# Patient Record
Sex: FEMALE | Race: WHITE | NOT HISPANIC OR LATINO | Employment: OTHER | URBAN - METROPOLITAN AREA
[De-identification: names, ages, dates, MRNs, and addresses within clinical notes are randomized per-mention and may not be internally consistent; named-entity substitution may affect disease eponyms.]

---

## 2017-02-24 ENCOUNTER — TELEPHONE (OUTPATIENT)
Dept: INTERNAL MEDICINE | Facility: CLINIC | Age: 67
End: 2017-02-24

## 2017-02-24 DIAGNOSIS — F32.0 MAJOR DEPRESSIVE DISORDER, SINGLE EPISODE, MILD (H): ICD-10-CM

## 2017-02-24 NOTE — TELEPHONE ENCOUNTER
Pt still has refills left on her prescription so she is going to call her mail order pharmacy and get this straightened out.

## 2017-02-24 NOTE — TELEPHONE ENCOUNTER
Reason for Call:  Other prescription    Detailed comments: Patient is requesting Dr Barry to refill 1 month supply of Bupropian, while she is in [place]. Will see MD in April when she comes home. Any question please call her.    Phone Number Patient can be reached at: Cell number on file:    Telephone Information:   Mobile [phone]       Best Time: Anytime    Can we leave a detailed message on this number? YES    Call taken on 2/24/2017 at 8:50 AM by MARCI NAVARRO

## 2017-04-25 ENCOUNTER — TELEPHONE (OUTPATIENT)
Dept: INTERNAL MEDICINE | Facility: CLINIC | Age: 67
End: 2017-04-25

## 2017-04-25 NOTE — TELEPHONE ENCOUNTER
Patient calling.  In October 2016, bilateral foot numbness started mostly in toes.  At office visit on 11/28/2016 started on Neurontin.  Since then she has only been taking 1 capsule at bedtime d/t drowsiness and only experiencing numbness at night.  Now experiencing bilateral foot numbness mostly in toes all day.  Reports does not experience any drowsiness now.  Patient wondering if she can increase the frequency of the medication.  Please advise.

## 2017-04-25 NOTE — TELEPHONE ENCOUNTER
Reason for call: Other   Patient called regarding (reason for call): wants to talk to nurse about foot pain, and see if she needs to see dr, or do any labs, now that she is back from [place]  Additional comments: Please call patient to discuss this with her    Phone Number Pt can be reached at: Home number on file [phone] (home)  Best Time: anytime  Can we leave a detailed message on this number? YES

## 2017-04-25 NOTE — TELEPHONE ENCOUNTER
If tolerates can take up to 600mg po TID, may need f/u appt if no resolve, higher dose may increase drowsiness.

## 2017-06-01 DIAGNOSIS — Z13.6 CARDIOVASCULAR SCREENING; LDL GOAL LESS THAN 100: ICD-10-CM

## 2017-06-01 DIAGNOSIS — F32.0 MAJOR DEPRESSIVE DISORDER, SINGLE EPISODE, MILD (H): ICD-10-CM

## 2017-06-01 DIAGNOSIS — K21.9 GASTROESOPHAGEAL REFLUX DISEASE WITHOUT ESOPHAGITIS: ICD-10-CM

## 2017-06-01 DIAGNOSIS — E11.9 TYPE 2 DIABETES MELLITUS WITHOUT COMPLICATION, WITHOUT LONG-TERM CURRENT USE OF INSULIN (H): ICD-10-CM

## 2017-06-01 DIAGNOSIS — R20.9 DISTURBANCE OF SKIN SENSATION: ICD-10-CM

## 2017-06-01 DIAGNOSIS — I10 ESSENTIAL HYPERTENSION, BENIGN: ICD-10-CM

## 2017-06-01 RX ORDER — BUPROPION HYDROCHLORIDE 150 MG/1
150 TABLET ORAL EVERY MORNING
Qty: 90 TABLET | Refills: 3 | Status: SHIPPED | OUTPATIENT
Start: 2017-06-01 | End: 2018-04-26

## 2017-06-01 RX ORDER — VALSARTAN 160 MG/1
160 TABLET ORAL DAILY
Qty: 90 TABLET | Refills: 3 | Status: SHIPPED | OUTPATIENT
Start: 2017-06-01 | End: 2017-06-12

## 2017-06-01 RX ORDER — SIMVASTATIN 20 MG
20 TABLET ORAL AT BEDTIME
Qty: 90 TABLET | Refills: 3 | Status: SHIPPED | OUTPATIENT
Start: 2017-06-01 | End: 2018-04-26

## 2017-06-01 RX ORDER — CITALOPRAM HYDROBROMIDE 20 MG/1
20 TABLET ORAL DAILY
Qty: 90 TABLET | Refills: 3 | Status: SHIPPED | OUTPATIENT
Start: 2017-06-01 | End: 2018-04-26

## 2017-06-01 NOTE — TELEPHONE ENCOUNTER
Reason for Call:  Other prescription    Detailed comments: Please fax all of the pt medications to [facility] to get them refilled.     Phone Number Patient can be reached at: Home number on file [phone] (home)    Best Time: asap    Can we leave a detailed message on this number? YES    Call taken on 6/1/2017 at 8:17 AM by Sonia Guan

## 2017-06-01 NOTE — TELEPHONE ENCOUNTER
Pt is due for office visit and labs.  Called and pt is scheduled to see you Monday.  Ok to fill all meds to mail order with 90 day supply now?  Pt says she is having no issues.

## 2017-06-05 ENCOUNTER — OFFICE VISIT (OUTPATIENT)
Dept: INTERNAL MEDICINE | Facility: CLINIC | Age: 67
End: 2017-06-05
Payer: MEDICARE

## 2017-06-05 VITALS
HEART RATE: 102 BPM | HEIGHT: 67 IN | SYSTOLIC BLOOD PRESSURE: 136 MMHG | DIASTOLIC BLOOD PRESSURE: 84 MMHG | TEMPERATURE: 98.4 F | WEIGHT: 218.3 LBS | OXYGEN SATURATION: 99 % | BODY MASS INDEX: 34.26 KG/M2

## 2017-06-05 DIAGNOSIS — F32.0 MAJOR DEPRESSIVE DISORDER, SINGLE EPISODE, MILD (H): ICD-10-CM

## 2017-06-05 DIAGNOSIS — E11.9 TYPE 2 DIABETES MELLITUS WITHOUT COMPLICATION, WITHOUT LONG-TERM CURRENT USE OF INSULIN (H): Primary | ICD-10-CM

## 2017-06-05 DIAGNOSIS — I10 ESSENTIAL HYPERTENSION, BENIGN: ICD-10-CM

## 2017-06-05 DIAGNOSIS — R20.9 DISTURBANCE OF SKIN SENSATION: ICD-10-CM

## 2017-06-05 DIAGNOSIS — Z71.89 ACP (ADVANCE CARE PLANNING): ICD-10-CM

## 2017-06-05 DIAGNOSIS — Z13.6 CARDIOVASCULAR SCREENING; LDL GOAL LESS THAN 100: ICD-10-CM

## 2017-06-05 LAB
ALBUMIN SERPL-MCNC: 3.6 G/DL (ref 3.4–5)
ALP SERPL-CCNC: 99 U/L (ref 40–150)
ALT SERPL W P-5'-P-CCNC: 24 U/L (ref 0–50)
ANION GAP SERPL CALCULATED.3IONS-SCNC: 8 MMOL/L (ref 3–14)
AST SERPL W P-5'-P-CCNC: 17 U/L (ref 0–45)
BILIRUB SERPL-MCNC: 0.6 MG/DL (ref 0.2–1.3)
BUN SERPL-MCNC: 10 MG/DL (ref 7–30)
CALCIUM SERPL-MCNC: 8.9 MG/DL (ref 8.5–10.1)
CHLORIDE SERPL-SCNC: 107 MMOL/L (ref 94–109)
CHOLEST SERPL-MCNC: 137 MG/DL
CO2 SERPL-SCNC: 28 MMOL/L (ref 20–32)
CREAT SERPL-MCNC: 0.61 MG/DL (ref 0.52–1.04)
GFR SERPL CREATININE-BSD FRML MDRD: ABNORMAL ML/MIN/1.7M2
GLUCOSE SERPL-MCNC: 129 MG/DL (ref 70–99)
HBA1C MFR BLD: 5.8 % (ref 4.3–6)
HDLC SERPL-MCNC: 57 MG/DL
LDLC SERPL CALC-MCNC: 62 MG/DL
NONHDLC SERPL-MCNC: 80 MG/DL
POTASSIUM SERPL-SCNC: 4.7 MMOL/L (ref 3.4–5.3)
PROT SERPL-MCNC: 7.3 G/DL (ref 6.8–8.8)
SODIUM SERPL-SCNC: 143 MMOL/L (ref 133–144)
TRIGL SERPL-MCNC: 88 MG/DL

## 2017-06-05 PROCEDURE — 36415 COLL VENOUS BLD VENIPUNCTURE: CPT | Performed by: INTERNAL MEDICINE

## 2017-06-05 PROCEDURE — 80053 COMPREHEN METABOLIC PANEL: CPT | Performed by: INTERNAL MEDICINE

## 2017-06-05 PROCEDURE — 80061 LIPID PANEL: CPT | Performed by: INTERNAL MEDICINE

## 2017-06-05 PROCEDURE — 99214 OFFICE O/P EST MOD 30 MIN: CPT | Performed by: INTERNAL MEDICINE

## 2017-06-05 PROCEDURE — 83036 HEMOGLOBIN GLYCOSYLATED A1C: CPT | Performed by: INTERNAL MEDICINE

## 2017-06-05 RX ORDER — GABAPENTIN 300 MG/1
CAPSULE ORAL
Qty: 270 CAPSULE | Refills: 3 | Status: SHIPPED | OUTPATIENT
Start: 2017-06-05 | End: 2018-04-26

## 2017-06-05 NOTE — NURSING NOTE
"Chief Complaint   Patient presents with     Recheck Medication       Initial /84  Pulse 102  Temp 98.4  F (36.9  C) (Oral)  Ht 5' 7\" (1.702 m)  Wt 218 lb 4.8 oz (99 kg)  SpO2 99%  BMI 34.19 kg/m2 Estimated body mass index is 34.19 kg/(m^2) as calculated from the following:    Height as of this encounter: 5' 7\" (1.702 m).    Weight as of this encounter: 218 lb 4.8 oz (99 kg).  Medication Reconciliation: complete   Raquel Leal CMA      "

## 2017-06-05 NOTE — PROGRESS NOTES
SUBJECTIVE:                                                    Diane Gaitan is a 67 year old female who presents to clinic today for the following health issues:    Diabetes Follow-up      Patient is checking blood sugars: not at all    Diabetic concerns: None     Symptoms of hypoglycemia (low blood sugar): none     Paresthesias (numbness or burning in feet) or sores: Yes- numbness, using Gabapentin     Date of last diabetic eye exam: UTD     Hyperlipidemia Follow-Up      Rate your low fat/cholesterol diet?: fair    Taking statin?  Yes, no muscle aches from statin    Other lipid medications/supplements?:  none     Hypertension Follow-up      Outpatient blood pressures are not being checked.    Low Salt Diet: low salt       Depression Followup    Status since last visit: Stable     See PHQ-9 for current symptoms.  Other associated symptoms: None    Complicating factors:   Significant life event:  No   Current substance abuse:  None  Anxiety or Panic symptoms:  No    PHQ-9  English PHQ-9   Any Language            Amount of exercise or physical activity: 2-3 days/week for an average of 45-60 minutes    Problems taking medications regularly: No    Medication side effects: none    Diet: low fat/cholesterol and diabetic    Other concerns:  1. Worsening neuropathy- using gabapentin 300 tid with good results     Problem list and histories reviewed & adjusted, as indicated.  Additional history: as documented    Patient Active Problem List   Diagnosis     Essential hypertension, benign     CARDIOVASCULAR SCREENING; LDL GOAL LESS THAN 100     ACP (advance care planning)     S/P total knee replacement     Osteoarthrosis, unspecified whether generalized or localized, involving lower leg     Acute posthemorrhagic anemia     Major depressive disorder, single episode, mild (H)     Gastroesophageal reflux disease without esophagitis     Type 2 diabetes mellitus without complication, without long-term current use of insulin  (H)     Past Surgical History:   Procedure Laterality Date     ARTHROPLASTY KNEE  10/18/2012    Procedure: ARTHROPLASTY KNEE;  RIGHT TOTAL KNEE ARTHROPLASTY ([employer]PHEW)^ ;  Surgeon: Howard Charles MD;  Location: [facility]     BREAST BIOPSY, RT/LT  1988    breast biopsy     C NONSPECIFIC PROCEDURE  10/30/96    skin tags removed     C NONSPECIFIC PROCEDURE      colonic polyps     COLONOSCOPY N/A 7/14/2016    Procedure: COLONOSCOPY;  Surgeon: Curt Patel MD;  Location:  GI     HYSTERECTOMY, PAP NO LONGER INDICATED  1986    abdominal hysterectomy       Social History   Substance Use Topics     Smoking status: Former Smoker     Packs/day: 1.00     Years: 12.00     Quit date: 10/18/1980     Smokeless tobacco: Never Used     Alcohol use Yes      Comment: OCCASIONAL     Family History   Problem Relation Age of Onset     Breast Cancer Sister      Colon Cancer Sister      Breast Cancer Daughter      Hypertension Daughter          Current Outpatient Prescriptions   Medication Sig Dispense Refill     buPROPion (WELLBUTRIN XL) 150 MG 24 hr tablet Take 1 tablet (150 mg) by mouth every morning 90 tablet 3     valsartan (DIOVAN) 160 MG tablet Take 1 tablet (160 mg) by mouth daily 90 tablet 3     citalopram (CELEXA) 20 MG tablet Take 1 tablet (20 mg) by mouth daily 90 tablet 3     metFORMIN (GLUCOPHAGE) 500 MG tablet Take 1 tablet (500 mg) by mouth 2 times daily (with meals) 180 tablet 3     simvastatin (ZOCOR) 20 MG tablet Take 1 tablet (20 mg) by mouth At Bedtime 90 tablet 3     omeprazole (PRILOSEC) 20 MG CR capsule Take 1 capsule (20 mg) by mouth daily 90 capsule 3     gabapentin (NEURONTIN) 300 MG capsule Take 1 tablet (300 mg) every night for 1-3 days, then 1 tablet twice daily for 1-3 days, then 1 tablet three times daily 90 capsule 5     Acetaminophen (TYLENOL PO) Take 1,000 mg by mouth every 6 hours as needed.         aspirin 81 MG tablet Take 1 tablet by mouth daily. 90 tablet 3     CALCIUM + D OR  "2 tablets daily       Allergies   Allergen Reactions     Lisinopril Cough     COUGH     Recent Labs   Lab Test  09/15/16   0651  04/25/16   0914  09/17/15   1035  01/19/15   0940  01/08/15   0806   06/21/13   0742   A1C  5.9  6.0  6.2*   --   6.1*   --   5.8   LDL   --   61   --    --   51   --   61   HDL   --   63   --    --   49*   --   58   TRIG   --   146   --    --   182*   --   155*   ALT  33   --    --   33   --    --   34   CR  0.70   --   0.79   --   0.73   --   0.65   GFRESTIMATED  83   --   73   --   80   --   >90   GFRESTBLACK  >90   GFR Calc     --   88   --   >90   GFR Calc     --   >90   POTASSIUM  4.1   --   4.1   --   4.3   --   3.9   TSH  1.59   --    --   1.17   --    < >   --     < > = values in this interval not displayed.      BP Readings from Last 3 Encounters:   11/28/16 128/80   09/15/16 124/74   07/14/16 131/72    Wt Readings from Last 3 Encounters:   11/28/16 211 lb 14.4 oz (96.1 kg)   09/15/16 209 lb 14.4 oz (95.2 kg)   07/14/16 210 lb (95.3 kg)           Labs reviewed in EPIC    Reviewed and updated as needed this visit by clinical staff  Tobacco  Allergies  Med Hx  Surg Hx  Fam Hx  Soc Hx      Reviewed and updated as needed this visit by Provider         ROS:  C: NEGATIVE for fever, chills, change in weight  E/M: NEGATIVE for ear, mouth and throat problems  R: NEGATIVE for significant cough or SOB  CV: NEGATIVE for chest pain, palpitations or peripheral edema  GI: NEGATIVE for nausea, abdominal pain, heartburn, or change in bowel habits  : NEGATIVE for frequency, dysuria, or hematuria  M: NEGATIVE for significant arthralgias or myalgia  H: NEGATIVE for bleeding problems  P: NEGATIVE for changes in mood or affect    OBJECTIVE:                                                    /84  Pulse 102  Temp 98.4  F (36.9  C) (Oral)  Ht 5' 7\" (1.702 m)  Wt 218 lb 4.8 oz (99 kg)  SpO2 99%  BMI 34.19 kg/m2  There is no height or weight on file to " calculate BMI.  GENERAL: alert and no distress  EYES: Eyes grossly normal to inspection, extraocular movements - intact, and PERRL  HENT: ear canals- normal; TMs- normal; Nose- normal; Mouth- no ulcers, no lesions  NECK: no tenderness, no adenopathy, no asymmetry, no masses, no stiffness; thyroid- normal to palpation  RESP: lungs clear to auscultation - no rales, no rhonchi, no wheezes  CV: regular rates and rhythm, normal S1 S2, no S3 or S4 and no click or rub   ABDOMEN: soft, no tenderness, no  hepatosplenomegaly, no masses, normal bowel sounds  MS: extremities- no gross deformities noted  PSYCH: Alert and oriented times 3; speech- coherent , normal rate and volume; able to articulate logical thoughts, able to abstract reason, no tangential thoughts, no hallucinations or delusions, affect- normal     ASSESSMENT/PLAN:                                                      (E11.9) Type 2 diabetes mellitus without complication, without long-term current use of insulin (H)  (primary encounter diagnosis)  Comment: labs as fsting today, stable on therapy, no changes  Plan: Hemoglobin A1c, gabapentin (NEURONTIN) 300 MG         capsule, Comprehensive metabolic panel            (I10) Essential hypertension, benign  Comment: at goal on therapy  Plan: Comprehensive metabolic panel            (F32.0) Major depressive disorder, single episode, mild (H)  Comment: stable per PHQ 9 score 0  Plan:     (Z13.6) CARDIOVASCULAR SCREENING; LDL GOAL LESS THAN 100  Comment:   LDL Cholesterol Calculated   Date Value Ref Range Status   04/25/2016 61 <100 mg/dL Final     Comment:     Desirable:       <100 mg/dl   ]  Plan: Lipid Profile        At goal, labs fasting    (R20.9) Disturbance of skin sensation  Comment: stable on TID dosing   Plan: gabapentin (NEURONTIN) 300 MG capsule            (Z71.89) ACP (advance care planning)  Comment:   Plan: noted, advised        See Patient Instructions    Jac Barry MD  [facility]  [place]EL    THE MEDICATION LIST HAS BEEN FULLY RECONCILED BY THE M[facility]. AND THE NURSING STAFF.  25 minutes spent with this patient, face to face, discussing treatment options for listed problems above as well as side effects of appropriate medications.  Counseling time extended beyond 50% of the clinic visit.  Medication dosing, treatment plan and follow-up were discussed. Also reviewed need for primary care testing for patient.

## 2017-06-05 NOTE — LETTER
[facility]  [address]  [address]      June 5, 2017      Diane Gaitan  [address] [facility] [address]          Dear Diane,      I have enclosed a copy of your most recent labs done here at the clinic and if available some of your prior labs for comparison.     I am pleased to inform you that your routine blood work including your sodium, potassium, calcium, kidney and liver function tests are all normal.    Your Hemoglobin A1C and blood sugar tests look good and I would continue with your medication without change.  These tests should be repeated in 6 months.    Your cholesterol looks good and I would not change anything at this point but would repeat your labs in 12 months.    Please call me if you have further questions.        Jac Barry MD

## 2017-06-05 NOTE — MR AVS SNAPSHOT
After Visit Summary   6/5/2017    Diane Gaitan    MRN: [id number]           Patient Information     Date Of Birth          1950        Visit Information        Provider Department      6/5/2017 8:00 AM Jac Barry MD [facility]        Today's Diagnoses     Type 2 diabetes mellitus without complication, without long-term current use of insulin (H)    -  1    Essential hypertension, benign        Major depressive disorder, single episode, mild (H)        CARDIOVASCULAR SCREENING; LDL GOAL LESS THAN 100        Disturbance of skin sensation        ACP (advance care planning)           Follow-ups after your visit        Follow-up notes from your care team     Return in about 6 months (around 12/5/2017), or if symptoms worsen or fail to improve, for BP Recheck, Lab Work.      Who to contact     If you have questions or need follow up information about today's clinic visit or your schedule please contact [facility] directly at [phone].  Normal or non-critical lab and imaging results will be communicated to you by ShotCliphart, letter or phone within 4 business days after the clinic has received the results. If you do not hear from us within 7 days, please contact the clinic through St[employer] or phone. If you have a critical or abnormal lab result, we will notify you by phone as soon as possible.  Submit refill requests through [employer] or call your pharmacy and they will forward the refill request to us. Please allow 3 business days for your refill to be completed.          Additional Information About Your Visit        MyChart Information     [employer] gives you secure access to your electronic health record. If you see a primary care provider, you can also send messages to your care team and make appointments. If you have questions, please call your primary care clinic.  If you do not have a primary care provider, please call [phone] and they  "will assist you.        Care EveryWhere ID     This is your Care EveryWhere ID. This could be used by other organizations to access your [facility] medical records  [id number]        Your Vitals Were     Pulse Temperature Height Pulse Oximetry BMI (Body Mass Index)       102 98.4  F (36.9  C) (Oral) 5' 7\" (1.702 m) 99% 34.19 kg/m2        Blood Pressure from Last 3 Encounters:   06/05/17 136/84   11/28/16 128/80   09/15/16 124/74    Weight from Last 3 Encounters:   06/05/17 218 lb 4.8 oz (99 kg)   11/28/16 211 lb 14.4 oz (96.1 kg)   09/15/16 209 lb 14.4 oz (95.2 kg)              We Performed the Following     Comprehensive metabolic panel     DEPRESSION ACTION PLAN (DAP)     Hemoglobin A1c     Lipid Profile          Today's Medication Changes          These changes are accurate as of: 6/5/17  8:21 AM.  If you have any questions, ask your nurse or doctor.               These medicines have changed or have updated prescriptions.        Dose/Directions    gabapentin 300 MG capsule   Commonly known as:  NEURONTIN   This may have changed:  additional instructions   Used for:  Type 2 diabetes mellitus without complication, without long-term current use of insulin (H), Disturbance of skin sensation   Changed by:  Jac Barry MD        1 tablet three times daily   Quantity:  270 capsule   Refills:  3            Where to get your medicines      These medications were sent to [facility] BY MAIL [facility]INDY CEDEÑO - [address]  [address] [place] [address]     Phone:  [phone]     gabapentin 300 MG capsule                Primary Care Provider Office Phone # Fax #    Jac Barry [phone]       [facility] [address] [facility] [address]        Thank you!     Thank you for choosing [facility]  for your care. Our goal is always to provide you with excellent care. Hearing back from our patients is one way we can continue to improve our " services. Please take a few minutes to complete the written survey that you may receive in the mail after your visit with us. Thank you!             Your Updated Medication List - Protect others around you: Learn how to safely use, store and throw away your medicines at www.disposemymeds.org.          This list is accurate as of: 6/5/17  8:21 AM.  Always use your most recent med list.                   Brand Name Dispense Instructions for use    aspirin 81 MG tablet     90 tablet    Take 1 tablet by mouth daily.       buPROPion 150 MG 24 hr tablet    WELLBUTRIN XL    90 tablet    Take 1 tablet (150 mg) by mouth every morning       CALCIUM + D PO      2 tablets daily       citalopram 20 MG tablet    celeXA    90 tablet    Take 1 tablet (20 mg) by mouth daily       gabapentin 300 MG capsule    NEURONTIN    270 capsule    1 tablet three times daily       metFORMIN 500 MG tablet    GLUCOPHAGE    180 tablet    Take 1 tablet (500 mg) by mouth 2 times daily (with meals)       omeprazole 20 MG CR capsule    priLOSEC    90 capsule    Take 1 capsule (20 mg) by mouth daily       simvastatin 20 MG tablet    ZOCOR    90 tablet    Take 1 tablet (20 mg) by mouth At Bedtime       TYLENOL PO      Take 1,000 mg by mouth every 6 hours as needed.       valsartan 160 MG tablet    DIOVAN    90 tablet    Take 1 tablet (160 mg) by mouth daily

## 2017-06-06 ASSESSMENT — PATIENT HEALTH QUESTIONNAIRE - PHQ9: SUM OF ALL RESPONSES TO PHQ QUESTIONS 1-9: 0

## 2017-06-12 ENCOUNTER — TELEPHONE (OUTPATIENT)
Dept: INTERNAL MEDICINE | Facility: CLINIC | Age: 67
End: 2017-06-12

## 2017-06-12 DIAGNOSIS — I10 ESSENTIAL HYPERTENSION, BENIGN: ICD-10-CM

## 2017-06-12 RX ORDER — VALSARTAN 160 MG/1
160 TABLET ORAL DAILY
Qty: 20 TABLET | Refills: 0 | Status: SHIPPED | OUTPATIENT
Start: 2017-06-12 | End: 2017-12-05

## 2017-06-12 NOTE — TELEPHONE ENCOUNTER
Pt is calling says that her mailorder pharmacy has not yet sent out her medications that were refilled by PCP on 6/1/17. She says that she has run out of Valsartan and is requesting about a 2 wk refill.   RX sent to requested [facility].

## 2017-07-11 ENCOUNTER — MYC MEDICAL ADVICE (OUTPATIENT)
Dept: INTERNAL MEDICINE | Facility: CLINIC | Age: 67
End: 2017-07-11

## 2017-08-30 ENCOUNTER — OFFICE VISIT (OUTPATIENT)
Dept: INTERNAL MEDICINE | Facility: CLINIC | Age: 67
End: 2017-08-30
Payer: MEDICARE

## 2017-08-30 VITALS
DIASTOLIC BLOOD PRESSURE: 70 MMHG | OXYGEN SATURATION: 98 % | SYSTOLIC BLOOD PRESSURE: 152 MMHG | TEMPERATURE: 99.2 F | BODY MASS INDEX: 34.89 KG/M2 | WEIGHT: 222.3 LBS | HEIGHT: 67 IN

## 2017-08-30 DIAGNOSIS — N63.0 LUMP OR MASS IN BREAST: ICD-10-CM

## 2017-08-30 DIAGNOSIS — N64.9 LESION OF BREAST: Primary | ICD-10-CM

## 2017-08-30 DIAGNOSIS — Z80.3 FAMILY HISTORY OF BREAST CANCER IN FIRST DEGREE RELATIVE: ICD-10-CM

## 2017-08-30 PROCEDURE — 99214 OFFICE O/P EST MOD 30 MIN: CPT | Performed by: INTERNAL MEDICINE

## 2017-08-30 NOTE — PROGRESS NOTES
"  SUBJECTIVE:                                                      HPI: Diane Gaitan is a pleasant 67 year old female who presents with a spot on her breast:    - right breast  - noticed ~1 week ago; no precipitating trauma  - was initially losing scant clear fluid, then started spotting blood  - asymptomatic - no pain or itching    - no associated lumps or bumps  - no fevers or chills  - no night sweats, unintentional weight loss, anorexia, or excessive fatigue    Last mammogram was in September, 2016 and was normal (other than scattered fibroglandular densities).    FH significant for:  - daughter with breast cancer diagnosed in her 40s  - sister with breast cancer diagnosed in her 60s  - niece (above sister's daughter) diagnosed with breast cancer in her 40s  - four maternal aunts with breast cancer  - paternal aunt with breast cancer    The medication, allergy, and problem lists have been reviewed and updated as appropriate.       OBJECTIVE:                                                      /70  Temp 99.2  F (37.3  C) (Oral)  Ht 5' 7\" (1.702 m)  Wt 222 lb 4.8 oz (100.8 kg)  SpO2 98%  BMI 34.82 kg/m2  Constitutional: well-appearing  Breasts: small scab with minimal surrounding mild erythema right areola at 9 o'clock; no masses or skin retraction; no nipple discharge or bleeding; no axillary lymphadenopathy      ASSESSMENT/PLAN:                                                      (N64.9) Lesion of breast  (primary encounter diagnosis)  (N63) Lump or mass in breast  (Z80.3) Family history of breast cancer in a first degree relative  Comment:    - skin lesion/scab right areola at 9 o'clock.   - suspect benign lesion healing appropriately, but family history is concerning.  Plan: right-sided diagnostic mammogram and ultrasound.    The instructions on the AVS were discussed and explained to the patient. Patient expressed understanding of instructions.    (Chart documentation was completed, in part, " with Dragon voice-recognition software. Even though reviewed, some grammatical, spelling, and word errors may remain.)    Marian Farris MD   [address] [address]  T: [phone], F: [phone]

## 2017-08-30 NOTE — NURSING NOTE
"Chief Complaint   Patient presents with     Derm Problem     R breast        Initial /70  Temp 99.2  F (37.3  C) (Oral)  Ht 5' 7\" (1.702 m)  Wt 222 lb 4.8 oz (100.8 kg)  SpO2 98%  BMI 34.82 kg/m2 Estimated body mass index is 34.82 kg/(m^2) as calculated from the following:    Height as of this encounter: 5' 7\" (1.702 m).    Weight as of this encounter: 222 lb 4.8 oz (100.8 kg).  Medication Reconciliation: complete   Jeana Arthur MA   "

## 2017-08-30 NOTE — MR AVS SNAPSHOT
After Visit Summary   8/30/2017    Diane Gaitan    MRN: [id number]           Patient Information     Date Of Birth          1950        Visit Information        Provider Department      8/30/2017 11:00 AM Marian Farris MD [facility]        Today's Diagnoses     Lesion of breast    -  1    Lump or mass in breast          Care Instructions    Please schedule right mammogram and ultrasound on your way out.           Follow-ups after your visit        Future tests that were ordered for you today     Open Future Orders        Priority Expected Expires Ordered    MA Diagnostic Digital Right Routine  8/30/2018 8/30/2017    US Breast Right Limited 1-3 Quadrants Routine  8/30/2018 8/30/2017            Who to contact     If you have questions or need follow up information about today's clinic visit or your schedule please contact [facility] directly at [phone].  Normal or non-critical lab and imaging results will be communicated to you by MyChart, letter or phone within 4 business days after the clinic has received the results. If you do not hear from us within 7 days, please contact the clinic through Arideashart or phone. If you have a critical or abnormal lab result, we will notify you by phone as soon as possible.  Submit refill requests through Happy Days or call your pharmacy and they will forward the refill request to us. Please allow 3 business days for your refill to be completed.          Additional Information About Your Visit        MyChart Information     Happy Days gives you secure access to your electronic health record. If you see a primary care provider, you can also send messages to your care team and make appointments. If you have questions, please call your primary care clinic.  If you do not have a primary care provider, please call [phone] and they will assist you.        Care EveryWhere ID     This is your Care EveryWhere ID.  "This could be used by other organizations to access your [facility] medical records  [id number]        Your Vitals Were     Temperature Height Pulse Oximetry BMI (Body Mass Index)          99.2  F (37.3  C) (Oral) 5' 7\" (1.702 m) 98% 34.82 kg/m2         Blood Pressure from Last 3 Encounters:   08/30/17 152/70   06/05/17 136/84   11/28/16 128/80    Weight from Last 3 Encounters:   08/30/17 222 lb 4.8 oz (100.8 kg)   06/05/17 218 lb 4.8 oz (99 kg)   11/28/16 211 lb 14.4 oz (96.1 kg)               Primary Care Provider Office Phone # Fax #    Jac Barry [phone]       [address] [place] [phone]        Equal Access to Services     [facility]: Hadii alexx mayorga hadasho Soomaali, waaxda luqadaha, qaybta kaalmada adeegyada, waxnicole rivera hayamie yang . So [facility] [phone].    ATENCIÓN: Si habla español, tiene a christina disposición servicios gratuitos de asistencia lingüística. Ingrid al [phone].    We comply with applicable federal civil rights laws and [place] laws. We do not discriminate on the basis of race, color, national origin, age, disability sex, sexual orientation or gender identity.            Thank you!     Thank you for choosing [facility]  for your care. Our goal is always to provide you with excellent care. Hearing back from our patients is one way we can continue to improve our services. Please take a few minutes to complete the written survey that you may receive in the mail after your visit with us. Thank you!             Your Updated Medication List - Protect others around you: Learn how to safely use, store and throw away your medicines at www.disposemymeds.org.          This list is accurate as of: 8/30/17 11:08 AM.  Always use your most recent med list.                   Brand Name Dispense Instructions for use Diagnosis    aspirin 81 MG tablet     90 tablet    Take 1 tablet by mouth daily.    Type 2 diabetes, HbA1c goal < " 7% (H)       buPROPion 150 MG 24 hr tablet    WELLBUTRIN XL    90 tablet    Take 1 tablet (150 mg) by mouth every morning    Major depressive disorder, single episode, mild (H)       CALCIUM + D PO      2 tablets daily        citalopram 20 MG tablet    celeXA    90 tablet    Take 1 tablet (20 mg) by mouth daily    Major depressive disorder, single episode, mild (H)       gabapentin 300 MG capsule    NEURONTIN    270 capsule    1 tablet three times daily    Type 2 diabetes mellitus without complication, without long-term current use of insulin (H), Disturbance of skin sensation       metFORMIN 500 MG tablet    GLUCOPHAGE    180 tablet    Take 1 tablet (500 mg) by mouth 2 times daily (with meals)    Type 2 diabetes mellitus without complication, without long-term current use of insulin (H)       omeprazole 20 MG CR capsule    priLOSEC    90 capsule    Take 1 capsule (20 mg) by mouth daily    Gastroesophageal reflux disease without esophagitis       simvastatin 20 MG tablet    ZOCOR    90 tablet    Take 1 tablet (20 mg) by mouth At Bedtime    CARDIOVASCULAR SCREENING; LDL GOAL LESS THAN 100, Type 2 diabetes mellitus without complication, without long-term current use of insulin (H)       TYLENOL PO      Take 1,000 mg by mouth every 6 hours as needed.        valsartan 160 MG tablet    DIOVAN    20 tablet    Take 1 tablet (160 mg) by mouth daily    Essential hypertension, benign

## 2017-09-06 ENCOUNTER — HOSPITAL ENCOUNTER (OUTPATIENT)
Dept: MAMMOGRAPHY | Facility: CLINIC | Age: 67
End: 2017-09-06
Attending: INTERNAL MEDICINE
Payer: MEDICARE

## 2017-09-06 ENCOUNTER — HOSPITAL ENCOUNTER (OUTPATIENT)
Dept: MAMMOGRAPHY | Facility: CLINIC | Age: 67
Discharge: HOME | End: 2017-09-06
Attending: INTERNAL MEDICINE | Admitting: INTERNAL MEDICINE
Payer: MEDICARE

## 2017-09-06 DIAGNOSIS — N64.9 LESION OF BREAST: ICD-10-CM

## 2017-09-06 DIAGNOSIS — N63.0 LUMP OR MASS IN BREAST: ICD-10-CM

## 2017-09-06 PROCEDURE — 76642 ULTRASOUND BREAST LIMITED: CPT | Mod: RT

## 2017-09-06 PROCEDURE — G0204 DX MAMMO INCL CAD BI: HCPCS

## 2017-11-07 ENCOUNTER — OFFICE VISIT (OUTPATIENT)
Dept: INTERNAL MEDICINE | Facility: CLINIC | Age: 67
End: 2017-11-07
Payer: MEDICARE

## 2017-11-07 VITALS
SYSTOLIC BLOOD PRESSURE: 134 MMHG | TEMPERATURE: 98.1 F | OXYGEN SATURATION: 98 % | BODY MASS INDEX: 35.05 KG/M2 | HEIGHT: 67 IN | HEART RATE: 102 BPM | WEIGHT: 223.3 LBS | DIASTOLIC BLOOD PRESSURE: 74 MMHG

## 2017-11-07 DIAGNOSIS — I10 ESSENTIAL HYPERTENSION, BENIGN: ICD-10-CM

## 2017-11-07 DIAGNOSIS — E11.9 TYPE 2 DIABETES MELLITUS WITHOUT COMPLICATION, WITHOUT LONG-TERM CURRENT USE OF INSULIN (H): Primary | ICD-10-CM

## 2017-11-07 DIAGNOSIS — F32.0 MAJOR DEPRESSIVE DISORDER, SINGLE EPISODE, MILD (H): ICD-10-CM

## 2017-11-07 DIAGNOSIS — Z23 NEED FOR PROPHYLACTIC VACCINATION AND INOCULATION AGAINST INFLUENZA: ICD-10-CM

## 2017-11-07 LAB
CREAT UR-MCNC: 51 MG/DL
HBA1C MFR BLD: 6.3 % (ref 4.3–6)
MICROALBUMIN UR-MCNC: <5 MG/L
MICROALBUMIN/CREAT UR: NORMAL MG/G CR (ref 0–25)
TSH SERPL DL<=0.005 MIU/L-ACNC: 1.23 MU/L (ref 0.4–4)

## 2017-11-07 PROCEDURE — 99214 OFFICE O/P EST MOD 30 MIN: CPT | Performed by: INTERNAL MEDICINE

## 2017-11-07 PROCEDURE — G0008 ADMIN INFLUENZA VIRUS VAC: HCPCS | Performed by: INTERNAL MEDICINE

## 2017-11-07 PROCEDURE — 84443 ASSAY THYROID STIM HORMONE: CPT | Performed by: INTERNAL MEDICINE

## 2017-11-07 PROCEDURE — 36415 COLL VENOUS BLD VENIPUNCTURE: CPT | Performed by: INTERNAL MEDICINE

## 2017-11-07 PROCEDURE — 83036 HEMOGLOBIN GLYCOSYLATED A1C: CPT | Performed by: INTERNAL MEDICINE

## 2017-11-07 PROCEDURE — 82043 UR ALBUMIN QUANTITATIVE: CPT | Performed by: INTERNAL MEDICINE

## 2017-11-07 PROCEDURE — 90662 IIV NO PRSV INCREASED AG IM: CPT | Performed by: INTERNAL MEDICINE

## 2017-11-07 ASSESSMENT — PATIENT HEALTH QUESTIONNAIRE - PHQ9: SUM OF ALL RESPONSES TO PHQ QUESTIONS 1-9: 0

## 2017-11-07 NOTE — NURSING NOTE
"Chief Complaint   Patient presents with     Diabetes     Extremity Weakness       Initial /84  Pulse 102  Temp 98.1  F (36.7  C) (Oral)  Ht 5' 7\" (1.702 m)  Wt 223 lb 4.8 oz (101.3 kg)  SpO2 98%  BMI 34.97 kg/m2 Estimated body mass index is 34.97 kg/(m^2) as calculated from the following:    Height as of this encounter: 5' 7\" (1.702 m).    Weight as of this encounter: 223 lb 4.8 oz (101.3 kg).  Medication Reconciliation: complete   Raquel Leal, MARIA A      "

## 2017-11-07 NOTE — MR AVS SNAPSHOT
After Visit Summary   11/7/2017    Diane Gaitan    MRN: [id number]           Patient Information     Date Of Birth          1950        Visit Information        Provider Department      11/7/2017 8:00 AM Jac Barry MD [facility]        Today's Diagnoses     Type 2 diabetes mellitus without complication, without long-term current use of insulin (H)    -  1    Essential hypertension, benign        Major depressive disorder, single episode, mild (H)           Follow-ups after your visit        Follow-up notes from your care team     Return in about 6 months (around 5/7/2018), or if symptoms worsen or fail to improve.      Who to contact     If you have questions or need follow up information about today's clinic visit or your schedule please contact [facility] directly at [phone].  Normal or non-critical lab and imaging results will be communicated to you by CitizenShipperhart, letter or phone within 4 business days after the clinic has received the results. If you do not hear from us within 7 days, please contact the clinic through CitizenShipperhart or phone. If you have a critical or abnormal lab result, we will notify you by phone as soon as possible.  Submit refill requests through Ozmott or call your pharmacy and they will forward the refill request to us. Please allow 3 business days for your refill to be completed.          Additional Information About Your Visit        MyChart Information     Ozmott gives you secure access to your electronic health record. If you see a primary care provider, you can also send messages to your care team and make appointments. If you have questions, please call your primary care clinic.  If you do not have a primary care provider, please call [phone] and they will assist you.        Care EveryWhere ID     This is your Care EveryWhere ID. This could be used by other organizations to access your [facility]  "records  [id number]        Your Vitals Were     Pulse Temperature Height Pulse Oximetry BMI (Body Mass Index)       102 98.1  F (36.7  C) (Oral) 5' 7\" (1.702 m) 98% 34.97 kg/m2        Blood Pressure from Last 3 Encounters:   11/07/17 134/74   08/30/17 152/70   06/05/17 136/84    Weight from Last 3 Encounters:   11/07/17 223 lb 4.8 oz (101.3 kg)   08/30/17 222 lb 4.8 oz (100.8 kg)   06/05/17 218 lb 4.8 oz (99 kg)              We Performed the Following     Albumin Random Urine Quantitative with Creat Ratio     Hemoglobin A1c     TSH with free T4 reflex        Primary Care Provider Office Phone # Fax #    Jac Barry [phone]       [address] [place] [phone]        Equal Access to Services     [facility]LEGARIO SCHRADER : Hadii aad ku hadasho Soomaali, waaxda luqadaha, qaybta kaalmada adeegyada, waxay jeancarlosin hayiwonan star yang . So [facility] [phone].    ATENCIÓN: Si habla español, tiene a christina disposición servicios gratuitos de asistencia lingüística. Ingrid al [phone].    We comply with applicable federal civil rights laws and [place] laws. We do not discriminate on the basis of race, color, national origin, age, disability, sex, sexual orientation, or gender identity.            Thank you!     Thank you for choosing [facility]  for your care. Our goal is always to provide you with excellent care. Hearing back from our patients is one way we can continue to improve our services. Please take a few minutes to complete the written survey that you may receive in the mail after your visit with us. Thank you!             Your Updated Medication List - Protect others around you: Learn how to safely use, store and throw away your medicines at www.disposemymeds.org.          This list is accurate as of: 11/7/17  8:19 AM.  Always use your most recent med list.                   Brand Name Dispense Instructions for use Diagnosis    aspirin 81 MG tablet     90 " tablet    Take 1 tablet by mouth daily.    Type 2 diabetes, HbA1c goal < 7% (H)       buPROPion 150 MG 24 hr tablet    WELLBUTRIN XL    90 tablet    Take 1 tablet (150 mg) by mouth every morning    Major depressive disorder, single episode, mild (H)       CALCIUM + D PO      2 tablets daily        citalopram 20 MG tablet    celeXA    90 tablet    Take 1 tablet (20 mg) by mouth daily    Major depressive disorder, single episode, mild (H)       gabapentin 300 MG capsule    NEURONTIN    270 capsule    1 tablet three times daily    Type 2 diabetes mellitus without complication, without long-term current use of insulin (H), Disturbance of skin sensation       metFORMIN 500 MG tablet    GLUCOPHAGE    180 tablet    Take 1 tablet (500 mg) by mouth 2 times daily (with meals)    Type 2 diabetes mellitus without complication, without long-term current use of insulin (H)       omeprazole 20 MG CR capsule    priLOSEC    90 capsule    Take 1 capsule (20 mg) by mouth daily    Gastroesophageal reflux disease without esophagitis       simvastatin 20 MG tablet    ZOCOR    90 tablet    Take 1 tablet (20 mg) by mouth At Bedtime    CARDIOVASCULAR SCREENING; LDL GOAL LESS THAN 100, Type 2 diabetes mellitus without complication, without long-term current use of insulin (H)       TYLENOL PO      Take 1,000 mg by mouth every 6 hours as needed.        valsartan 160 MG tablet    DIOVAN    20 tablet    Take 1 tablet (160 mg) by mouth daily    Essential hypertension, benign

## 2017-11-07 NOTE — LETTER
[facility]  [address] [id number] [phone]      11/7/2017       Diane Gaitan  [address] [place] [phone]        Dear Diane,    Your Hemoglobin A1C is stable although slightly more abnormal and indicates your blood sugars could be better controlled with better diet and exercise.  Please follow-up in the clinic to repeat these labs in 6 months for comparison.    Your thyroid function tests look good and thus I would not change anything at this point.    Sincerely,      Jac Barry MD  Internal Medicine

## 2017-11-07 NOTE — PROGRESS NOTES
SUBJECTIVE:   Diane Gaitan is a 67 year old female who presents to clinic today for the following health issues:      Diabetes Follow-up      Patient is checking blood sugars: not at all    Diabetic concerns: None     Symptoms of hypoglycemia (low blood sugar): none     Paresthesias (numbness or burning in feet) or sores: Yes- numbness and burning. Using gabapentin      Date of last diabetic eye exam: 4/2017        Amount of exercise or physical activity: 6-7 days/week for an average of less than 15 minutes    Problems taking medications regularly: No    Medication side effects: none    Diet: regular (no restrictions)    Other concerns:  1. Bilateral leg weakness, worse with prolonged walking. Using cane intermittently. Discussed with patient as intermittent, no pain, did contact Orthopedist, no other focal changes, unsure of BS at time as does not check, discussed and advised close observation.      Problem list and histories reviewed & adjusted, as indicated.  Additional history: as documented    Patient Active Problem List   Diagnosis     Essential hypertension, benign     CARDIOVASCULAR SCREENING; LDL GOAL LESS THAN 100     ACP (advance care planning)     S/P total knee replacement     Osteoarthrosis, unspecified whether generalized or localized, involving lower leg     Acute posthemorrhagic anemia     Major depressive disorder, single episode, mild (H)     Gastroesophageal reflux disease without esophagitis     Type 2 diabetes mellitus without complication, without long-term current use of insulin (H)     Past Surgical History:   Procedure Laterality Date     ARTHROPLASTY KNEE  10/18/2012    Procedure: ARTHROPLASTY KNEE;  RIGHT TOTAL KNEE ARTHROPLASTY (SMITH & NEPHEW)^ ;  Surgeon: Howard Charles MD;  Location: [facility] OR     BREAST BIOPSY, RT/LT  1988    breast biopsy     C NONSPECIFIC PROCEDURE  10/30/96    skin tags removed     C NONSPECIFIC PROCEDURE      colonic polyps     COLONOSCOPY N/A  7/14/2016    Procedure: COLONOSCOPY;  Surgeon: Curt Patel MD;  Location:  GI     HYSTERECTOMY, PAP NO LONGER INDICATED  1986    abdominal hysterectomy       Social History   Substance Use Topics     Smoking status: Former Smoker     Packs/day: 1.00     Years: 12.00     Quit date: 10/18/1980     Smokeless tobacco: Never Used     Alcohol use Yes      Comment: OCCASIONAL     Family History   Problem Relation Age of Onset     Breast Cancer Sister      Colon Cancer Sister      Breast Cancer Daughter      Hypertension Daughter          Current Outpatient Prescriptions   Medication Sig Dispense Refill     valsartan (DIOVAN) 160 MG tablet Take 1 tablet (160 mg) by mouth daily 20 tablet 0     gabapentin (NEURONTIN) 300 MG capsule 1 tablet three times daily 270 capsule 3     buPROPion (WELLBUTRIN XL) 150 MG 24 hr tablet Take 1 tablet (150 mg) by mouth every morning 90 tablet 3     citalopram (CELEXA) 20 MG tablet Take 1 tablet (20 mg) by mouth daily 90 tablet 3     metFORMIN (GLUCOPHAGE) 500 MG tablet Take 1 tablet (500 mg) by mouth 2 times daily (with meals) 180 tablet 3     simvastatin (ZOCOR) 20 MG tablet Take 1 tablet (20 mg) by mouth At Bedtime 90 tablet 3     omeprazole (PRILOSEC) 20 MG CR capsule Take 1 capsule (20 mg) by mouth daily 90 capsule 3     Acetaminophen (TYLENOL PO) Take 1,000 mg by mouth every 6 hours as needed.         aspirin 81 MG tablet Take 1 tablet by mouth daily. 90 tablet 3     CALCIUM + D OR 2 tablets daily       Allergies   Allergen Reactions     Lisinopril Cough     COUGH     Recent Labs   Lab Test  06/05/17   0819  09/15/16   0651  04/25/16   0914   01/19/15   0940  01/08/15   0806   A1C  5.8  5.9  6.0   < >   --   6.1*   LDL  62   --   61   --    --   51   HDL  57   --   63   --    --   49*   TRIG  88   --   146   --    --   182*   ALT  24  33   --    --   33   --    CR  0.61  0.70   --    < >   --   0.73   GFRESTIMATED  >90  Non  GFR Calc    83   --    < >   --    "80   GFRESTBLACK  >90   GFR Calc    >90   GFR Calc     --    < >   --   >90   GFR Calc     POTASSIUM  4.7  4.1   --    < >   --   4.3   TSH   --   1.59   --    --   1.17   --     < > = values in this interval not displayed.      BP Readings from Last 3 Encounters:   11/07/17 144/84   08/30/17 152/70   06/05/17 136/84    Wt Readings from Last 3 Encounters:   11/07/17 223 lb 4.8 oz (101.3 kg)   08/30/17 222 lb 4.8 oz (100.8 kg)   06/05/17 218 lb 4.8 oz (99 kg)            Labs reviewed in EPIC      Reviewed and updated as needed this visit by clinical staffTobacco  Allergies  Med Hx  Surg Hx  Fam Hx  Soc Hx      Reviewed and updated as needed this visit by Provider         ROS:  C: NEGATIVE for fever, chills, change in weight  E/M: NEGATIVE for ear, mouth and throat problems  R: NEGATIVE for significant cough or SOB  CV: NEGATIVE for chest pain, palpitations or peripheral edema  GI: NEGATIVE for nausea, abdominal pain, heartburn, or change in bowel habits  : NEGATIVE for frequency, dysuria, or hematuria  M: NEGATIVE for significant arthralgias or myalgia  N: NEGATIVE for weakness, dizziness or paresthesias  H: NEGATIVE for bleeding problems  P: NEGATIVE for changes in mood or affect    OBJECTIVE:                                                    /74  Pulse 102  Temp 98.1  F (36.7  C) (Oral)  Ht 5' 7\" (1.702 m)  Wt 223 lb 4.8 oz (101.3 kg)  SpO2 98%  BMI 34.97 kg/m2  Body mass index is 34.97 kg/(m^2).  GENERAL: alert and no distress  EYES: Eyes grossly normal to inspection, extraocular movements - intact, and PERRL  HENT: ear canals- normal; TMs- normal; Nose- normal; Mouth- no ulcers, no lesions  NECK: no tenderness, no adenopathy, no asymmetry, no masses, no stiffness; thyroid- normal to palpation  RESP: lungs clear to auscultation - no rales, no rhonchi, no wheezes  CV: regular rates and rhythm, normal S1 S2, no S3 or S4 and no murmur, no click or " rub -  MS: extremities- no gross deformities noted, no edema  NEURO: strength and tone- normal, sensory exam- grossly normal, mentation- intact, speech- normal, reflexes- symmetric  PSYCH: Alert and oriented times 3; speech- coherent , normal rate and volume; able to articulate logical thoughts, able to abstract reason, no tangential thoughts, no hallucinations or delusions, affect- normal       ASSESSMENT/PLAN:                                                      (E11.9) Type 2 diabetes mellitus without complication, without long-term current use of insulin (H)  (primary encounter diagnosis)  Comment: stable on therapy, labs as fasting  Plan: Hemoglobin A1c, TSH with free T4 reflex,         Albumin Random Urine Quantitative with Creat         Ratio        advised better compliance with Accu checks    (I10) Essential hypertension, benign  Comment: stable on therapy  Plan:     (F32.0) Major depressive disorder, single episode, mild (H)  Comment: stable per PHQ 9 score 0  Plan:       See Patient Instructions    Jac Barry MD  [facility]    25 minutes spent with this patient, face to face, discussing treatment options for listed problems above as well as side effects of appropriate medications.  Counseling time extended beyond 50% of the clinic visit.  Medication dosing, treatment plan and follow-up were discussed. Also reviewed need for primary care testing for patient.

## 2017-12-05 ENCOUNTER — TELEPHONE (OUTPATIENT)
Dept: INTERNAL MEDICINE | Facility: CLINIC | Age: 67
End: 2017-12-05

## 2017-12-05 DIAGNOSIS — I10 ESSENTIAL HYPERTENSION, BENIGN: ICD-10-CM

## 2017-12-05 RX ORDER — VALSARTAN 160 MG/1
160 TABLET ORAL DAILY
Qty: 90 TABLET | Refills: 3 | Status: SHIPPED | OUTPATIENT
Start: 2017-12-05 | End: 2018-09-24

## 2017-12-05 NOTE — TELEPHONE ENCOUNTER
Reason for Call:  Medication or medication refill:    Do you use a [facility]?  Name of the pharmacy and phone number for the current request:  [facility] [address] [place] [address] - [phone]    Name of the medication requested: valsartan    Other request: [facility]  [facility] isn't able to fill the valsartan, so the pt is requesting that Dr Barry write a new script for [facility].    Can we leave a detailed message on this number? YES    Phone number patient can be reached at: Home number on file [phone] (home)    Best Time: anytime    Call taken on 12/5/2017 at 9:44 AM by DAINA CHAPPELL

## 2017-12-05 NOTE — TELEPHONE ENCOUNTER
valsartan (DIOVAN) 160 MG tablet  Last Written Prescription Date: 6/12/17  Last Fill Quantity: 20, # refills: 0  Last Office Visit with G, [facility] or [facility] prescribing provider: 11/7/17       Potassium   Date Value Ref Range Status   06/05/2017 4.7 3.4 - 5.3 mmol/L Final     Creatinine   Date Value Ref Range Status   06/05/2017 0.61 0.52 - 1.04 mg/dL Final     BP Readings from Last 3 Encounters:   11/07/17 134/74   08/30/17 152/70   06/05/17 136/84

## 2018-04-26 ENCOUNTER — OFFICE VISIT (OUTPATIENT)
Dept: INTERNAL MEDICINE | Facility: CLINIC | Age: 68
End: 2018-04-26
Payer: MEDICARE

## 2018-04-26 VITALS
OXYGEN SATURATION: 97 % | BODY MASS INDEX: 33.79 KG/M2 | DIASTOLIC BLOOD PRESSURE: 76 MMHG | HEIGHT: 67 IN | SYSTOLIC BLOOD PRESSURE: 136 MMHG | TEMPERATURE: 98.4 F | RESPIRATION RATE: 16 BRPM | HEART RATE: 105 BPM | WEIGHT: 215.3 LBS

## 2018-04-26 DIAGNOSIS — E11.9 TYPE 2 DIABETES MELLITUS WITHOUT COMPLICATION, WITHOUT LONG-TERM CURRENT USE OF INSULIN (H): Primary | ICD-10-CM

## 2018-04-26 DIAGNOSIS — Z13.6 CARDIOVASCULAR SCREENING; LDL GOAL LESS THAN 100: ICD-10-CM

## 2018-04-26 DIAGNOSIS — R20.9 DISTURBANCE OF SKIN SENSATION: ICD-10-CM

## 2018-04-26 DIAGNOSIS — F32.0 MAJOR DEPRESSIVE DISORDER, SINGLE EPISODE, MILD (H): ICD-10-CM

## 2018-04-26 DIAGNOSIS — I10 ESSENTIAL HYPERTENSION, BENIGN: ICD-10-CM

## 2018-04-26 DIAGNOSIS — K21.9 GASTROESOPHAGEAL REFLUX DISEASE WITHOUT ESOPHAGITIS: ICD-10-CM

## 2018-04-26 LAB
ALBUMIN SERPL-MCNC: 3.6 G/DL (ref 3.4–5)
ALP SERPL-CCNC: 96 U/L (ref 40–150)
ALT SERPL W P-5'-P-CCNC: 25 U/L (ref 0–50)
ANION GAP SERPL CALCULATED.3IONS-SCNC: 9 MMOL/L (ref 3–14)
AST SERPL W P-5'-P-CCNC: 12 U/L (ref 0–45)
BILIRUB SERPL-MCNC: 0.7 MG/DL (ref 0.2–1.3)
BUN SERPL-MCNC: 7 MG/DL (ref 7–30)
CALCIUM SERPL-MCNC: 9.3 MG/DL (ref 8.5–10.1)
CHLORIDE SERPL-SCNC: 106 MMOL/L (ref 94–109)
CHOLEST SERPL-MCNC: 123 MG/DL
CO2 SERPL-SCNC: 26 MMOL/L (ref 20–32)
CREAT SERPL-MCNC: 0.61 MG/DL (ref 0.52–1.04)
GFR SERPL CREATININE-BSD FRML MDRD: >90 ML/MIN/1.7M2
GLUCOSE SERPL-MCNC: 150 MG/DL (ref 70–99)
HBA1C MFR BLD: 6 % (ref 0–5.6)
HDLC SERPL-MCNC: 48 MG/DL
LDLC SERPL CALC-MCNC: 41 MG/DL
NONHDLC SERPL-MCNC: 75 MG/DL
POTASSIUM SERPL-SCNC: 4.4 MMOL/L (ref 3.4–5.3)
PROT SERPL-MCNC: 7.5 G/DL (ref 6.8–8.8)
SODIUM SERPL-SCNC: 141 MMOL/L (ref 133–144)
TRIGL SERPL-MCNC: 169 MG/DL

## 2018-04-26 PROCEDURE — 99214 OFFICE O/P EST MOD 30 MIN: CPT | Performed by: INTERNAL MEDICINE

## 2018-04-26 PROCEDURE — 36415 COLL VENOUS BLD VENIPUNCTURE: CPT | Performed by: INTERNAL MEDICINE

## 2018-04-26 PROCEDURE — 83036 HEMOGLOBIN GLYCOSYLATED A1C: CPT | Performed by: INTERNAL MEDICINE

## 2018-04-26 PROCEDURE — 80053 COMPREHEN METABOLIC PANEL: CPT | Performed by: INTERNAL MEDICINE

## 2018-04-26 PROCEDURE — 80061 LIPID PANEL: CPT | Performed by: INTERNAL MEDICINE

## 2018-04-26 RX ORDER — GABAPENTIN 300 MG/1
CAPSULE ORAL
Qty: 270 CAPSULE | Refills: 3 | Status: ON HOLD | OUTPATIENT
Start: 2018-04-26 | End: 2018-09-13

## 2018-04-26 RX ORDER — SIMVASTATIN 20 MG
20 TABLET ORAL AT BEDTIME
Qty: 90 TABLET | Refills: 3 | Status: SHIPPED | OUTPATIENT
Start: 2018-04-26 | End: 2019-05-02

## 2018-04-26 RX ORDER — BUPROPION HYDROCHLORIDE 150 MG/1
150 TABLET ORAL EVERY MORNING
Qty: 90 TABLET | Refills: 3 | Status: SHIPPED | OUTPATIENT
Start: 2018-04-26 | End: 2018-12-13

## 2018-04-26 RX ORDER — CITALOPRAM HYDROBROMIDE 20 MG/1
20 TABLET ORAL DAILY
Qty: 90 TABLET | Refills: 3 | Status: SHIPPED | OUTPATIENT
Start: 2018-04-26 | End: 2019-05-02

## 2018-04-26 ASSESSMENT — PAIN SCALES - GENERAL: PAINLEVEL: NO PAIN (0)

## 2018-04-26 NOTE — LETTER
[facility]  [address] [address]  [phone]      4/26/2018       Diane Gaitan  [address] [facility] [phone]        Dear Diane,      Your Hemoglobin A1C and blood sugar tests look good and I would continue with your medication without change.  These tests should be repeated in 6 months.    I am pleased to inform you that your routine blood work including your sodium, potassium, calcium, kidney and liver function tests are all normal.    Your cholesterol looks good and I would not change anything at this point but would repeat your labs in 12 months and continue to work on your diet and exercise as this will help lower your triglyceride level.    Sincerely,      Jac Barry MD  Internal Medicine

## 2018-04-26 NOTE — MR AVS SNAPSHOT
After Visit Summary   4/26/2018    Diane Gaitan    MRN: [id number]           Patient Information     Date Of Birth          1950        Visit Information        Provider Department      4/26/2018 8:00 AM Jac Barry MD [facility]        Today's Diagnoses     Type 2 diabetes mellitus without complication, without long-term current use of insulin (H)    -  1    Major depressive disorder, single episode, mild (H)        Essential hypertension, benign        CARDIOVASCULAR SCREENING; LDL GOAL LESS THAN 100        Gastroesophageal reflux disease without esophagitis        Disturbance of skin sensation           Follow-ups after your visit        Follow-up notes from your care team     Return in about 6 months (around 10/26/2018), or if symptoms worsen or fail to improve.      Who to contact     If you have questions or need follow up information about today's clinic visit or your schedule please contact [facility] directly at [phone].  Normal or non-critical lab and imaging results will be communicated to you by MyChart, letter or phone within 4 business days after the clinic has received the results. If you do not hear from us within 7 days, please contact the clinic through [employer] or phone. If you have a critical or abnormal lab result, we will notify you by phone as soon as possible.  Submit refill requests through [employer] or call your pharmacy and they will forward the refill request to us. Please allow 3 business days for your refill to be completed.          Additional Information About Your Visit        MyChart Information     [employer] gives you secure access to your electronic health record. If you see a primary care provider, you can also send messages to your care team and make appointments. If you have questions, please call your primary care clinic.  If you do not have a primary care provider, please call [phone] and they  "will assist you.        Care EveryWhere ID     This is your Care EveryWhere ID. This could be used by other organizations to access your [place] medical records  [id number]        Your Vitals Were     Pulse Temperature Respirations Height Pulse Oximetry BMI (Body Mass Index)    105 98.4  F (36.9  C) (Oral) 16 5' 7\" (1.702 m) 97% 33.72 kg/m2       Blood Pressure from Last 3 Encounters:   04/26/18 136/76   11/07/17 134/74   08/30/17 152/70    Weight from Last 3 Encounters:   04/26/18 215 lb 4.8 oz (97.7 kg)   11/07/17 223 lb 4.8 oz (101.3 kg)   08/30/17 222 lb 4.8 oz (100.8 kg)              We Performed the Following     Comprehensive metabolic panel     Hemoglobin A1c     Lipid Profile          Where to get your medicines      These medications were sent to [facility] BY MAIL SINDY CEDE[facility]O [facility] [address] [facility]  [address] [facility] [place] [address]     Phone:  [phone]     buPROPion 150 MG 24 hr tablet    citalopram 20 MG tablet    gabapentin 300 MG capsule    metFORMIN 500 MG tablet    omeprazole 20 MG CR capsule    simvastatin 20 MG tablet          Primary Care Provider Office Phone # Fax #    Jac Barry [phone]       [address] [place] [id number]        Equal Access to Services     YUNG SCHRADER AH: Hadii alexx ku hadasho Soblazeali, waaxda luqadaha, qaybta kaalmada jada, leisa acosta. So [facility] [phone].    ATENCIÓN: Si habla español, tiene a christina disposición servicios gratuitos de asistencia lingüística. Ingrid al [phone].    We comply with applicable federal civil rights laws and [place] laws. We do not discriminate on the basis of race, color, national origin, age, disability, sex, sexual orientation, or gender identity.            Thank you!     Thank you for choosing [facility]  for your care. Our goal is always to provide you with excellent care. Hearing back from our patients is one way we can " continue to improve our services. Please take a few minutes to complete the written survey that you may receive in the mail after your visit with us. Thank you!             Your Updated Medication List - Protect others around you: Learn how to safely use, store and throw away your medicines at www.disposemymeds.org.          This list is accurate as of 4/26/18  8:08 AM.  Always use your most recent med list.                   Brand Name Dispense Instructions for use Diagnosis    aspirin 81 MG tablet     90 tablet    Take 1 tablet by mouth daily.    Type 2 diabetes, HbA1c goal < 7% (H)       buPROPion 150 MG 24 hr tablet    WELLBUTRIN XL    90 tablet    Take 1 tablet (150 mg) by mouth every morning    Major depressive disorder, single episode, mild (H)       CALCIUM + D PO      2 tablets daily        citalopram 20 MG tablet    celeXA    90 tablet    Take 1 tablet (20 mg) by mouth daily    Major depressive disorder, single episode, mild (H)       gabapentin 300 MG capsule    NEURONTIN    270 capsule    1 tablet three times daily    Type 2 diabetes mellitus without complication, without long-term current use of insulin (H), Disturbance of skin sensation       metFORMIN 500 MG tablet    GLUCOPHAGE    180 tablet    Take 1 tablet (500 mg) by mouth 2 times daily (with meals)    Type 2 diabetes mellitus without complication, without long-term current use of insulin (H)       omeprazole 20 MG CR capsule    priLOSEC    90 capsule    Take 1 capsule (20 mg) by mouth daily    Gastroesophageal reflux disease without esophagitis       simvastatin 20 MG tablet    ZOCOR    90 tablet    Take 1 tablet (20 mg) by mouth At Bedtime    CARDIOVASCULAR SCREENING; LDL GOAL LESS THAN 100, Type 2 diabetes mellitus without complication, without long-term current use of insulin (H)       TYLENOL PO      Take 1,000 mg by mouth every 6 hours as needed.        valsartan 160 MG tablet    DIOVAN    90 tablet    Take 1 tablet (160 mg) by mouth daily     Essential hypertension, benign

## 2018-04-26 NOTE — PROGRESS NOTES
SUBJECTIVE:   Diane Gaitan is a 68 year old female who presents to clinic today for the following health issues:    Diabetes Follow-up      Patient is checking blood sugars: not at all    Diabetic concerns: None     Symptoms of hypoglycemia (low blood sugar): none     Paresthesias (numbness or burning in feet) or sores: Yes- numbness, improving with gabapentin      Date of last diabetic eye exam: Scheduled Monday     Hyperlipidemia Follow-Up      Rate your low fat/cholesterol diet?: fair    Taking statin?  Yes, no muscle aches from statin    Other lipid medications/supplements?:  none    Hypertension Follow-up      Outpatient blood pressures are not being checked.    Low Salt Diet: low salt    Depression Followup    Status since last visit: Stable     See PHQ-9 for current symptoms.  Other associated symptoms: None    Complicating factors:   Significant life event:  No   Current substance abuse:  None  Anxiety or Panic symptoms:  No    PHQ-9 11/28/2016 6/5/2017 11/7/2017   Total Score 0 0 0   Q9: Suicide Ideation Not at all Not at all Not at all       PHQ-9  English  PHQ-9   Any Language  Suicide Assessment Five-step Evaluation and Treatment (SAFE-T)    BP Readings from Last 2 Encounters:   11/07/17 134/74   08/30/17 152/70     Hemoglobin A1C (%)   Date Value   11/07/2017 6.3 (H)   06/05/2017 5.8     LDL Cholesterol Calculated (mg/dL)   Date Value   06/05/2017 62   04/25/2016 61       Amount of exercise or physical activity: 6-7 days/week for an average of less than 15 minutes    Problems taking medications regularly: No    Medication side effects: none    Diet: regular (no restrictions)      Problem list and histories reviewed & adjusted, as indicated.  Additional history: as documented    Patient Active Problem List   Diagnosis     Essential hypertension, benign     CARDIOVASCULAR SCREENING; LDL GOAL LESS THAN 100     ACP (advance care planning)     S/P total knee replacement     Osteoarthrosis, unspecified  whether generalized or localized, involving lower leg     Acute posthemorrhagic anemia     Major depressive disorder, single episode, mild (H)     Gastroesophageal reflux disease without esophagitis     Type 2 diabetes mellitus without complication, without long-term current use of insulin (H)     Past Surgical History:   Procedure Laterality Date     ARTHROPLASTY KNEE  10/18/2012    Procedure: ARTHROPLASTY KNEE;  RIGHT TOTAL KNEE ARTHROPLASTY (SMITH & NEPHEW)^ ;  Surgeon: Howard Charles MD;  Location: [facility] OR     BREAST BIOPSY, RT/LT  1988    breast biopsy     C NONSPECIFIC PROCEDURE  10/30/96    skin tags removed     C NONSPECIFIC PROCEDURE      colonic polyps     COLONOSCOPY N/A 7/14/2016    Procedure: COLONOSCOPY;  Surgeon: Curt Patel MD;  Location:  GI     HYSTERECTOMY, PAP NO LONGER INDICATED  1986    abdominal hysterectomy       Social History   Substance Use Topics     Smoking status: Former Smoker     Packs/day: 1.00     Years: 12.00     Quit date: 10/18/1980     Smokeless tobacco: Never Used     Alcohol use Yes      Comment: OCCASIONAL     Family History   Problem Relation Age of Onset     Breast Cancer Sister      Colon Cancer Sister      Breast Cancer Daughter      Hypertension Daughter          Current Outpatient Prescriptions   Medication Sig Dispense Refill     Acetaminophen (TYLENOL PO) Take 1,000 mg by mouth every 6 hours as needed.         aspirin 81 MG tablet Take 1 tablet by mouth daily. 90 tablet 3     buPROPion (WELLBUTRIN XL) 150 MG 24 hr tablet Take 1 tablet (150 mg) by mouth every morning 90 tablet 3     CALCIUM + D OR 2 tablets daily       citalopram (CELEXA) 20 MG tablet Take 1 tablet (20 mg) by mouth daily 90 tablet 3     gabapentin (NEURONTIN) 300 MG capsule 1 tablet three times daily 270 capsule 3     metFORMIN (GLUCOPHAGE) 500 MG tablet Take 1 tablet (500 mg) by mouth 2 times daily (with meals) 180 tablet 3     omeprazole (PRILOSEC) 20 MG CR capsule Take 1  capsule (20 mg) by mouth daily 90 capsule 3     simvastatin (ZOCOR) 20 MG tablet Take 1 tablet (20 mg) by mouth At Bedtime 90 tablet 3     valsartan (DIOVAN) 160 MG tablet Take 1 tablet (160 mg) by mouth daily 90 tablet 3     Allergies   Allergen Reactions     Lisinopril Cough     COUGH     Recent Labs   Lab Test  11/07/17   0811  06/05/17   0819  09/15/16   0651  04/25/16   0914   01/19/15   0940  01/08/15   0806   A1C  6.3*  5.8  5.9  6.0   < >   --   6.1*   LDL   --   62   --   61   --    --   51   HDL   --   57   --   63   --    --   49*   TRIG   --   88   --   146   --    --   182*   ALT   --   24  33   --    --   33   --    CR   --   0.61  0.70   --    < >   --   0.73   GFRESTIMATED   --   >90  Non  GFR Calc    83   --    < >   --   80   GFRESTBLACK   --   >90   GFR Calc    >90   GFR Calc     --    < >   --   >90   GFR Calc     POTASSIUM   --   4.7  4.1   --    < >   --   4.3   TSH  1.23   --   1.59   --    --   1.17   --     < > = values in this interval not displayed.      BP Readings from Last 3 Encounters:   11/07/17 134/74   08/30/17 152/70   06/05/17 136/84    Wt Readings from Last 3 Encounters:   11/07/17 223 lb 4.8 oz (101.3 kg)   08/30/17 222 lb 4.8 oz (100.8 kg)   06/05/17 218 lb 4.8 oz (99 kg)        Labs reviewed in EPIC    Reviewed and updated as needed this visit by clinical staff       Reviewed and updated as needed this visit by Provider         ROS:  CONSTITUTIONAL: NEGATIVE for fever, chills, change in weight  ENT/MOUTH: NEGATIVE for ear, mouth and throat problems  RESP: NEGATIVE for significant cough or SOB  CV: NEGATIVE for chest pain, palpitations or peripheral edema  GI: NEGATIVE for nausea, abdominal pain, heartburn, or change in bowel habits  : NEGATIVE for frequency, dysuria, or hematuria  MUSCULOSKELETAL: NEGATIVE for significant arthralgias or myalgia  NEURO: NEGATIVE for weakness, dizziness or paresthesias  HEME:  "NEGATIVE for bleeding problems  PSYCHIATRIC: NEGATIVE for changes in mood or affect    OBJECTIVE:                                                    /76  Pulse 105  Temp 98.4  F (36.9  C) (Oral)  Resp 16  Ht 5' 7\" (1.702 m)  Wt 215 lb 4.8 oz (97.7 kg)  SpO2 97%  BMI 33.72 kg/m2  Body mass index is 33.72 kg/(m^2).  GENERAL: alert and no distress  EYES: Eyes grossly normal to inspection, extraocular movements - intact, and PERRL  HENT: ear canals- normal; TMs- normal; Nose- normal; Mouth- no ulcers, no lesions  NECK: no tenderness, no adenopathy, no asymmetry, no masses, no stiffness; thyroid- normal to palpation  RESP: lungs clear to auscultation - no rales, no rhonchi, no wheezes  CV: regular rates and rhythm, normal S1 S2, no S3 or S4 and no click or rub -  MS: extremities- no gross deformities noted  NEURO:  No focal changes  PSYCH: Alert and oriented times 3; speech- coherent , normal rate and volume; able to articulate logical thoughts, able to abstract reason, no tangential thoughts, no hallucinations or delusions, affect- normal     Lab Results   Component Value Date    A1C 6.3 11/07/2017    A1C 5.8 06/05/2017    A1C 5.9 09/15/2016    A1C 6.0 04/25/2016    A1C 6.2 09/17/2015     LDL Cholesterol Calculated   Date Value Ref Range Status   06/05/2017 62 <100 mg/dL Final     Comment:     Desirable:       <100 mg/dl     ASSESSMENT/PLAN:                                                      (E11.9) Type 2 diabetes mellitus without complication, without long-term current use of insulin (H)  (primary encounter diagnosis)  Comment: labs as ordered  Plan: Comprehensive metabolic panel, Hemoglobin A1c,         simvastatin (ZOCOR) 20 MG tablet, metFORMIN         (GLUCOPHAGE) 500 MG tablet, gabapentin         (NEURONTIN) 300 MG capsule            (F32.0) Major depressive disorder, single episode, mild (H)  Comment: stable on PHQ 9  Plan: citalopram (CELEXA) 20 MG tablet, buPROPion         (WELLBUTRIN XL) 150 MG " 24 hr tablet            (I10) Essential hypertension, benign  Comment: stable on therapy  Plan: Comprehensive metabolic panel            (Z13.6) CARDIOVASCULAR SCREENING; LDL GOAL LESS THAN 100  Comment: labs as ordered  Plan: Lipid Profile, simvastatin (ZOCOR) 20 MG tablet            (K21.9) Gastroesophageal reflux disease without esophagitis  Comment: stable on therapy  Plan: omeprazole (PRILOSEC) 20 MG CR capsule            (R20.9) Disturbance of skin sensation  Comment: as ordered  Plan: gabapentin (NEURONTIN) 300 MG capsule            See Patient Instructions    Jac Barry MD  [facility]    THE MEDICATION LIST HAS BEEN FULLY RECONCILED BY THE [facility] AND THE NURSING STAFF.    25 minutes spent with this patient, face to face, discussing treatment options for listed problems above as well as side effects of appropriate medications.  Counseling time extended beyond 50% of the clinic visit.  Medication dosing, treatment plan and follow-up were discussed. Also reviewed need for primary care testing for patient.

## 2018-04-27 ASSESSMENT — PATIENT HEALTH QUESTIONNAIRE - PHQ9: SUM OF ALL RESPONSES TO PHQ QUESTIONS 1-9: 3

## 2018-05-01 ENCOUNTER — TRANSFERRED RECORDS (OUTPATIENT)
Dept: HEALTH INFORMATION MANAGEMENT | Facility: CLINIC | Age: 68
End: 2018-05-01

## 2018-09-13 ENCOUNTER — HOSPITAL ENCOUNTER (INPATIENT)
Facility: CLINIC | Age: 68
LOS: 2 days | Discharge: HOME | DRG: 812 | End: 2018-09-15
Attending: EMERGENCY MEDICINE | Admitting: INTERNAL MEDICINE
Payer: MEDICARE

## 2018-09-13 ENCOUNTER — APPOINTMENT (OUTPATIENT)
Dept: GENERAL RADIOLOGY | Facility: CLINIC | Age: 68
DRG: 812 | End: 2018-09-13
Attending: EMERGENCY MEDICINE
Payer: MEDICARE

## 2018-09-13 ENCOUNTER — APPOINTMENT (OUTPATIENT)
Dept: CT IMAGING | Facility: CLINIC | Age: 68
DRG: 812 | End: 2018-09-13
Attending: EMERGENCY MEDICINE
Payer: MEDICARE

## 2018-09-13 ENCOUNTER — APPOINTMENT (OUTPATIENT)
Dept: CARDIOLOGY | Facility: CLINIC | Age: 68
DRG: 812 | End: 2018-09-13
Attending: PHYSICIAN ASSISTANT
Payer: MEDICARE

## 2018-09-13 ENCOUNTER — SURGERY (OUTPATIENT)
Age: 68
End: 2018-09-13

## 2018-09-13 DIAGNOSIS — R07.9 CHEST PAIN: ICD-10-CM

## 2018-09-13 PROBLEM — I21.4 NSTEMI (NON-ST ELEVATED MYOCARDIAL INFARCTION) (H): Status: ACTIVE | Noted: 2018-09-13

## 2018-09-13 PROBLEM — I20.0 UNSTABLE ANGINA (H): Status: ACTIVE | Noted: 2018-09-13

## 2018-09-13 LAB
ABO + RH BLD: NORMAL
ABO + RH BLD: NORMAL
ALBUMIN SERPL-MCNC: 3.5 G/DL (ref 3.4–5)
ALP SERPL-CCNC: 99 U/L (ref 40–150)
ALT SERPL W P-5'-P-CCNC: 22 U/L (ref 0–50)
ANION GAP SERPL CALCULATED.3IONS-SCNC: 9 MMOL/L (ref 3–14)
AST SERPL W P-5'-P-CCNC: 16 U/L (ref 0–45)
BASOPHILS # BLD AUTO: 0 10E9/L (ref 0–0.2)
BASOPHILS NFR BLD AUTO: 0.4 %
BILIRUB DIRECT SERPL-MCNC: 0.2 MG/DL (ref 0–0.2)
BILIRUB SERPL-MCNC: 0.6 MG/DL (ref 0.2–1.3)
BLD GP AB SCN SERPL QL: NORMAL
BLOOD BANK CMNT PATIENT-IMP: NORMAL
BUN SERPL-MCNC: 10 MG/DL (ref 7–30)
CALCIUM SERPL-MCNC: 8.2 MG/DL (ref 8.5–10.1)
CHLORIDE SERPL-SCNC: 104 MMOL/L (ref 94–109)
CHOLEST SERPL-MCNC: 120 MG/DL
CO2 SERPL-SCNC: 25 MMOL/L (ref 20–32)
CREAT SERPL-MCNC: 0.67 MG/DL (ref 0.52–1.04)
D DIMER PPP FEU-MCNC: 0.8 UG/ML FEU (ref 0–0.5)
DIFFERENTIAL METHOD BLD: ABNORMAL
EOSINOPHIL # BLD AUTO: 0.1 10E9/L (ref 0–0.7)
EOSINOPHIL NFR BLD AUTO: 1.6 %
ERYTHROCYTE [DISTWIDTH] IN BLOOD BY AUTOMATED COUNT: 16.3 % (ref 10–15)
ERYTHROCYTE [DISTWIDTH] IN BLOOD BY AUTOMATED COUNT: 16.5 % (ref 10–15)
FERRITIN SERPL-MCNC: 19 NG/ML (ref 8–252)
GFR SERPL CREATININE-BSD FRML MDRD: 87 ML/MIN/1.7M2
GLUCOSE BLDC GLUCOMTR-MCNC: 134 MG/DL (ref 70–99)
GLUCOSE BLDC GLUCOMTR-MCNC: 154 MG/DL (ref 70–99)
GLUCOSE BLDC GLUCOMTR-MCNC: 185 MG/DL (ref 70–99)
GLUCOSE SERPL-MCNC: 219 MG/DL (ref 70–99)
HBA1C MFR BLD: 6.1 % (ref 0–5.6)
HCT VFR BLD AUTO: 26.2 % (ref 35–47)
HCT VFR BLD AUTO: 28.5 % (ref 35–47)
HDLC SERPL-MCNC: 51 MG/DL
HGB BLD-MCNC: 7.4 G/DL (ref 11.7–15.7)
HGB BLD-MCNC: 7.5 G/DL (ref 11.7–15.7)
HGB BLD-MCNC: 7.7 G/DL (ref 11.7–15.7)
HGB BLD-MCNC: 7.8 G/DL (ref 11.7–15.7)
HGB BLD-MCNC: 8.3 G/DL (ref 11.7–15.7)
IMM GRANULOCYTES # BLD: 0 10E9/L (ref 0–0.4)
IMM GRANULOCYTES NFR BLD: 0.4 %
INTERPRETATION ECG - MUSE: NORMAL
IRON SATN MFR SERPL: 4 % (ref 15–46)
IRON SERPL-MCNC: 18 UG/DL (ref 35–180)
LACTATE BLD-SCNC: 0.9 MMOL/L (ref 0.7–2)
LDLC SERPL CALC-MCNC: 48 MG/DL
LIPASE SERPL-CCNC: 115 U/L (ref 73–393)
LYMPHOCYTES # BLD AUTO: 0.7 10E9/L (ref 0.8–5.3)
LYMPHOCYTES NFR BLD AUTO: 8.1 %
MCH RBC QN AUTO: 19.2 PG (ref 26.5–33)
MCH RBC QN AUTO: 19.5 PG (ref 26.5–33)
MCHC RBC AUTO-ENTMCNC: 28.6 G/DL (ref 31.5–36.5)
MCHC RBC AUTO-ENTMCNC: 29.1 G/DL (ref 31.5–36.5)
MCV RBC AUTO: 67 FL (ref 78–100)
MCV RBC AUTO: 67 FL (ref 78–100)
MONOCYTES # BLD AUTO: 0.6 10E9/L (ref 0–1.3)
MONOCYTES NFR BLD AUTO: 7.1 %
NEUTROPHILS # BLD AUTO: 7 10E9/L (ref 1.6–8.3)
NEUTROPHILS NFR BLD AUTO: 82.4 %
NONHDLC SERPL-MCNC: 69 MG/DL
NRBC # BLD AUTO: 0 10*3/UL
NRBC BLD AUTO-RTO: 0 /100
PLATELET # BLD AUTO: 145 10E9/L (ref 150–450)
PLATELET # BLD AUTO: 164 10E9/L (ref 150–450)
PLATELET # BLD EST: ABNORMAL 10*3/UL
POTASSIUM SERPL-SCNC: 4.2 MMOL/L (ref 3.4–5.3)
PROT SERPL-MCNC: 7.2 G/DL (ref 6.8–8.8)
RBC # BLD AUTO: 3.91 10E12/L (ref 3.8–5.2)
RBC # BLD AUTO: 4.26 10E12/L (ref 3.8–5.2)
RBC MORPH BLD: ABNORMAL
SODIUM SERPL-SCNC: 138 MMOL/L (ref 133–144)
SPECIMEN EXP DATE BLD: NORMAL
TIBC SERPL-MCNC: 408 UG/DL (ref 240–430)
TRIGL SERPL-MCNC: 103 MG/DL
TROPONIN I SERPL-MCNC: <0.015 UG/L (ref 0–0.04)
TSH SERPL DL<=0.005 MIU/L-ACNC: 0.91 MU/L (ref 0.4–4)
UPPER GI ENDOSCOPY: NORMAL
WBC # BLD AUTO: 8.4 10E9/L (ref 4–11)
WBC # BLD AUTO: 8.5 10E9/L (ref 4–11)

## 2018-09-13 PROCEDURE — 93010 ELECTROCARDIOGRAM REPORT: CPT | Performed by: INTERNAL MEDICINE

## 2018-09-13 PROCEDURE — 83036 HEMOGLOBIN GLYCOSYLATED A1C: CPT | Performed by: EMERGENCY MEDICINE

## 2018-09-13 PROCEDURE — 80048 BASIC METABOLIC PNL TOTAL CA: CPT | Performed by: EMERGENCY MEDICINE

## 2018-09-13 PROCEDURE — 25500064 ZZH RX 255 OP 636: Performed by: INTERNAL MEDICINE

## 2018-09-13 PROCEDURE — 25000128 H RX IP 250 OP 636: Performed by: EMERGENCY MEDICINE

## 2018-09-13 PROCEDURE — 36415 COLL VENOUS BLD VENIPUNCTURE: CPT | Performed by: INTERNAL MEDICINE

## 2018-09-13 PROCEDURE — 25000128 H RX IP 250 OP 636

## 2018-09-13 PROCEDURE — 83540 ASSAY OF IRON: CPT | Performed by: EMERGENCY MEDICINE

## 2018-09-13 PROCEDURE — 99291 CRITICAL CARE FIRST HOUR: CPT

## 2018-09-13 PROCEDURE — 96376 TX/PRO/DX INJ SAME DRUG ADON: CPT

## 2018-09-13 PROCEDURE — 86901 BLOOD TYPING SEROLOGIC RH(D): CPT | Performed by: PHYSICIAN ASSISTANT

## 2018-09-13 PROCEDURE — 82728 ASSAY OF FERRITIN: CPT | Performed by: EMERGENCY MEDICINE

## 2018-09-13 PROCEDURE — 83690 ASSAY OF LIPASE: CPT | Performed by: EMERGENCY MEDICINE

## 2018-09-13 PROCEDURE — 25000125 ZZHC RX 250: Performed by: INTERNAL MEDICINE

## 2018-09-13 PROCEDURE — 25000132 ZZH RX MED GY IP 250 OP 250 PS 637: Mod: GY | Performed by: PHYSICIAN ASSISTANT

## 2018-09-13 PROCEDURE — G0500 MOD SEDAT ENDO SERVICE >5YRS: HCPCS | Performed by: INTERNAL MEDICINE

## 2018-09-13 PROCEDURE — 84443 ASSAY THYROID STIM HORMONE: CPT | Performed by: EMERGENCY MEDICINE

## 2018-09-13 PROCEDURE — 71260 CT THORAX DX C+: CPT

## 2018-09-13 PROCEDURE — 80061 LIPID PANEL: CPT | Performed by: EMERGENCY MEDICINE

## 2018-09-13 PROCEDURE — 96365 THER/PROPH/DIAG IV INF INIT: CPT | Mod: 59

## 2018-09-13 PROCEDURE — 40000264 ECHO COMPLETE WITH OPTISON

## 2018-09-13 PROCEDURE — 25000128 H RX IP 250 OP 636: Performed by: PHYSICIAN ASSISTANT

## 2018-09-13 PROCEDURE — 93005 ELECTROCARDIOGRAM TRACING: CPT

## 2018-09-13 PROCEDURE — 93306 TTE W/DOPPLER COMPLETE: CPT | Mod: 26 | Performed by: INTERNAL MEDICINE

## 2018-09-13 PROCEDURE — A9270 NON-COVERED ITEM OR SERVICE: HCPCS | Mod: GY | Performed by: EMERGENCY MEDICINE

## 2018-09-13 PROCEDURE — 00000146 ZZHCL STATISTIC GLUCOSE BY METER IP

## 2018-09-13 PROCEDURE — 36415 COLL VENOUS BLD VENIPUNCTURE: CPT | Performed by: PHYSICIAN ASSISTANT

## 2018-09-13 PROCEDURE — 25000128 H RX IP 250 OP 636: Performed by: INTERNAL MEDICINE

## 2018-09-13 PROCEDURE — 96366 THER/PROPH/DIAG IV INF ADDON: CPT

## 2018-09-13 PROCEDURE — 86900 BLOOD TYPING SEROLOGIC ABO: CPT | Performed by: PHYSICIAN ASSISTANT

## 2018-09-13 PROCEDURE — 85027 COMPLETE CBC AUTOMATED: CPT | Performed by: PHYSICIAN ASSISTANT

## 2018-09-13 PROCEDURE — 25000125 ZZHC RX 250: Performed by: EMERGENCY MEDICINE

## 2018-09-13 PROCEDURE — 99222 1ST HOSP IP/OBS MODERATE 55: CPT | Mod: 25 | Performed by: INTERNAL MEDICINE

## 2018-09-13 PROCEDURE — 0DJ08ZZ INSPECTION OF UPPER INTESTINAL TRACT, VIA NATURAL OR ARTIFICIAL OPENING ENDOSCOPIC: ICD-10-PCS | Performed by: INTERNAL MEDICINE

## 2018-09-13 PROCEDURE — 85018 HEMOGLOBIN: CPT | Performed by: PHYSICIAN ASSISTANT

## 2018-09-13 PROCEDURE — 84484 ASSAY OF TROPONIN QUANT: CPT | Performed by: EMERGENCY MEDICINE

## 2018-09-13 PROCEDURE — 96375 TX/PRO/DX INJ NEW DRUG ADDON: CPT

## 2018-09-13 PROCEDURE — 43235 EGD DIAGNOSTIC BRUSH WASH: CPT | Performed by: INTERNAL MEDICINE

## 2018-09-13 PROCEDURE — 99223 1ST HOSP IP/OBS HIGH 75: CPT | Mod: AI | Performed by: INTERNAL MEDICINE

## 2018-09-13 PROCEDURE — 21000000 ZZH R&B IMCU HEART CARE

## 2018-09-13 PROCEDURE — 25000132 ZZH RX MED GY IP 250 OP 250 PS 637: Mod: GY | Performed by: EMERGENCY MEDICINE

## 2018-09-13 PROCEDURE — C9113 INJ PANTOPRAZOLE SODIUM, VIA: HCPCS | Performed by: PHYSICIAN ASSISTANT

## 2018-09-13 PROCEDURE — 86850 RBC ANTIBODY SCREEN: CPT | Performed by: PHYSICIAN ASSISTANT

## 2018-09-13 PROCEDURE — 25000131 ZZH RX MED GY IP 250 OP 636 PS 637: Mod: GY | Performed by: PHYSICIAN ASSISTANT

## 2018-09-13 PROCEDURE — 83550 IRON BINDING TEST: CPT | Performed by: EMERGENCY MEDICINE

## 2018-09-13 PROCEDURE — 71045 X-RAY EXAM CHEST 1 VIEW: CPT

## 2018-09-13 PROCEDURE — 80076 HEPATIC FUNCTION PANEL: CPT | Performed by: EMERGENCY MEDICINE

## 2018-09-13 PROCEDURE — 93005 ELECTROCARDIOGRAM TRACING: CPT | Mod: 76

## 2018-09-13 PROCEDURE — A9270 NON-COVERED ITEM OR SERVICE: HCPCS | Mod: GY | Performed by: PHYSICIAN ASSISTANT

## 2018-09-13 PROCEDURE — 83605 ASSAY OF LACTIC ACID: CPT | Performed by: INTERNAL MEDICINE

## 2018-09-13 PROCEDURE — 99292 CRITICAL CARE ADDL 30 MIN: CPT

## 2018-09-13 PROCEDURE — 84484 ASSAY OF TROPONIN QUANT: CPT | Performed by: PHYSICIAN ASSISTANT

## 2018-09-13 PROCEDURE — 85025 COMPLETE CBC W/AUTO DIFF WBC: CPT | Performed by: EMERGENCY MEDICINE

## 2018-09-13 PROCEDURE — 85379 FIBRIN DEGRADATION QUANT: CPT | Performed by: EMERGENCY MEDICINE

## 2018-09-13 RX ORDER — BISACODYL 10 MG
10 SUPPOSITORY, RECTAL RECTAL DAILY PRN
Status: DISCONTINUED | OUTPATIENT
Start: 2018-09-13 | End: 2018-09-15 | Stop reason: HOSPADM

## 2018-09-13 RX ORDER — GABAPENTIN 300 MG/1
300 CAPSULE ORAL 2 TIMES DAILY PRN
COMMUNITY
End: 2019-10-03

## 2018-09-13 RX ORDER — OXYCODONE HYDROCHLORIDE 5 MG/1
5-10 TABLET ORAL EVERY 4 HOURS PRN
Status: DISCONTINUED | OUTPATIENT
Start: 2018-09-13 | End: 2018-09-15 | Stop reason: HOSPADM

## 2018-09-13 RX ORDER — PROCHLORPERAZINE 25 MG
12.5 SUPPOSITORY, RECTAL RECTAL EVERY 12 HOURS PRN
Status: DISCONTINUED | OUTPATIENT
Start: 2018-09-13 | End: 2018-09-15 | Stop reason: HOSPADM

## 2018-09-13 RX ORDER — CITALOPRAM HYDROBROMIDE 20 MG/1
20 TABLET ORAL DAILY
Status: DISCONTINUED | OUTPATIENT
Start: 2018-09-13 | End: 2018-09-15 | Stop reason: HOSPADM

## 2018-09-13 RX ORDER — NITROGLYCERIN 20 MG/100ML
.07-2 INJECTION INTRAVENOUS CONTINUOUS
Status: DISCONTINUED | OUTPATIENT
Start: 2018-09-13 | End: 2018-09-15 | Stop reason: HOSPADM

## 2018-09-13 RX ORDER — HYDROMORPHONE HYDROCHLORIDE 1 MG/ML
.2-.5 INJECTION, SOLUTION INTRAMUSCULAR; INTRAVENOUS; SUBCUTANEOUS
Status: DISCONTINUED | OUTPATIENT
Start: 2018-09-13 | End: 2018-09-15 | Stop reason: HOSPADM

## 2018-09-13 RX ORDER — ACETAMINOPHEN 650 MG/1
650 SUPPOSITORY RECTAL EVERY 4 HOURS PRN
Status: DISCONTINUED | OUTPATIENT
Start: 2018-09-13 | End: 2018-09-15 | Stop reason: HOSPADM

## 2018-09-13 RX ORDER — DEXTROSE MONOHYDRATE 25 G/50ML
25-50 INJECTION, SOLUTION INTRAVENOUS
Status: DISCONTINUED | OUTPATIENT
Start: 2018-09-13 | End: 2018-09-15 | Stop reason: HOSPADM

## 2018-09-13 RX ORDER — ONDANSETRON 4 MG/1
4 TABLET, ORALLY DISINTEGRATING ORAL EVERY 6 HOURS PRN
Status: DISCONTINUED | OUTPATIENT
Start: 2018-09-13 | End: 2018-09-15 | Stop reason: HOSPADM

## 2018-09-13 RX ORDER — ASPIRIN 81 MG/1
81 TABLET, CHEWABLE ORAL DAILY
Status: DISCONTINUED | OUTPATIENT
Start: 2018-09-14 | End: 2018-09-15 | Stop reason: HOSPADM

## 2018-09-13 RX ORDER — SODIUM CHLORIDE, SODIUM LACTATE, POTASSIUM CHLORIDE, CALCIUM CHLORIDE 600; 310; 30; 20 MG/100ML; MG/100ML; MG/100ML; MG/100ML
1000 INJECTION, SOLUTION INTRAVENOUS CONTINUOUS
Status: DISCONTINUED | OUTPATIENT
Start: 2018-09-13 | End: 2018-09-13

## 2018-09-13 RX ORDER — FLUMAZENIL 0.1 MG/ML
0.2 INJECTION, SOLUTION INTRAVENOUS
Status: ACTIVE | OUTPATIENT
Start: 2018-09-13 | End: 2018-09-14

## 2018-09-13 RX ORDER — GABAPENTIN 300 MG/1
300 CAPSULE ORAL 2 TIMES DAILY
Status: DISCONTINUED | OUTPATIENT
Start: 2018-09-13 | End: 2018-09-15 | Stop reason: HOSPADM

## 2018-09-13 RX ORDER — GABAPENTIN 300 MG/1
300 CAPSULE ORAL 2 TIMES DAILY
COMMUNITY
End: 2019-05-02

## 2018-09-13 RX ORDER — BUPROPION HYDROCHLORIDE 150 MG/1
150 TABLET ORAL EVERY MORNING
Status: DISCONTINUED | OUTPATIENT
Start: 2018-09-13 | End: 2018-09-15 | Stop reason: HOSPADM

## 2018-09-13 RX ORDER — AMOXICILLIN 250 MG
1 CAPSULE ORAL 2 TIMES DAILY PRN
Status: DISCONTINUED | OUTPATIENT
Start: 2018-09-13 | End: 2018-09-15 | Stop reason: HOSPADM

## 2018-09-13 RX ORDER — ACETAMINOPHEN 500 MG
1000 TABLET ORAL ONCE
Status: COMPLETED | OUTPATIENT
Start: 2018-09-13 | End: 2018-09-13

## 2018-09-13 RX ORDER — IOPAMIDOL 755 MG/ML
76 INJECTION, SOLUTION INTRAVASCULAR ONCE
Status: COMPLETED | OUTPATIENT
Start: 2018-09-13 | End: 2018-09-13

## 2018-09-13 RX ORDER — ATORVASTATIN CALCIUM 40 MG/1
40 TABLET, FILM COATED ORAL EVERY EVENING
Status: DISCONTINUED | OUTPATIENT
Start: 2018-09-13 | End: 2018-09-15 | Stop reason: HOSPADM

## 2018-09-13 RX ORDER — MORPHINE SULFATE 2 MG/ML
2 INJECTION, SOLUTION INTRAMUSCULAR; INTRAVENOUS
Status: DISCONTINUED | OUTPATIENT
Start: 2018-09-13 | End: 2018-09-13

## 2018-09-13 RX ORDER — POLYETHYLENE GLYCOL 3350 17 G/17G
17 POWDER, FOR SOLUTION ORAL DAILY PRN
Status: DISCONTINUED | OUTPATIENT
Start: 2018-09-13 | End: 2018-09-15 | Stop reason: HOSPADM

## 2018-09-13 RX ORDER — ONDANSETRON 2 MG/ML
4 INJECTION INTRAMUSCULAR; INTRAVENOUS EVERY 30 MIN PRN
Status: DISCONTINUED | OUTPATIENT
Start: 2018-09-13 | End: 2018-09-13

## 2018-09-13 RX ORDER — VALSARTAN 160 MG/1
160 TABLET ORAL DAILY
Status: DISCONTINUED | OUTPATIENT
Start: 2018-09-14 | End: 2018-09-13

## 2018-09-13 RX ORDER — FENTANYL CITRATE 50 UG/ML
INJECTION, SOLUTION INTRAMUSCULAR; INTRAVENOUS PRN
Status: DISCONTINUED | OUTPATIENT
Start: 2018-09-13 | End: 2018-09-13 | Stop reason: HOSPADM

## 2018-09-13 RX ORDER — LIDOCAINE 40 MG/G
CREAM TOPICAL
Status: DISCONTINUED | OUTPATIENT
Start: 2018-09-13 | End: 2018-09-15 | Stop reason: HOSPADM

## 2018-09-13 RX ORDER — NITROGLYCERIN 20 MG/100ML
INJECTION INTRAVENOUS
Status: COMPLETED
Start: 2018-09-13 | End: 2018-09-13

## 2018-09-13 RX ORDER — GABAPENTIN 300 MG/1
300 CAPSULE ORAL 2 TIMES DAILY PRN
Status: DISCONTINUED | OUTPATIENT
Start: 2018-09-13 | End: 2018-09-15 | Stop reason: HOSPADM

## 2018-09-13 RX ORDER — NALOXONE HYDROCHLORIDE 0.4 MG/ML
.1-.4 INJECTION, SOLUTION INTRAMUSCULAR; INTRAVENOUS; SUBCUTANEOUS
Status: DISCONTINUED | OUTPATIENT
Start: 2018-09-13 | End: 2018-09-13

## 2018-09-13 RX ORDER — VALSARTAN 160 MG/1
160 TABLET ORAL DAILY
Status: DISCONTINUED | OUTPATIENT
Start: 2018-09-13 | End: 2018-09-15 | Stop reason: HOSPADM

## 2018-09-13 RX ORDER — AMOXICILLIN 250 MG
2 CAPSULE ORAL 2 TIMES DAILY PRN
Status: DISCONTINUED | OUTPATIENT
Start: 2018-09-13 | End: 2018-09-15 | Stop reason: HOSPADM

## 2018-09-13 RX ORDER — NICOTINE POLACRILEX 4 MG
15-30 LOZENGE BUCCAL
Status: DISCONTINUED | OUTPATIENT
Start: 2018-09-13 | End: 2018-09-15 | Stop reason: HOSPADM

## 2018-09-13 RX ORDER — ONDANSETRON 2 MG/ML
4 INJECTION INTRAMUSCULAR; INTRAVENOUS EVERY 6 HOURS PRN
Status: DISCONTINUED | OUTPATIENT
Start: 2018-09-13 | End: 2018-09-15 | Stop reason: HOSPADM

## 2018-09-13 RX ORDER — ACETAMINOPHEN 325 MG/1
650 TABLET ORAL EVERY 4 HOURS PRN
Status: DISCONTINUED | OUTPATIENT
Start: 2018-09-13 | End: 2018-09-15 | Stop reason: HOSPADM

## 2018-09-13 RX ORDER — NITROGLYCERIN 20 MG/100ML
.07-2 INJECTION INTRAVENOUS CONTINUOUS
Status: CANCELLED | OUTPATIENT
Start: 2018-09-13

## 2018-09-13 RX ORDER — ALUMINA, MAGNESIA, AND SIMETHICONE 2400; 2400; 240 MG/30ML; MG/30ML; MG/30ML
30 SUSPENSION ORAL EVERY 4 HOURS PRN
Status: DISCONTINUED | OUTPATIENT
Start: 2018-09-13 | End: 2018-09-15 | Stop reason: HOSPADM

## 2018-09-13 RX ORDER — DILTIAZEM HYDROCHLORIDE 5 MG/ML
10 INJECTION INTRAVENOUS ONCE
Status: COMPLETED | OUTPATIENT
Start: 2018-09-13 | End: 2018-09-13

## 2018-09-13 RX ORDER — NALOXONE HYDROCHLORIDE 0.4 MG/ML
.1-.4 INJECTION, SOLUTION INTRAMUSCULAR; INTRAVENOUS; SUBCUTANEOUS
Status: DISCONTINUED | OUTPATIENT
Start: 2018-09-13 | End: 2018-09-15 | Stop reason: HOSPADM

## 2018-09-13 RX ORDER — PROCHLORPERAZINE MALEATE 5 MG
5 TABLET ORAL EVERY 6 HOURS PRN
Status: DISCONTINUED | OUTPATIENT
Start: 2018-09-13 | End: 2018-09-15 | Stop reason: HOSPADM

## 2018-09-13 RX ORDER — METOPROLOL TARTRATE 25 MG/1
25 TABLET, FILM COATED ORAL 2 TIMES DAILY
Status: DISCONTINUED | OUTPATIENT
Start: 2018-09-13 | End: 2018-09-15 | Stop reason: HOSPADM

## 2018-09-13 RX ADMIN — HUMAN ALBUMIN MICROSPHERES AND PERFLUTREN 8 ML: 10; .22 INJECTION, SOLUTION INTRAVENOUS at 10:22

## 2018-09-13 RX ADMIN — SODIUM CHLORIDE 1000 ML: 9 INJECTION, SOLUTION INTRAVENOUS at 04:26

## 2018-09-13 RX ADMIN — ATORVASTATIN CALCIUM 40 MG: 40 TABLET, FILM COATED ORAL at 19:37

## 2018-09-13 RX ADMIN — VALSARTAN 160 MG: 160 TABLET, FILM COATED ORAL at 11:52

## 2018-09-13 RX ADMIN — ACETAMINOPHEN 1000 MG: 500 TABLET, FILM COATED ORAL at 04:31

## 2018-09-13 RX ADMIN — GABAPENTIN 300 MG: 300 CAPSULE ORAL at 21:16

## 2018-09-13 RX ADMIN — OXYCODONE HYDROCHLORIDE 5 MG: 5 TABLET ORAL at 19:37

## 2018-09-13 RX ADMIN — INSULIN ASPART 1 UNITS: 100 INJECTION, SOLUTION INTRAVENOUS; SUBCUTANEOUS at 17:15

## 2018-09-13 RX ADMIN — CITALOPRAM HYDROBROMIDE 20 MG: 20 TABLET ORAL at 10:42

## 2018-09-13 RX ADMIN — MORPHINE SULFATE 2 MG: 2 INJECTION, SOLUTION INTRAMUSCULAR; INTRAVENOUS at 04:45

## 2018-09-13 RX ADMIN — NITROGLYCERIN 0.07 MCG/KG/MIN: 20 INJECTION INTRAVENOUS at 04:25

## 2018-09-13 RX ADMIN — METOPROLOL TARTRATE 25 MG: 25 TABLET ORAL at 09:07

## 2018-09-13 RX ADMIN — FENTANYL CITRATE 100 MCG: 50 INJECTION, SOLUTION INTRAMUSCULAR; INTRAVENOUS at 13:11

## 2018-09-13 RX ADMIN — GABAPENTIN 300 MG: 300 CAPSULE ORAL at 10:42

## 2018-09-13 RX ADMIN — PANTOPRAZOLE SODIUM 40 MG: 40 INJECTION, POWDER, FOR SOLUTION INTRAVENOUS at 09:07

## 2018-09-13 RX ADMIN — SODIUM CHLORIDE 99 ML: 9 INJECTION, SOLUTION INTRAVENOUS at 05:27

## 2018-09-13 RX ADMIN — METOPROLOL TARTRATE 25 MG: 25 TABLET ORAL at 21:17

## 2018-09-13 RX ADMIN — DILTIAZEM HYDROCHLORIDE 10 MG: 5 INJECTION INTRAVENOUS at 21:04

## 2018-09-13 RX ADMIN — MIDAZOLAM 2 MG: 1 INJECTION INTRAMUSCULAR; INTRAVENOUS at 13:11

## 2018-09-13 RX ADMIN — MORPHINE SULFATE 2 MG: 2 INJECTION, SOLUTION INTRAMUSCULAR; INTRAVENOUS at 10:53

## 2018-09-13 RX ADMIN — Medication 0.5 MG: at 20:27

## 2018-09-13 RX ADMIN — IOPAMIDOL 76 ML: 755 INJECTION, SOLUTION INTRAVENOUS at 05:12

## 2018-09-13 RX ADMIN — BUPROPION HYDROCHLORIDE 150 MG: 150 TABLET, FILM COATED, EXTENDED RELEASE ORAL at 10:42

## 2018-09-13 RX ADMIN — MORPHINE SULFATE 2 MG: 2 INJECTION, SOLUTION INTRAMUSCULAR; INTRAVENOUS at 05:30

## 2018-09-13 ASSESSMENT — ENCOUNTER SYMPTOMS
DIAPHORESIS: 1
SHORTNESS OF BREATH: 1

## 2018-09-13 ASSESSMENT — ACTIVITIES OF DAILY LIVING (ADL)
FALL_HISTORY_WITHIN_LAST_SIX_MONTHS: NO
SWALLOWING: 0-->SWALLOWS FOODS/LIQUIDS WITHOUT DIFFICULTY
AMBULATION: 0-->INDEPENDENT
BATHING: 0-->INDEPENDENT
TRANSFERRING: 0-->INDEPENDENT
COGNITION: 0 - NO COGNITION ISSUES REPORTED
DRESS: 0-->INDEPENDENT
RETIRED_COMMUNICATION: 0-->UNDERSTANDS/COMMUNICATES WITHOUT DIFFICULTY
RETIRED_EATING: 0-->INDEPENDENT
TOILETING: 0-->INDEPENDENT

## 2018-09-13 ASSESSMENT — PAIN DESCRIPTION - DESCRIPTORS
DESCRIPTORS: PRESSURE
DESCRIPTORS: ACHING

## 2018-09-13 NOTE — CONSULTS
[facility]  Gastroenterology Consultation         Diane Gaitan  [address] [address]  68 year old female    Admission Date/Time: 9/13/2018  Primary Care Provider: Jac Barry  Referring / Attending Physician: Clair Alaniz    We were asked to see the patient in consultation by Dr. Clair Alaniz for evaluation of acute onset of severe chest pain new diagnosis of anemia..      CC: Anemia.    HPI:  Diane Gaitan is a 68 year old female who was admitted via ER due to acute saw acute onset of severe abdominal pain patient woke up at 1:00 in the morning patient has history of diabetes gastroesophageal reflux disease patient was significantly hypertensive patient was noticed to have some EKG changes on inferior leads however patient has been evaluated in cardiology this morning patient's cardiac workup has been completely unremarkable including echocardiogram troponins patient was started on IV nitroglycerin due to pain.  Patient was given a dose of morphine now.  Patient is starting to feel better her pain is also improving.  Patient denied any history of similar episode in the past denied any history of fever chills, dysphagia.  Denied any history of hematemesis cough any significant physical activity or any other precipitating event.  During her workup patient's hemoglobin was found to be 7.5 with MCV of 67.  There is no baseline hemoglobin available according to patient patient was never told she is anemic during a routine physical exams.  CT of the chest was unremarkable.    ROS: A comprehensive ten point review of systems was negative aside from those in mentioned in the HPI.      PAST MED HX:  I have reviewed this patient's medical history and updated it with pertinent information if needed.   Past Medical History:   Diagnosis Date     Arthritis      BENIGN HYPERTENSION 8/28/2002     Cancer (H)     Basal cell carcinoma     CARDIOVASCULAR SCREENING; LDL GOAL  LESS THAN 100 10/31/2010     Depressive disorder 1997     Esophageal reflux      Mild major depression (H) 9/14/2010     Other malaise and fatigue 8/28/2002     Type 2 diabetes, HbA1c goal < 7% (H) 10/31/2010       MEDICATIONS:   Prior to Admission Medications   Prescriptions Last Dose Informant Patient Reported? Taking?   Acetaminophen (TYLENOL PO)  at prn Self Yes Yes   Sig: Take 1,000 mg by mouth every 6 hours as needed.     aspirin 81 MG tablet 9/13/2018 at 0400 Self No Yes   Sig: Take 1 tablet by mouth daily.   buPROPion (WELLBUTRIN XL) 150 MG 24 hr tablet 9/12/2018 at am Self No Yes   Sig: Take 1 tablet (150 mg) by mouth every morning   calcium carbonate 600 mg-vitamin D 400 units (CALTRATE) 600-400 MG-UNIT per tablet 9/12/2018 at am Self Yes Yes   Sig: Take 1 tablet by mouth daily   citalopram (CELEXA) 20 MG tablet 9/12/2018 at am Self No Yes   Sig: Take 1 tablet (20 mg) by mouth daily   gabapentin (NEURONTIN) 300 MG capsule 9/12/2018 at pm Self Yes Yes   Sig: Take 300 mg by mouth 2 times daily   gabapentin (NEURONTIN) 300 MG capsule  at prn Self Yes Yes   Sig: Take 300 mg by mouth 2 times daily as needed for other (Neuropathy) In addition to scheduled doses   metFORMIN (GLUCOPHAGE) 500 MG tablet 9/12/2018 at pm Self No Yes   Sig: Take 1 tablet (500 mg) by mouth 2 times daily (with meals)   omeprazole (PRILOSEC) 20 MG CR capsule 9/12/2018 at am Self No Yes   Sig: Take 1 capsule (20 mg) by mouth daily   simvastatin (ZOCOR) 20 MG tablet 9/12/2018 at pm Self No Yes   Sig: Take 1 tablet (20 mg) by mouth At Bedtime   valsartan (DIOVAN) 160 MG tablet 9/13/2018 at am Self No Yes   Sig: Take 1 tablet (160 mg) by mouth daily      Facility-Administered Medications: None       ALLERGIES:   Allergies   Allergen Reactions     Lisinopril Cough     COUGH       SOCIAL HISTORY:  Social History   Substance Use Topics     Smoking status: Former Smoker     Packs/day: 1.00     Years: 12.00     Quit date: 10/18/1980     Smokeless  tobacco: Never Used     Alcohol use Yes      Comment: 12 Beers per week       FAMILY HISTORY:  Family History   Problem Relation Age of Onset     Breast Cancer Sister      Colon Cancer Sister      Breast Cancer Daughter      Hypertension Daughter      Skin Cancer Mother      Coronary Artery Disease Father      Breast Cancer Sister        PHYSICAL EXAM:   General awake alert laying comfortably still complaining of pain 1-3/10 in intensity  Vital Signs with Ranges  Temp: 98.3  F (36.8  C) Temp src: Oral BP: 107/60 Pulse: 90 Heart Rate: 93 Resp: 21 SpO2: 96 % O2 Device: None (Room air) Oxygen Delivery: 4 LPM  I/O last 3 completed shifts:  In: 1050 [P.O.:50; IV Piggyback:1000]  Out: -     Constitutional: healthy, alert and no distress   Cardiovascular: negative, PMI normal. No lifts, heaves, or thrills. RRR. No murmurs, clicks gallops or rub  Respiratory: negative, Percussion normal. Good diaphragmatic excursion. Lungs clear  Head: Normocephalic. No masses, lesions, tenderness or abnormalities  Neck: Neck supple. No adenopathy. Thyroid symmetric, normal size,, Carotids without bruits.  Abdomen: Abdomen soft, non-tender. BS normal. No masses, organomegaly          ADDITIONAL COMMENTS:   I reviewed the patient's new clinical lab test results.   Recent Labs   Lab Test  09/13/18   1408  09/13/18   0904  09/13/18   0416   10/21/12   0550   10/18/12   0915   WBC   --   8.4  8.5   --    --    --    --    HGB  7.8*  7.5*  8.3*   < >  8.7*   < >  14.0   MCV   --   67*  67*   --    --    --    --    PLT   --   145*  164   --   137*   < >  214   INR   --    --    --    --    --    --   0.97    < > = values in this interval not displayed.     Recent Labs   Lab Test  09/13/18 0416 04/26/18   0809  06/05/17   0819   POTASSIUM  4.2  4.4  4.7   CHLORIDE  104  106  107   CO2  25  26  28   BUN  10  7  10   ANIONGAP  9  9  8     Recent Labs   Lab Test  09/13/18 0416 04/26/18   0809  06/05/17   0819   ALBUMIN  3.5  3.6  3.6    BILITOTAL  0.6  0.7  0.6   ALT  22  25  24   AST  16  12  17   LIPASE  115   --    --        I reviewed the patient's new imaging results.        CONSULTATION ASSESSMENT AND PLAN:    Principal Problem:    NSTEMI (non-ST elevated myocardial infarction) (H)    Assessment:     Very pleasant 68-year-old female with accused onset of severe chest pain radiating to her neck left shoulder left arm of unclear etiology cardiac workup has been completely unremarkable patient has a known history of gastroesophageal reflux patient is at risk for possible esophageal spasm due to bad acid reflux however patient's symptoms are atypical patient is complaining of persistent pain for many hours.  Patient denied any history of hematemesis melena nausea vomiting or any other significant GI symptoms.  Patient has new diagnosis of anemia of unclear etiology there is no signs of acute GI bleed.  At this point I will recommend her to be started on IV Protonix continue on pain control patient is cleared from cardiology standpoint.  Patient will need further evaluation of GI tract not only due to her above-mentioned symptom but also due to her anemia.  I will recommend her to be evaluated with upper GI endoscopy today.  Patient had a colonoscopy 2 years ago which was normal.  However if upper GI endoscopy is negative patient would benefit from repeat colonoscopy and possible video capsule endoscopy.  At this point if upper GI endoscopy is unremarkable I would recommend further workup for her chest pain prior to pursuing colonoscopy.  Plan was discussed in detail including the risks and benefits.  Thank you very much for letting me participate in her care.            Mac White MD, FACP  [facility].  Office: [phone]  Cell : [phone]

## 2018-09-13 NOTE — ED NOTES
Pt c/o increasing sharp midsternal CP as well as HA. MD notified, nitroglycerin gtt increased, EKG repeated.

## 2018-09-13 NOTE — H&P
Admitted:     09/13/2018      PRIMARY CARE PROVIDER:  Jac Barry MD      CHIEF COMPLAINT:  Chest pain.      History obtained from patient.  The patient's daughter accompanies her at bedside and contributes some history.      HISTORY OF PRESENT ILLNESS:  Diane Gaitan is a 68-year-old female with a past medical history of non-insulin dependent type 2 diabetes mellitus, hypertension, osteoarthritis, GERD and depression who presented to the Emergency Department for further evaluation of chest pain.  The patient states that her chest pain awoke her at 1:00 a.m. the day prior to admission.  Describes as a sharp shooting pain on the left side of her chest radiating to her bilateral shoulders with associated diaphoresis and shortness of breath, pleuritic component.  Note sitting upright or positional changes made her dizzy.  She has had no prior episodes of symptoms like this.  Due to her symptoms, she called EMS.  She was administered 3 nitroglycerin and full dose aspirin and ultimately brought to the Emergency Department.      In the ED, the patient was seen and assessed by Dr. Medina who obtained basic labs and imaging which revealed an unremarkable BMP.  CBC showed anemia with hemoglobin of 8.3, otherwise unremarkable.  Note MCV 67.  Troponin negative.  D-dimer 0.8.  However, CT PE was negative for PE or other acute pathology.  Chest x-ray unremarkable.  EKG showed sinus tachycardia.  The patient was given a 1 liter bolus of IV fluids, Tylenol 1 gram, and started on a nitroglycerin drip for her symptoms and administered morphine for pain and Zofran for nausea.  Ultimately, the decision was made to admit the patient under inpatient status for further evaluation and treatment.      On my interview, the patient confirms the above history.  She notes her pain improved with the nitroglycerin drip, however, she still has an ongoing headache.  She rates her chest pain 1/10.  She has no prior cardiac history.  No prior  "stress testing.  Risk factors for CAD include diabetes, hypertension, and hyperlipidemia.  Father with a history of CAD in his 80s.  No family history of premature CAD.  The patient does not use tobacco.  On my evaluation, her chest pain is reproducible upon palpation of her left upper chest wall.  She is right-handed, she denies any recent trauma, falls or new exercise regimen.  She does have a history of GERD which she says is well controlled.  Note current hemoglobin 8.3.  Per chart review, her last hemoglobin in our system was 11.1.  She states her hemoglobin ranges from 13-14 usually.  She denies any melena, hematochezia, hematuria or vaginal bleeding.  She has had a hysterectomy.  She does take daily aspirin 81 mg, with her last dose being on 09/12.  No chronic NSAID use, intermittent ibuprofen use.  Family history of colon cancer.  Gets colonoscopies every 5 years with last colonoscopy 2 years ago reported as \"normal.\"  Has had polyps removed in the past.      REVIEW OF SYSTEMS:  A 10-point review of systems was performed and is otherwise negative unless specified in the HPI.      PAST MEDICAL HISTORY:   1.  Non-insulin dependent diabetes mellitus with last A1c of 6.0 on 04/26/2018 with associated peripheral neuropathy.   2.  Hypertension.   3.  Osteoarthritis.   4.  Hyperlipidemia.   5.  GERD.   6.  Depression.      MEDICATIONS:    Prescriptions Prior to Admission   Medication Sig Dispense Refill Last Dose     Acetaminophen (TYLENOL PO) Take 1,000 mg by mouth every 6 hours as needed.      at prn     aspirin 81 MG tablet Take 1 tablet by mouth daily. 90 tablet 3 9/13/2018 at 0400     buPROPion (WELLBUTRIN XL) 150 MG 24 hr tablet Take 1 tablet (150 mg) by mouth every morning 90 tablet 3 9/12/2018 at am     calcium carbonate 600 mg-vitamin D 400 units (CALTRATE) 600-400 MG-UNIT per tablet Take 1 tablet by mouth daily   9/12/2018 at am     citalopram (CELEXA) 20 MG tablet Take 1 tablet (20 mg) by mouth daily 90 " tablet 3 9/12/2018 at am     gabapentin (NEURONTIN) 300 MG capsule Take 300 mg by mouth 2 times daily   9/12/2018 at pm     gabapentin (NEURONTIN) 300 MG capsule Take 300 mg by mouth 2 times daily as needed for other (Neuropathy) In addition to scheduled doses    at prn     metFORMIN (GLUCOPHAGE) 500 MG tablet Take 1 tablet (500 mg) by mouth 2 times daily (with meals) 180 tablet 3 9/12/2018 at pm     omeprazole (PRILOSEC) 20 MG CR capsule Take 1 capsule (20 mg) by mouth daily 90 capsule 3 9/12/2018 at am     simvastatin (ZOCOR) 20 MG tablet Take 1 tablet (20 mg) by mouth At Bedtime 90 tablet 3 9/12/2018 at pm     valsartan (DIOVAN) 160 MG tablet Take 1 tablet (160 mg) by mouth daily 90 tablet 3 9/13/2018 at am      ALLERGIES:  LISINOPRIL CAUSING COUGH.      PAST SURGICAL HISTORY:   1.  Hysterectomy in her 30s.   2.  Colonoscopy.   3.  Appendectomy.   4.  Bilateral knee replacement in 2007, 2012.   5.  Shoulder surgery.      SOCIAL HISTORY:  The patient lives in a house in [place].  She is .  She will use a cane on occasion when she is out running errands.  She denies tobacco use.  Occasional alcohol use, socially, last alcohol intake was the night prior to admission.  She drank 3 beers to celebrate her daughter's birthday.      LABORATORY DATA:  Reviewed per Epic and noted in the HPI.      IMAGING:  Noted in the HPI.      VITAL SIGNS:  T 98.6, P 105, /72, R 17, SpO2 96% on room air.        CONSTITUTIONAL: Pt laying in bed, dressed in hospital garb Appears comfortable. Cooperative with interview. Accompanied by daughter at bedside.  HEENT: Normocephalic, atraumatic. Negative for conjunctival redness or scleral icterus.    CARDIOVASCULAR: RRR, no murmurs, rubs, or extra heart sounds appreciated. Pulses +2/4 and regular in upper and lower extremities, bilaterally.   RESPIRATORY: No increased work of breathing.  CTA, bilat; no wheezes, rales, or rhonchi appreciated.  GASTROINTESTINAL:  Abdomen soft,  non-distended. BS auscultated in all four quadrants. Negative for tenderness to palpation.  No masses or organomegaly noted.  MUSCULOSKELETAL: Chest pain reproducible about palpation of left upper chest wall. No gross deformities noted. Normal muscle tone.   HEMATOLOGIC/LYMPHATIC/IMMUNOLOGIC: Negative for lower extremity edema, bilaterally.  NEUROLOGIC: Alert and oriented to person, place, and time.  strength intact.   SKIN: Warm, dry, intact. No jaundice noted. Negative for suspicious lesions, rashes, bruising, open sores or abrasions.      ASSESSMENT AND PLAN:  Diane Gaitan is a 68-year-old female with a past medical history of non-insulin dependent diabetes mellitus with peripheral neuropathy, hypertension, hyperlipidemia, osteoarthritis, gastroesophageal reflux disease, and depression who presented to the Emergency Department for further evaluation of chest pain.  Also found to be anemic.  Ultimately, the patient was started on a nitroglycerin drip for her chest pain and admitted for further evaluation and treatment.  The patient is slightly tachycardic and hypertensive, all other vitals within normal limits.  The patient is currently stable.      Atypical chest pain:  Acute onset of left upper chest wall pain beginning at 1:00 a.m. the day of admission with pain radiating to her bilateral shoulders, described as a sharp shooting pain with some pleuritic component, but also note is reproducible upon palpation.  Associated diaphoresis and shortness of breath.  Risk factors for CAD include diabetes mellitus, hypertension, hyperlipidemia.  No tobacco history.  No prior episodes.  No prior stress testing.  The patient was given 3 nitroglycerin by EMS and a full dose of aspirin.  In the ED, she was started on a nitroglycerin drip for ongoing pain, given Tylenol, morphine, Zofran and a 1 liter bolus.  Troponin negative.  BMP unremarkable.  CBC with anemia, hemoglobin 8.3, otherwise unremarkable.  D-dimer 0.8,  prompting CT PE which was negative for PE.  Chest x-ray unremarkable.  EKG showing sinus tachycardia.   -- Admit under inpatient status.   -- Continue nitroglycerin drip (for blood pressure and chest pain), ween as tolerated   -- Will hold on heparin at this time given anemia (see below).  If troponins turns positive, would most certainly initiate heparin as long as hemoglobin remains stable.  I appreciate cardiology's recommendations.     -- Cardiology consulted, appreciate assistance.   -- Echocardiogram ordered.   -- Serial troponins.   -- Lipid profile added.   -- Will continue patient's prior to admission valsartan, aspirin 81 mg p.o. daily, atorvastatin 40 mg p.o. daily, Lopressor 25 mg p.o. b.i.d.   -- NPO status until Cardiology assesses.      Microcytic anemia:  Hemoglobin on admission 8.3 with MCV of 67.  Most recent hemoglobin per record is from 2012 and was 11.1.  The patient reports her hemoglobin typically ranges from 13-14.  She denies any melena, hematochezia, hematuria or vaginal bleeding.  She does take a daily aspirin 81 mg p.o. daily with last dose on 09/12.  However, she did get full dose of aspirin via EMS prior to arrival.  No daily chronic NSAID use.  Occasional intermittent ibuprofen.  No history of ulcer.  No abdominal pain, nausea, or vomiting.  Last colonoscopy in 2016, which was unremarkable.  Gets colonoscopies every 5 years for her family history of colon cancer (sister).  Takes a daily Prilosec.   -- Serial hemoglobins.   -- Iron studies; iron 18 (low), iron binding capacity 408, iron saturation index 4 (low), ferritin 19.     -- GI consulted, appreciate recommendations.   -- Occult blood testing ordered.   -- Protonix IV.     Recent Labs  Lab 09/13/18  0904 09/13/18  0416   HGB 7.5* 8.3*     Type 2 diabetes mellitus, non-insulin dependent, controlled with peripheral neuropathy:  A1c on admission 6.1.  The patient maintained on metformin 500 mg p.o. b.i.d.  We will hold prior to  admission metformin at this time.  Will cover patient with medium dose sliding scale insulin.  Blood sugars per protocol.  Monitor for signs of hypoglycemia.  Will continue PTA gabapentin.     Recent Labs  Lab 18  1120 18  0416   GLC  --  219*   *  --      Hypertension, hyperlipidemia:  As above.     Depression:  Continue PTA Wellbutrin and Celexa once verified by pharmacy.     Gastroesophageal reflux disease:  PPI as above.     Deep venous thrombosis prophylaxis:  PCDs ordered.      CODE STATUS:  FULL CODE.  This was confirmed with patient and daughter at bedside.      This patient was seen and assessed with Dr. Gillis of the Hospitalist Service who agrees with the plan as outlined above.         LIANA GILLIS MD       As dictated by JUAN M CAR PA-C            D: 2018   T: 2018   MT: AMADOR      Name:     GRACIE ALVARADO   MRN:      [id number]        Account:      [id number]   :      1950        Admitted:     2018                   Document: [id number]

## 2018-09-13 NOTE — ED PROVIDER NOTES
History     Chief Complaint:  Chest pain      HPI   Diane Gaitan is a 68 year old female who presents to the emergency department via EMS for evaluation of chest pain. Per EMS report, the patient work up around 1:00 AM with chest pain radiating to the bilateral shoulders. She was also diaphoretic and had shortness of breath. En route the patient was stable other than being hypertensive and sinus tachycardic. She was given 3 doses of Nitroglycerine, blood sugar 232. Note, she took aspirin before EMS arrived.   Upon evaluation, the patient states that she has never experienced pain like this before and rates the pain as a 5/10 but the pain has gotten better since the onset.   All other symptoms are negative per patient's report.           Allergies:  Lisinopril: Cough       Medications:    Tylenol  Aspirin  Wellbutrin  Celexa  Neurontin  Metformin  Omeprazole  ZOCOR  DIOVAN       Past Medical History:    Arthritis  Hypertension  Depression  Type 2 diabetes.     Past Surgical History:    Arthroplasty knee  Breast biopsy   Hysterectomy    Family History:    Breast cancer  Colon Cancer    Social History:  Smoking status: Former smoker 10/18/1980  Alcohol use: Yes    Marital Status:   [5]       Review of Systems   Constitutional: Positive for diaphoresis.   Respiratory: Positive for shortness of breath.    Cardiovascular: Positive for chest pain.   All other systems reviewed and are negative.        Physical Exam     Patient Vitals for the past 24 hrs:   BP Temp Temp src Pulse Heart Rate Resp SpO2 Height Weight   09/13/18 0610 146/81 - - - 102 28 96 % - -   09/13/18 0600 (!) 137/93 - - - 100 21 97 % - -   09/13/18 0540 142/72 - - - 102 19 95 % - -   09/13/18 0533 151/70 - - - 101 23 96 % - -   09/13/18 0530 (!) 240/88 - - - 101 21 95 % - -   09/13/18 0500 139/72 - - - 100 20 95 % - -   09/13/18 0452 - - - - 101 26 98 % - -   09/13/18 0450 129/64 - - - 100 27 96 % - -   09/13/18 0444 - - - - 102 14 97 % - -  "  09/13/18 0434 135/64 - - - 98 8 96 % - -   09/13/18 0414 106/64 98.6  F (37  C) Oral 98 - 20 97 % 1.702 m (5' 7\") 98.3 kg (216 lb 11.4 oz)         Physical Exam  Constitutional: The patient is oriented to person, place, and time. Uncomfortable, Diaphoretic  HENT:   Head: Atraumatic  Right Ear: Normal  Left Ear: Normal  Nose: Nose normal.   Mouth/Throat: Oropharynx is clear and moist. No erythema or exudate.   Eyes: Conjunctivae and EOM are normal. Pupils are equal, round, and reactive to light. No discharge  Neck: Normal range of motion. Neck supple.   Cardiovascular: Normal rate, regular rhythm, no murmur gallops or rubs. Intact distal pulses.    Pulmonary/Chest: CTA bilaterally. No wheezes rale or rhonchi.  Abdominal: Soft. Non tender.  No masses   Musculoskeletal: No edema. No bony deformity. Normal range of motion  Lymphadenopathy:     The patient has no cervical adenopathy.   Neurological: The patient is alert and oriented to person, place, and time. The patient has normal strength and normal reflexes. No cranial nerve deficit. Coordination normal.   Skin: Skin is warm and dry. No rash noted. The patient is not diaphoretic.   Psychiatric: The patient has a normal mood and affect.        Emergency Department Course   #1 ECG (4:10:56):  Rate 93 bpm. ME interval 132. QRS duration 78. QT/QTc 380/472. P-R-T axes 60 9 34. Normal sinus rhythm, Normal ECG,  Interpreted at 0410 by Curt Medina MD.    #2 ECG (4:39:43):  Rate 101 bpm. ME interval 140. QRS duration 78. QT/QTc 374/484. P-R-T axes 65 20 46. Sinus tachycardia, Otherwise normal ECG,  Interpreted at 0445 by Curt Medina MD.    #3 ECG (5:29:40):  Rate 104 bpm. ME interval 124. QRS duration 80. QT/QTc 368/483. P-R-T axes 72 17 41. Sinus tachycardia, otherwise normal ECG Interpreted at 0531 by Curt Medina MD.    Imaging:  Radiographic findings were communicated with the patient who voiced understanding of the findings.    Chest XR, 1 view "   IMPRESSION: No acute abnormality.  As read by radiology    Chest CT  IMPRESSION: There is no pulmonary embolus, aortic aneurysm or  dissection. No acute chest abnormality.  As read by Radiology    Laboratory:  CBC:HGB 8.3 (L),  WNL (WBC 8.5, )  BMP:Glucose 219 (H), Calcium 8.2 (L),  WNL (Creatinine 0.67)  Troponin I: <0.015  D dimer: 0.8 (H)    Interventions:  0431 Tylenol 1000 mg PO  0530 Morphine 2 mg IV      Emergency Department Course:  Past medical records, nursing notes, and vitals reviewed.  0412: I performed an exam of the patient and obtained history, as documented above.    IV inserted and blood drawn.    The patient was sent for a chest x-ray and CT while in the emergency department, findings above.    0424: I rechecked the patient.    0558: I rechecked the patient.    Findings and plan explained to the Patient who consents to admission.     0557: Discussed the patient with Dr. Churchill, who will admit the patient to a ICU bed for further monitoring, evaluation, and treatment.       Impression & Plan      Medical Decision Making:  Patient presents with chest pain concerning for acute coronary syndrome. On arrival here she was diaphoretic, had pain radiating to the jaw and both shoulders. Initial EKG showed her normal sinus rhythm. Paramedics related that they thought one of their EKGs looked like ST elevation in the inferior leads although did not appreciate it. Currently patient had been given 3 sublingual nitroglycerin. Pain was improving although she has got a significant headache. She was started on a nitroglycerine drip that was titrated up and pain is now essentially resolved. She did have an elevated D dimer so scan of chest was done which was negative. Initial troponin came back negative as well. Repeat EKG showed she had sinus tachycardia but no ST elevation in light of her symptoms. I do think that admission is warranted. I spoke to Dr. Churchill. Patient will be admitted to the CICU for  further evaluation and treatment.       Diagnosis:    ICD-10-CM    1. Chest pain-possible ACS R07.9        Disposition:  Admitted to ICU bed.         Sebastian Navas  9/13/2018    EMERGENCY DEPARTMENT    Scribe Disclosure:  I, Sebastian Navas, am serving as a scribe at 4:12 AM on 9/13/2018 to document services personally performed by Curt Medina MD based on my observations and the provider's statements to me.        Curt Medina MD  09/13/18 0633

## 2018-09-13 NOTE — PROGRESS NOTES
[facility]  Cardiology Consultation     Date of Admission:  9/13/2018    Assessment & Plan     1.  Microcytic Anemia - Hgb 7.5 with MCV 67, iron deficiency.    2.  HTN  3.  CP with normal troponin  4.  DM  5.  GERD    Recommendations:    1.  Chest pain: Unclear etiology.  Fortunately this has since resolved with stabilization of medical therapy.  More pressing concern is her anemia.  GI service has been consulted.  Agreed that given the fact that her EKG is at baseline and her troponins are negative she would not be a good candidate for heparin.  2.  Would focus initially on blood pressure control and GI workup.  Echocardiogram has been ordered and the results are pending at time of my dictation.  3.  We will be in the background and the focus of her initial care at this point should be directed by the GI service.  With iron deficiency anemia in this age group certainly cancer would be in the differential or some form of ongoing bleeding not apparent to patient.  Defer to GI and hospitalist team  4.  May consider outpatient stress test once her other issues have stabilized.      Lee Smith MD      HPI:    Patient is a pleasant 68-year-old female who we are asked to consult regarding chest pain.  Patient has a past medical history notable for hypertension, diabetes mellitus and gastroesophageal reflux disease.  She was in her usual state of health until the morning of her admission.  She states that she was awakened with a headache and chest pressure.  She describes as a sharp pain middle of her chest apparently reproducible with no radiation.  She presented to the emergency room her EKG demonstrated sinus tachycardia however no acute ST-T wave normalities were found.  Lab work was also notable for reduced hemoglobin with an MCV which was also microcytic in nature.  Apparently this is a new finding as well.  Upon questioning patient reports of no melena hematochezia amenorrhea or any  other obvious sources of bleeding.  She does take a chronic low-dose aspirin.  Limited echocardiogram has been performed the results are pending at time of dictation.  Briefly the wall motion normalities appear to be normal.  Again awaiting official read.  Her hemoglobin unfortunately has further declined to 7.5.  GI service is also consulted as well.  Due to her presentation cartilages service is consulted.  Fortunately her troponins have been negative ×2.        Lee Smith MD    Primary Care Physician   Jca Barry      Patient Active Problem List   Diagnosis     Essential hypertension, benign     CARDIOVASCULAR SCREENING; LDL GOAL LESS THAN 100     ACP (advance care planning)     S/P total knee replacement     Osteoarthrosis, unspecified whether generalized or localized, involving lower leg     Acute posthemorrhagic anemia     Major depressive disorder, single episode, mild (H)     Gastroesophageal reflux disease without esophagitis     Type 2 diabetes mellitus without complication, without long-term current use of insulin (H)     NSTEMI (non-ST elevated myocardial infarction) (H)     Unstable angina (H)       Past Medical History   I have reviewed this patient's medical history and updated it with pertinent information if needed.   Past Medical History:   Diagnosis Date     Arthritis      BENIGN HYPERTENSION 8/28/2002     CARDIOVASCULAR SCREENING; LDL GOAL LESS THAN 100 10/31/2010     Esophageal reflux      Mild major depression (H) 9/14/2010     Other malaise and fatigue 8/28/2002     Type 2 diabetes, HbA1c goal < 7% (H) 10/31/2010       Past Surgical History   I have reviewed this patient's surgical history and updated it with pertinent information if needed.  Past Surgical History:   Procedure Laterality Date     ARTHROPLASTY KNEE  10/18/2012    Procedure: ARTHROPLASTY KNEE;  RIGHT TOTAL KNEE ARTHROPLASTY ([employer]PHEW)^ ;  Surgeon: Howard Charles MD;  Location: [facility] OR     BREAST  BIOPSY, RT/LT  1988    breast biopsy     C NONSPECIFIC PROCEDURE  10/30/96    skin tags removed     C NONSPECIFIC PROCEDURE      colonic polyps     COLONOSCOPY N/A 7/14/2016    Procedure: COLONOSCOPY;  Surgeon: Curt Patel MD;  Location:  GI     HYSTERECTOMY, PAP NO LONGER INDICATED  1986    abdominal hysterectomy       Prior to Admission Medications   Prior to Admission Medications   Prescriptions Last Dose Informant Patient Reported? Taking?   Acetaminophen (TYLENOL PO)  at prn Self Yes Yes   Sig: Take 1,000 mg by mouth every 6 hours as needed.     aspirin 81 MG tablet 9/13/2018 at 0400 Self No Yes   Sig: Take 1 tablet by mouth daily.   buPROPion (WELLBUTRIN XL) 150 MG 24 hr tablet 9/12/2018 at am Self No Yes   Sig: Take 1 tablet (150 mg) by mouth every morning   calcium carbonate 600 mg-vitamin D 400 units (CALTRATE) 600-400 MG-UNIT per tablet 9/12/2018 at am Self Yes Yes   Sig: Take 1 tablet by mouth daily   citalopram (CELEXA) 20 MG tablet 9/12/2018 at am Self No Yes   Sig: Take 1 tablet (20 mg) by mouth daily   gabapentin (NEURONTIN) 300 MG capsule 9/12/2018 at pm Self Yes Yes   Sig: Take 300 mg by mouth 2 times daily   gabapentin (NEURONTIN) 300 MG capsule  at prn Self Yes Yes   Sig: Take 300 mg by mouth 2 times daily as needed for other (Neuropathy) In addition to scheduled doses   metFORMIN (GLUCOPHAGE) 500 MG tablet 9/12/2018 at pm Self No Yes   Sig: Take 1 tablet (500 mg) by mouth 2 times daily (with meals)   omeprazole (PRILOSEC) 20 MG CR capsule 9/12/2018 at am Self No Yes   Sig: Take 1 capsule (20 mg) by mouth daily   simvastatin (ZOCOR) 20 MG tablet 9/12/2018 at pm Self No Yes   Sig: Take 1 tablet (20 mg) by mouth At Bedtime   valsartan (DIOVAN) 160 MG tablet 9/13/2018 at am Self No Yes   Sig: Take 1 tablet (160 mg) by mouth daily      Facility-Administered Medications: None     Current Facility-Administered Medications   Medication Dose Route Frequency     [START ON 9/14/2018] aspirin   "81 mg Oral Daily     atorvastatin  40 mg Oral QPM     buPROPion  150 mg Oral QAM     citalopram  20 mg Oral Daily     gabapentin  300 mg Oral BID     insulin aspart  1-6 Units Subcutaneous Q4H     metoprolol tartrate  25 mg Oral BID     pantoprazole (PROTONIX) IV  40 mg Intravenous Daily with breakfast     sodium chloride (PF)  3 mL Intracatheter Q8H     [START ON 9/14/2018] valsartan  160 mg Oral Daily     Current Facility-Administered Medications   Medication Last Rate     Continuing ACE inhibitor/ARB/ARNI from home medication list OR ACE inhibitor/ARB order already placed during this visit       - MEDICATION INSTRUCTIONS -       - MEDICATION INSTRUCTIONS -       - MEDICATION INSTRUCTIONS -       nitroGLYcerin 0.7 mcg/kg/min (09/13/18 0916)     Allergies   Allergies   Allergen Reactions     Lisinopril Cough     COUGH       Social History    reports that she quit smoking about 37 years ago. She has a 12.00 pack-year smoking history. She has never used smokeless tobacco. She reports that she drinks alcohol. She reports that she does not use illicit drugs.    Family History   Family History   Problem Relation Age of Onset     Breast Cancer Sister      Colon Cancer Sister      Breast Cancer Daughter      Hypertension Daughter        Review of Systems   The comprehensive 10 point Review of Systems is negative other than noted in the HPI or here.     Physical Exam   Vital Signs with Ranges  Temp:  [98.6  F (37  C)] 98.6  F (37  C)  Pulse:  [96-98] 96  Heart Rate:  [] 89  Resp:  [8-28] 21  BP: (106-240)/(56-93) 112/64  SpO2:  [95 %-98 %] 98 %  Wt Readings from Last 4 Encounters:   09/13/18 98.3 kg (216 lb 11.4 oz)   04/26/18 97.7 kg (215 lb 4.8 oz)   11/07/17 101.3 kg (223 lb 4.8 oz)   08/30/17 100.8 kg (222 lb 4.8 oz)     I/O last 3 completed shifts:  In: 1000 [IV Piggyback:1000]  Out: -       Vitals: /64  Pulse 96  Temp 98.6  F (37  C) (Oral)  Resp 21  Ht 1.702 m (5' 7\")  Wt 98.3 kg (216 lb 11.4 oz)  " SpO2 98%  BMI 33.94 kg/m2    Eyes:   Lids and lashes normal, pupils equal, round and reactive to light, extra ocular muscles intact, sclera clear, conjunctiva normal     Neck:   Supple, symmetrical, trachea midline, no adenopathy, thyroid symmetric, not enlarged and no tenderness, skin normal     Back:   Symmetric, no curvature, spinous processes are non-tender on palpation, paraspinous muscles are non-tender on palpation, no costal vertebral tenderness     Lungs:   No increased work of breathing, good air exchange, clear to auscultation bilaterally, no crackles or wheezing     Cardiovascular:   Normal apical impulse, regular rate and rhythm, normal S1 and S2, no S3 or S4, and no murmur noted     Abdomen:   No scars, normal bowel sounds, soft, non-distended, non-tender, no masses palpated, no hepatosplenomegally     Neurologic:   Awake, alert, oriented to name, place and time.  Cranial nerves II-XII are grossly intact.  Motor is 5 out of 5 bilaterally.  Cerebellar finger to nose, heel to shin intact.  Sensory is intact.  Babinski down going, Romberg negative, and gait is normal.         Recent Labs  Lab 09/13/18 0904 09/13/18 0416   TROPI <0.015 <0.015         Recent Labs  Lab 09/13/18 0904 09/13/18 0416   WBC 8.4 8.5   HGB 7.5* 8.3*   MCV 67* 67*   * 164   NA  --  138   POTASSIUM  --  4.2   CHLORIDE  --  104   CO2  --  25   BUN  --  10   CR  --  0.67   GFRESTIMATED  --  87   GFRESTBLACK  --  >90   ANIONGAP  --  9   JORDON  --  8.2*   GLC  --  219*   ALBUMIN  --  3.5   PROTTOTAL  --  7.2   BILITOTAL  --  0.6   ALKPHOS  --  99   ALT  --  22   AST  --  16   LIPASE  --  115   TROPI <0.015 <0.015     Recent Labs   Lab Test  09/13/18   0416  04/26/18   0809   01/08/15   0806  06/21/13   0742   CHOL  120  123   < >  136  149   HDL  51  48*   < >  49*  58   LDL  48  41   < >  51  61   TRIG  103  169*   < >  182*  155*   CHOLHDLRATIO   --    --    --   2.8  2.6    < > = values in this interval not displayed.        Recent Labs  Lab 09/13/18  0904 09/13/18  0416   WBC 8.4 8.5   HGB 7.5* 8.3*   HCT 26.2* 28.5*   MCV 67* 67*   * 164   IRON  --  18*   IRONSAT  --  4*   FEB  --  408   SOLOMON  --  19     No results for input(s): PH, PHV, PO2, PO2V, SAT, PCO2, PCO2V, HCO3, HCO3V in the last 168 hours.  No results for input(s): NTBNPI, NTBNP in the last 168 hours.    Recent Labs  Lab 09/13/18  0416   DD 0.8*     No results for input(s): SED, CRP in the last 168 hours.    Recent Labs  Lab 09/13/18  0904 09/13/18  0416   * 164       Recent Labs  Lab 09/13/18  0416   TSH 0.91     No results for input(s): COLOR, APPEARANCE, URINEGLC, URINEBILI, URINEKETONE, SG, UBLD, URINEPH, PROTEIN, UROBILINOGEN, NITRITE, LEUKEST, RBCU, WBCU in the last 168 hours.    Imaging:  Recent Results (from the past 48 hour(s))   Chest  XR, 1 view PORTABLE    Narrative    XR CHEST PORT 1 VW  9/13/2018 4:45 AM     HISTORY: Chest pain.    COMPARISON: 1/23/2005.    FINDINGS: Upright portable chest. The heart size is normal. The lungs  are clear. No pneumothorax. Degenerative disease in the shoulders.      Impression    IMPRESSION: No acute abnormality.    LUBA KUMAR MD   Chest CT, IV contrast only - PE protocol    Narrative    CT CHEST PULMONARY EMBOLISM W CONTRAST  9/13/2018 5:29 AM    HISTORY: Chest pain, shortness of breath.    TECHNIQUE: Scans obtained from the apices through the diaphragm with  IV contrast. 76 mL Isovue-370 injected. Radiation dose for this scan  was reduced using automated exposure control, adjustment of the mA  and/or kV according to patient size, or iterative reconstruction  technique.    COMPARISON: None.    FINDINGS: Evaluation of the pulmonary arterial system shows no  evidence of embolus. There is no aortic aneurysm or dissection. The  heart size is normal. There is no mediastinal, hilar or axillary lymph  node enlargement. There is dependent atelectasis in the lungs  bilaterally. No pneumothorax or pleural  effusion. Images through the  upper abdomen show no acute abnormality. Probable cyst in the left  lobe of the liver.      Impression    IMPRESSION: There is no pulmonary embolus, aortic aneurysm or  dissection. No acute chest abnormality.    LUBA KUMAR MD       Echo:  No results found for this or any previous visit (from the past 4320 hour(s)).

## 2018-09-13 NOTE — IP AVS SNAPSHOT
MRN:[id number]                      After Visit Summary   9/13/2018    Diane Gaitan    MRN: [id number]           Thank you!     Thank you for choosing [facility] for your care. Our goal is always to provide you with excellent care. Hearing back from our patients is one way we can continue to improve our services. Please take a few minutes to complete the written survey that you may receive in the mail after you visit with us. Thank you!        Patient Information     Date Of Birth          1950        Designated Caregiver       Most Recent Value    Caregiver    Will someone help with your care after discharge? yes    Name of designated caregiver nasreen Gaitan    Phone number of caregiver [phone]    Caregiver address [address] [address]      About your hospital stay     You were admitted on:  September 13, 2018 You last received care in the:  [facility] [place] [facility]    You were discharged on:  September 15, 2018        Reason for your hospital stay       You had chest pain, needed to make sure it was not related to you heart. You also had low hemoglobin.                  Who to Call     For medical emergencies, please call 911.  For non-urgent questions about your medical care, please call your primary care provider or clinic, [phone]  For questions related to your surgery, please call your surgery clinic        Attending Provider     Provider Specialty    Curt Medina MD Emergency Medicine    Hui Churchill MD Internal Medicine    Ruddy Turner MD Internal Medicine       Primary Care Provider Office Phone # Fax #    Jac Barry [phone]      After Care Instructions     Activity       Your activity upon discharge: activity as tolerated            Diet       Follow this diet upon discharge: Orders Placed This Encounter      Moderate Consistent CHO Diet                  Follow-up Appointments     Follow-up and  recommended labs and tests        Follow up with primary care provider, Jac Barry, within 7 days for hospital follow- up.  No follow up labs or test are needed.    Needs to follow up with GI for colonoscopy as outpatient to further investigate cause of microcytic anemia.                  Further instructions from your care team       NO NSAIDS - no motrin/ibuprofen/advil, no Aleeve/Naproxen, no Idocin, No aspirin other than what is prescribed by MD.              When You Have Gastrointestinal (GI) Bleeding    Blood in your vomit or stool can be a sign of gastrointestinal (GI) bleeding. GI bleeding can be scary. But the cause may not be serious. You should always see a doctor if GI bleeding occurs.  The GI tract  The GI tract is the path through which food travels in the body. Food passes from the mouth down the esophagus (the tube from the mouth to the stomach). Food begins to break down in the stomach. It then moves through the duodenum, the first part of the small intestine. Nutrients are absorbed as food travels through the small intestine. What is left passes into the colon (large intestine) as waste. The colon removes water from the waste. Waste continues from the colon to the rectum (where stool is stored). Waste then leaves the body through the anus.  Causes of GI bleeding  GI bleeding can be caused by many different problems. Some of the more common causes include:    Swollen veins in the anus (hemorrhoids)    Swollen veins in the esophagus (varices)    Sore on the lining of the GI tract (ulcer)    Cuts or scrapes in the mouth or throat    Infection caused by germs such as bacteria or parasites    Food allergies, such as milk allergy in young children    Medicines    Inflammation of the GI tract (gastritis or esophagitis)    Colitis (Crohn's disease or ulcerative colitis)    Cancer (tumors or polyps)    Abnormal pouches in the colon (diverticula)    Tears in the esophagus or  anus    Nosebleed    Abnormal blood vessels in the GI tract (angiodysplasia)  Diagnosing the cause of blood in stool  If blood is coming out in your stool, you may have a lower GI tract problem or a very fast upper GI tract bleed. Bleeding from the GI tract can be bright red. Or it may look dark and tarry. Tests may also find blood in your stool that can t be seen with the eye (occult blood). To find out the cause, tests that may be ordered include:    Blood tests. A blood sample is taken and sent to a lab for exam.    Hemoccult test. Checks a stool sample for blood.    Stool culture. Checks a stool sample for bacteria or parasites.    X-ray, ultrasound, or CT scan. Imaging tests that take pictures of the digestive tract.    Colonoscopy or sigmoidoscopy. This test uses a flexible tube with a tiny camera. The tube is inserted through your anus into your rectum to see the inside of your colon. Your provider can also take a tiny tissue sample (biopsy) and treat a bleeding source  Diagnosing the cause of blood in vomit  If you are vomiting blood or something that looks like coffee grounds, you may have an upper GI tract problem. To find the cause, tests that may be done include:    Upper Endoscopy. A flexible tube with a tiny camera is inserted through your mouth and throat to see inside your upper GI tract. This lets your provider take a tiny tissue sample (biopsy) and treat a bleeding source.    Nasogastric lavage. This can tell if you have upper GI or lower GI bleeding.    X-ray, ultrasound, or CT scan. Imaging tests that take pictures of your digestive tract.    Upper GI series. X-rays of the upper part of your GI tract taken from inside your body.    Enteroscopy. This sends a flexible tube or a small, swallowed capsule camera into your small intestine.  When to call your healthcare provider  Call your healthcare provider right away if you have any of the following:    Bleeding from your mouth or anus that can't be  stopped    Fever of 100.4 F (38.0 ) or higher    Bleeding along with feeling lightheaded or dizzy    Signs of fluid loss (dehydration). These include a dry, sticky mouth, decreased urine output; and very dark urine.    Belly (abdominal) pain   Date Last Reviewed: 7/1/2016 2000-2017 [employer]. [address] [place] [address]. All rights reserved. This information is not intended as a substitute for professional medical care. Always follow your healthcare professional's instructions.                 *CHEST PAIN, UNCERTAIN CAUSE    Based on your exam today, the exact cause of your chest pain is not certain. Your condition does not seem serious at this time, and your pain does not appear to be coming from your heart. However, sometimes the signs of a serious problem take more time to appear. Therefore, watch for the warning signs listed below.  HOME CARE:    1. Rest today and avoid strenuous activity.  2. Take any prescribed medicine as directed.  FOLLOW UP with your doctor in 1-3 days.   GET PROMPT MEDICAL ATTENTION if any of the following occur:    A change in the type of pain: if it feels different, becomes more severe, lasts longer, or begins to spread into your shoulder, arm, neck, jaw or back    Shortness of breath or increased pain with breathing    Weakness, dizziness, or fainting    Cough with blood or dark colored sputum (phlegm)    Fever over 101  F (38.3  C)    Swelling, pain or redness in one leg    8020-9005 [employer] [address] [place] [address]. All rights reserved. This information is not intended as a substitute for professional medical care. Always follow your healthcare professional's instructions.  This information has been modified by your health care provider with permission from the publisher.        Pending Results     Date and Time Order Name Status Description    9/13/2018 2022 EKG 12-lead, tracing only Preliminary     9/13/2018 0438  "EKG 12-lead, tracing only Preliminary             Statement of Approval     Ordered          09/15/18 0837  I have reviewed and agree with all the recommendations and orders detailed in this document.  EFFECTIVE NOW     Approved and electronically signed by:  Som Dhaliwal MD             Admission Information     Date & Time Provider Department Dept. Phone    9/13/2018 Ruddy Turner MD [facility] [phone]      Your Vitals Were     Blood Pressure Pulse Temperature Respirations Height Weight    121/69 (BP Location: Right arm) 90 98.1  F (36.7  C) (Oral) 16 1.702 m (5' 7\") 98.7 kg (217 lb 9.6 oz)    Pulse Oximetry BMI (Body Mass Index)                97% 34.08 kg/m2          MyChart Information     [facility] gives you secure access to your electronic health record. If you see a primary care provider, you can also send messages to your care team and make appointments. If you have questions, please call your primary care clinic.  If you do not have a primary care provider, please call [phone] and they will assist you.        Care EveryWhere ID     This is your Care EveryWhere ID. This could be used by other organizations to access your [facility] medical records  [id number]        Equal Access to Services     YUNG SCHRADER : Erick Hoang, wajuan sanches, qaybta kaalmada jada, leisa acosta. So [facility] [phone].    ATENCIÓN: Si habla español, tiene a christina disposición servicios gratuitos de asistencia lingüística. Llame al [phone].    We comply with applicable federal civil rights laws and [place] laws. We do not discriminate on the basis of race, color, national origin, age, disability, sex, sexual orientation, or gender identity.               Review of your medicines      CONTINUE these medicines which have NOT CHANGED        Dose / Directions    aspirin 81 MG tablet   Used for:  Type 2 diabetes, HbA1c goal < 7% (H)        " Dose:  81 mg   Take 1 tablet by mouth daily.   Quantity:  90 tablet   Refills:  3       buPROPion 150 MG 24 hr tablet   Commonly known as:  WELLBUTRIN XL   Used for:  Major depressive disorder, single episode, mild (H)        Dose:  150 mg   Take 1 tablet (150 mg) by mouth every morning   Quantity:  90 tablet   Refills:  3       calcium carbonate 600 mg-vitamin D 400 units 600-400 MG-UNIT per tablet   Commonly known as:  CALTRATE        Dose:  1 tablet   Take 1 tablet by mouth daily   Refills:  0       citalopram 20 MG tablet   Commonly known as:  celeXA   Used for:  Major depressive disorder, single episode, mild (H)        Dose:  20 mg   Take 1 tablet (20 mg) by mouth daily   Quantity:  90 tablet   Refills:  3       * gabapentin 300 MG capsule   Commonly known as:  NEURONTIN        Dose:  300 mg   Take 300 mg by mouth 2 times daily   Refills:  0       * gabapentin 300 MG capsule   Commonly known as:  NEURONTIN        Dose:  300 mg   Take 300 mg by mouth 2 times daily as needed for other (Neuropathy) In addition to scheduled doses   Refills:  0       metFORMIN 500 MG tablet   Commonly known as:  GLUCOPHAGE   Used for:  Type 2 diabetes mellitus without complication, without long-term current use of insulin (H)        Dose:  500 mg   Take 1 tablet (500 mg) by mouth 2 times daily (with meals)   Quantity:  180 tablet   Refills:  3       omeprazole 20 MG CR capsule   Commonly known as:  priLOSEC   Used for:  Gastroesophageal reflux disease without esophagitis        Dose:  20 mg   Take 1 capsule (20 mg) by mouth daily   Quantity:  90 capsule   Refills:  3       simvastatin 20 MG tablet   Commonly known as:  ZOCOR   Used for:  CARDIOVASCULAR SCREENING; LDL GOAL LESS THAN 100, Type 2 diabetes mellitus without complication, without long-term current use of insulin (H)        Dose:  20 mg   Take 1 tablet (20 mg) by mouth At Bedtime   Quantity:  90 tablet   Refills:  3       TYLENOL PO        Dose:  1000 mg   Take 1,000 mg  by mouth every 6 hours as needed.   Refills:  0       valsartan 160 MG tablet   Commonly known as:  DIOVAN   Used for:  Essential hypertension, benign        Dose:  160 mg   Take 1 tablet (160 mg) by mouth daily   Quantity:  90 tablet   Refills:  3       * Notice:  This list has 2 medication(s) that are the same as other medications prescribed for you. Read the directions carefully, and ask your doctor or other care provider to review them with you.             Protect others around you: Learn how to safely use, store and throw away your medicines at www.disposemymeds.org.             Medication List: This is a list of all your medications and when to take them. Check marks below indicate your daily home schedule. Keep this list as a reference.      Medications           Morning Afternoon Evening Bedtime As Needed    aspirin 81 MG tablet   Take 1 tablet by mouth daily.   Next Dose Due:  9/16/18 am                                   buPROPion 150 MG 24 hr tablet   Commonly known as:  WELLBUTRIN XL   Take 1 tablet (150 mg) by mouth every morning   Last time this was given:  150 mg on 9/15/2018  8:18 AM   Next Dose Due:  9/16/18 am                                   calcium carbonate 600 mg-vitamin D 400 units 600-400 MG-UNIT per tablet   Commonly known as:  CALTRATE   Take 1 tablet by mouth daily   Next Dose Due:  9/16/18 am                                   citalopram 20 MG tablet   Commonly known as:  celeXA   Take 1 tablet (20 mg) by mouth daily   Last time this was given:  20 mg on 9/15/2018  8:17 AM   Next Dose Due:  9/16/18 am                                   * gabapentin 300 MG capsule   Commonly known as:  NEURONTIN   Take 300 mg by mouth 2 times daily   Last time this was given:  300 mg on 9/15/2018  8:17 AM   Next Dose Due:  9/15/18 evening                                        * gabapentin 300 MG capsule   Commonly known as:  NEURONTIN   Take 300 mg by mouth 2 times daily as needed for other (Neuropathy) In  addition to scheduled doses   Last time this was given:  300 mg on 9/15/2018  8:17 AM                                   metFORMIN 500 MG tablet   Commonly known as:  GLUCOPHAGE   Take 1 tablet (500 mg) by mouth 2 times daily (with meals)   Next Dose Due:  9/15/18 dinner                                        omeprazole 20 MG CR capsule   Commonly known as:  priLOSEC   Take 1 capsule (20 mg) by mouth daily   Next Dose Due:  9/16/18 am                                   simvastatin 20 MG tablet   Commonly known as:  ZOCOR   Take 1 tablet (20 mg) by mouth At Bedtime   Next Dose Due:  9/15/18 bedtime                                     TYLENOL PO   Take 1,000 mg by mouth every 6 hours as needed.   Last time this was given:  650 mg on 9/15/2018  1:41 AM                                   valsartan 160 MG tablet   Commonly known as:  DIOVAN   Take 1 tablet (160 mg) by mouth daily   Last time this was given:  160 mg on 9/15/2018  8:17 AM   Next Dose Due:  9/16/18 am                                   * Notice:  This list has 2 medication(s) that are the same as other medications prescribed for you. Read the directions carefully, and ask your doctor or other care provider to review them with you.

## 2018-09-13 NOTE — IP AVS SNAPSHOT
[facility]    [address]    [place] [address]    Phone:  [phone]                                       After Visit Summary   9/13/2018    Diane Gaitan    MRN: [id number]           After Visit Summary Signature Page     I have received my discharge instructions, and my questions have been answered. I have discussed any challenges I see with this plan with the nurse or doctor.    ..........................................................................................................................................  Patient/Patient Representative Signature      ..........................................................................................................................................  Patient Representative Print Name and Relationship to Patient    ..................................................               ................................................  Date                                   Time    ..........................................................................................................................................  Reviewed by Signature/Title    ...................................................              ..............................................  Date                                               Time          22EPIC Rev 08/18

## 2018-09-13 NOTE — PLAN OF CARE
Problem: Patient Care Overview  Goal: Plan of Care/Patient Progress Review  Outcome: No Change  Weaned of nitroglycerine, continue to have chest pressure, had EGD was negative, up with assist. Hgb is low.  Continue to monitor.

## 2018-09-13 NOTE — ED NOTES
Bed: ST  Expected date: 9/13/18  Expected time: 4:10 AM  Means of arrival: Ambulance  Comments:  Santiago [phone] [id number]

## 2018-09-13 NOTE — ED NOTES
"Back from CT, pt c/o HA getting worse while in CT, MD aware, pt states \"I feel like my heads going to explode\". States chest pain is better, 2-3/10.  in CT.   "

## 2018-09-13 NOTE — ED NOTES
CP down to 2/10 on nitroglycerin gtt, MD aware. BP stable. Results and plan of care discussed with pt. Dtr at bedside. Pt to CT on cardiac monitor with RN and EDT.

## 2018-09-13 NOTE — PHARMACY-ADMISSION MEDICATION HISTORY
Admission medication history interview status for the 9/13/2018  admission is complete. See EPIC admission navigator for prior to admission medications     Medication history source reliability:Moderate    Actions taken by pharmacist (provider contacted, etc):  Spoke w/ patient who provided a medication list (list did not include Gabapentin; however, pt was able to tell me the dosing)     Additional medication history information not noted on PTA med list :None    Medication reconciliation/reorder completed by provider prior to medication history? Yes    Time spent in this activity: 15 minutes    Prior to Admission medications    Medication Sig Last Dose Taking? Auth Provider   Acetaminophen (TYLENOL PO) Take 1,000 mg by mouth every 6 hours as needed.    at prn Yes Reported, Patient   aspirin 81 MG tablet Take 1 tablet by mouth daily. 9/13/2018 at 0400 Yes Jac Barry MD   buPROPion (WELLBUTRIN XL) 150 MG 24 hr tablet Take 1 tablet (150 mg) by mouth every morning 9/12/2018 at am Yes Jac Barry MD   calcium carbonate 600 mg-vitamin D 400 units (CALTRATE) 600-400 MG-UNIT per tablet Take 1 tablet by mouth daily 9/12/2018 at am Yes Unknown, Entered By History   citalopram (CELEXA) 20 MG tablet Take 1 tablet (20 mg) by mouth daily 9/12/2018 at am Yes Jac Barry MD   gabapentin (NEURONTIN) 300 MG capsule Take 300 mg by mouth 2 times daily 9/12/2018 at pm Yes Unknown, Entered By History   gabapentin (NEURONTIN) 300 MG capsule Take 300 mg by mouth 2 times daily as needed for other (Neuropathy) In addition to scheduled doses  at prn Yes Unknown, Entered By History   metFORMIN (GLUCOPHAGE) 500 MG tablet Take 1 tablet (500 mg) by mouth 2 times daily (with meals) 9/12/2018 at pm Yes Jac Barry MD   omeprazole (PRILOSEC) 20 MG CR capsule Take 1 capsule (20 mg) by mouth daily 9/12/2018 at am Yes Jac Barry MD   simvastatin (ZOCOR) 20 MG tablet Take 1 tablet (20 mg) by mouth At Bedtime 9/12/2018 at pm  Yes Jac Barry MD   valsartan (DIOVAN) 160 MG tablet Take 1 tablet (160 mg) by mouth daily 9/13/2018 at am Yes Jac Barry MD Carolyn Dahlman, PharmD, BCPS

## 2018-09-14 ENCOUNTER — APPOINTMENT (OUTPATIENT)
Dept: NUCLEAR MEDICINE | Facility: CLINIC | Age: 68
DRG: 812 | End: 2018-09-14
Attending: NURSE PRACTITIONER
Payer: MEDICARE

## 2018-09-14 LAB
GLUCOSE BLDC GLUCOMTR-MCNC: 103 MG/DL (ref 70–99)
HEMOCCULT STL QL: NEGATIVE
HGB BLD-MCNC: 7.6 G/DL (ref 11.7–15.7)
HGB BLD-MCNC: 7.7 G/DL (ref 11.7–15.7)

## 2018-09-14 PROCEDURE — 99232 SBSQ HOSP IP/OBS MODERATE 35: CPT | Mod: 25 | Performed by: INTERNAL MEDICINE

## 2018-09-14 PROCEDURE — 25000132 ZZH RX MED GY IP 250 OP 250 PS 637: Mod: GY | Performed by: PHYSICIAN ASSISTANT

## 2018-09-14 PROCEDURE — 93018 CV STRESS TEST I&R ONLY: CPT | Performed by: INTERNAL MEDICINE

## 2018-09-14 PROCEDURE — 25000128 H RX IP 250 OP 636: Performed by: PHYSICIAN ASSISTANT

## 2018-09-14 PROCEDURE — 40000855 ZZH STATISTIC ECHO STRESS OR NM NPI

## 2018-09-14 PROCEDURE — 93017 CV STRESS TEST TRACING ONLY: CPT

## 2018-09-14 PROCEDURE — 36415 COLL VENOUS BLD VENIPUNCTURE: CPT | Performed by: PHYSICIAN ASSISTANT

## 2018-09-14 PROCEDURE — 21000000 ZZH R&B IMCU HEART CARE

## 2018-09-14 PROCEDURE — 93005 ELECTROCARDIOGRAM TRACING: CPT

## 2018-09-14 PROCEDURE — 82272 OCCULT BLD FECES 1-3 TESTS: CPT | Performed by: PHYSICIAN ASSISTANT

## 2018-09-14 PROCEDURE — 78452 HT MUSCLE IMAGE SPECT MULT: CPT | Mod: 26 | Performed by: INTERNAL MEDICINE

## 2018-09-14 PROCEDURE — 00000146 ZZHCL STATISTIC GLUCOSE BY METER IP

## 2018-09-14 PROCEDURE — 25000128 H RX IP 250 OP 636: Performed by: INTERNAL MEDICINE

## 2018-09-14 PROCEDURE — 99232 SBSQ HOSP IP/OBS MODERATE 35: CPT | Performed by: STUDENT IN AN ORGANIZED HEALTH CARE EDUCATION/TRAINING PROGRAM

## 2018-09-14 PROCEDURE — 78452 HT MUSCLE IMAGE SPECT MULT: CPT

## 2018-09-14 PROCEDURE — 34300033 ZZH RX 343: Performed by: INTERNAL MEDICINE

## 2018-09-14 PROCEDURE — A9270 NON-COVERED ITEM OR SERVICE: HCPCS | Mod: GY | Performed by: PHYSICIAN ASSISTANT

## 2018-09-14 PROCEDURE — C9113 INJ PANTOPRAZOLE SODIUM, VIA: HCPCS | Performed by: PHYSICIAN ASSISTANT

## 2018-09-14 PROCEDURE — A9502 TC99M TETROFOSMIN: HCPCS | Performed by: INTERNAL MEDICINE

## 2018-09-14 PROCEDURE — 93010 ELECTROCARDIOGRAM REPORT: CPT | Performed by: INTERNAL MEDICINE

## 2018-09-14 PROCEDURE — 85018 HEMOGLOBIN: CPT | Performed by: PHYSICIAN ASSISTANT

## 2018-09-14 PROCEDURE — 93016 CV STRESS TEST SUPVJ ONLY: CPT | Performed by: INTERNAL MEDICINE

## 2018-09-14 RX ORDER — AMINOPHYLLINE 25 MG/ML
50-100 INJECTION, SOLUTION INTRAVENOUS
Status: DISCONTINUED | OUTPATIENT
Start: 2018-09-14 | End: 2018-09-14

## 2018-09-14 RX ORDER — REGADENOSON 0.08 MG/ML
0.4 INJECTION, SOLUTION INTRAVENOUS ONCE
Status: COMPLETED | OUTPATIENT
Start: 2018-09-14 | End: 2018-09-14

## 2018-09-14 RX ORDER — ACYCLOVIR 200 MG/1
0-1 CAPSULE ORAL
Status: DISCONTINUED | OUTPATIENT
Start: 2018-09-14 | End: 2018-09-14

## 2018-09-14 RX ORDER — ALBUTEROL SULFATE 90 UG/1
2 AEROSOL, METERED RESPIRATORY (INHALATION) EVERY 5 MIN PRN
Status: DISCONTINUED | OUTPATIENT
Start: 2018-09-14 | End: 2018-09-14

## 2018-09-14 RX ADMIN — TETROFOSMIN 32.9 MCI.: 1.38 INJECTION, POWDER, LYOPHILIZED, FOR SOLUTION INTRAVENOUS at 11:30

## 2018-09-14 RX ADMIN — REGADENOSON 0.4 MG: 0.08 INJECTION, SOLUTION INTRAVENOUS at 11:27

## 2018-09-14 RX ADMIN — TETROFOSMIN 10.5 MCI.: 1.38 INJECTION, POWDER, LYOPHILIZED, FOR SOLUTION INTRAVENOUS at 09:45

## 2018-09-14 RX ADMIN — CITALOPRAM HYDROBROMIDE 20 MG: 20 TABLET ORAL at 09:29

## 2018-09-14 RX ADMIN — METOPROLOL TARTRATE 25 MG: 25 TABLET ORAL at 09:28

## 2018-09-14 RX ADMIN — ATORVASTATIN CALCIUM 40 MG: 40 TABLET, FILM COATED ORAL at 22:46

## 2018-09-14 RX ADMIN — PANTOPRAZOLE SODIUM 40 MG: 40 INJECTION, POWDER, FOR SOLUTION INTRAVENOUS at 09:29

## 2018-09-14 RX ADMIN — GABAPENTIN 300 MG: 300 CAPSULE ORAL at 09:29

## 2018-09-14 RX ADMIN — BUPROPION HYDROCHLORIDE 150 MG: 150 TABLET, FILM COATED, EXTENDED RELEASE ORAL at 09:29

## 2018-09-14 RX ADMIN — METOPROLOL TARTRATE 25 MG: 25 TABLET ORAL at 22:47

## 2018-09-14 RX ADMIN — GABAPENTIN 300 MG: 300 CAPSULE ORAL at 22:47

## 2018-09-14 RX ADMIN — POLYETHYLENE GLYCOL 3350 17 G: 17 POWDER, FOR SOLUTION ORAL at 16:42

## 2018-09-14 RX ADMIN — VALSARTAN 160 MG: 160 TABLET, FILM COATED ORAL at 09:29

## 2018-09-14 RX ADMIN — ASPIRIN 81 MG 81 MG: 81 TABLET ORAL at 09:29

## 2018-09-14 NOTE — PROGRESS NOTES
[facility]  Gastroenterology Progress Note     Diane Gaitan MRN# [id number]   YOB: 1950 Age: 68 year old          Assessment and Plan:     NSTEMI (non-ST elevated myocardial infarction) (H)    Essential hypertension, benign    Major depressive disorder, single episode, mild (H)    Gastroesophageal reflux disease without esophagitis    Type 2 diabetes mellitus without complication, without long-term current use of insulin (H)    Unstable angina (H)  Anemia with history of GERD- Status post EGD on 09/13/2018 revealed no cause of GI bleed. Hemoglobin has remained stable around 7.7. Patient is asymptomatic with shortness of breath or extreme fatigue. Developed new onset A fib yesterday. Will recommend to draw serial hemoglobin and monitor for signs of active GI bleed. Patient denies any history of melena or rectal bleeding. At this time no urgent need for colonoscopy and given patients current status will recommend to plan colonoscopy as an outpatient. Alternatively if patient remains stable and colonoscopy is desired to have done inpatient. Can plan in next 1-2 days pending stable from cardiac standpoint         Chest pain      Interval History:   no complaints, doing well, denies chest pain, denies shortness of breath, denies abdominal pain, up and ambulating, alert, oriented to person, place and time and doing well; no cp, sob, n/v/d, or abd pain.              Review of Systems:   C: NEGATIVE for fever, chills, change in weight  E/M: NEGATIVE for ear, mouth and throat problems  R: NEGATIVE for significant cough or SOB  CV: NEGATIVE for chest pain, palpitations or peripheral edema             Medications:   I have reviewed this patient's current medications    aspirin  81 mg Oral Daily     atorvastatin  40 mg Oral QPM     buPROPion  150 mg Oral QAM     citalopram  20 mg Oral Daily     gabapentin  300 mg Oral BID     metoprolol tartrate  25 mg Oral BID     pantoprazole (PROTONIX) IV   40 mg Intravenous Daily with breakfast     sodium chloride (PF)  3 mL Intracatheter Q8H     valsartan  160 mg Oral Daily                  Physical Exam:   Vitals were reviewed  Vital Signs with Ranges  Temp:  [98.3  F (36.8  C)-99.7  F (37.6  C)] 99  F (37.2  C)  Pulse:  [90-96] 90  Heart Rate:  [] 85  Resp:  [11-40] 15  BP: ()/(48-94) 106/94  SpO2:  [85 %-99 %] 95 %  I/O last 3 completed shifts:  In: 250 [P.O.:250]  Out: -   Constitutional: healthy, alert and no distress   Cardiovascular: negative, PMI normal. No lifts, heaves, or thrills. RRR. No murmurs, clicks gallops or rub  Respiratory: negative, Percussion normal. Good diaphragmatic excursion. Lungs clear  Abdomen: Abdomen soft, non-tender. BS normal. No masses, organomegaly           Data:   I reviewed the patient's new clinical lab test results.   Recent Labs   Lab Test  09/14/18   0535  09/14/18   0215  09/13/18   2250   09/13/18   0904  09/13/18   0416   10/21/12   0550   10/18/12   0915   WBC   --    --    --    --   8.4  8.5   --    --    --    --    HGB  7.6*  7.7*  7.7*   < >  7.5*  8.3*   < >  8.7*   < >  14.0   MCV   --    --    --    --   67*  67*   --    --    --    --    PLT   --    --    --    --   145*  164   --   137*   < >  214   INR   --    --    --    --    --    --    --    --    --   0.97    < > = values in this interval not displayed.     Recent Labs   Lab Test  09/13/18 0416 04/26/18   0809  06/05/17   0819   POTASSIUM  4.2  4.4  4.7   CHLORIDE  104  106  107   CO2  25  26  28   BUN  10  7  10   ANIONGAP  9  9  8     Recent Labs   Lab Test  09/13/18 0416 04/26/18   0809  06/05/17   0819   ALBUMIN  3.5  3.6  3.6   BILITOTAL  0.6  0.7  0.6   ALT  22  25  24   AST  16  12  17   LIPASE  115   --    --        I reviewed the patient's new imaging results.    All laboratory data reviewed  All imaging studies reviewed by me.    Shea Romo PA-C,  9/14/2018  [facility]  Office : [phone]  Cell:  [phone] (Dr. White)  Cell: [phone] (Shea Romo PA-C)

## 2018-09-14 NOTE — PROGRESS NOTES
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  Also found to be anemic.  Ultimately, the patient was started on a nitroglycerin drip for her chest pain and admitted for further evaluation and treatment on 09/13/2018      Microcytic anemia:  Hemoglobin on admission 8.3 with MCV of 67.  Most recent hemoglobin per record is from 2012 and was 11.1.  The patient reports her hemoglobin typically ranges from 13-14.  She denies any melena, hematochezia, hematuria or vaginal bleeding.  She does take a daily aspirin 81 mg p.o. daily with last dose on 09/12.  However, she did get full dose of aspirin via EMS prior to arrival.  No daily chronic NSAID use.  Occasional intermittent ibuprofen.  No history of ulcer.  No abdominal pain, nausea, or vomiting.  Last colonoscopy in 2016, which was unremarkable.  Gets colonoscopies every 5 years for her family history of colon cancer (sister).  Takes a daily Prilosec. GI was consulted and EGD on 09/13/2018 revealed no cause of GI bleed, per GI colonoscopy can be done as outpatient, if negative may need capsule study.  Plan:  - GI work up as outpatient  - Regular diet  - one more Hgb in AM    Atypical chest pain  Assessment: Acute onset of left upper chest wall pain beginning at 1:00 a.m. the day of admission with pain radiating to her bilateral shoulders, described as a sharp shooting pain with some pleuritic component, but also note is reproducible upon palpation.  Associated diaphoresis and shortness of breath.  Risk factors for CAD include diabetes mellitus, hypertension, hyperlipidemia.  No tobacco history.  No prior episodes.  No prior stress  testing.  The patient was given 3 nitroglycerin by EMS and a full dose of aspirin.  In the ED, she was started on a nitroglycerin drip for ongoing pain, given Tylenol, morphine, Zofran and a 1 liter bolus.  Troponin negative.  BMP unremarkable.  CBC with anemia, hemoglobin 8.3, otherwise unremarkable.  D-dimer 0.8, prompting CT PE which was negative for PE.  Chest x-ray unremarkable.  EKG showing sinus tachycardia. ECHO with normal EF, no WMA was noted. Nuclear stress test without evidence of myocardial ischemia. Given negative cardiac work-up, chest pain very unlikely to be cardiac in nature.  Plan:  - Follow as outpatient  - Continue ASA       Type 2 diabetes mellitus, non-insulin dependent, controlled with peripheral neuropathy  Assessment: A1c on admission 6.1.  The patient maintained on metformin 500 mg p.o. b.i.d.    Plan:  - We will hold prior to admission metformin at this time.    - Will cover patient with medium dose sliding scale insulin.    - Blood sugars per protocol.  - Monitor for signs of hypoglycemia.    - Will continue PTA gabapentin.      Hypertension, hyperlipidemia:  As above.      Depression:  Continue PTA Wellbutrin and Celexa once verified by pharmacy.      Gastroesophageal reflux disease:  PPI as above.        Active Diet Order      Moderate Consistent CHO Diet    DVT Prophylaxis: Pneumatic Compression Devices  Code Status: Full Code    Disposition: Expected discharge in 09/15/2018    Attestation:   I have reviewed today's relevant vital signs, notes, medications, labs and imaging.I personally reviewed the Nuclear stress test image(s) showing no evidence of ischemia.    Som Dhaliwal MD  [facility]  Text Page  (7am - 6pm)        Interval History:        Doing well overall  Tolerating diet  Has not had BM since admission  No nausea/vomiting/abdominal pain  Hoping for discharge tomorrow         Physical Exam:        Physical Exam   Temp: 99  F (37.2  C) Temp src: Oral BP:  121/66   Heart Rate: 90 Resp: 18 SpO2: 96 % O2 Device: None (Room air)    Vitals:    09/13/18 0414 09/14/18 0510   Weight: 98.3 kg (216 lb 11.4 oz) 99.1 kg (218 lb 6.4 oz)     Vital Signs with Ranges  Temp:  [99  F (37.2  C)-99.7  F (37.6  C)] 99  F (37.2  C)  Heart Rate:  [] 90  Resp:  [12-31] 18  BP: ()/(48-94) 121/66  SpO2:  [92 %-98 %] 96 %  I/O last 3 completed shifts:  In: 440 [P.O.:440]  Out: -     Constitutional: Awake, alert, cooperative, no apparent distress  Respiratory: Clear to auscultation bilaterally, no crackles or wheezing  Cardiovascular: Regular rate and rhythm, normal S1 and S2, and no murmur noted  GI: Normal bowel sounds, soft, non-distended, non-tender  Skin/Integumen: No rashes, no cyanosis, no edema  Neuro: A/O x 3. No gross neuro deficits. Moving all extremities  Psych: normal mood and affect     # Pain Assessment:  Current Pain Score 9/14/2018   Patient currently in pain? denies   Pain score (0-10) -   Pain location -   Pain descriptors -   Diane phillips pain level was assessed and she currently denies pain.         Data:     CBC RESULTS:    Recent Labs  Lab 09/14/18  0535 09/14/18  0215 09/13/18  2250 09/13/18  1756 09/13/18  1408 09/13/18  0904 09/13/18  0416   WBC  --   --   --   --   --  8.4 8.5   RBC  --   --   --   --   --  3.91 4.26   HGB 7.6* 7.7* 7.7* 7.4* 7.8* 7.5* 8.3*   HCT  --   --   --   --   --  26.2* 28.5*   PLT  --   --   --   --   --  145* 164       BASIC METABOLIC PANEL:    Recent Labs  Lab 09/13/18  0416      POTASSIUM 4.2   CHLORIDE 104   CO2 25   BUN 10   CR 0.67   *   JORDON 8.2*       HEPATIC FUNCTION PANEL    Recent Labs  Lab 09/13/18  0416   AST 16   ALT 22   ALKPHOS 99   BILITOTAL 0.6     INR  No results for input(s): INR in the last 168 hours.    OTHER LABS  None    Medications     Continuing ACE inhibitor/ARB/ARNI from home medication list OR ACE inhibitor/ARB order already placed during this visit       - MEDICATION INSTRUCTIONS -       -  MEDICATION INSTRUCTIONS -       - MEDICATION INSTRUCTIONS -       - MEDICATION INSTRUCTIONS -       nitroGLYcerin Stopped (09/13/18 1207)       aspirin  81 mg Oral Daily     atorvastatin  40 mg Oral QPM     buPROPion  150 mg Oral QAM     citalopram  20 mg Oral Daily     gabapentin  300 mg Oral BID     metoprolol tartrate  25 mg Oral BID     pantoprazole (PROTONIX) IV  40 mg Intravenous Daily with breakfast     sodium chloride (PF)  3 mL Intracatheter Q8H     valsartan  160 mg Oral Daily         Recent Results (from the past 24 hour(s))   NM Lexiscan stress test (nuc card)    Narrative    GATED MYOCARDIAL PERFUSION SCINTIGRAPHY WITH INTRAVENOUS PHARMACOLOGIC  VASODILATATION LEXISCAN -ONE DAY STUDY     9/14/2018 1:18 PM  GRACIE ALVARADO  68 years  Female  1950.    Indication/Clinical History: Non-STEMI    Impression  1.  Myocardial perfusion imaging using single isotope technique  demonstrated a technically difficult study without evidence for  myocardial ischemia.   2. Gated images demonstrated normal wall motion with a calculated  ejection fraction of 70%.    3. There is no prior nuclear study available for comparison.    Procedure  Pharmacologic stress testing was performed with Lexiscan at a rate of  0.08 mg/ml rapid bolus injection, for 15 seconds, 0.4 mg/5ml  intravenously. Low-level exercise was not performed along with the  vasodilator infusion.  The heart rate was 85 at baseline and sander to  93 beats per minute during the Lexiscan infusion. The rest blood  pressure was 129/60 mmHg and was 126/64 mm Hg during Lexiscan  infusion. The patient experienced no chest pain  during the test.    Myocardial perfusion imaging was performed at rest, approximately 45  minutes after the injection intravenously of 10.5 mCi of Tc-99m  Myoview. At peak pharmacologic effect, 10-20 seconds after Lexiscan,   the patient was injected intravenously with 32.9 mCi of  Tc-99m  Myoview. The post-stress tomographic imaging was  performed  approximately 60 minutes after stress.    EKG Findings  The resting EKG demonstrated sinus rhythm with early repolarization.  The stress EKG demonstrated no significant worsening of the baseline  ST-T wave changes with Lexiscan infusion.    Tomographic Findings  Overall, the study quality is poor . On the stress images, there is  somewhat patchy but overall relatively uniform tracer uptake noted. On  the rest images, there is somewhat patchy but overall relatively  uniform tracer uptake noted . Gated images demonstrated normal wall  motion. The left ventricular ejection fraction was calculated to be  70%. TID was 0.98.    STEVEN RUIZ MD

## 2018-09-14 NOTE — PLAN OF CARE
Problem: Patient Care Overview  Goal: Plan of Care/Patient Progress Review  Outcome: No Change  Patient A&O x4,  Dilaudid IV 0.5 mg and Oxicodone 5 mg given for sudden midsternal chest pain/tightness/pressure and ECG done - confirmed new A-fib RVR - Dr. Borrero was paged and one time Dilt 10 mg given on tele -Aifib RVR (HR 110s) and VSS,  Lungs are diminished at bases and on RA, up with SBA , on mod CHO diet, skin is WNL , voiding well. IV is in R arm x2  and SL. Plan for Cardiology to decide treatment in AM for new A-fib.

## 2018-09-14 NOTE — PROGRESS NOTES
Cardiology    Nuclear Stress Test  Impression  1.  Myocardial perfusion imaging using single isotope technique  demonstrated a technically difficult study without evidence for  myocardial ischemia.   2. Gated images demonstrated normal wall motion with a calculated  ejection fraction of 70%.    3. There is no prior nuclear study available for comparison.    Recs:    1.  CV status stable  2.  Proceed with GI/Heme w/u for anemia  3.  Cardiology will sign off    Lee Smith MD

## 2018-09-14 NOTE — PLAN OF CARE
Problem: Patient Care Overview  Goal: Plan of Care/Patient Progress Review  OT/CR: Orders rec'd and chart reviewed. Pt admitted due to chest pain and A-fib, per chart converted to NSR. Pt going for stress test soon, will hold therapy until after stress test and POC established.

## 2018-09-14 NOTE — PLAN OF CARE
Problem: Anemia (Adult)  Goal: Identify Related Risk Factors and Signs and Symptoms  Related risk factors and signs and symptoms are identified upon initiation of Human Response Clinical Practice Guideline (CPG).  Outcome: No Change  Patient had uneventful shift and slept well.  Son spent the night.  VSS.  Denies pain.  Hgb's stable at in 7.6 to 7.7 range.  No active bleeding noted.  Patient converted from AFIB to SR at 0435.  Patient's rate remains tachycardic.  Mild BARNETT.

## 2018-09-14 NOTE — PROGRESS NOTES
[facility]  Cardiology Progress Note  Date of Service: 09/14/2018    Assessment & Plan    Diane Gaitan is a 68 year old female with past medical history significant for hypertension, DM, and GERD admitted on 9/13/2018 with head ache and chest pressure. Her chest pain was described as sharp in the middle of her chest that was reproducible without radiation. Upon admission her hemoglobin was 7.5.    1. Chest pain of unclear etiology:    Troponin negative x 3    Echocardiogram showed preserved LVEF without regional wall motion or valvular abnormalities    Lexiscan nuclear stress test today    2. Atrial fibrillation:     CHADS2-VASc 4 (age, gender, hypertension and diabetes)    Atrial fibrillation with RVR up to 160 bpm.    Converted to SR with 10 mg IV diltiazem    Due to significant anemia will not recommend anticoagulation at this time.     Consider outpatient monitor    3. Hypertension: [PTA: valsartan 160 mg daily]    4. Microcytic anemia:    Hemoglobin stable around 7.7    Serial hemoglobins per GI    5. GERD:     EGD 9/13/18 revealed no cause of GI bleed.     GI recommended outpatient colonoscopy    NITZA Valentine, CNP  P Heart  Pager: [phone]     Interval History   No further chest discomfort. Lexiscan stress today.    Physical Exam   Temp: 99  F (37.2  C) Temp src: Oral BP: (!) 106/94 Pulse: 90 Heart Rate: 85 Resp: 15 SpO2: 95 % O2 Device: None (Room air) Oxygen Delivery: 4 LPM  Vitals:    09/13/18 0414 09/14/18 0510   Weight: 98.3 kg (216 lb 11.4 oz) 99.1 kg (218 lb 6.4 oz)       Constitutional:   NAD   Skin:   Warm and dry   Head:   Nontraumatic   Neck:   no JVD   Lungs:   symmetric, clear to auscultation   Cardiovascular:   regular rate and rhythm   Abdomen:   Benign   Extremities and Back:   No edema   Neurological:   Grossly nonfocal       Medications     Continuing ACE inhibitor/ARB/ARNI from home medication list OR ACE inhibitor/ARB order already placed during this visit        - MEDICATION INSTRUCTIONS -       - MEDICATION INSTRUCTIONS -       - MEDICATION INSTRUCTIONS -       - MEDICATION INSTRUCTIONS -       nitroGLYcerin Stopped (09/13/18 1207)       aspirin  81 mg Oral Daily     atorvastatin  40 mg Oral QPM     buPROPion  150 mg Oral QAM     citalopram  20 mg Oral Daily     gabapentin  300 mg Oral BID     metoprolol tartrate  25 mg Oral BID     pantoprazole (PROTONIX) IV  40 mg Intravenous Daily with breakfast     sodium chloride (PF)  3 mL Intracatheter Q8H     technetium Tc 99m tetrofosmin 2UD study  3-42 mCi Intravenous every 2 hours     valsartan  160 mg Oral Daily       Data     Most Recent 3 BMP's:  Recent Labs   Lab Test  09/13/18   0416  04/26/18   0809  06/05/17   0819   NA  138  141  143   POTASSIUM  4.2  4.4  4.7   CHLORIDE  104  106  107   CO2  25  26  28   BUN  10  7  10   CR  0.67  0.61  0.61   ANIONGAP  9  9  8   JORDON  8.2*  9.3  8.9   GLC  219*  150*  129*     Most Recent 3 Hemoglobins:  Recent Labs   Lab Test  09/14/18   0535  09/14/18   0215  09/13/18   2250   HGB  7.6*  7.7*  7.7*     Most Recent 3 Troponin's:  Recent Labs   Lab Test  09/13/18   1408  09/13/18   0904  09/13/18   0416   TROPI  <0.015  <0.015  <0.015     Most Recent Cholesterol Panel:  Recent Labs   Lab Test  09/13/18   0416   CHOL  120   LDL  48   HDL  51   TRIG  103

## 2018-09-14 NOTE — PROGRESS NOTES
Lexiscan Stress: Pt tolerated well. VSS. Pt denied chest pain or pressure prior to injection. After injection no side effects.   1138 Pt transferred back to  per w/c. Detailed report called to  RN.

## 2018-09-14 NOTE — PROGRESS NOTES
X-Cover:    Called regarding new onset A-Fib with RVR. EKG confirmed. HR up to 160 a times.  BP stable but she does feel a little CP and is diaphoretic. Will give 10 mg IV Dilt now and follow for improvement.

## 2018-09-14 NOTE — DISCHARGE SUMMARY
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 Also found to be anemic.  Ultimately, the patient was started on a nitroglycerin drip for her chest pain and admitted for further evaluation and treatment on 09/13/2018.    Hospital Course   Diane Gaitan was admitted on 9/13/2018.  The following problems were addressed during her hospitalization:    Microcytic anemia:  Hemoglobin on admission 8.3 with MCV of 67.  Most recent hemoglobin per record is from 2012 and was 11.1.  The patient reports her hemoglobin typically ranges from 13-14.  She denies any melena, hematochezia, hematuria or vaginal bleeding.  She does take a daily aspirin 81 mg p.o. daily with last dose on 09/12.  However, she did get full dose of aspirin via EMS prior to arrival.  No daily chronic NSAID use.  Occasional intermittent ibuprofen.  No history of ulcer.  No abdominal pain, nausea, or vomiting.  Last colonoscopy in 2016, which was unremarkable.  Gets colonoscopies every 5 years for her family history of colon cancer (sister).  Takes a daily Prilosec. GI was consulted and EGD on 09/13/2018 revealed no cause of GI bleed, per GI colonoscopy can be done as outpatient, if negative may need capsule study.  Plan:  - Continue anemia work up as outpatient  - Colonoscopy at outpatient  - Should follow up with GI, she has their contact information  - Avoid NSAIDS (ibuprofen, Naproxen, Aleve,  etc.)     Atypical chest pain  Hyperlipidemia  Hypertension  Assessment: Acute onset of left upper chest wall pain beginning at 1:00 a.m. the day of admission with pain radiating to her bilateral shoulders, described as a sharp shooting pain with some pleuritic component, but also note is reproducible upon palpation.  Associated diaphoresis and shortness of breath.  Risk factors for CAD include diabetes mellitus, hypertension, hyperlipidemia.  No tobacco history.  No prior episodes.  No prior stress testing.  The patient was given 3 nitroglycerin by EMS and a full dose of aspirin.  In the ED, she was started on a nitroglycerin drip for ongoing pain, given Tylenol, morphine, Zofran and a 1 liter bolus. Troponin negative.  BMP unremarkable.  CBC with anemia, hemoglobin 8.3, otherwise unremarkable. D-dimer 0.8, prompting CT PE which was negative for PE.  Chest x-ray unremarkable.  EKG showing sinus tachycardia. ECHO with normal EF, no WMA was noted. Nuclear stress test without evidence of myocardial ischemia. Given negative cardiac work-up, chest pain very unlikely to be cardiac in nature.  Plan:  - Follow as outpatient  - Continue ASA  - Continue Statin  - Continue Valsartan    Atrial Fibrillation  Assessment: had multiple episodes of paroxysmal Atrial fibrillation while inpatient, was NSR at discharge. Anticoagulation currently contraindicated due to anemia and possible GI blood loss.   Plan:  - Follow up with PCP   - May need cardiac monitor to assess burden      Type 2 diabetes mellitus, non-insulin dependent, controlled with peripheral neuropathy  Assessment: A1c on admission 6.1.  The patient maintained on metformin 500 mg p.o. b.i.d.    Plan:  - Resume metformin at discharge      Depression:  Continue PTA Wellbutrin and Celexa once verified by pharmacy.       Gastroesophageal reflux disease:  PPI as above.       # Discharge Pain Plan:   - Patient currently has NO PAIN and is not being prescribed pain medications  on discharge.    Som Dhaliwal MD    Significant Results and Procedures     09/13/2018  Impression:        - Normal esophagus.   - Normal duodenal bulb, first portion of the duodenum and second portion of the duodenum.   - Normal stomach.   - No specimens collected.     Pending Results     Unresulted Labs Ordered in the Past 30 Days of this Admission     No orders found from 7/15/2018 to 9/14/2018.          Code Status   Full Code       Primary Care Physician   Jac Barry    Physical Exam   Temp: 98.1  F (36.7  C) Temp src: Oral BP: 121/69   Heart Rate: 82 Resp: 16 SpO2: 97 % O2 Device: None (Room air)    Vitals:    09/13/18 0414 09/14/18 0510 09/15/18 0536   Weight: 98.3 kg (216 lb 11.4 oz) 99.1 kg (218 lb 6.4 oz) 98.7 kg (217 lb 9.6 oz)     Vital Signs with Ranges  Temp:  [98.1  F (36.7  C)-99.2  F (37.3  C)] 98.1  F (36.7  C)  Heart Rate:  [] 82  Resp:  [16-20] 16  BP: (103-134)/(55-78) 121/69  SpO2:  [95 %-98 %] 97 %  I/O last 3 completed shifts:  In: 875 [P.O.:875]  Out: -     Constitutional: Awake, alert, cooperative, no apparent distress  Respiratory: Clear to auscultation bilaterally, no crackles or wheezing  Cardiovascular: Regular rate and rhythm, normal S1 and S2, and no murmur noted  GI: Normal bowel sounds, soft, non-distended, non-tender  Skin/Integumen: No rashes, no cyanosis, no edema  Neuro: A/O x 3. No gross neuro deficits. Moving all extremities  Psych: normal mood and affect                           Discharge Disposition   Discharged to home  Condition at discharge: Stable    Consultations This Hospital Stay   CARDIAC REHAB IP CONSULT  CARDIOLOGY IP CONSULT  GASTROENTEROLOGY IP CONSULT  PHARMACY TO DOSE HEPARIN    Time Spent on this Encounter   I, Som Dhaliwal, personally saw the patient today and spent greater than 30 minutes discharging this patient.    Discharge Orders     Reason for your hospital stay   You had chest pain, needed to make sure it was not related to you heart. You also  had low hemoglobin.     Follow-up and recommended labs and tests    Follow up with primary care provider, Jac Barry, within 7 days for hospital follow- up.  No follow up labs or test are needed.    Needs to follow up with GI for colonoscopy as outpatient to further investigate cause of microcytic anemia.     Activity   Your activity upon discharge: activity as tolerated     Full Code     Diet   Follow this diet upon discharge: Orders Placed This Encounter     Moderate Consistent CHO Diet       Discharge Medications   Current Discharge Medication List      CONTINUE these medications which have NOT CHANGED    Details   Acetaminophen (TYLENOL PO) Take 1,000 mg by mouth every 6 hours as needed.        aspirin 81 MG tablet Take 1 tablet by mouth daily.  Qty: 90 tablet, Refills: 3    Associated Diagnoses: Type 2 diabetes, HbA1c goal < 7% (H)      buPROPion (WELLBUTRIN XL) 150 MG 24 hr tablet Take 1 tablet (150 mg) by mouth every morning  Qty: 90 tablet, Refills: 3    Associated Diagnoses: Major depressive disorder, single episode, mild (H)      calcium carbonate 600 mg-vitamin D 400 units (CALTRATE) 600-400 MG-UNIT per tablet Take 1 tablet by mouth daily      citalopram (CELEXA) 20 MG tablet Take 1 tablet (20 mg) by mouth daily  Qty: 90 tablet, Refills: 3    Associated Diagnoses: Major depressive disorder, single episode, mild (H)      !! gabapentin (NEURONTIN) 300 MG capsule Take 300 mg by mouth 2 times daily      !! gabapentin (NEURONTIN) 300 MG capsule Take 300 mg by mouth 2 times daily as needed for other (Neuropathy) In addition to scheduled doses      metFORMIN (GLUCOPHAGE) 500 MG tablet Take 1 tablet (500 mg) by mouth 2 times daily (with meals)  Qty: 180 tablet, Refills: 3    Associated Diagnoses: Type 2 diabetes mellitus without complication, without long-term current use of insulin (H)      omeprazole (PRILOSEC) 20 MG CR capsule Take 1 capsule (20 mg) by mouth daily  Qty: 90 capsule, Refills: 3     Associated Diagnoses: Gastroesophageal reflux disease without esophagitis      simvastatin (ZOCOR) 20 MG tablet Take 1 tablet (20 mg) by mouth At Bedtime  Qty: 90 tablet, Refills: 3    Associated Diagnoses: CARDIOVASCULAR SCREENING; LDL GOAL LESS THAN 100; Type 2 diabetes mellitus without complication, without long-term current use of insulin (H)      valsartan (DIOVAN) 160 MG tablet Take 1 tablet (160 mg) by mouth daily  Qty: 90 tablet, Refills: 3    Associated Diagnoses: Essential hypertension, benign       !! - Potential duplicate medications found. Please discuss with provider.        Allergies   Allergies   Allergen Reactions     Lisinopril Cough     COUGH     Data   Most Recent 3 CBC's:  Recent Labs   Lab Test  09/15/18   0532  09/14/18   0535  09/14/18   0215   09/13/18   0904  09/13/18   0416   10/21/12   0550   WBC   --    --    --    --   8.4  8.5   --    --    HGB  7.5*  7.6*  7.7*   < >  7.5*  8.3*   < >  8.7*   MCV   --    --    --    --   67*  67*   --    --    PLT   --    --    --    --   145*  164   --   137*    < > = values in this interval not displayed.      Most Recent 3 BMP's:  Recent Labs   Lab Test  09/13/18   0416  04/26/18   0809  06/05/17   0819   NA  138  141  143   POTASSIUM  4.2  4.4  4.7   CHLORIDE  104  106  107   CO2  25  26  28   BUN  10  7  10   CR  0.67  0.61  0.61   ANIONGAP  9  9  8   JORDON  8.2*  9.3  8.9   GLC  219*  150*  129*     Most Recent 2 LFT's:  Recent Labs   Lab Test  09/13/18   0416  04/26/18   0809   AST  16  12   ALT  22  25   ALKPHOS  99  96   BILITOTAL  0.6  0.7     Most Recent INR's and Anticoagulation Dosing History:  Anticoagulation Dose History     Recent Dosing and Labs Latest Ref Rng & Units 1/18/2007 1/22/2007 1/25/2007 1/29/2007 2/5/2007 10/18/2012    INR 0.86 - 1.14 - - - - - 0.97    INR - 1.5 1.5 1.8 2.0 2.4 -        Most Recent 3 Troponin's:  Recent Labs   Lab Test  09/13/18   1408  09/13/18   0904  09/13/18   0416   TROPI  <0.015  <0.015  <0.015     Most  Recent Cholesterol Panel:  Recent Labs   Lab Test  09/13/18   0416   CHOL  120   LDL  48   HDL  51   TRIG  103     Most Recent 6 Bacteria Isolates From Any Culture (See EPIC Reports for Culture Details):No lab results found.  Most Recent TSH, T4 and A1c Labs:  Recent Labs   Lab Test  09/13/18 0416   TSH  0.91   A1C  6.1*     Results for orders placed or performed during the hospital encounter of 09/13/18   Chest  XR, 1 view PORTABLE    Narrative    XR CHEST PORT 1 VW  9/13/2018 4:45 AM     HISTORY: Chest pain.    COMPARISON: 1/23/2005.    FINDINGS: Upright portable chest. The heart size is normal. The lungs  are clear. No pneumothorax. Degenerative disease in the shoulders.      Impression    IMPRESSION: No acute abnormality.    LUBA KUMAR MD   Chest CT, IV contrast only - PE protocol    Narrative    CT CHEST PULMONARY EMBOLISM W CONTRAST  9/13/2018 5:29 AM    HISTORY: Chest pain, shortness of breath.    TECHNIQUE: Scans obtained from the apices through the diaphragm with  IV contrast. 76 mL Isovue-370 injected. Radiation dose for this scan  was reduced using automated exposure control, adjustment of the mA  and/or kV according to patient size, or iterative reconstruction  technique.    COMPARISON: None.    FINDINGS: Evaluation of the pulmonary arterial system shows no  evidence of embolus. There is no aortic aneurysm or dissection. The  heart size is normal. There is no mediastinal, hilar or axillary lymph  node enlargement. There is dependent atelectasis in the lungs  bilaterally. No pneumothorax or pleural effusion. Images through the  upper abdomen show no acute abnormality. Probable cyst in the left  lobe of the liver.      Impression    IMPRESSION: There is no pulmonary embolus, aortic aneurysm or  dissection. No acute chest abnormality.    LUBA KUMAR MD   NM Lexiscan stress test (nuc card)    Narrative    GATED MYOCARDIAL PERFUSION SCINTIGRAPHY WITH INTRAVENOUS PHARMACOLOGIC  VASODILATATION  LEXISCAN -ONE DAY STUDY     9/14/2018 1:18 PM  GRACIE ALVARADO  68 years  Female  1950.    Indication/Clinical History: Non-STEMI    Impression  1.  Myocardial perfusion imaging using single isotope technique  demonstrated a technically difficult study without evidence for  myocardial ischemia.   2. Gated images demonstrated normal wall motion with a calculated  ejection fraction of 70%.    3. There is no prior nuclear study available for comparison.    Procedure  Pharmacologic stress testing was performed with Lexiscan at a rate of  0.08 mg/ml rapid bolus injection, for 15 seconds, 0.4 mg/5ml  intravenously. Low-level exercise was not performed along with the  vasodilator infusion.  The heart rate was 85 at baseline and sander to  93 beats per minute during the Lexiscan infusion. The rest blood  pressure was 129/60 mmHg and was 126/64 mm Hg during Lexiscan  infusion. The patient experienced no chest pain  during the test.    Myocardial perfusion imaging was performed at rest, approximately 45  minutes after the injection intravenously of 10.5 mCi of Tc-99m  Myoview. At peak pharmacologic effect, 10-20 seconds after Lexiscan,   the patient was injected intravenously with 32.9 mCi of  Tc-99m  Myoview. The post-stress tomographic imaging was performed  approximately 60 minutes after stress.    EKG Findings  The resting EKG demonstrated sinus rhythm with early repolarization.  The stress EKG demonstrated no significant worsening of the baseline  ST-T wave changes with Lexiscan infusion.    Tomographic Findings  Overall, the study quality is poor . On the stress images, there is  somewhat patchy but overall relatively uniform tracer uptake noted. On  the rest images, there is somewhat patchy but overall relatively  uniform tracer uptake noted . Gated images demonstrated normal wall  motion. The left ventricular ejection fraction was calculated to be  70%. TID was 0.98.    STEVEN RUIZ MD

## 2018-09-14 NOTE — PROVIDER NOTIFICATION
MD Notification    Notified Person: MD    Notified Person Name: Dr. Borrero    Notification Date/Time: 9/13/2018 at 1948    Notification Interaction: awaiting call back    Purpose of Notification:  and pt reported some pressure, pt is in A - fib.    Orders Received:    Comments:  2010: second page sent via hospitalist paging

## 2018-09-15 VITALS
HEART RATE: 90 BPM | TEMPERATURE: 98.1 F | HEIGHT: 67 IN | BODY MASS INDEX: 34.15 KG/M2 | WEIGHT: 217.6 LBS | RESPIRATION RATE: 16 BRPM | OXYGEN SATURATION: 97 % | DIASTOLIC BLOOD PRESSURE: 69 MMHG | SYSTOLIC BLOOD PRESSURE: 121 MMHG

## 2018-09-15 LAB — HGB BLD-MCNC: 7.5 G/DL (ref 11.7–15.7)

## 2018-09-15 PROCEDURE — 85018 HEMOGLOBIN: CPT | Performed by: STUDENT IN AN ORGANIZED HEALTH CARE EDUCATION/TRAINING PROGRAM

## 2018-09-15 PROCEDURE — A9270 NON-COVERED ITEM OR SERVICE: HCPCS | Mod: GY | Performed by: PHYSICIAN ASSISTANT

## 2018-09-15 PROCEDURE — 25000132 ZZH RX MED GY IP 250 OP 250 PS 637: Mod: GY | Performed by: PHYSICIAN ASSISTANT

## 2018-09-15 PROCEDURE — 99239 HOSP IP/OBS DSCHRG MGMT >30: CPT | Performed by: STUDENT IN AN ORGANIZED HEALTH CARE EDUCATION/TRAINING PROGRAM

## 2018-09-15 PROCEDURE — C9113 INJ PANTOPRAZOLE SODIUM, VIA: HCPCS | Performed by: PHYSICIAN ASSISTANT

## 2018-09-15 PROCEDURE — 36415 COLL VENOUS BLD VENIPUNCTURE: CPT | Performed by: STUDENT IN AN ORGANIZED HEALTH CARE EDUCATION/TRAINING PROGRAM

## 2018-09-15 PROCEDURE — 25000128 H RX IP 250 OP 636: Performed by: PHYSICIAN ASSISTANT

## 2018-09-15 RX ADMIN — METOPROLOL TARTRATE 25 MG: 25 TABLET ORAL at 08:15

## 2018-09-15 RX ADMIN — BUPROPION HYDROCHLORIDE 150 MG: 150 TABLET, FILM COATED, EXTENDED RELEASE ORAL at 08:18

## 2018-09-15 RX ADMIN — CITALOPRAM HYDROBROMIDE 20 MG: 20 TABLET ORAL at 08:17

## 2018-09-15 RX ADMIN — VALSARTAN 160 MG: 160 TABLET, FILM COATED ORAL at 08:17

## 2018-09-15 RX ADMIN — ASPIRIN 81 MG 81 MG: 81 TABLET ORAL at 08:18

## 2018-09-15 RX ADMIN — ACETAMINOPHEN 650 MG: 325 TABLET, FILM COATED ORAL at 01:41

## 2018-09-15 RX ADMIN — GABAPENTIN 300 MG: 300 CAPSULE ORAL at 08:17

## 2018-09-15 RX ADMIN — PANTOPRAZOLE SODIUM 40 MG: 40 INJECTION, POWDER, FOR SOLUTION INTRAVENOUS at 08:12

## 2018-09-15 NOTE — PLAN OF CARE
Problem: Patient Care Overview  Goal: Plan of Care/Patient Progress Review  OT/cardiac rehab orders received however patient discharged to home prior to being see for evaluation.

## 2018-09-15 NOTE — DISCHARGE INSTRUCTIONS
NO NSAIDS - no motrin/ibuprofen/advil, no Aleeve/Naproxen, no Idocin, No aspirin other than what is prescribed by MD.              When You Have Gastrointestinal (GI) Bleeding    Blood in your vomit or stool can be a sign of gastrointestinal (GI) bleeding. GI bleeding can be scary. But the cause may not be serious. You should always see a doctor if GI bleeding occurs.  The GI tract  The GI tract is the path through which food travels in the body. Food passes from the mouth down the esophagus (the tube from the mouth to the stomach). Food begins to break down in the stomach. It then moves through the duodenum, the first part of the small intestine. Nutrients are absorbed as food travels through the small intestine. What is left passes into the colon (large intestine) as waste. The colon removes water from the waste. Waste continues from the colon to the rectum (where stool is stored). Waste then leaves the body through the anus.  Causes of GI bleeding  GI bleeding can be caused by many different problems. Some of the more common causes include:    Swollen veins in the anus (hemorrhoids)    Swollen veins in the esophagus (varices)    Sore on the lining of the GI tract (ulcer)    Cuts or scrapes in the mouth or throat    Infection caused by germs such as bacteria or parasites    Food allergies, such as milk allergy in young children    Medicines    Inflammation of the GI tract (gastritis or esophagitis)    Colitis (Crohn's disease or ulcerative colitis)    Cancer (tumors or polyps)    Abnormal pouches in the colon (diverticula)    Tears in the esophagus or anus    Nosebleed    Abnormal blood vessels in the GI tract (angiodysplasia)  Diagnosing the cause of blood in stool  If blood is coming out in your stool, you may have a lower GI tract problem or a very fast upper GI tract bleed. Bleeding from the GI tract can be bright red. Or it may look dark and tarry. Tests may also find blood in your stool that can t be seen  with the eye (occult blood). To find out the cause, tests that may be ordered include:    Blood tests. A blood sample is taken and sent to a lab for exam.    Hemoccult test. Checks a stool sample for blood.    Stool culture. Checks a stool sample for bacteria or parasites.    X-ray, ultrasound, or CT scan. Imaging tests that take pictures of the digestive tract.    Colonoscopy or sigmoidoscopy. This test uses a flexible tube with a tiny camera. The tube is inserted through your anus into your rectum to see the inside of your colon. Your provider can also take a tiny tissue sample (biopsy) and treat a bleeding source  Diagnosing the cause of blood in vomit  If you are vomiting blood or something that looks like coffee grounds, you may have an upper GI tract problem. To find the cause, tests that may be done include:    Upper Endoscopy. A flexible tube with a tiny camera is inserted through your mouth and throat to see inside your upper GI tract. This lets your provider take a tiny tissue sample (biopsy) and treat a bleeding source.    Nasogastric lavage. This can tell if you have upper GI or lower GI bleeding.    X-ray, ultrasound, or CT scan. Imaging tests that take pictures of your digestive tract.    Upper GI series. X-rays of the upper part of your GI tract taken from inside your body.    Enteroscopy. This sends a flexible tube or a small, swallowed capsule camera into your small intestine.  When to call your healthcare provider  Call your healthcare provider right away if you have any of the following:    Bleeding from your mouth or anus that can't be stopped    Fever of 100.4 F (38.0 ) or higher    Bleeding along with feeling lightheaded or dizzy    Signs of fluid loss (dehydration). These include a dry, sticky mouth, decreased urine output; and very dark urine.    Belly (abdominal) pain   Date Last Reviewed: 7/1/2016 2000-2017 [employer]. [address] [place] [address], [place] [address]. All rights  reserved. This information is not intended as a substitute for professional medical care. Always follow your healthcare professional's instructions.                 *CHEST PAIN, UNCERTAIN CAUSE    Based on your exam today, the exact cause of your chest pain is not certain. Your condition does not seem serious at this time, and your pain does not appear to be coming from your heart. However, sometimes the signs of a serious problem take more time to appear. Therefore, watch for the warning signs listed below.  HOME CARE:    1. Rest today and avoid strenuous activity.  2. Take any prescribed medicine as directed.  FOLLOW UP with your doctor in 1-3 days.   GET PROMPT MEDICAL ATTENTION if any of the following occur:    A change in the type of pain: if it feels different, becomes more severe, lasts longer, or begins to spread into your shoulder, arm, neck, jaw or back    Shortness of breath or increased pain with breathing    Weakness, dizziness, or fainting    Cough with blood or dark colored sputum (phlegm)    Fever over 101  F (38.3  C)    Swelling, pain or redness in one leg    7937-6313 [employer]. [address] [place] [address]. All rights reserved. This information is not intended as a substitute for professional medical care. Always follow your healthcare professional's instructions.  This information has been modified by your health care provider with permission from the publisher.    Follow up with [facility]. [facility] office number: [phone]. Please call to set up this appointment for outpatient colonoscopy

## 2018-09-15 NOTE — PLAN OF CARE
Problem: Patient Care Overview  Goal: Plan of Care/Patient Progress Review  Outcome: Adequate for Discharge Date Met: 09/15/18  Patient is alert and oriented times 4.  cms is intact. No co pain. Patient is up indep.  pt voiding per BR, had BM 9/14.  NSR. MD aware of afib overnight. Lungs diminished, infreq cough. No signs of bleeding. hgb stable 7.5. No c/o light headedness or dizziness. Educated pt on what to look for with signs of bleeding, chest pain and no NSAIDS. Tylenol prn only for pain. Follow-up appt to be made by pt for primary care- possible holter monitor and outpt colonoscopy. DC home at 0900 with son. Belongings with pt. No new meds.

## 2018-09-15 NOTE — PLAN OF CARE
Problem: Anemia (Adult)  Goal: Identify Related Risk Factors and Signs and Symptoms  Related risk factors and signs and symptoms are identified upon initiation of Human Response Clinical Practice Guideline (CPG).   VSS, A/O.  Pt went into LAURA 0058, unaware.  MD notified but pt had converted back to SR on own.  Up ind to bathroom.  Tylenol given x 1 for headache with relief.  Hemoglobin pending.  Pt hopes for discharge today.

## 2018-09-16 LAB
INTERPRETATION ECG - MUSE: NORMAL
INTERPRETATION ECG - MUSE: NORMAL

## 2018-09-17 ENCOUNTER — TELEPHONE (OUTPATIENT)
Dept: INTERNAL MEDICINE | Facility: CLINIC | Age: 68
End: 2018-09-17

## 2018-09-17 NOTE — TELEPHONE ENCOUNTER
"Hospital/TCU/ED for chronic condition Discharge Protocol    \"Hi, my name is China Ruth, a registered nurse, and I am calling from [facility].  I am calling to follow up and see how things are going for you after your recent emergency visit/hospital/TCU stay.\"    Tell me how you are doing now that you are home?\" I am doing ok.  My symptoms certainly have improved.      Discharge Instructions    \"Let's review your discharge instructions.  What is/are the follow-up recommendations?  Pt. Response: I am supposed to schedule a colonoscopy soon.  I scheduled an appointment with Dr. Barry for next Monday.    \"Has an appointment with your primary care provider been scheduled?\"   Yes. (confirm)    \"When you see the provider, I would recommend that you bring your medications with you.\"    Medications    \"Tell me what changed about your medicines when you discharged?\"    Changes to chronic meds?    0-1    \"What questions do you have about your medications?\"    None     New diagnoses of heart failure, COPD, diabetes, or MI?    No              Medication reconciliation completed? Yes  Was MTM referral placed (*Make sure to put transitions as reason for referral)?   No    Call Summary    \"What questions or concerns do you have about your recent visit and your follow-up care?\"     none    \"If you have questions or things don't continue to improve, we encourage you contact us through the main clinic number (give number).  Even if the clinic is not open, triage nurses are available 24/7 to help you.     We would like you to know that our clinic has extended hours (provide information).  We also have urgent care (provide details on closest location and hours/contact info)\"      \"Thank you for your time and take care!\"             "

## 2018-09-24 ENCOUNTER — TELEPHONE (OUTPATIENT)
Dept: NURSING | Facility: CLINIC | Age: 68
End: 2018-09-24

## 2018-09-24 ENCOUNTER — OFFICE VISIT (OUTPATIENT)
Dept: INTERNAL MEDICINE | Facility: CLINIC | Age: 68
End: 2018-09-24
Payer: MEDICARE

## 2018-09-24 VITALS
SYSTOLIC BLOOD PRESSURE: 122 MMHG | RESPIRATION RATE: 14 BRPM | BODY MASS INDEX: 33.4 KG/M2 | DIASTOLIC BLOOD PRESSURE: 60 MMHG | WEIGHT: 212.8 LBS | OXYGEN SATURATION: 99 % | HEART RATE: 91 BPM | HEIGHT: 67 IN

## 2018-09-24 DIAGNOSIS — I10 ESSENTIAL HYPERTENSION, BENIGN: ICD-10-CM

## 2018-09-24 DIAGNOSIS — I21.4 NSTEMI (NON-ST ELEVATED MYOCARDIAL INFARCTION) (H): ICD-10-CM

## 2018-09-24 DIAGNOSIS — I48.91 ATRIAL FIBRILLATION, UNSPECIFIED TYPE (H): ICD-10-CM

## 2018-09-24 DIAGNOSIS — D50.9 IRON DEFICIENCY ANEMIA, UNSPECIFIED IRON DEFICIENCY ANEMIA TYPE: ICD-10-CM

## 2018-09-24 DIAGNOSIS — Z12.11 COLON CANCER SCREENING: ICD-10-CM

## 2018-09-24 DIAGNOSIS — E11.9 TYPE 2 DIABETES MELLITUS WITHOUT COMPLICATION, WITHOUT LONG-TERM CURRENT USE OF INSULIN (H): ICD-10-CM

## 2018-09-24 DIAGNOSIS — Z09 HOSPITAL DISCHARGE FOLLOW-UP: Primary | ICD-10-CM

## 2018-09-24 LAB — HGB BLD-MCNC: 7.8 G/DL (ref 11.7–15.7)

## 2018-09-24 PROCEDURE — 36415 COLL VENOUS BLD VENIPUNCTURE: CPT | Performed by: INTERNAL MEDICINE

## 2018-09-24 PROCEDURE — 99495 TRANSJ CARE MGMT MOD F2F 14D: CPT | Performed by: INTERNAL MEDICINE

## 2018-09-24 PROCEDURE — 85018 HEMOGLOBIN: CPT | Performed by: INTERNAL MEDICINE

## 2018-09-24 RX ORDER — VALSARTAN 160 MG/1
160 TABLET ORAL DAILY
Qty: 90 TABLET | Refills: 3 | Status: SHIPPED | OUTPATIENT
Start: 2018-09-24 | End: 2018-10-30 | Stop reason: ALTCHOICE

## 2018-09-24 NOTE — MR AVS SNAPSHOT
After Visit Summary   9/24/2018    Diane Gaitan    MRN: [id number]           Patient Information     Date Of Birth          1950        Visit Information        Provider Department      9/24/2018 1:40 PM Jac Barry MD [facility]        Today's Diagnoses     Hospital discharge follow-up    -  1    Type 2 diabetes mellitus without complication, without long-term current use of insulin (H)        NSTEMI (non-ST elevated myocardial infarction) (H)        Iron deficiency anemia, unspecified iron deficiency anemia type        Atrial fibrillation, unspecified type (H)        Essential hypertension, benign        Colon cancer screening           Follow-ups after your visit        Additional Services     CARDIOLOGY EVAL ADULT REFERRAL       Preferred location:  [facility] [phone]   [email or web address]    Please be aware that coverage of these services is subject to the terms and limitations of your health insurance plan.  Call member services at your health plan with any benefit or coverage questions.      Please bring the following to your appointment:  Any x-rays, CTs or MRIs which have been performed. Contact the facility where they were done to arrange for  prior to your scheduled appointment.    List of current medications  This referral request   Any documents/labs given to you for this referral            GASTROENTEROLOGY ADULT REF PROCEDURE ONLY [facility]  [phone]; Dr. DOC White       Last Lab Result: Creatinine (mg/dL)       Date                     Value                 09/13/2018               0.67             ----------  Body mass index is 33.33 kg/(m^2).     Needed:  No  Language:  English    Patient will be contacted to schedule procedure.     Please be aware that coverage of these services is subject to the terms and limitations of your health  insurance plan.  Call member services at your health plan with any benefit or coverage questions.  Any procedures must be performed at a [facility] facility OR coordinated by your clinic's referral office.    Please bring the following with you to your appointment:    (1) Any X-Rays, CTs or MRIs which have been performed.  Contact the facility where they were done to arrange for  prior to your scheduled appointment.    (2) List of current medications   (3) This referral request   (4) Any documents/labs given to you for this referral                  Follow-up notes from your care team     Return if symptoms worsen or fail to improve.      Future tests that were ordered for you today     Open Future Orders        Priority Expected Expires Ordered    Fecal colorectal cancer screen (FIT) Routine 10/15/2018 12/17/2018 9/24/2018            Who to contact     If you have questions or need follow up information about today's clinic visit or your schedule please contact [facility] directly at [phone].  Normal or non-critical lab and imaging results will be communicated to you by [employer], letter or phone within 4 business days after the clinic has received the results. If you do not hear from us within 7 days, please contact the clinic through [employer] or phone. If you have a critical or abnormal lab result, we will notify you by phone as soon as possible.  Submit refill requests through [employer] or call your pharmacy and they will forward the refill request to us. Please allow 3 business days for your refill to be completed.          Additional Information About Your Visit        [employer] Information     [employer] gives you secure access to your electronic health record. If you see a primary care provider, you can also send messages to your care team and make appointments. If you have questions, please call your primary care clinic.  If you do not have a primary care provider, please call  "[phone] and they will assist you.        Care EveryWhere ID     This is your Care EveryWhere ID. This could be used by other organizations to access your [place] medical records  [id number]        Your Vitals Were     Pulse Respirations Height Pulse Oximetry BMI (Body Mass Index)       91 14 5' 7\" (1.702 m) 99% 33.33 kg/m2        Blood Pressure from Last 3 Encounters:   09/24/18 122/60   09/15/18 121/69   04/26/18 136/76    Weight from Last 3 Encounters:   09/24/18 212 lb 12.8 oz (96.5 kg)   09/15/18 217 lb 9.6 oz (98.7 kg)   04/26/18 215 lb 4.8 oz (97.7 kg)              We Performed the Following     CARDIOLOGY EVAL ADULT REFERRAL     DEPRESSION ACTION PLAN (DAP)     GASTROENTEROLOGY ADULT REF PROCEDURE ONLY [facility] [phone]; Dr. DOC White     Hemoglobin          Where to get your medicines      These medications were sent to [email or web address] [place] - [address] [facility] [address]  [address] [facility] [address] [facility] [place] [address]     Phone:  [phone]     valsartan 160 MG tablet          Primary Care Provider Office Phone # Fax #    Jac Barry [phone] [address] [place] [phone]        Equal Access to Services     [facility] AH: Hadii aad ku hadasho Soomaali, waaxda luqadaha, qaybta kaalmada adeegyada, leisa rivera hayamei yang . So [facility] [phone].    ATENCIÓN: Si habla español, tiene a christina disposición servicios gratuitos de asistencia lingüística. Llame al [phone].    We comply with applicable federal civil rights laws and [place] laws. We do not discriminate on the basis of race, color, national origin, age, disability, sex, sexual orientation, or gender identity.            Thank you!     Thank you for choosing [facility]  for your care. Our goal is always to provide you with excellent care. Hearing back from our patients is one way we can continue to improve our services. Please take a few minutes " to complete the written survey that you may receive in the mail after your visit with us. Thank you!             Your Updated Medication List - Protect others around you: Learn how to safely use, store and throw away your medicines at www.disposemymeds.org.          This list is accurate as of 9/24/18  1:56 PM.  Always use your most recent med list.                   Brand Name Dispense Instructions for use Diagnosis    aspirin 81 MG tablet     90 tablet    Take 1 tablet by mouth daily.    Type 2 diabetes, HbA1c goal < 7% (H)       buPROPion 150 MG 24 hr tablet    WELLBUTRIN XL    90 tablet    Take 1 tablet (150 mg) by mouth every morning    Major depressive disorder, single episode, mild (H)       calcium carbonate 600 mg-vitamin D 400 units 600-400 MG-UNIT per tablet    CALTRATE     Take 1 tablet by mouth daily        citalopram 20 MG tablet    celeXA    90 tablet    Take 1 tablet (20 mg) by mouth daily    Major depressive disorder, single episode, mild (H)       * gabapentin 300 MG capsule    NEURONTIN     Take 300 mg by mouth 2 times daily        * gabapentin 300 MG capsule    NEURONTIN     Take 300 mg by mouth 2 times daily as needed for other (Neuropathy) In addition to scheduled doses        metFORMIN 500 MG tablet    GLUCOPHAGE    180 tablet    Take 1 tablet (500 mg) by mouth 2 times daily (with meals)    Type 2 diabetes mellitus without complication, without long-term current use of insulin (H)       omeprazole 20 MG CR capsule    priLOSEC    90 capsule    Take 1 capsule (20 mg) by mouth daily    Gastroesophageal reflux disease without esophagitis       simvastatin 20 MG tablet    ZOCOR    90 tablet    Take 1 tablet (20 mg) by mouth At Bedtime    CARDIOVASCULAR SCREENING; LDL GOAL LESS THAN 100, Type 2 diabetes mellitus without complication, without long-term current use of insulin (H)       TYLENOL PO      Take 1,000 mg by mouth every 6 hours as needed.        valsartan 160 MG tablet    DIOVAN    90 tablet     Take 1 tablet (160 mg) by mouth daily    Essential hypertension, benign       * Notice:  This list has 2 medication(s) that are the same as other medications prescribed for you. Read the directions carefully, and ask your doctor or other care provider to review them with you.

## 2018-09-24 NOTE — PROGRESS NOTES
SUBJECTIVE:   Diane Gaitan is a 68 year old female who presents to clinic today for the following health issues:    Hospital Follow-up Visit:    Hospital/Nursing Home/IP Rehab Facility: [facility]  Date of Admission: 9/13/18  Date of Discharge: 9/15/18  Reason(s) for Admission: chest pain            Problems taking medications regularly:  None       Medication changes since discharge: noted       Problems adhering to non-medication therapy:  None    Summary of hospitalization:  [facility] discharge summary reviewed  Diagnostic Tests/Treatments reviewed.  Follow up needed: noted   Other Healthcare Providers Involved in Patient s Care:         None  Update since discharge: stable.     Post Discharge Medication Reconciliation: discharge medications reconciled, continue medications without change.  Plan of care communicated with patient     Coding guidelines for this visit:  Type of Medical   Decision Making Face-to-Face Visit       within 7 Days of discharge Face-to-Face Visit        within 14 days of discharge   Moderate Complexity [id number]   High Complexity [id number]          Microcytic anemia:  Hemoglobin on admission 8.3 with MCV of 67.  Most recent hemoglobin per record is from 2012 and was 11.1.  The patient reports her hemoglobin typically ranges from 13-14.  She denies any melena, hematochezia, hematuria or vaginal bleeding.  She does take a daily aspirin 81 mg p.o. daily with last dose on 09/12.  However, she did get full dose of aspirin via EMS prior to arrival.  No daily chronic NSAID use.  Occasional intermittent ibuprofen.  No history of ulcer.  No abdominal pain, nausea, or vomiting.  Last colonoscopy in 2016, which was unremarkable.  Gets colonoscopies every 5 years for her family history of colon cancer (sister).  Takes a daily Prilosec. GI was consulted and EGD on 09/13/2018 revealed no cause of GI bleed, per GI colonoscopy can be done as outpatient, if negative may  need capsule study.  Plan:  - Continue anemia work up as outpatient  - Colonoscopy at outpatient  - Should follow up with GI, she has their contact information  - Avoid NSAIDS (ibuprofen, Naproxen, Aleve, etc.)      Atypical chest pain  Hyperlipidemia  Hypertension  Assessment: Acute onset of left upper chest wall pain beginning at 1:00 a.m. the day of admission with pain radiating to her bilateral shoulders, described as a sharp shooting pain with some pleuritic component, but also note is reproducible upon palpation.  Associated diaphoresis and shortness of breath.  Risk factors for CAD include diabetes mellitus, hypertension, hyperlipidemia.  No tobacco history.  No prior episodes.  No prior stress testing.  The patient was given 3 nitroglycerin by EMS and a full dose of aspirin.  In the ED, she was started on a nitroglycerin drip for ongoing pain, given Tylenol, morphine, Zofran and a 1 liter bolus. Troponin negative.  BMP unremarkable.  CBC with anemia, hemoglobin 8.3, otherwise unremarkable. D-dimer 0.8, prompting CT PE which was negative for PE.  Chest x-ray unremarkable.  EKG showing sinus tachycardia. ECHO with normal EF, no WMA was noted. Nuclear stress test without evidence of myocardial ischemia. Given negative cardiac work-up, chest pain very unlikely to be cardiac in nature.  Plan:  - Follow as outpatient  - Continue ASA  - Continue Statin  - Continue Valsartan     Atrial Fibrillation  Assessment: had multiple episodes of paroxysmal Atrial fibrillation while inpatient, was NSR at discharge. Anticoagulation currently contraindicated due to anemia and possible GI blood loss.   Plan:  - Follow up with PCP   - May need cardiac monitor to assess burden       Type 2 diabetes mellitus, non-insulin dependent, controlled with peripheral neuropathy  Assessment: A1c on admission 6.1.  The patient maintained on metformin 500 mg p.o. b.i.d.    Plan:  - Resume metformin at discharge    Problem list and histories  reviewed & adjusted, as indicated.  Additional history: as documented    Patient Active Problem List   Diagnosis     Essential hypertension, benign     CARDIOVASCULAR SCREENING; LDL GOAL LESS THAN 100     S/P total knee replacement     Osteoarthrosis, unspecified whether generalized or localized, involving lower leg     Acute posthemorrhagic anemia     Major depressive disorder, single episode, mild (H)     Gastroesophageal reflux disease without esophagitis     Type 2 diabetes mellitus without complication, without long-term current use of insulin (H)     NSTEMI (non-ST elevated myocardial infarction) (H)     Unstable angina (H)     Past Surgical History:   Procedure Laterality Date     ARTHROPLASTY KNEE  10/18/2012    Procedure: ARTHROPLASTY KNEE;  RIGHT TOTAL KNEE ARTHROPLASTY (SMITH & NEPHEW)^ ;  Surgeon: Howard Charles MD;  Location:  OR     BREAST BIOPSY, RT/LT  1988    breast biopsy     C NONSPECIFIC PROCEDURE  10/30/96    skin tags removed     C NONSPECIFIC PROCEDURE      colonic polyps     COLONOSCOPY N/A 7/14/2016    Procedure: COLONOSCOPY;  Surgeon: Curt Patel MD;  Location:  GI     ESOPHAGOSCOPY, GASTROSCOPY, DUODENOSCOPY (EGD), COMBINED N/A 9/13/2018    Procedure: COMBINED ESOPHAGOSCOPY, GASTROSCOPY, DUODENOSCOPY (EGD);  gastroscopy;  Surgeon: Mac White MD;  Location:  GI     HYSTERECTOMY, PAP NO LONGER INDICATED  1986    abdominal hysterectomy     ROTATOR CUFF REPAIR RT/LT Right        Social History   Substance Use Topics     Smoking status: Former Smoker     Packs/day: 1.00     Years: 12.00     Quit date: 10/18/1980     Smokeless tobacco: Never Used     Alcohol use Yes      Comment: 12 Beers per week     Family History   Problem Relation Age of Onset     Breast Cancer Sister      Colon Cancer Sister      Breast Cancer Daughter      Hypertension Daughter      Skin Cancer Mother      Coronary Artery Disease Father      Breast Cancer Sister          Current  Outpatient Prescriptions   Medication Sig Dispense Refill     Acetaminophen (TYLENOL PO) Take 1,000 mg by mouth every 6 hours as needed.         aspirin 81 MG tablet Take 1 tablet by mouth daily. 90 tablet 3     buPROPion (WELLBUTRIN XL) 150 MG 24 hr tablet Take 1 tablet (150 mg) by mouth every morning 90 tablet 3     calcium carbonate 600 mg-vitamin D 400 units (CALTRATE) 600-400 MG-UNIT per tablet Take 1 tablet by mouth daily       citalopram (CELEXA) 20 MG tablet Take 1 tablet (20 mg) by mouth daily 90 tablet 3     gabapentin (NEURONTIN) 300 MG capsule Take 300 mg by mouth 2 times daily       gabapentin (NEURONTIN) 300 MG capsule Take 300 mg by mouth 2 times daily as needed for other (Neuropathy) In addition to scheduled doses       metFORMIN (GLUCOPHAGE) 500 MG tablet Take 1 tablet (500 mg) by mouth 2 times daily (with meals) 180 tablet 3     omeprazole (PRILOSEC) 20 MG CR capsule Take 1 capsule (20 mg) by mouth daily 90 capsule 3     simvastatin (ZOCOR) 20 MG tablet Take 1 tablet (20 mg) by mouth At Bedtime 90 tablet 3     valsartan (DIOVAN) 160 MG tablet Take 1 tablet (160 mg) by mouth daily 90 tablet 3     Allergies   Allergen Reactions     Lisinopril Cough     COUGH     Recent Labs   Lab Test  09/13/18   0416  04/26/18   0809  11/07/17   0811  06/05/17   0819   A1C  6.1*  6.0*  6.3*  5.8   LDL  48  41   --   62   HDL  51  48*   --   57   TRIG  103  169*   --   88   ALT  22  25   --   24   CR  0.67  0.61   --   0.61   GFRESTIMATED  87  >90   --   >90  Non  GFR Calc     GFRESTBLACK  >90  >90   --   >90   GFR Calc     POTASSIUM  4.2  4.4   --   4.7   TSH  0.91   --   1.23   --       BP Readings from Last 3 Encounters:   09/15/18 121/69   04/26/18 136/76   11/07/17 134/74    Wt Readings from Last 3 Encounters:   09/15/18 217 lb 9.6 oz (98.7 kg)   04/26/18 215 lb 4.8 oz (97.7 kg)   11/07/17 223 lb 4.8 oz (101.3 kg)             Labs reviewed in EPIC    Reviewed and updated as needed  "this visit by clinical staff       Reviewed and updated as needed this visit by Provider       ROS:  CONSTITUTIONAL: NEGATIVE for fever, chills, change in weight  ENT/MOUTH: NEGATIVE for ear, mouth and throat problems  CV: NEGATIVE for palpitations or peripheral edema  GI: NEGATIVE for nausea, abdominal pain, heartburn, with noted + change in bowel habits with slight darker stools  : NEGATIVE for frequency, dysuria, or hematuria  MUSCULOSKELETAL: NEGATIVE for significant arthralgias or myalgia  NEURO: NEGATIVE for weakness or paresthesias  HEME: NEGATIVE for bleeding problems  PSYCHIATRIC: NEGATIVE for changes in mood or affect    OBJECTIVE:                                                    /60  Pulse 91  Resp 14  Ht 5' 7\" (1.702 m)  Wt 212 lb 12.8 oz (96.5 kg)  SpO2 99%  BMI 33.33 kg/m2  Body mass index is 33.33 kg/(m^2).  GENERAL:  alert and no distress  EYES: Eyes grossly normal to inspection, extraocular movements - intact, and PERRL  HENT: ear canals- normal; TMs- normal; Nose- normal; Mouth- no ulcers, no lesions  NECK: no tenderness, no adenopathy, no asymmetry, no masses, no stiffness; thyroid- normal to palpation  RESP: lungs clear to auscultation - no rales, no rhonchi, no wheezes  CV: regular rates and rhythm, normal S1 S2, no S3 or S4 and no click or rub   ABDOMEN: soft, no tenderness, no  hepatosplenomegaly, no masses, normal bowel sounds  MS: extremities- no gross deformities noted  PSYCH: Alert and oriented times 3; speech- coherent , normal rate and volume; able to articulate logical thoughts, able to abstract reason, no tangential thoughts, no hallucinations or delusions, affect- normal       ASSESSMENT/PLAN:                                                      (Z09) Hospital discharge follow-up  (primary encounter diagnosis)  Comment: There is overall stable although still mildly symptomatic  Plan:     (E11.9) Type 2 diabetes mellitus without complication, without long-term current " use of insulin (H)  Comment:   Lab Results   Component Value Date    A1C 6.1 09/13/2018    A1C 6.0 04/26/2018    A1C 6.3 11/07/2017    A1C 5.8 06/05/2017    A1C 5.9 09/15/2016     Plan: Blood sugar control appears stable at this point    (I21.4) NSTEMI (non-ST elevated myocardial infarction) (H)  Comment: No acute symptoms in setting of recent full evaluation as per above discharge some  Plan:     (D50.9) Iron deficiency anemia, unspecified iron deficiency anemia type  Comment: Etiology unclear although the patient does state that she thinks her stools are a little darker now thus I have suggested a repeat hemoglobin and fit test per  Plan: Hemoglobin, Fecal colorectal cancer screen         (FIT), GASTROENTEROLOGY ADULT REF PROCEDURE         ONLY [facility] [phone]; Dr. DOC White        A referral was sent for her to see Dr. WHITE in consultation for need for repeat colonoscopy despite negative colonoscopy done July 2016.  Question need for small bowel evaluation    (I48.91) Atrial fibrillation, unspecified type (H)  Comment: Discussed with the patient that she likely needs to see a cardiologist to determine if they need further evaluation of her atrial fibrillation as noted per discharge summary.  She does not appear to be in atrial fibrillation today  Plan: CARDIOLOGY EVAL ADULT REFERRAL        I offered a cardiac monitor but at this point the patient wants to wait.  I discussed the potential issues of atrial fibrillation as a source for some of her symptoms and to continue with medical management currently as noted avoiding any anticoagulation due to her anemia    (I10) Essential hypertension, benign  Comment: Stable on current therapy continue with medical management  Plan: valsartan (DIOVAN) 160 MG tablet            (Z12.11) Colon cancer screening  Comment: Ordered as routine screening again  Plan: Fecal colorectal cancer screen (FIT)              See Patient Instructions    Jac WARD  MD Asha  [facility]

## 2018-09-28 ENCOUNTER — TRANSFERRED RECORDS (OUTPATIENT)
Dept: HEALTH INFORMATION MANAGEMENT | Facility: CLINIC | Age: 68
End: 2018-09-28

## 2018-10-08 ENCOUNTER — TELEPHONE (OUTPATIENT)
Dept: INTERNAL MEDICINE | Facility: CLINIC | Age: 68
End: 2018-10-08

## 2018-10-08 NOTE — TELEPHONE ENCOUNTER
Patient calling concerned about low hemoglobin and is wondering if she should be taking an iron supplement.  She states that her daughter also has low hemoglobin and takes an iron supplement.  Please advise.    Patient is also wondering if she still needs to complete FIT testing if she had occult blood test of stool while in hospital and results were negative.  She notes that she hasn't had a solid BM since being home from the hospital.  Patient reports stools are somewhat loose and that she does not have stools everyday.  She states she isn't worried about this however because she attributes it to her decrease in intake due to poor appetite.  Patient notes that if she has to do FIT testing it may be hard due to the fact that her stools are loose.  Please advise.

## 2018-10-08 NOTE — TELEPHONE ENCOUNTER
Probably could go ahead and take a daily iron supplement otc 325mg without a whole lot of concern as iron studies did demonstrate a low normal ferritin and serum iron level but please see copy of discharge summary that patient was informed to do as appears she needs to arrange to see GI to do outpatient colonoscopy:    GI was consulted and EGD on 09/13/2018 revealed no cause of GI bleed, per GI colonoscopy can be done as outpatient, if negative may need capsule study.  Plan:  - Continue anemia work up as outpatient  - Colonoscopy at outpatient  - Should follow up with GI, she has their contact information    She should be contacting [facility] to arrange an outpatient colonoscopy I believe the phone number is [phone]    I would still turn in the fit test

## 2018-10-08 NOTE — TELEPHONE ENCOUNTER
Patient informed and understood.  She will begin daily iron supplement.  Patient forgot to mention that she already had colonoscopy done on 9/28 at Dr. White's office and nothing was found (please see scanned notes in chart).  Routing to PCP as FYI.

## 2018-10-16 NOTE — TELEPHONE ENCOUNTER
Suggest he follow-up with GI as directed upon discharge as per my last phone note okay to come in and get those vaccinations at any time and would suggest repeat hemoglobin and iron studies in 4 weeks.

## 2018-10-16 NOTE — TELEPHONE ENCOUNTER
Patient called and started Iron on 10/08/2018. She would like to know when she needs to come for blood work and if she should come in for her pneumonia and flu shot. It has been sing 9/15/18 since she was in the hospital Please call and advise.

## 2018-10-30 ENCOUNTER — OFFICE VISIT (OUTPATIENT)
Dept: INTERNAL MEDICINE | Facility: CLINIC | Age: 68
End: 2018-10-30
Payer: MEDICARE

## 2018-10-30 ENCOUNTER — TELEPHONE (OUTPATIENT)
Dept: INTERNAL MEDICINE | Facility: CLINIC | Age: 68
End: 2018-10-30

## 2018-10-30 VITALS
HEART RATE: 105 BPM | RESPIRATION RATE: 14 BRPM | BODY MASS INDEX: 31.89 KG/M2 | TEMPERATURE: 98 F | OXYGEN SATURATION: 98 % | DIASTOLIC BLOOD PRESSURE: 80 MMHG | WEIGHT: 203.2 LBS | SYSTOLIC BLOOD PRESSURE: 132 MMHG | HEIGHT: 67 IN

## 2018-10-30 DIAGNOSIS — F32.0 MAJOR DEPRESSIVE DISORDER, SINGLE EPISODE, MILD (H): ICD-10-CM

## 2018-10-30 DIAGNOSIS — D50.9 IRON DEFICIENCY ANEMIA, UNSPECIFIED IRON DEFICIENCY ANEMIA TYPE: Primary | ICD-10-CM

## 2018-10-30 DIAGNOSIS — I10 ESSENTIAL HYPERTENSION, BENIGN: Primary | ICD-10-CM

## 2018-10-30 LAB
FERRITIN SERPL-MCNC: 20 NG/ML (ref 8–252)
IRON SERPL-MCNC: 20 UG/DL (ref 35–180)
RETICS # AUTO: 111.5 10E9/L (ref 25–95)
RETICS/RBC NFR AUTO: 2.3 % (ref 0.5–2)
VIT B12 SERPL-MCNC: 378 PG/ML (ref 193–986)

## 2018-10-30 PROCEDURE — 99214 OFFICE O/P EST MOD 30 MIN: CPT | Performed by: INTERNAL MEDICINE

## 2018-10-30 PROCEDURE — 40000847 ZZHCL STATISTIC MORPHOLOGY W/INTERP HISTOLOGY TC 85060: Performed by: INTERNAL MEDICINE

## 2018-10-30 PROCEDURE — 85045 AUTOMATED RETICULOCYTE COUNT: CPT | Performed by: INTERNAL MEDICINE

## 2018-10-30 PROCEDURE — 82728 ASSAY OF FERRITIN: CPT | Performed by: INTERNAL MEDICINE

## 2018-10-30 PROCEDURE — 83540 ASSAY OF IRON: CPT | Performed by: INTERNAL MEDICINE

## 2018-10-30 PROCEDURE — 36415 COLL VENOUS BLD VENIPUNCTURE: CPT | Performed by: INTERNAL MEDICINE

## 2018-10-30 PROCEDURE — 82607 VITAMIN B-12: CPT | Performed by: INTERNAL MEDICINE

## 2018-10-30 PROCEDURE — 84165 PROTEIN E-PHORESIS SERUM: CPT | Performed by: INTERNAL MEDICINE

## 2018-10-30 PROCEDURE — 85025 COMPLETE CBC W/AUTO DIFF WBC: CPT | Performed by: INTERNAL MEDICINE

## 2018-10-30 PROCEDURE — 00000402 ZZHCL STATISTIC TOTAL PROTEIN: Performed by: INTERNAL MEDICINE

## 2018-10-30 PROCEDURE — 85060 BLOOD SMEAR INTERPRETATION: CPT | Performed by: INTERNAL MEDICINE

## 2018-10-30 RX ORDER — LOSARTAN POTASSIUM 100 MG/1
100 TABLET ORAL DAILY
Qty: 90 TABLET | Refills: 3 | Status: SHIPPED | OUTPATIENT
Start: 2018-10-30 | End: 2019-05-14

## 2018-10-30 ASSESSMENT — PATIENT HEALTH QUESTIONNAIRE - PHQ9: SUM OF ALL RESPONSES TO PHQ QUESTIONS 1-9: 0

## 2018-10-30 NOTE — PROGRESS NOTES
SUBJECTIVE:   Diane Gaitan is a 68 year old female who presents to clinic today for the following health issues:    Follow up hgb and noted low HGB. Patient started taking otc 325 mg iron 10/8/18. Colonoscopy completed 9/18/18 negative, EGD negative 9/13/18.  Has had a recent colonoscopy as well as upper endoscopy without any obvious source for low hemoglobin.    She comes to the clinic today for follow-up and states that she feels much better.  She has been taking the iron and her energy level as well as mood and numerous subjective complaints inclusive of shortness of breath have all resolved.  She feels as if she is pretty much back to her baseline.    Problem list and histories reviewed & adjusted, as indicated.  Additional history: as documented    Patient Active Problem List   Diagnosis     Essential hypertension, benign     CARDIOVASCULAR SCREENING; LDL GOAL LESS THAN 100     S/P total knee replacement     Osteoarthrosis, unspecified whether generalized or localized, involving lower leg     Acute posthemorrhagic anemia     Major depressive disorder, single episode, mild (H)     Gastroesophageal reflux disease without esophagitis     Type 2 diabetes mellitus without complication, without long-term current use of insulin (H)     NSTEMI (non-ST elevated myocardial infarction) (H)     Unstable angina (H)     Past Surgical History:   Procedure Laterality Date     ARTHROPLASTY KNEE  10/18/2012    Procedure: ARTHROPLASTY KNEE;  RIGHT TOTAL KNEE ARTHROPLASTY (SMITH & NEPHEW)^ ;  Surgeon: Howard Charles MD;  Location:  OR     BREAST BIOPSY, RT/LT  1988    breast biopsy     C NONSPECIFIC PROCEDURE  10/30/96    skin tags removed     C NONSPECIFIC PROCEDURE      colonic polyps     COLONOSCOPY N/A 7/14/2016    Procedure: COLONOSCOPY;  Surgeon: Curt Patel MD;  Location:  GI     ESOPHAGOSCOPY, GASTROSCOPY, DUODENOSCOPY (EGD), COMBINED N/A 9/13/2018    Procedure: COMBINED ESOPHAGOSCOPY,  GASTROSCOPY, DUODENOSCOPY (EGD);  gastroscopy;  Surgeon: Mac White MD;  Location: [facility]     HYSTERECTOMY, PAP NO LONGER INDICATED  1986    abdominal hysterectomy     ROTATOR CUFF REPAIR RT/LT Right        Social History   Substance Use Topics     Smoking status: Former Smoker     Packs/day: 1.00     Years: 12.00     Quit date: 10/18/1980     Smokeless tobacco: Never Used     Alcohol use Yes      Comment: 12 Beers per week     Family History   Problem Relation Age of Onset     Breast Cancer Sister      Colon Cancer Sister      Breast Cancer Daughter      Hypertension Daughter      Skin Cancer Mother      Coronary Artery Disease Father      Breast Cancer Sister          Current Outpatient Prescriptions   Medication Sig Dispense Refill     Acetaminophen (TYLENOL PO) Take 1,000 mg by mouth every 6 hours as needed.         aspirin 81 MG tablet Take 1 tablet by mouth daily. 90 tablet 3     buPROPion (WELLBUTRIN XL) 150 MG 24 hr tablet Take 1 tablet (150 mg) by mouth every morning 90 tablet 3     calcium carbonate 600 mg-vitamin D 400 units (CALTRATE) 600-400 MG-UNIT per tablet Take 1 tablet by mouth daily       citalopram (CELEXA) 20 MG tablet Take 1 tablet (20 mg) by mouth daily 90 tablet 3     gabapentin (NEURONTIN) 300 MG capsule Take 300 mg by mouth 2 times daily       gabapentin (NEURONTIN) 300 MG capsule Take 300 mg by mouth 2 times daily as needed for other (Neuropathy) In addition to scheduled doses       metFORMIN (GLUCOPHAGE) 500 MG tablet Take 1 tablet (500 mg) by mouth 2 times daily (with meals) 180 tablet 3     omeprazole (PRILOSEC) 20 MG CR capsule Take 1 capsule (20 mg) by mouth daily 90 capsule 3     simvastatin (ZOCOR) 20 MG tablet Take 1 tablet (20 mg) by mouth At Bedtime 90 tablet 3     valsartan (DIOVAN) 160 MG tablet Take 1 tablet (160 mg) by mouth daily 90 tablet 3     Allergies   Allergen Reactions     Lisinopril Cough     COUGH     BP Readings from Last 3 Encounters:   09/24/18 122/60  "  09/15/18 121/69   04/26/18 136/76    Wt Readings from Last 3 Encounters:   09/24/18 212 lb 12.8 oz (96.5 kg)   09/15/18 217 lb 9.6 oz (98.7 kg)   04/26/18 215 lb 4.8 oz (97.7 kg)                    Reviewed and updated as needed this visit by clinical staff  Tobacco  Allergies  Meds  Med Hx  Surg Hx  Fam Hx  Soc Hx      Reviewed and updated as needed this visit by Provider         ROS:  CONSTITUTIONAL: NEGATIVE for fever, chills, change in weight  ENT/MOUTH: NEGATIVE for ear, mouth and throat problems  RESP: NEGATIVE for significant cough or SOB  CV: NEGATIVE for chest pain, palpitations or peripheral edema  GI: NEGATIVE for nausea, abdominal pain, heartburn, or change in bowel habits  : NEGATIVE for frequency, dysuria, or hematuria  MUSCULOSKELETAL: NEGATIVE for significant arthralgias or myalgia  HEME: NEGATIVE for bleeding problems  PSYCHIATRIC: NEGATIVE for changes in mood or affect    OBJECTIVE:                                                    /80  Pulse 105  Temp 98  F (36.7  C) (Oral)  Resp 14  Ht 5' 7\" (1.702 m)  Wt 203 lb 3.2 oz (92.2 kg)  SpO2 98%  BMI 31.83 kg/m2  Body mass index is 31.83 kg/(m^2).  GENERAL: healthy, alert and no distress  EYES: Eyes grossly normal to inspection, extraocular movements - intact, and PERRL  HENT: ear canals- normal; TMs- normal; Nose- normal; Mouth- no ulcers, no lesions  NECK: no tenderness, no adenopathy, no asymmetry, no masses, no stiffness; thyroid- normal to palpation  RESP: lungs clear to auscultation - no rales, no rhonchi, no wheezes  CV: regular rates and rhythm, normal S1 S2, no S3 or S4 and no murmur, no click or rub -  MS: extremities- no gross deformities noted, no edema  NEURO: strength and tone- normal, sensory exam- grossly normal, mentation- intact, speech- normal, reflexes- symmetric  PSYCH: Alert and oriented times 3; speech- coherent , normal rate and volume; able to articulate logical thoughts, able to abstract reason, no " tangential thoughts, no hallucinations or delusions, affect- normal     ASSESSMENT/PLAN:                                                      (D50.9) Iron deficiency anemia, unspecified iron deficiency anemia type  (primary encounter diagnosis)  Comment: I discussed with the patient at least for now she needs to continue with her iron pending the lab work that I have ordered and that we really should pursue a capsule endoscopy study for better evaluation of her small bowel in the setting of her recent upper and lower endoscopies being normal per  Plan: Ferritin, IRON, Protein electrophoresis,         GASTROENTEROLOGY ADULT REF PROCEDURE ONLY Other        ([facility]); [facility] [phone], Blood Morphology        Pathologist Review, CBC with platelets         differential, Reticulocyte Count, Vitamin B12        Initially the patient was not willing to do the capsule study but I have suggested that she proceed and go forward at this point.    (F32.0) Major depressive disorder, single episode, mild (H)  Comment: Stable per PHQ 9 as noted score 0  Plan:       See Patient Instructions    Jac Barry MD  [facility]    25 minutes spent with this patient, face to face, discussing treatment options for listed problems above as well as side effects of appropriate medications.  Counseling time extended beyond 50% of the clinic visit.  Medication dosing, treatment plan and follow-up were discussed. Also reviewed need for primary care testing for patient.

## 2018-10-30 NOTE — TELEPHONE ENCOUNTER
Patient is taking Valsartan, which has been recalled.  Losartan 100 MG once a day sent to pharmacy per nursing therapeutic substitution.  FYI to PCP.

## 2018-10-30 NOTE — TELEPHONE ENCOUNTER
Reason for Call:  Other call back    Detailed comments: patient would like a call regarding valsartan (DIOVAN) 160 MG tablet. Should she like to know if she should stop taking this medication or not. Please call patient.    Phone Number Patient can be reached at: Home number on file [phone] (home)    Best Time: anytime    Can we leave a detailed message on this number? YES    Call taken on 10/30/2018 at 10:03 AM by ALYCIA POLK

## 2018-10-30 NOTE — LETTER
[facility]  [address] [id number] [phone]      10/31/2018       Diane Gaitan  [address] [facility] [address]        Dear Diane,    Your lab tests returned and demonstrate that your hemoglobin is slightly better at a level of 9.5(last checked was 7.8) but your iron studies still demonstrate that you are slightly iron deficient and should continue with the iron supplementation.  At the present time even though your labs have improved we still do not know the exact cause of your iron deficiency thus that was our discussion about pursuing further evaluation of your gastrointestinal tract.  If you would like to proceed with those tests please let me know otherwise I would continue with the iron and consider again rechecking your hemoglobin and iron studies in 4-6 weeks.    In addition, your vitamin B12 level and protein levels returned normal    Sincerely,      Jac Barry MD  Internal Medicine

## 2018-10-30 NOTE — MR AVS SNAPSHOT
After Visit Summary   10/30/2018    Diane Gaitan    MRN: [id number]           Patient Information     Date Of Birth          1950        Visit Information        Provider Department      10/30/2018 9:40 AM Jac Barry MD [facility]        Today's Diagnoses     Iron deficiency anemia, unspecified iron deficiency anemia type    -  1    Major depressive disorder, single episode, mild (H)           Follow-ups after your visit        Additional Services     GASTROENTEROLOGY ADULT REF PROCEDURE ONLY Other ([facility]); [facility] [phone]       Last Lab Result: Creatinine (mg/dL)       Date                     Value                 09/13/2018               0.67             ----------  There is no height or weight on file to calculate BMI.     Needed:  No  Language:  English    Patient will be contacted to schedule procedure.     Please be aware that coverage of these services is subject to the terms and limitations of your health insurance plan.  Call member services at your health plan with any benefit or coverage questions.  Any procedures must be performed at a [facility] facility OR coordinated by your clinic's referral office.    Please bring the following with you to your appointment:    (1) Any X-Rays, CTs or MRIs which have been performed.  Contact the facility where they were done to arrange for  prior to your scheduled appointment.    (2) List of current medications   (3) This referral request   (4) Any documents/labs given to you for this referral                  Follow-up notes from your care team     Return for diagnostic test as ordered.      Your next 10 appointments already scheduled     Oct 31, 2018  7:45 AM CDT   MA SCREENING BILATERAL W/ FITO with [id number]   [facility] [place] [facility]    [address] [place] [address], [address] [place] [id number] [phone]           How do I prepare for my exam? (Food  "and drink instructions) No Food and Drink Restrictions.  How do I prepare for my exam? (Other instructions) Do not use any powder, lotion or deodorant under your arms or on your breast. If you do, we will ask you to remove it before your exam.  What should I wear: Wear comfortable, two-piece clothing.  How long does the exam take: Most scans will take 15 minutes.  What should I bring: Bring any previous mammograms from other facilities or have them mailed to the breast center.  Do I need a :  No  is needed.  What do I need to tell my doctor: If you have any allergies, tell your care team.  What should I do after the exam: No restrictions, You may resume normal activities.  What is this test: This test is an x-ray of the breast to look for breast disease. The breast is pressed between two plates to flatten and spread the tissue. An X-ray is taken of the breast from different angles.  Who should I call with questions: If you have any questions, please call the Imaging Department where you will have your exam. Directions, parking instructions, and other information is available on our website, [email or web address].  Other information about my exam Three-dimensional (3D) mammograms are available at [facility] locations in [facility], [place], and [place]. [facility] locations include [place] and the [facility] in [place].  Benefits of 3D mammograms include: * Improved rate of cancer detection * Decreases your chance of having to go back for more tests, which means fewer: * \"False-positive\" results (This means that there is an abnormal area but it isn't cancer.) * Invasive testing procedures, such as a biopsy or surgery * Can provide clearer images of the breast if you have dense breast tissue.  *3D mammography is an optional exam that anyone can have with a 2D mammogram. It doesn't replace or take the place of a 2D mammogram. 2D mammograms " "remain an effective screening test for all women.  Not all insurance companies cover the cost of a 3D mammogram. Check with your insurance.              Future tests that were ordered for you today     Open Future Orders        Priority Expected Expires Ordered    MA Screen Bilateral w/Carlos Routine  10/29/2019 10/29/2018            Who to contact     If you have questions or need follow up information about today's clinic visit or your schedule please contact [facility] directly at [phone].  Normal or non-critical lab and imaging results will be communicated to you by mohchihart, letter or phone within 4 business days after the clinic has received the results. If you do not hear from us within 7 days, please contact the clinic through [employer] or phone. If you have a critical or abnormal lab result, we will notify you by phone as soon as possible.  Submit refill requests through [employer] or call your pharmacy and they will forward the refill request to us. Please allow 3 business days for your refill to be completed.          Additional Information About Your Visit        [employer] Information     [employer] gives you secure access to your electronic health record. If you see a primary care provider, you can also send messages to your care team and make appointments. If you have questions, please call your primary care clinic.  If you do not have a primary care provider, please call [phone] and they will assist you.        Care EveryWhere ID     This is your Care EveryWhere ID. This could be used by other organizations to access your [facility] medical records  [id number]        Your Vitals Were     Pulse Temperature Respirations Height Pulse Oximetry BMI (Body Mass Index)    105 98  F (36.7  C) (Oral) 14 5' 7\" (1.702 m) 98% 31.83 kg/m2       Blood Pressure from Last 3 Encounters:   10/30/18 132/80   09/24/18 122/60   09/15/18 121/69    Weight from Last 3 Encounters:   10/30/18 203 lb 3.2 oz " (92.2 kg)   09/24/18 212 lb 12.8 oz (96.5 kg)   09/15/18 217 lb 9.6 oz (98.7 kg)              We Performed the Following     Blood Morphology Pathologist Review     CBC with platelets differential     Ferritin     GASTROENTEROLOGY ADULT REF PROCEDURE ONLY Other ([facility]); [facility] [phone]     IRON     Protein electrophoresis     Reticulocyte Count     Vitamin B12        Primary Care Provider Office Phone # Fax #    Jac Barry [phone]       [address] [facility] [address]        Equal Access to Services     [facility] : Hadii aad ku hadasho Soomaali, waaxda luqadaha, qaybta kaalmada adecherrieyatalat, leisa yang . So [facility] [phone].    ATENCIÓN: Si habla español, tiene a christina disposición servicios gratuitos de asistencia lingüística. [facility] [phone].    We comply with applicable federal civil rights laws and [place] laws. We do not discriminate on the basis of race, color, national origin, age, disability, sex, sexual orientation, or gender identity.            Thank you!     Thank you for choosing [facility]  for your care. Our goal is always to provide you with excellent care. Hearing back from our patients is one way we can continue to improve our services. Please take a few minutes to complete the written survey that you may receive in the mail after your visit with us. Thank you!             Your Updated Medication List - Protect others around you: Learn how to safely use, store and throw away your medicines at www.disposemymeds.org.          This list is accurate as of 10/30/18  9:59 AM.  Always use your most recent med list.                   Brand Name Dispense Instructions for use Diagnosis    aspirin 81 MG tablet     90 tablet    Take 1 tablet by mouth daily.    Type 2 diabetes, HbA1c goal < 7% (H)       buPROPion 150 MG 24 hr tablet    WELLBUTRIN XL    90 tablet    Take 1 tablet (150 mg) by mouth every morning     Major depressive disorder, single episode, mild (H)       calcium carbonate 600 mg-vitamin D 400 units 600-400 MG-UNIT per tablet    CALTRATE     Take 1 tablet by mouth daily        citalopram 20 MG tablet    celeXA    90 tablet    Take 1 tablet (20 mg) by mouth daily    Major depressive disorder, single episode, mild (H)       * gabapentin 300 MG capsule    NEURONTIN     Take 300 mg by mouth 2 times daily        * gabapentin 300 MG capsule    NEURONTIN     Take 300 mg by mouth 2 times daily as needed for other (Neuropathy) In addition to scheduled doses        IRON SUPPLEMENT PO      Take 325 mg by mouth        metFORMIN 500 MG tablet    GLUCOPHAGE    180 tablet    Take 1 tablet (500 mg) by mouth 2 times daily (with meals)    Type 2 diabetes mellitus without complication, without long-term current use of insulin (H)       omeprazole 20 MG CR capsule    priLOSEC    90 capsule    Take 1 capsule (20 mg) by mouth daily    Gastroesophageal reflux disease without esophagitis       simvastatin 20 MG tablet    ZOCOR    90 tablet    Take 1 tablet (20 mg) by mouth At Bedtime    CARDIOVASCULAR SCREENING; LDL GOAL LESS THAN 100, Type 2 diabetes mellitus without complication, without long-term current use of insulin (H)       TYLENOL PO      Take 1,000 mg by mouth every 6 hours as needed.        valsartan 160 MG tablet    DIOVAN    90 tablet    Take 1 tablet (160 mg) by mouth daily    Essential hypertension, benign       * Notice:  This list has 2 medication(s) that are the same as other medications prescribed for you. Read the directions carefully, and ask your doctor or other care provider to review them with you.

## 2018-10-30 NOTE — TELEPHONE ENCOUNTER
Called and left message on voicemail to try and get additional information.  Germania Aaron, MARIA A on 10/30/2018 at 10:45 AM

## 2018-10-31 ENCOUNTER — MYC MEDICAL ADVICE (OUTPATIENT)
Dept: INTERNAL MEDICINE | Facility: CLINIC | Age: 68
End: 2018-10-31

## 2018-10-31 ENCOUNTER — HOSPITAL ENCOUNTER (OUTPATIENT)
Dept: MAMMOGRAPHY | Facility: CLINIC | Age: 68
Discharge: HOME | End: 2018-10-31
Attending: INTERNAL MEDICINE | Admitting: INTERNAL MEDICINE
Payer: MEDICARE

## 2018-10-31 DIAGNOSIS — Z12.31 VISIT FOR SCREENING MAMMOGRAM: ICD-10-CM

## 2018-10-31 LAB
ALBUMIN SERPL ELPH-MCNC: 3.9 G/DL (ref 3.7–5.1)
ALPHA1 GLOB SERPL ELPH-MCNC: 0.4 G/DL (ref 0.2–0.4)
ALPHA2 GLOB SERPL ELPH-MCNC: 0.9 G/DL (ref 0.5–0.9)
B-GLOBULIN SERPL ELPH-MCNC: 0.9 G/DL (ref 0.6–1)
COPATH REPORT: NORMAL
GAMMA GLOB SERPL ELPH-MCNC: 1 G/DL (ref 0.7–1.6)
M PROTEIN SERPL ELPH-MCNC: 0 G/DL
PROT PATTERN SERPL ELPH-IMP: NORMAL

## 2018-10-31 PROCEDURE — 77063 BREAST TOMOSYNTHESIS BI: CPT

## 2018-11-01 LAB
BASOPHILS # BLD AUTO: 0.1 10E9/L (ref 0–0.2)
BASOPHILS NFR BLD AUTO: 1 %
DIFFERENTIAL METHOD BLD: ABNORMAL
EOSINOPHIL # BLD AUTO: 0.2 10E9/L (ref 0–0.7)
EOSINOPHIL NFR BLD AUTO: 3 %
ERYTHROCYTE [DISTWIDTH] IN BLOOD BY AUTOMATED COUNT: 20.5 % (ref 10–15)
HCT VFR BLD AUTO: 33.5 % (ref 35–47)
HGB BLD-MCNC: 9.5 G/DL (ref 11.7–15.7)
LYMPHOCYTES # BLD AUTO: 1.3 10E9/L (ref 0.8–5.3)
LYMPHOCYTES NFR BLD AUTO: 24 %
MCH RBC QN AUTO: 19.7 PG (ref 26.5–33)
MCHC RBC AUTO-ENTMCNC: 28.4 G/DL (ref 31.5–36.5)
MCV RBC AUTO: 69 FL (ref 78–100)
MONOCYTES # BLD AUTO: 0.4 10E9/L (ref 0–1.3)
MONOCYTES NFR BLD AUTO: 8 %
NEUTROPHILS # BLD AUTO: 3.3 10E9/L (ref 1.6–8.3)
NEUTROPHILS NFR BLD AUTO: 64 %
PLATELET # BLD AUTO: 280 10E9/L (ref 150–450)
RBC # BLD AUTO: 4.83 10E12/L (ref 3.8–5.2)
WBC # BLD AUTO: 5.3 10E9/L (ref 4–11)

## 2018-11-13 ENCOUNTER — TELEPHONE (OUTPATIENT)
Dept: INTERNAL MEDICINE | Facility: CLINIC | Age: 68
End: 2018-11-13

## 2018-12-04 ENCOUNTER — MYC MEDICAL ADVICE (OUTPATIENT)
Dept: INTERNAL MEDICINE | Facility: CLINIC | Age: 68
End: 2018-12-04

## 2018-12-06 DIAGNOSIS — D50.9 IRON DEFICIENCY ANEMIA, UNSPECIFIED: Primary | ICD-10-CM

## 2018-12-06 LAB
HGB BLD-MCNC: 12.2 G/DL (ref 11.7–15.7)
IRON SERPL-MCNC: 24 UG/DL (ref 35–180)
RETICS # AUTO: 95.2 10E9/L (ref 25–95)
RETICS/RBC NFR AUTO: 1.8 % (ref 0.5–2)

## 2018-12-06 PROCEDURE — 85018 HEMOGLOBIN: CPT | Performed by: INTERNAL MEDICINE

## 2018-12-06 PROCEDURE — 36415 COLL VENOUS BLD VENIPUNCTURE: CPT | Performed by: INTERNAL MEDICINE

## 2018-12-06 PROCEDURE — 83540 ASSAY OF IRON: CPT | Performed by: INTERNAL MEDICINE

## 2018-12-06 PROCEDURE — 85045 AUTOMATED RETICULOCYTE COUNT: CPT | Performed by: INTERNAL MEDICINE

## 2018-12-12 NOTE — PROGRESS NOTES
SUBJECTIVE:   Diane Gaitan is a 68 year old female who presents to clinic today for the following health issues:    Follow up serum iron level from 12/6/18    Follow up hgb and noted low HGB. Patient started taking otc 325 mg iron 10/8/18. Colonoscopy completed 9/18/18 negative, EGD negative 9/13/18.  Has had a recent colonoscopy as well as upper endoscopy without any obvious source for low hemoglobin.  She was scheduled for a small bowel enteroscopy capsule study but decided not to go through with the examination of canceled     She comes to the clinic today for follow-up and states that she feels much better.  She has been taking the iron and her energy level as well as mood and numerous subjective complaints inclusive of shortness of breath have all resolved.  She feels as if she is pretty much back to her baseline.    Other concerns:  1. Patient states episodes of diarrhea with eating larger meals- urgency and soft stools.     Problem list and histories reviewed & adjusted, as indicated.  Additional history: as documented    Patient Active Problem List   Diagnosis     Essential hypertension, benign     CARDIOVASCULAR SCREENING; LDL GOAL LESS THAN 100     S/P total knee replacement     Osteoarthrosis, unspecified whether generalized or localized, involving lower leg     Acute posthemorrhagic anemia     Major depressive disorder, single episode, mild (H)     Gastroesophageal reflux disease without esophagitis     Type 2 diabetes mellitus without complication, without long-term current use of insulin (H)     NSTEMI (non-ST elevated myocardial infarction) (H)     Unstable angina (H)     Past Surgical History:   Procedure Laterality Date     ARTHROPLASTY KNEE  10/18/2012    Procedure: ARTHROPLASTY KNEE;  RIGHT TOTAL KNEE ARTHROPLASTY (SMITH & NEPHEW)^ ;  Surgeon: Howard Charles MD;  Location: [facility]     BREAST BIOPSY, RT/LT  1988    breast biopsy     C NONSPECIFIC PROCEDURE  10/30/96    skin tags  removed     C NONSPECIFIC PROCEDURE      colonic polyps     COLONOSCOPY N/A 2016    Procedure: COLONOSCOPY;  Surgeon: Curt Patel MD;  Location:  GI     ESOPHAGOSCOPY, GASTROSCOPY, DUODENOSCOPY (EGD), COMBINED N/A 2018    Procedure: COMBINED ESOPHAGOSCOPY, GASTROSCOPY, DUODENOSCOPY (EGD);  gastroscopy;  Surgeon: Mac White MD;  Location:  GI     HYSTERECTOMY, PAP NO LONGER INDICATED      abdominal hysterectomy     ROTATOR CUFF REPAIR RT/LT Right        Social History     Tobacco Use     Smoking status: Former Smoker     Packs/day: 1.00     Years: 12.00     Pack years: 12.00     Last attempt to quit: 10/18/1980     Years since quittin.1     Smokeless tobacco: Never Used   Substance Use Topics     Alcohol use: Yes     Comment: 12 Beers per week     Family History   Problem Relation Age of Onset     Breast Cancer Sister      Colon Cancer Sister      Breast Cancer Daughter      Hypertension Daughter      Skin Cancer Mother      Coronary Artery Disease Father      Breast Cancer Sister          Current Outpatient Medications   Medication Sig Dispense Refill     Acetaminophen (TYLENOL PO) Take 1,000 mg by mouth every 6 hours as needed.         aspirin 81 MG tablet Take 1 tablet by mouth daily. 90 tablet 3     buPROPion (WELLBUTRIN XL) 150 MG 24 hr tablet Take 1 tablet (150 mg) by mouth every morning 90 tablet 3     calcium carbonate 600 mg-vitamin D 400 units (CALTRATE) 600-400 MG-UNIT per tablet Take 1 tablet by mouth daily       citalopram (CELEXA) 20 MG tablet Take 1 tablet (20 mg) by mouth daily 90 tablet 3     Ferrous Sulfate (IRON SUPPLEMENT PO) Take 325 mg by mouth       gabapentin (NEURONTIN) 300 MG capsule Take 300 mg by mouth 2 times daily       gabapentin (NEURONTIN) 300 MG capsule Take 300 mg by mouth 2 times daily as needed for other (Neuropathy) In addition to scheduled doses       losartan (COZAAR) 100 MG tablet Take 1 tablet (100 mg) by mouth daily 90 tablet 3      metFORMIN (GLUCOPHAGE) 500 MG tablet Take 1 tablet (500 mg) by mouth 2 times daily (with meals) 180 tablet 3     omeprazole (PRILOSEC) 20 MG CR capsule Take 1 capsule (20 mg) by mouth daily 90 capsule 3     simvastatin (ZOCOR) 20 MG tablet Take 1 tablet (20 mg) by mouth At Bedtime 90 tablet 3     Allergies   Allergen Reactions     Lisinopril Cough     COUGH     BP Readings from Last 3 Encounters:   12/13/18 134/80   10/30/18 132/80   09/24/18 122/60    Wt Readings from Last 3 Encounters:   12/13/18 95.3 kg (210 lb)   10/30/18 92.2 kg (203 lb 3.2 oz)   09/24/18 96.5 kg (212 lb 12.8 oz)                    Reviewed and updated as needed this visit by clinical staff  Problems       Reviewed and updated as needed this visit by Provider         ROS:  CONSTITUTIONAL: NEGATIVE for fever, chills, change in weight  ENT/MOUTH: NEGATIVE for ear, mouth and throat problems  RESP: NEGATIVE for significant cough or SOB  CV: NEGATIVE for chest pain, palpitations or peripheral edema  GI: NEGATIVE for nausea, abdominal pain, heartburn, or change in bowel habits  : NEGATIVE for frequency, dysuria, or hematuria  MUSCULOSKELETAL: NEGATIVE for significant arthralgias or myalgia  NEURO: NEGATIVE for weakness, dizziness or paresthesias  HEME: NEGATIVE for bleeding problems  PSYCHIATRIC: NEGATIVE for changes in mood or affect    OBJECTIVE:                                                    /80   Pulse 94   Temp 98  F (36.7  C) (Oral)   Resp 14   Wt 95.3 kg (210 lb)   SpO2 98%   BMI 32.89 kg/m    Body mass index is 32.89 kg/m .  GENERAL: alert and no distress  EYES: Eyes grossly normal to inspection, extraocular movements - intact, and PERRL  HENT: ear canals- normal; TMs- normal; Nose- normal; Mouth- no ulcers, no lesions  NECK: no tenderness, no adenopathy, no asymmetry, no masses, no stiffness; thyroid- normal to palpation  RESP: lungs clear to auscultation - no rales, no rhonchi, no wheezes  CV: regular rates and  rhythm, normal S1 S2, no S3 or S4 and no click or rub   MS: extremities- no gross deformities noted, no edema  PSYCH: Alert and oriented times 3; speech- coherent , normal rate and volume; able to articulate logical thoughts, able to abstract reason, no tangential thoughts, no hallucinations or delusions, affect- normal       Lab Results   Component Value Date    A1C 6.1 09/13/2018    A1C 6.0 04/26/2018    A1C 6.3 11/07/2017    A1C 5.8 06/05/2017    A1C 5.9 09/15/2016     LDL Cholesterol Calculated   Date Value Ref Range Status   09/13/2018 48 <100 mg/dL Final     Comment:     Desirable:       <100 mg/dl       ASSESSMENT/PLAN:                                                      (D50.9) Iron deficiency anemia, unspecified iron deficiency anemia type  (primary encounter diagnosis)  Comment: I discussed with the patient that the etiology of her low hemoglobin is still unclear.  I discussed with her that she still probably needs a capsule endoscopy study and potentially hematology evaluation for further assessment but she is unwilling to do so at this point.  She states that she is feeling better and does not want to proceed with further assessment.  Plan: Ferritin, IRON, Hemoglobin        I advised her that at least we should recheck her hemoglobin and iron studies next month to make sure that there continuing to improve and if not go forward at that point.  She is agreeable and lab orders have been placed    (E11.9) Type 2 diabetes mellitus without complication, without long-term current use of insulin (H)  Comment: At goal on therapy continuing as managed with A1c recommended every 6 months  Plan:     (F32.0) Major depressive disorder, single episode, mild (H)  Comment: Stable on therapy with PHQ 9 score noted  Plan: buPROPion (WELLBUTRIN XL) 150 MG 24 hr tablet            (Z13.89) Screening for diabetic peripheral neuropathy  Comment: No evidence of acute changes on exam  Plan: FOOT EXAM  NO CHARGE [50473.114]           See Patient Instructions    Jac Barry MD  [facility]    THE MEDICATION LIST HAS BEEN FULLY RECONCILED BY THE M.ERIK. AND THE NURSING STAFF.

## 2018-12-13 ENCOUNTER — OFFICE VISIT (OUTPATIENT)
Dept: INTERNAL MEDICINE | Facility: CLINIC | Age: 68
End: 2018-12-13
Payer: MEDICARE

## 2018-12-13 VITALS
BODY MASS INDEX: 32.89 KG/M2 | SYSTOLIC BLOOD PRESSURE: 134 MMHG | OXYGEN SATURATION: 98 % | WEIGHT: 210 LBS | TEMPERATURE: 98 F | RESPIRATION RATE: 14 BRPM | DIASTOLIC BLOOD PRESSURE: 80 MMHG | HEART RATE: 94 BPM

## 2018-12-13 DIAGNOSIS — E11.9 TYPE 2 DIABETES MELLITUS WITHOUT COMPLICATION, WITHOUT LONG-TERM CURRENT USE OF INSULIN (H): ICD-10-CM

## 2018-12-13 DIAGNOSIS — F32.0 MAJOR DEPRESSIVE DISORDER, SINGLE EPISODE, MILD (H): ICD-10-CM

## 2018-12-13 DIAGNOSIS — Z13.89 SCREENING FOR DIABETIC PERIPHERAL NEUROPATHY: ICD-10-CM

## 2018-12-13 DIAGNOSIS — D50.9 IRON DEFICIENCY ANEMIA, UNSPECIFIED IRON DEFICIENCY ANEMIA TYPE: Primary | ICD-10-CM

## 2018-12-13 PROCEDURE — 99207 C FOOT EXAM  NO CHARGE: CPT | Mod: 25 | Performed by: INTERNAL MEDICINE

## 2018-12-13 PROCEDURE — 99214 OFFICE O/P EST MOD 30 MIN: CPT | Performed by: INTERNAL MEDICINE

## 2018-12-13 RX ORDER — BUPROPION HYDROCHLORIDE 150 MG/1
150 TABLET ORAL EVERY MORNING
Qty: 30 TABLET | Refills: 0 | Status: SHIPPED | OUTPATIENT
Start: 2018-12-13 | End: 2019-05-02

## 2018-12-13 ASSESSMENT — PATIENT HEALTH QUESTIONNAIRE - PHQ9: SUM OF ALL RESPONSES TO PHQ QUESTIONS 1-9: 0

## 2019-01-23 DIAGNOSIS — D50.9 IRON DEFICIENCY ANEMIA, UNSPECIFIED IRON DEFICIENCY ANEMIA TYPE: ICD-10-CM

## 2019-01-23 LAB — HGB BLD-MCNC: 13.6 G/DL (ref 11.7–15.7)

## 2019-01-23 PROCEDURE — 82728 ASSAY OF FERRITIN: CPT | Performed by: INTERNAL MEDICINE

## 2019-01-23 PROCEDURE — 85018 HEMOGLOBIN: CPT | Performed by: INTERNAL MEDICINE

## 2019-01-23 PROCEDURE — 36415 COLL VENOUS BLD VENIPUNCTURE: CPT | Performed by: INTERNAL MEDICINE

## 2019-01-23 PROCEDURE — 83540 ASSAY OF IRON: CPT | Performed by: INTERNAL MEDICINE

## 2019-01-24 LAB
FERRITIN SERPL-MCNC: 26 NG/ML (ref 8–252)
IRON SERPL-MCNC: 33 UG/DL (ref 35–180)

## 2019-02-05 ENCOUNTER — TELEPHONE (OUTPATIENT)
Dept: INTERNAL MEDICINE | Facility: CLINIC | Age: 69
End: 2019-02-05

## 2019-02-05 DIAGNOSIS — I10 ESSENTIAL HYPERTENSION: Primary | ICD-10-CM

## 2019-02-05 RX ORDER — LOSARTAN POTASSIUM 100 MG/1
100 TABLET ORAL DAILY
Qty: 7 TABLET | Refills: 0 | Status: SHIPPED | OUTPATIENT
Start: 2019-02-05 | End: 2019-04-08

## 2019-02-05 NOTE — TELEPHONE ENCOUNTER
Pt says she does not have enough RX for losartan, says that her mail-order pharmacy mailed it out in January,  but she never received it at home. So she called and spoke to them today, because she is out of pills today. And They will be mailing out another 90 day supply today. Pt is hoping to receive the order before this weekend, because she is Going on vacation on Sunday--down to [place] for 12 days, and will be back home on Feb 22nd. 1 week supply sent to local [facility] pharmacy, and hope to receive supply from mail-order pharmacy before she leaves.

## 2019-05-01 ENCOUNTER — TRANSFERRED RECORDS (OUTPATIENT)
Dept: HEALTH INFORMATION MANAGEMENT | Facility: CLINIC | Age: 69
End: 2019-05-01

## 2019-05-02 ENCOUNTER — OFFICE VISIT (OUTPATIENT)
Dept: INTERNAL MEDICINE | Facility: CLINIC | Age: 69
End: 2019-05-02
Payer: MEDICARE

## 2019-05-02 VITALS
DIASTOLIC BLOOD PRESSURE: 82 MMHG | OXYGEN SATURATION: 96 % | SYSTOLIC BLOOD PRESSURE: 132 MMHG | TEMPERATURE: 98.5 F | HEART RATE: 108 BPM | BODY MASS INDEX: 32.65 KG/M2 | HEIGHT: 67 IN | WEIGHT: 208 LBS | RESPIRATION RATE: 16 BRPM

## 2019-05-02 DIAGNOSIS — E11.9 TYPE 2 DIABETES MELLITUS WITHOUT COMPLICATION, WITHOUT LONG-TERM CURRENT USE OF INSULIN (H): ICD-10-CM

## 2019-05-02 DIAGNOSIS — F32.0 MAJOR DEPRESSIVE DISORDER, SINGLE EPISODE, MILD (H): ICD-10-CM

## 2019-05-02 DIAGNOSIS — D50.9 IRON DEFICIENCY ANEMIA, UNSPECIFIED IRON DEFICIENCY ANEMIA TYPE: ICD-10-CM

## 2019-05-02 DIAGNOSIS — Z13.6 CARDIOVASCULAR SCREENING; LDL GOAL LESS THAN 100: Primary | ICD-10-CM

## 2019-05-02 DIAGNOSIS — K21.9 GASTROESOPHAGEAL REFLUX DISEASE WITHOUT ESOPHAGITIS: ICD-10-CM

## 2019-05-02 DIAGNOSIS — E11.42 DIABETIC POLYNEUROPATHY ASSOCIATED WITH TYPE 2 DIABETES MELLITUS (H): ICD-10-CM

## 2019-05-02 LAB
CREAT UR-MCNC: 173 MG/DL
FERRITIN SERPL-MCNC: 55 NG/ML (ref 8–252)
HBA1C MFR BLD: 5.7 % (ref 0–5.6)
HGB BLD-MCNC: 15.4 G/DL (ref 11.7–15.7)
IRON SERPL-MCNC: 32 UG/DL (ref 35–180)
MICROALBUMIN UR-MCNC: 10 MG/L
MICROALBUMIN/CREAT UR: 5.95 MG/G CR (ref 0–25)

## 2019-05-02 PROCEDURE — 85018 HEMOGLOBIN: CPT | Performed by: INTERNAL MEDICINE

## 2019-05-02 PROCEDURE — 83540 ASSAY OF IRON: CPT | Performed by: INTERNAL MEDICINE

## 2019-05-02 PROCEDURE — 83036 HEMOGLOBIN GLYCOSYLATED A1C: CPT | Performed by: INTERNAL MEDICINE

## 2019-05-02 PROCEDURE — 36415 COLL VENOUS BLD VENIPUNCTURE: CPT | Performed by: INTERNAL MEDICINE

## 2019-05-02 PROCEDURE — 82043 UR ALBUMIN QUANTITATIVE: CPT | Performed by: INTERNAL MEDICINE

## 2019-05-02 PROCEDURE — 82728 ASSAY OF FERRITIN: CPT | Performed by: INTERNAL MEDICINE

## 2019-05-02 PROCEDURE — 99214 OFFICE O/P EST MOD 30 MIN: CPT | Performed by: INTERNAL MEDICINE

## 2019-05-02 RX ORDER — GABAPENTIN 300 MG/1
300 CAPSULE ORAL 2 TIMES DAILY PRN
Status: CANCELLED | OUTPATIENT
Start: 2019-05-02

## 2019-05-02 RX ORDER — SIMVASTATIN 20 MG
20 TABLET ORAL AT BEDTIME
Qty: 90 TABLET | Refills: 3 | Status: SHIPPED | OUTPATIENT
Start: 2019-05-02 | End: 2020-03-10

## 2019-05-02 RX ORDER — CITALOPRAM HYDROBROMIDE 20 MG/1
20 TABLET ORAL DAILY
Qty: 90 TABLET | Refills: 3 | Status: SHIPPED | OUTPATIENT
Start: 2019-05-02 | End: 2020-03-10

## 2019-05-02 RX ORDER — BUPROPION HYDROCHLORIDE 150 MG/1
150 TABLET ORAL EVERY MORNING
Qty: 30 TABLET | Refills: 0 | Status: SHIPPED | OUTPATIENT
Start: 2019-05-02 | End: 2019-05-14

## 2019-05-02 RX ORDER — GABAPENTIN 300 MG/1
300 CAPSULE ORAL 2 TIMES DAILY
Qty: 180 CAPSULE | Refills: 1 | Status: SHIPPED | OUTPATIENT
Start: 2019-05-02 | End: 2019-10-03

## 2019-05-02 ASSESSMENT — PATIENT HEALTH QUESTIONNAIRE - PHQ9: SUM OF ALL RESPONSES TO PHQ QUESTIONS 1-9: 2

## 2019-05-02 ASSESSMENT — MIFFLIN-ST. JEOR: SCORE: 1496.11

## 2019-05-02 NOTE — LETTER
[facility]  [address] [address]  [phone]      5/2/2019       Diane Gaitan  [address] [facility] [address]        Dear Diane,      Your Hemoglobin A1C and blood sugar tests look good and I would continue with your medication without change.  These tests should be repeated in 6 months.    Your hemoglobin is better and your iron stores or ferritin levels have also improved.  Your iron level is unchanged.  I would continue your iron for the month of May and then stop the iron supplementation at that point and let us recheck your hemoglobin in 3 months for comparison.    Sincerely,      Jac Barry MD  Internal Medicine

## 2019-05-14 DIAGNOSIS — F32.0 MAJOR DEPRESSIVE DISORDER, SINGLE EPISODE, MILD (H): ICD-10-CM

## 2019-05-14 DIAGNOSIS — I10 ESSENTIAL HYPERTENSION, BENIGN: ICD-10-CM

## 2019-05-14 RX ORDER — LOSARTAN POTASSIUM 100 MG/1
100 TABLET ORAL DAILY
Qty: 90 TABLET | Refills: 3 | Status: SHIPPED | OUTPATIENT
Start: 2019-05-14 | End: 2020-03-10

## 2019-05-14 RX ORDER — BUPROPION HYDROCHLORIDE 150 MG/1
150 TABLET ORAL EVERY MORNING
Qty: 90 TABLET | Refills: 1 | Status: SHIPPED | OUTPATIENT
Start: 2019-05-14 | End: 2019-12-16

## 2019-05-14 NOTE — TELEPHONE ENCOUNTER
"Requested Prescriptions   Pending Prescriptions Disp Refills     buPROPion (WELLBUTRIN XL) 150 MG 24 hr tablet 30 tablet 0     Sig: Take 1 tablet (150 mg) by mouth every morning       SSRIs Protocol Passed - 5/14/2019 10:30 AM        Passed - PHQ-9 score less than 5 in past 6 months     Please review last PHQ-9 score.           Passed - Medication is Bupropion     If the medication is Bupropion (Wellbutrin), and the patient is taking for smoking cessation; OK to refill.          Passed - Medication is active on med list        Passed - Patient is age 18 or older        Passed - No active pregnancy on record        Passed - No positive pregnancy test in last 12 months        Passed - Recent (6 mo) or future (30 days) visit within the authorizing provider's specialty     Patient had office visit in the last 6 months or has a visit in the next 30 days with authorizing provider or within the authorizing provider's specialty.  See \"Patient Info\" tab in inbasket, or \"Choose Columns\" in Meds & Orders section of the refill encounter.            losartan (COZAAR) 100 MG tablet 90 tablet 3     Sig: Take 1 tablet (100 mg) by mouth daily       Angiotensin-II Receptors Passed - 5/14/2019 10:30 AM        Passed - Blood pressure under 140/90 in past 12 months     BP Readings from Last 3 Encounters:   05/02/19 132/82   12/13/18 134/80   10/30/18 132/80                 Passed - Recent (12 mo) or future (30 days) visit within the authorizing provider's specialty     Patient had office visit in the last 12 months or has a visit in the next 30 days with authorizing provider or within the authorizing provider's specialty.  See \"Patient Info\" tab in inbasket, or \"Choose Columns\" in Meds & Orders section of the refill encounter.              Passed - Medication is active on med list        Passed - Patient is age 18 or older        Passed - No active pregnancy on record        Passed - Normal serum creatinine on file in past 12 months     " Recent Labs   Lab Test 09/13/18  0416   CR 0.67             Passed - Normal serum potassium on file in past 12 months     Recent Labs   Lab Test 09/13/18 0416   POTASSIUM 4.2                    Passed - No positive pregnancy test in past 12 months

## 2019-05-14 NOTE — TELEPHONE ENCOUNTER
PHQ-9 SCORE 10/30/2018 12/13/2018 5/2/2019   PHQ-9 Total Score - - -   PHQ-9 Total Score 0 0 2

## 2019-06-17 ENCOUNTER — OFFICE VISIT (OUTPATIENT)
Dept: INTERNAL MEDICINE | Facility: CLINIC | Age: 69
End: 2019-06-17
Payer: MEDICARE

## 2019-06-17 VITALS
RESPIRATION RATE: 15 BRPM | HEIGHT: 67 IN | TEMPERATURE: 98 F | WEIGHT: 213.3 LBS | BODY MASS INDEX: 33.48 KG/M2 | HEART RATE: 92 BPM | DIASTOLIC BLOOD PRESSURE: 84 MMHG | OXYGEN SATURATION: 97 % | SYSTOLIC BLOOD PRESSURE: 136 MMHG

## 2019-06-17 DIAGNOSIS — I21.4 NSTEMI (NON-ST ELEVATED MYOCARDIAL INFARCTION) (H): ICD-10-CM

## 2019-06-17 DIAGNOSIS — E11.9 TYPE 2 DIABETES MELLITUS WITHOUT COMPLICATION, WITHOUT LONG-TERM CURRENT USE OF INSULIN (H): ICD-10-CM

## 2019-06-17 DIAGNOSIS — Z01.818 PRE-OPERATIVE GENERAL PHYSICAL EXAMINATION: Primary | ICD-10-CM

## 2019-06-17 DIAGNOSIS — H25.9 SENILE CATARACT, UNSPECIFIED AGE-RELATED CATARACT TYPE, UNSPECIFIED LATERALITY: ICD-10-CM

## 2019-06-17 DIAGNOSIS — F32.0 MAJOR DEPRESSIVE DISORDER, SINGLE EPISODE, MILD (H): ICD-10-CM

## 2019-06-17 DIAGNOSIS — I10 ESSENTIAL HYPERTENSION, BENIGN: ICD-10-CM

## 2019-06-17 LAB
GLUCOSE SERPL-MCNC: 141 MG/DL (ref 70–99)
HGB BLD-MCNC: 14.9 G/DL (ref 11.7–15.7)
PLATELET # BLD AUTO: 164 10E9/L (ref 150–450)
POTASSIUM SERPL-SCNC: 4.7 MMOL/L (ref 3.4–5.3)

## 2019-06-17 PROCEDURE — 99215 OFFICE O/P EST HI 40 MIN: CPT | Performed by: INTERNAL MEDICINE

## 2019-06-17 PROCEDURE — 82947 ASSAY GLUCOSE BLOOD QUANT: CPT | Performed by: INTERNAL MEDICINE

## 2019-06-17 PROCEDURE — 85049 AUTOMATED PLATELET COUNT: CPT | Performed by: INTERNAL MEDICINE

## 2019-06-17 PROCEDURE — 36415 COLL VENOUS BLD VENIPUNCTURE: CPT | Performed by: INTERNAL MEDICINE

## 2019-06-17 PROCEDURE — 85018 HEMOGLOBIN: CPT | Performed by: INTERNAL MEDICINE

## 2019-06-17 PROCEDURE — 84132 ASSAY OF SERUM POTASSIUM: CPT | Performed by: INTERNAL MEDICINE

## 2019-06-17 ASSESSMENT — MIFFLIN-ST. JEOR: SCORE: 1520.15

## 2019-06-17 NOTE — Clinical Note
Please abstract the following data from this visit with this patient into the appropriate field in Epic:Eye exam with ophthalmology on this date: Eye exam done per Dr. Matos on 5/1/2019 at [facility] eye clinic for diabetes check, normal exam less cataracts, no evidence of retinopathy

## 2019-06-17 NOTE — PROGRESS NOTES
[facility]  [address] [address]  [phone]  Dept: [phone]    PRE-OP EVALUATION:  Today's date: 2019    Diane Gaitan (: 1950) presents for pre-operative evaluation assessment as requested by Dr. Prescott.  She requires evaluation and anesthesia risk assessment prior to undergoing surgery/procedure for treatment of cataracts.    Proposed Surgery/ Procedure: cataract replacement   Date of Surgery/ Procedure: 19 (L) & 19 (R)  Time of Surgery/ Procedure: 8:00 am  Hospital/Surgical Facility: [phone]  Fax number for surgical facility: TBD  Primary Physician: Jac Barry  Type of Anesthesia Anticipated: MAC anesthesia     Patient has a Health Care Directive or Living Will:  NO    HPI:     HPI related to upcoming procedure: She requires evaluation and anesthesia risk assessment prior to undergoing surgery/procedure for treatment of cataracts.    Proposed Surgery/ Procedure: cataract replacement   Date of Surgery/ Procedure: 19 (L) & 19 (R)    See problem list for active medical problems.  Problems all longstanding and stable, except as noted/documented.  See ROS for pertinent symptoms related to these conditions.      MEDICAL HISTORY:     Patient Active Problem List    Diagnosis Date Noted     Diabetic polyneuropathy associated with type 2 diabetes mellitus (H) 2019     Priority: Medium     Iron deficiency anemia, unspecified iron deficiency anemia type 2019     Priority: Medium     NSTEMI (non-ST elevated myocardial infarction) (H) 2018     Priority: Medium     Unstable angina (H) 2018     Priority: Medium     Type 2 diabetes mellitus without complication, without long-term current use of insulin (H) 2017     Priority: Medium     Major depressive disorder, single episode, mild (H) 2016     Priority: Medium     Gastroesophageal reflux disease without esophagitis 2016     Priority:  Medium     Osteoarthrosis, unspecified whether generalized or localized, involving lower leg 10/22/2012     Priority: Medium     Problem list name updated by automated process. Provider to review       Acute posthemorrhagic anemia 10/22/2012     Priority: Medium     S/P total knee replacement 10/18/2012     Priority: Medium     CARDIOVASCULAR SCREENING; LDL GOAL LESS THAN 100 10/31/2010     Priority: Medium     Essential hypertension, benign 08/28/2002     Priority: Medium      Past Medical History:   Diagnosis Date     Arthritis      BENIGN HYPERTENSION 8/28/2002     Cancer (H)     Basal cell carcinoma     CARDIOVASCULAR SCREENING; LDL GOAL LESS THAN 100 10/31/2010     Depressive disorder 1997     Esophageal reflux      Mild major depression (H) 9/14/2010     Other malaise and fatigue 8/28/2002     Type 2 diabetes, HbA1c goal < 7% (H) 10/31/2010     Past Surgical History:   Procedure Laterality Date     ARTHROPLASTY KNEE  10/18/2012    Procedure: ARTHROPLASTY KNEE;  RIGHT TOTAL KNEE ARTHROPLASTY (SMITH & NEPHEW)^ ;  Surgeon: Howard Charles MD;  Location:  OR     BREAST BIOPSY, RT/LT  1988    breast biopsy     C NONSPECIFIC PROCEDURE  10/30/96    skin tags removed     C NONSPECIFIC PROCEDURE      colonic polyps     COLONOSCOPY N/A 7/14/2016    Procedure: COLONOSCOPY;  Surgeon: Curt Patel MD;  Location:  GI     ESOPHAGOSCOPY, GASTROSCOPY, DUODENOSCOPY (EGD), COMBINED N/A 9/13/2018    Procedure: COMBINED ESOPHAGOSCOPY, GASTROSCOPY, DUODENOSCOPY (EGD);  gastroscopy;  Surgeon: Mac White MD;  Location:  GI     HYSTERECTOMY, PAP NO LONGER INDICATED  1986    abdominal hysterectomy     ROTATOR CUFF REPAIR RT/LT Right      Current Outpatient Medications   Medication Sig Dispense Refill     Acetaminophen (TYLENOL PO) Take 1,000 mg by mouth every 6 hours as needed.         aspirin 81 MG tablet Take 1 tablet by mouth daily. 90 tablet 3     buPROPion (WELLBUTRIN XL) 150 MG 24 hr tablet  Take 1 tablet (150 mg) by mouth every morning 90 tablet 1     calcium carbonate 600 mg-vitamin D 400 units (CALTRATE) 600-400 MG-UNIT per tablet Take 1 tablet by mouth daily       citalopram (CELEXA) 20 MG tablet Take 1 tablet (20 mg) by mouth daily 90 tablet 3     Ferrous Sulfate (IRON SUPPLEMENT PO) Take 325 mg by mouth       gabapentin (NEURONTIN) 300 MG capsule Take 1 capsule (300 mg) by mouth 2 times daily 180 capsule 1     gabapentin (NEURONTIN) 300 MG capsule Take 300 mg by mouth 2 times daily as needed for other (Neuropathy) In addition to scheduled doses       losartan (COZAAR) 100 MG tablet Take 1 tablet (100 mg) by mouth daily 90 tablet 3     metFORMIN (GLUCOPHAGE) 500 MG tablet Take 1 tablet (500 mg) by mouth 2 times daily (with meals) 180 tablet 3     omeprazole (PRILOSEC) 20 MG DR capsule Take 1 capsule (20 mg) by mouth daily 90 capsule 3     simvastatin (ZOCOR) 20 MG tablet Take 1 tablet (20 mg) by mouth At Bedtime 90 tablet 3     OTC products: None, except as noted above    Allergies   Allergen Reactions     Lisinopril Cough     COUGH      Latex Allergy: NO    Social History     Tobacco Use     Smoking status: Former Smoker     Packs/day: 1.00     Years: 12.00     Pack years: 12.00     Last attempt to quit: 10/18/1980     Years since quittin.6     Smokeless tobacco: Never Used   Substance Use Topics     Alcohol use: Yes     Comment: 12 Beers per week     History   Drug Use No       REVIEW OF SYSTEMS:   CONSTITUTIONAL: NEGATIVE for fever, chills, change in weight  ENT/MOUTH: NEGATIVE for ear, mouth and throat problems  RESP: NEGATIVE for significant cough or SOB  CV: NEGATIVE for chest pain, palpitations or peripheral edema  GI: NEGATIVE for nausea, abdominal pain, heartburn, or change in bowel habits  : NEGATIVE for frequency, dysuria, or hematuria  MUSCULOSKELETAL: NEGATIVE for significant arthralgias or myalgia less mild right arm pain with abduction.  NEURO: NEGATIVE for weakness, dizziness  "or paresthesias  HEME: NEGATIVE for bleeding problems  PSYCHIATRIC: NEGATIVE for changes in mood or affect    EXAM:   /84   Pulse 92   Temp 98  F (36.7  C) (Oral)   Resp 15   Ht 1.702 m (5' 7\")   Wt 96.8 kg (213 lb 4.8 oz)   SpO2 97%   BMI 33.41 kg/m      GENERAL APPEARANCE: healthy, alert and no distress     EYES: + cataracts OU     HENT: ear canals and TM's normal and nose and mouth without ulcers or lesions     NECK: no adenopathy, no asymmetry, masses, or scars and thyroid normal to palpation     RESP: lungs clear to auscultation - no rales, rhonchi or wheezes     CV: regular rates and rhythm, normal S1 S2, no S3 or S4 and no click or rub     ABDOMEN:  soft, nontender, no HSM or masses and bowel sounds normal     MS: extremities normal- no gross deformities noted, no evidence of inflammation in joints, FROM in all extremities.     NEURO: N o foal changes     PSYCH: mentation appears normal and affect normal/bright    DIAGNOSTICS:   No EKG required for low risk surgery (cataract, skin procedure, breast biopsy, etc)  Noted prior stess test 9/2018 negative for ischemia.    Recent Labs   Lab Test 05/02/19  1050 01/23/19  0837  10/30/18  0950  09/13/18  0904 09/13/18  0416 04/26/18  0809  10/18/12  0915   HGB 15.4 13.6   < > 9.5*   < > 7.5* 8.3*  --    < > 14.0   PLT  --   --   --  280  --  145* 164  --    < > 214   INR  --   --   --   --   --   --   --   --   --  0.97   NA  --   --   --   --   --   --  138 141   < > 138   POTASSIUM  --   --   --   --   --   --  4.2 4.4   < > 4.4   CR  --   --   --   --   --   --  0.67 0.61   < > 0.66   A1C 5.7*  --   --   --   --   --  6.1* 6.0*   < >  --     < > = values in this interval not displayed.     IMPRESSION:   Reason for surgery/procedure: visual changes    The proposed surgical procedure is considered LOW risk.    REVISED CARDIAC RISK INDEX  The patient has the following serious cardiovascular risks for perioperative complications such as (MI, PE, VFib and " 3  AV Block):  Diabetes Mellitus  INTERPRETATION: 1 risks: Class II (low risk - 0.9% complication rate)    The patient has the following additional risks for perioperative complications:  No identified additional risks      ICD-10-CM    1. Pre-operative general physical examination Z01.818 Potassium     Glucose     Platelet count     Hemoglobin   2. Senile cataract, unspecified age-related cataract type, unspecified laterality H25.9    3. Type 2 diabetes mellitus without complication, without long-term current use of insulin (H) E11.9 Glucose   4. Major depressive disorder, single episode, mild (H) F32.0    5. NSTEMI (non-ST elevated myocardial infarction) (H) I21.4    6. Essential hypertension, benign I10        RECOMMENDATIONS:     --Consult hospital rounder / IM to assist post-op medical management    Cardiovascular Risk  Performs 4 METs exercise without symptoms (Light housework (dusting, washing dishes), Climb a flight of stairs and Walk on level ground at 15 minutes per mile (4 miles/hour)) .     Patient is to take all scheduled medications on the day of surgery EXCEPT for modifications listed below.    Diabetes Medication Use  Hold usual oral and non-insulin diabetic meds (e.g. Metformin, Actos, Glipizide) while NPO.     Anticoagulant or Antiplatelet Medication Use  ASPIRIN: Bleeding risk is low for this procedure and aspirin 81 mg daily should be continued in the perioperative period      ACE Inhibitor or Angiotensin Receptor Blocker (ARB) Use  Ace inhibitor or Angiotensin Receptor Blocker (ARB) and will continue this medication due to the higher risk of uncontrolled perioperative hypertension (e.g. neurosurgical procedure)    APPROVAL GIVEN to proceed with proposed procedure, without further diagnostic evaluation       Signed Electronically by: Jac Barry MD    Copy of this evaluation report is provided to requesting physician, Dr. Kenyon Finn Preop Guidelines    Revised Cardiac Risk Index

## 2019-06-27 ENCOUNTER — TRANSFERRED RECORDS (OUTPATIENT)
Dept: HEALTH INFORMATION MANAGEMENT | Facility: CLINIC | Age: 69
End: 2019-06-27

## 2019-10-03 ENCOUNTER — HEALTH MAINTENANCE LETTER (OUTPATIENT)
Age: 69
End: 2019-10-03

## 2019-10-03 ENCOUNTER — OFFICE VISIT (OUTPATIENT)
Dept: INTERNAL MEDICINE | Facility: CLINIC | Age: 69
End: 2019-10-03
Payer: MEDICARE

## 2019-10-03 VITALS
HEART RATE: 92 BPM | TEMPERATURE: 98.6 F | DIASTOLIC BLOOD PRESSURE: 78 MMHG | RESPIRATION RATE: 14 BRPM | HEIGHT: 67 IN | SYSTOLIC BLOOD PRESSURE: 134 MMHG | WEIGHT: 220 LBS | BODY MASS INDEX: 34.53 KG/M2 | OXYGEN SATURATION: 97 %

## 2019-10-03 DIAGNOSIS — Z13.6 CARDIOVASCULAR SCREENING; LDL GOAL LESS THAN 100: ICD-10-CM

## 2019-10-03 DIAGNOSIS — E11.42 DIABETIC POLYNEUROPATHY ASSOCIATED WITH TYPE 2 DIABETES MELLITUS (H): ICD-10-CM

## 2019-10-03 DIAGNOSIS — Z23 NEED FOR PROPHYLACTIC VACCINATION AND INOCULATION AGAINST INFLUENZA: ICD-10-CM

## 2019-10-03 DIAGNOSIS — I10 ESSENTIAL HYPERTENSION, BENIGN: ICD-10-CM

## 2019-10-03 DIAGNOSIS — E11.9 TYPE 2 DIABETES MELLITUS WITHOUT COMPLICATION, WITHOUT LONG-TERM CURRENT USE OF INSULIN (H): Primary | ICD-10-CM

## 2019-10-03 DIAGNOSIS — R26.89 BALANCE PROBLEMS: ICD-10-CM

## 2019-10-03 DIAGNOSIS — Z12.31 VISIT FOR SCREENING MAMMOGRAM: ICD-10-CM

## 2019-10-03 LAB
ALBUMIN SERPL-MCNC: 4.2 G/DL (ref 3.4–5)
ALP SERPL-CCNC: 106 U/L (ref 40–150)
ALT SERPL W P-5'-P-CCNC: 30 U/L (ref 0–50)
ANION GAP SERPL CALCULATED.3IONS-SCNC: 6 MMOL/L (ref 3–14)
AST SERPL W P-5'-P-CCNC: 14 U/L (ref 0–45)
BILIRUB SERPL-MCNC: 0.9 MG/DL (ref 0.2–1.3)
BUN SERPL-MCNC: 10 MG/DL (ref 7–30)
CALCIUM SERPL-MCNC: 9.5 MG/DL (ref 8.5–10.1)
CHLORIDE SERPL-SCNC: 104 MMOL/L (ref 94–109)
CHOLEST SERPL-MCNC: 156 MG/DL
CO2 SERPL-SCNC: 27 MMOL/L (ref 20–32)
CREAT SERPL-MCNC: 0.64 MG/DL (ref 0.52–1.04)
CREAT UR-MCNC: 146 MG/DL
GFR SERPL CREATININE-BSD FRML MDRD: >90 ML/MIN/{1.73_M2}
GLUCOSE SERPL-MCNC: 143 MG/DL (ref 70–99)
HBA1C MFR BLD: 5.7 % (ref 0–5.6)
HDLC SERPL-MCNC: 58 MG/DL
LDLC SERPL CALC-MCNC: 59 MG/DL
MICROALBUMIN UR-MCNC: 9 MG/L
MICROALBUMIN/CREAT UR: 6.47 MG/G CR (ref 0–25)
NONHDLC SERPL-MCNC: 90 MG/DL
POTASSIUM SERPL-SCNC: 4.5 MMOL/L (ref 3.4–5.3)
PROT SERPL-MCNC: 7.6 G/DL (ref 6.8–8.8)
SODIUM SERPL-SCNC: 137 MMOL/L (ref 133–144)
TRIGL SERPL-MCNC: 154 MG/DL
TSH SERPL DL<=0.005 MIU/L-ACNC: 0.82 MU/L (ref 0.4–4)

## 2019-10-03 PROCEDURE — G0008 ADMIN INFLUENZA VIRUS VAC: HCPCS | Performed by: INTERNAL MEDICINE

## 2019-10-03 PROCEDURE — 83036 HEMOGLOBIN GLYCOSYLATED A1C: CPT | Performed by: INTERNAL MEDICINE

## 2019-10-03 PROCEDURE — 99214 OFFICE O/P EST MOD 30 MIN: CPT | Mod: 25 | Performed by: INTERNAL MEDICINE

## 2019-10-03 PROCEDURE — 84443 ASSAY THYROID STIM HORMONE: CPT | Performed by: INTERNAL MEDICINE

## 2019-10-03 PROCEDURE — 80053 COMPREHEN METABOLIC PANEL: CPT | Performed by: INTERNAL MEDICINE

## 2019-10-03 PROCEDURE — 82043 UR ALBUMIN QUANTITATIVE: CPT | Performed by: INTERNAL MEDICINE

## 2019-10-03 PROCEDURE — 80061 LIPID PANEL: CPT | Performed by: INTERNAL MEDICINE

## 2019-10-03 PROCEDURE — 90662 IIV NO PRSV INCREASED AG IM: CPT | Performed by: INTERNAL MEDICINE

## 2019-10-03 PROCEDURE — 36415 COLL VENOUS BLD VENIPUNCTURE: CPT | Performed by: INTERNAL MEDICINE

## 2019-10-03 RX ORDER — GABAPENTIN 300 MG/1
300 CAPSULE ORAL 3 TIMES DAILY
Qty: 270 CAPSULE | Refills: 1
Start: 2019-10-03 | End: 2020-03-10

## 2019-10-03 RX ORDER — LORAZEPAM 0.5 MG/1
0.5 TABLET ORAL ONCE
Qty: 1 TABLET | Refills: 0 | Status: SHIPPED | OUTPATIENT
Start: 2019-10-03 | End: 2019-12-16

## 2019-10-03 ASSESSMENT — MIFFLIN-ST. JEOR: SCORE: 1550.54

## 2019-10-03 NOTE — PROGRESS NOTES
Subjective     Diane Gaitan is a 69 year old female who presents to clinic today for the following health issues:    HPI     Diabetes Follow-up      How often are you checking your blood sugar? Not at all    What time of day are you checking your blood sugars (select all that apply)?  n/a    Have you had any blood sugars above 200?  unknown    Have you had any blood sugars below 70?  unknown    What symptoms do you notice when your blood sugar is low?  Shaky, Dizzy, Weak and Other: light headed    What concerns do you have today about your diabetes? None     Do you have any of these symptoms? (Select all that apply)  Numbness in feet and Burning in feet     Have you had a diabetic eye exam in the last 12 months? Yes- Date of last eye exam: 6-2019    BP Readings from Last 2 Encounters:   10/03/19 134/78   06/17/19 136/84     Hemoglobin A1C (%)   Date Value   05/02/2019 5.7 (H)   09/13/2018 6.1 (H)     LDL Cholesterol Calculated (mg/dL)   Date Value   09/13/2018 48   04/26/2018 41       Diabetes Management Resources    Patient Active Problem List   Diagnosis     Essential hypertension, benign     CARDIOVASCULAR SCREENING; LDL GOAL LESS THAN 100     S/P total knee replacement     Osteoarthrosis, unspecified whether generalized or localized, involving lower leg     Acute posthemorrhagic anemia     Major depressive disorder, single episode, mild (H)     Gastroesophageal reflux disease without esophagitis     Type 2 diabetes mellitus without complication, without long-term current use of insulin (H)     NSTEMI (non-ST elevated myocardial infarction) (H)     Unstable angina (H)     Diabetic polyneuropathy associated with type 2 diabetes mellitus (H)     Iron deficiency anemia, unspecified iron deficiency anemia type     Past Surgical History:   Procedure Laterality Date     ARTHROPLASTY KNEE  10/18/2012    Procedure: ARTHROPLASTY KNEE;  RIGHT TOTAL KNEE ARTHROPLASTY (SMITH & NEPHEW)^ ;  Surgeon: Howard Charles  MD Alexander;  Location:  OR     BREAST BIOPSY, RT/LT      breast biopsy     C NONSPECIFIC PROCEDURE  10/30/96    skin tags removed     C NONSPECIFIC PROCEDURE      colonic polyps     COLONOSCOPY N/A 2016    Procedure: COLONOSCOPY;  Surgeon: Curt Patel MD;  Location:  GI     ESOPHAGOSCOPY, GASTROSCOPY, DUODENOSCOPY (EGD), COMBINED N/A 2018    Procedure: COMBINED ESOPHAGOSCOPY, GASTROSCOPY, DUODENOSCOPY (EGD);  gastroscopy;  Surgeon: Mac White MD;  Location:  GI     HYSTERECTOMY, PAP NO LONGER INDICATED      abdominal hysterectomy     ROTATOR CUFF REPAIR RT/LT Right        Social History     Tobacco Use     Smoking status: Former Smoker     Packs/day: 1.00     Years: 12.00     Pack years: 12.00     Last attempt to quit: 10/18/1980     Years since quittin.9     Smokeless tobacco: Never Used   Substance Use Topics     Alcohol use: Yes     Comment: 12 Beers per week     Family History   Problem Relation Age of Onset     Breast Cancer Sister      Colon Cancer Sister      Breast Cancer Daughter      Hypertension Daughter      Skin Cancer Mother      Coronary Artery Disease Father      Breast Cancer Sister          Current Outpatient Medications   Medication Sig Dispense Refill     Acetaminophen (TYLENOL PO) Take 1,000 mg by mouth every 6 hours as needed.         aspirin 81 MG tablet Take 1 tablet by mouth daily. 90 tablet 3     buPROPion (WELLBUTRIN XL) 150 MG 24 hr tablet Take 1 tablet (150 mg) by mouth every morning 90 tablet 1     calcium carbonate 600 mg-vitamin D 400 units (CALTRATE) 600-400 MG-UNIT per tablet Take 1 tablet by mouth daily       citalopram (CELEXA) 20 MG tablet Take 1 tablet (20 mg) by mouth daily 90 tablet 3     Ferrous Sulfate (IRON SUPPLEMENT PO) Take 325 mg by mouth       gabapentin (NEURONTIN) 300 MG capsule Take 1 capsule (300 mg) by mouth 2 times daily 180 capsule 1     gabapentin (NEURONTIN) 300 MG capsule Take 300 mg by mouth 2 times  daily as needed for other (Neuropathy) In addition to scheduled doses       losartan (COZAAR) 100 MG tablet Take 1 tablet (100 mg) by mouth daily 90 tablet 3     metFORMIN (GLUCOPHAGE) 500 MG tablet Take 1 tablet (500 mg) by mouth 2 times daily (with meals) 180 tablet 3     omeprazole (PRILOSEC) 20 MG DR capsule Take 1 capsule (20 mg) by mouth daily 90 capsule 3     simvastatin (ZOCOR) 20 MG tablet Take 1 tablet (20 mg) by mouth At Bedtime 90 tablet 3     Allergies   Allergen Reactions     Lisinopril Cough     COUGH     Recent Labs   Lab Test 06/17/19  0747 05/02/19  1050 09/13/18  0416 04/26/18  0809 11/07/17  0811 06/05/17  0819   A1C  --  5.7* 6.1* 6.0* 6.3* 5.8   LDL  --   --  48 41  --  62   HDL  --   --  51 48*  --  57   TRIG  --   --  103 169*  --  88   ALT  --   --  22 25  --  24   CR  --   --  0.67 0.61  --  0.61   GFRESTIMATED  --   --  87 >90  --  >90  Non  GFR Calc     GFRESTBLACK  --   --  >90 >90  --  >90  African American GFR Calc     POTASSIUM 4.7  --  4.2 4.4  --  4.7   TSH  --   --  0.91  --  1.23  --       BP Readings from Last 3 Encounters:   06/17/19 136/84   05/02/19 132/82   12/13/18 134/80    Wt Readings from Last 3 Encounters:   06/17/19 96.8 kg (213 lb 4.8 oz)   05/02/19 94.3 kg (208 lb)   12/13/18 95.3 kg (210 lb)           Reviewed and updated as needed this visit by Provider         Review of Systems   ROS COMP: CONSTITUTIONAL: NEGATIVE for fever, chills, change in weight  ENT/MOUTH: NEGATIVE for ear, mouth and throat problems  RESP: NEGATIVE for significant cough or SOB  CV: NEGATIVE for chest pain, palpitations or peripheral edema  GI: NEGATIVE for nausea, abdominal pain, heartburn, or change in bowel habits  : NEGATIVE for frequency, dysuria, or hematuria  MUSCULOSKELETAL: NEGATIVE for significant arthralgias or myalgia  NEURO: NEGATIVE for weakness, dizziness with ongoing ++ paresthesias, patient also states that several weeks ago had significant episode of vertigo  "with balance abnormalities and continually swaying to the left.  She required the use of a cane for support.  Subsequently the symptoms have improved and at present time she denies any major complaints other than the fact that it time she feels a little unstable.  HEME: NEGATIVE for bleeding problems  PSYCHIATRIC: NEGATIVE for changes in mood or affect      Objective    /78   Pulse 92   Temp 98.6  F (37  C) (Oral)   Resp 14   Ht 1.702 m (5' 7\")   Wt 99.8 kg (220 lb)   SpO2 97%   BMI 34.46 kg/m    Body mass index is 34.46 kg/m .  Physical Exam   GENERAL: alert and no distress  EYES: Eyes grossly normal to inspection, PERRL and conjunctivae and sclerae normal  HENT: ear canals and TM's normal, nose and mouth without ulcers or lesions  NECK: no adenopathy, no asymmetry, masses, or scars and thyroid normal to palpation, no carotid bruits  RESP: lungs clear to auscultation - no rales, rhonchi or wheezes  CV: regular rate and rhythm, normal S1 S2, no S3 or S4, no click or rub, no peripheral edema and peripheral pulses strong  ABDOMEN: soft, nontender, no hepatosplenomegaly, no masses and bowel sounds normal  MS: no gross musculoskeletal defects noted  NEURO: No focal changes less decreased sensation feet bilaterally LE's.  Gait is slowed and slightly wide-based but without focal change.  Strength and sensation are grossly normal there is no obvious drift.  PSYCH: mentation appears normal, affect normal/bright    Lab Results   Component Value Date    A1C 5.7 05/02/2019    A1C 6.1 09/13/2018    A1C 6.0 04/26/2018    A1C 6.3 11/07/2017    A1C 5.8 06/05/2017           Assessment & Plan     1. Type 2 diabetes mellitus without complication, without long-term current use of insulin (H)  Continuing current medical management and rechecking labs accordingly.  - Comprehensive metabolic panel  - Albumin Random Urine Quantitative with Creat Ratio  - TSH with free T4 reflex  - Hemoglobin A1c    2. Essential hypertension, " "benign  Stable at therapy continuing with current medications without change  - Comprehensive metabolic panel    3. CARDIOVASCULAR SCREENING; LDL GOAL LESS THAN 100  Labs ordered as fasting per routine currently on statin therapy  - Lipid Profile    4. Visit for screening mammogram  Ordered his routine screening due in October  - *MA Screening Digital Bilateral; Future    5. Need for prophylactic vaccination and inoculation against influenza  Updated as part of routine health care  - INFLUENZA (HIGH DOSE) 3 VALENT VACCINE [71071]  - ADMIN INFLUENZA (For MEDICARE Patients ONLY) []    6. Diabetic polyneuropathy associated with type 2 diabetes mellitus (H)  Increase gabapentin to 1 tablet 3 times daily.  - gabapentin (NEURONTIN) 300 MG capsule; Take 1 capsule (300 mg) by mouth 3 times daily  Dispense: 270 capsule; Refill: 1    7. Balance problems  Suggest imaging for reassurance of secondary neurologic event based on risk factors versus simple vertigo with resolution.  She will use lorazepam 1 tablet 30 minutes prior due to anxiety regarding MRIs  - MR Brain w/o Contrast; Future  - LORazepam (ATIVAN) 0.5 MG tablet; Take 1 tablet (0.5 mg) by mouth once for 1 dose 30 minutes prior to MRI  Dispense: 1 tablet; Refill: 0     BMI:   Estimated body mass index is 33.41 kg/m  as calculated from the following:    Height as of 6/17/19: 1.702 m (5' 7\").    Weight as of 6/17/19: 96.8 kg (213 lb 4.8 oz).     Regular exercise  See Patient Instructions    Return in about 6 months (around 4/3/2020) for diagnostic test as ordered, diabetes check 6 months, routine mammogram, screening.    Jac Barry MD  [facility]        "

## 2019-10-03 NOTE — LETTER
[facility]  [address] [address]  [phone]      10/3/2019       Diane Gaitan  [address] [facility] [address]        Dear Diane,    Your Hemoglobin A1C and blood sugar tests look good and I would continue with your medication without change.  These tests should be repeated in 6 months.    Your cholesterol looks good and I would not change anything at this point but would repeat your labs in 12 months.    I am also pleased to inform you that your routine blood work including your sodium, potassium, calcium, kidney and liver function tests are all normal.    Your thyroid function tests look good and thus I would not change anything at this point.    Your urine protein level also appears stable and I would not change your medications at the present time but continue with them as ordered.  .  Sincerely,      Jac Barry MD  Internal Medicine

## 2019-10-08 ENCOUNTER — HOSPITAL ENCOUNTER (OUTPATIENT)
Dept: MRI IMAGING | Facility: CLINIC | Age: 69
Discharge: HOME | End: 2019-10-08
Attending: INTERNAL MEDICINE | Admitting: INTERNAL MEDICINE
Payer: MEDICARE

## 2019-10-08 DIAGNOSIS — R26.89 BALANCE PROBLEMS: ICD-10-CM

## 2019-10-08 PROCEDURE — 70551 MRI BRAIN STEM W/O DYE: CPT

## 2019-11-05 ENCOUNTER — HOSPITAL ENCOUNTER (OUTPATIENT)
Dept: MAMMOGRAPHY | Facility: CLINIC | Age: 69
Discharge: HOME | End: 2019-11-05
Attending: INTERNAL MEDICINE | Admitting: INTERNAL MEDICINE
Payer: MEDICARE

## 2019-11-05 DIAGNOSIS — Z12.31 VISIT FOR SCREENING MAMMOGRAM: ICD-10-CM

## 2019-11-05 PROCEDURE — 77063 BREAST TOMOSYNTHESIS BI: CPT

## 2019-11-12 ENCOUNTER — HOSPITAL ENCOUNTER (OUTPATIENT)
Dept: MAMMOGRAPHY | Facility: CLINIC | Age: 69
Discharge: HOME | End: 2019-11-12
Attending: INTERNAL MEDICINE | Admitting: INTERNAL MEDICINE
Payer: MEDICARE

## 2019-11-12 DIAGNOSIS — R92.8 ABNORMAL MAMMOGRAM: ICD-10-CM

## 2019-11-12 PROCEDURE — 76642 ULTRASOUND BREAST LIMITED: CPT | Mod: LT

## 2019-11-15 ENCOUNTER — HOSPITAL ENCOUNTER (OUTPATIENT)
Dept: MAMMOGRAPHY | Facility: CLINIC | Age: 69
Discharge: HOME | End: 2019-11-15
Attending: INTERNAL MEDICINE | Admitting: INTERNAL MEDICINE
Payer: MEDICARE

## 2019-11-15 ENCOUNTER — HOSPITAL ENCOUNTER (OUTPATIENT)
Dept: MAMMOGRAPHY | Facility: CLINIC | Age: 69
End: 2019-11-15
Attending: INTERNAL MEDICINE
Payer: MEDICARE

## 2019-11-15 DIAGNOSIS — R92.8 ABNORMAL MAMMOGRAM: ICD-10-CM

## 2019-11-15 PROCEDURE — 00000159 ZZHCL STATISTIC H-SEND OUTS PREP: Performed by: INTERNAL MEDICINE

## 2019-11-15 PROCEDURE — 88305 TISSUE EXAM BY PATHOLOGIST: CPT | Mod: 26 | Performed by: INTERNAL MEDICINE

## 2019-11-15 PROCEDURE — 88342 IMHCHEM/IMCYTCHM 1ST ANTB: CPT | Mod: 26,XU | Performed by: INTERNAL MEDICINE

## 2019-11-15 PROCEDURE — 88305 TISSUE EXAM BY PATHOLOGIST: CPT | Performed by: INTERNAL MEDICINE

## 2019-11-15 PROCEDURE — 88360 TUMOR IMMUNOHISTOCHEM/MANUAL: CPT | Mod: 26,76 | Performed by: INTERNAL MEDICINE

## 2019-11-15 PROCEDURE — 40000986 MA POST PROCEDURE LEFT

## 2019-11-15 PROCEDURE — 88341 IMHCHEM/IMCYTCHM EA ADD ANTB: CPT | Mod: 26,XU | Performed by: INTERNAL MEDICINE

## 2019-11-15 PROCEDURE — 00000158 ZZHCL STATISTIC H-FISH PROCESS B/S: Performed by: INTERNAL MEDICINE

## 2019-11-15 PROCEDURE — 27210206 US BREAST BIOPSY CORE NEEDLE LEFT

## 2019-11-15 PROCEDURE — 25000125 ZZHC RX 250: Performed by: INTERNAL MEDICINE

## 2019-11-15 PROCEDURE — 88342 IMHCHEM/IMCYTCHM 1ST ANTB: CPT | Mod: XU | Performed by: INTERNAL MEDICINE

## 2019-11-15 PROCEDURE — 88377 M/PHMTRC ALYS ISHQUANT/SEMIQ: CPT | Performed by: PATHOLOGY

## 2019-11-15 PROCEDURE — 88360 TUMOR IMMUNOHISTOCHEM/MANUAL: CPT | Performed by: INTERNAL MEDICINE

## 2019-11-15 PROCEDURE — 88341 IMHCHEM/IMCYTCHM EA ADD ANTB: CPT | Performed by: INTERNAL MEDICINE

## 2019-11-15 RX ADMIN — LIDOCAINE HYDROCHLORIDE 5 ML: 10 INJECTION, SOLUTION INFILTRATION; PERINEURAL at 11:09

## 2019-11-15 NOTE — DISCHARGE INSTRUCTIONS

## 2019-11-19 ENCOUNTER — TELEPHONE (OUTPATIENT)
Dept: INTERNAL MEDICINE | Facility: CLINIC | Age: 69
End: 2019-11-19

## 2019-11-19 DIAGNOSIS — R92.8 ABNORMAL MAMMOGRAM: Primary | ICD-10-CM

## 2019-11-19 LAB
COPATH REPORT: NORMAL
COPATH REPORT: NORMAL

## 2019-11-19 NOTE — PROGRESS NOTES
Also recommendations from Dr. Nicolas Gilmore to consider getting a Breast MRI.  This will be discussed at patient's surgical consultation with Dr. Malik on 11/27/2019.  Dorothy Lindsey RN, BSN

## 2019-11-19 NOTE — TELEPHONE ENCOUNTER
Called and discussed patient's biopsy results with her and oncology referral sent for further evaluation.

## 2019-11-19 NOTE — PROGRESS NOTES
MALIGNANT path  Pathology report reviewed with our breast radiologist Dr. Nicolas Gilmore, who confirmed the recent breast imaging is concordant with the final pathology results below.    I phoned Diane Gaitan, confirmed her date of birth, and informed patient of her Ultrasound Guided Left Breast Biopsy (11/15/19) results below showing Invasive Mammary Carcinoma, grade 2.  Estrogen & Progesterone Receptors are positive, and the HER2 is still pending.  I informed patient I will call her once that result is available.    Patient states no problems with biopsy site.  Recommended follow up is Surgical Consultation.  Surgical Consult has been arranged with Dr Ruddy Malik on 11/27/19 at 10:45 a.m. with a check in time of 10:30 a.m. @ [facility].     Patient has directions and phone numbers.  Questions were answered and I explained my role as Breast Nurse Care Coordinator in assisting her with appointments, resources and social support. I also informed patient that I will be present at her surgical consultation to take notes to give to her, along with a copy of her pathology results, and a new diagnosis information packet. I will follow up with the patient. She has my phone number if she has further questions. Ordering provider notified.  Patient verbalized understanding and agrees with the plan of care.  Dorothy Lindsey RN, BSN    Dorothy Lindsey RN BSN  Breast Nurse Care Coordinator  [facility]  [phone]      Patient Name: DIANE GAITAN   MR#: [id number]   Specimen #: [id number]   Collected: 11/15/2019   Received: 11/15/2019   Reported: 11/19/2019 09:48   Ordering Phy(s): VENESSA WAN   Additional Phy(s): MALIK BARRETT     SPECIMEN(S):   Left ultrasound guided breast needle biopsy, 2:00, 5.0cm from nipple,   1.7cm size     FINAL DIAGNOSIS:   Left breast, 2:00, 5.0 cm from nipple, ultrasound-guided core biopsy   - Invasive mammary carcinoma,  Levasy grade 2 (see comment)   - Positive for estrogen and progesterone receptor     COMMENT:   Sections demonstrate invasive mammary carcinoma with a predominantly   single file architecture and focal tubule   formation. An immunohistochemical stain for E-cadherin is positive, but   somewhat attenuated. Overall, the   findings are those of an invasive mammary carcinoma and are most   suggestive of a lobular phenotype with   aberrant E-cadherin staining. However, definitive classification is   deferred to the anticipated resection   specimen.     FISH studies for HER2 have been ordered and the results will be reported   separately.     IMMUNOHISTOCHEMICAL ANALYSIS FOR ESTROGEN AND PROGESTERONE RECEPTORS     RESULTS:     ESTROGEN RECEPTORS:     Positive (>95% of tumor cell nuclei stain positive)   Intensity of staining: strong     PROGESTERONE RECEPTORS:     Positive (80% of tumor cell nuclei stain positive)   Intensity of staining: moderate to strong     Electronically signed out by:     Lisa Lafleur M.D.

## 2019-11-20 ENCOUNTER — TELEPHONE (OUTPATIENT)
Dept: MAMMOGRAPHY | Facility: CLINIC | Age: 69
End: 2019-11-20

## 2019-11-20 NOTE — TELEPHONE ENCOUNTER
I called patient this morning, confirmed her full name, date of birth, and informed her of HER2(-) negative results below.     Patient is scheduled to see Surgeon- Dr. Ruddy Malik next week to discuss her newly diagnosed Left Breast Cancer.  Patient verbalized understanding.  Dorothy Lindsey, RN, BSN      Patient Name: DIANE GAITAN   MR#: [id number]   Specimen #: [id number]   Collected: 11/15/2019 11:10   Received: 11/18/2019 17:34   Reported: 11/19/2019 18:30   Ordering Phy(s): MEGAN JON   Additional Phy(s): VENESSA WAN     __________________________________________     TEST(S) REQUESTED:   [employer] HER2 FISH     SPECIMEN DESCRIPTION:   Breast Tissue, Paraffin Embedded     CLINICAL COMMENTS:   [id number]     RESULTS:     Ratio of HER2/RYAN-17 signals   Diane Gaitan:  1.2 (DOTTIE Negative)    
lateral

## 2019-11-27 ENCOUNTER — OFFICE VISIT (OUTPATIENT)
Dept: SURGERY | Facility: CLINIC | Age: 69
End: 2019-11-27
Payer: MEDICARE

## 2019-11-27 VITALS
BODY MASS INDEX: 34.46 KG/M2 | DIASTOLIC BLOOD PRESSURE: 80 MMHG | WEIGHT: 220 LBS | SYSTOLIC BLOOD PRESSURE: 130 MMHG | HEART RATE: 101 BPM

## 2019-11-27 DIAGNOSIS — R92.8 ABNORMAL MAMMOGRAM: ICD-10-CM

## 2019-11-27 DIAGNOSIS — Z17.0 MALIGNANT NEOPLASM OF UPPER-OUTER QUADRANT OF LEFT BREAST IN FEMALE, ESTROGEN RECEPTOR POSITIVE (H): ICD-10-CM

## 2019-11-27 DIAGNOSIS — C50.412 MALIGNANT NEOPLASM OF UPPER-OUTER QUADRANT OF LEFT BREAST IN FEMALE, ESTROGEN RECEPTOR POSITIVE (H): ICD-10-CM

## 2019-11-27 PROCEDURE — 99205 OFFICE O/P NEW HI 60 MIN: CPT | Performed by: SURGERY

## 2019-11-27 RX ORDER — LORAZEPAM 0.5 MG/1
0.5 TABLET ORAL
Status: ACTIVE | OUTPATIENT
Start: 2019-11-27

## 2019-11-27 NOTE — PROGRESS NOTES
Surgery Consultation, [facility]         Ruddy Malik MD, MD    Diane Gaitan MRN# [id number]   YOB: 1950 Age: 69 year old     PCP:  Jac Barry [phone]    Chief Complaint: Left breast cancer    Pt was seen in consultation from Jac Barry.    History of Present Illness:  Diane Gaitan is a 69 year old female who presented with a suspicious finding in the upper outer quadrant of the left breast.  This was identified on screening mammography.  Patient had no complaints of skin thickening, breast pain, or nipple discharge.  Finding was followed up with diagnostic mammography and ultrasound which revealed a 1.7 cm suspicious mass and biopsy was performed.  This revealed an invasive mammary carcinoma, ER HI positive, HER-2/guadalupe negative.  Patient has had a previous biopsy in the past.  She has a significant family history of breast cancer including in her daughter, who is recently completing treatment.  The patient herself has multiple medical problems but is very active and lives independently.  She has 3 children and is postmenopausal.  No history of ovarian surgery.  Did take hormonal birth control for some time.  She is here to discuss these new findings.    PMH:  Diane Gaitan  has a past medical history of Arthritis, BENIGN HYPERTENSION (8/28/2002), Cancer (H), CARDIOVASCULAR SCREENING; LDL GOAL LESS THAN 100 (10/31/2010), Depressive disorder (1997), Esophageal reflux, Mild major depression (H) (9/14/2010), Other malaise and fatigue (8/28/2002), and Type 2 diabetes, HbA1c goal < 7% (H) (10/31/2010).  PSH:  Diane Gaitan  has a past surgical history that includes NONSPECIFIC PROCEDURE (10/30/96); breast biopsy, rt/lt (1988); NONSPECIFIC PROCEDURE; hysterectomy, pap no longer indicated (1986); Colonoscopy (N/A, 7/14/2016); Arthroplasty knee (10/18/2012); rotator cuff repair rt/lt (Right); and Esophagoscopy, gastroscopy, duodenoscopy (EGD), combined  (N/A, 9/13/2018).    Home medications and allergies reviewed.    Social History:  Diane Gaitan  reports that she quit smoking about 39 years ago. She has a 12.00 pack-year smoking history. She has never used smokeless tobacco. She reports current alcohol use. She reports that she does not use drugs.  Family History:  Diane Gaitan family history includes Breast Cancer in her maternal aunt and sister; Breast Cancer (age of onset: 43) in her daughter; Colon Cancer in her sister; Coronary Artery Disease in her father; Hypertension in her daughter; Skin Cancer in her mother.    ROS:  The 10 point Review of Systems is negative other than noted in the HPI.  No fever or chills.  No recent weight loss or weight gain.  No headache or back pain.    Physical Exam:  Blood pressure 130/80, pulse 101, weight 99.8 kg (220 lb).  220 lbs 0 oz  Pleasant female in no distress.  Patient has a pleasant affect and communicates well.   Pupils equal round and reactive to light.   No cervical lymphadenopathy or thyromegaly.   Lung fields clear, breathing comfortably.   Heart normal sinus rhythm.  No murmurs rubs or gallops.  Bilateral breast exam performed.  Moderately dense lumpy bumpy breasts bilaterally.  Small amount of bruising in the left.  No discrete palpable masses.  No suspicious findings in the right breast.  No axillary lymphadenopathy.  Skin warm, dry.  No obvious rashes or lesions.    All new lab and imaging data was reviewed.  This includes review of mammographic images and pathology reports.     Assessment/plan: Sheree 69-year-old female with a new diagnosis of invasive mammary carcinoma.  The pathology suggests some lobular features and I have ordered an MRI.  Assuming the MRI does not show extensive disease, I would favor a seed localized lumpectomy with sentinel node biopsy.  I explained the differences between lumpectomy and mastectomy and the similar 20-year survival for both.  Patient understands that  lumpectomy would likely be followed by postlumpectomy whole breast radiation.  I explained that the decision to recommend chemotherapy is typically based on final surgical findings and Oncotype.  Her cancer is ER DE positive and she would require antiestrogen therapy after treatment.  She was anxious to get surgery scheduled, but I have ordered an MRI to assess the extent of disease and due to the lobular features.  Surgical co-morbities include hypertension, arthritis, depression, reflux, diabetes, previous non-ST elevation MI, previous stroke.    Chidi Malik M.D.  [facility]  [phone]    Total time spent 60 minutes, 50 minutes of which was spent discussing breast cancer treatment and staging.    Please route or send letter to:  Primary Care Provider (PCP) and Referring Provider

## 2019-11-27 NOTE — NURSING NOTE
Patient is now scheduled for Breast MRI @ [facility] on 12/4/2019 @ 0750.  Dorothy Lindsey RN, BSN

## 2019-11-27 NOTE — NURSING NOTE
Breast Nurse Care Coordination:  I introduced self to patient, and her daughter  Kandi, and explained my role of breast nurse coordinator. I accompanied Diane to her surgical consultation on 12/27/2019 with Dr. Ruddy Malik at the [facility].      Diane was given the new breast cancer patient packet which includes educational material and support resources such as [employer], [employer], [facility] [employer], Fighting Cancer through Diet and Lifestyle, [employer], [employer], [employer], and the [facility] Breast Cancer Support Group.  I also enclosed a copy of her Left breast biopsy pathology report(11/15/2019).       At the end of the consultation, we reviewed Diane's plan of care and education.  The plan is for patient to have a bilateral breast MRI.  Our surgery scheduler Fawn is going to call patient this afternoon and assist in scheduling.  [facility] [place] off [address] per patient's request.  Instructed patient to make sure she has a  for the MRI and take the Lorazepam 30 minutes prior to her appointment.  Informed patient that either Dr. Malik or myself will call her within 2 business days with the results of the MRI.  If MRI shows known cancer, we will proceed with scheduling a seed localized Left Breast Lumpectomy with left sentinel lymph node biopsy.     I answered her questions and encouraged patient to call me back with any future questions or concerns.  Diane has my contact information, and knows to contact me in the future with any questions or concerns.     Dorothy Lindsey, RN, BSN  Breast Nurse Coordinator  [facility]  [phone]

## 2019-11-27 NOTE — LETTER
[facility]    [address] [facility], [address]  [place], [place] [address]  Phone [phone]  Fax [phone]    [address], [address]  [facility] [place] [address]  Phone [phone]  Fax [phone]    [email or web address]     November 27, 2019      Surgery Consultation, [facility], PRIYANKA Malik MD, MD     Diane Gaitan MRN# [id number]   YOB: 1950 Age: 69 year old      PCP:  Jac Barry [phone]     Chief Complaint: Left breast cancer     Pt was seen in consultation from Jac Barry.     History of Present Illness:  Diane Gaitan is a 69 year old female who presented with a suspicious finding in the upper outer quadrant of the left breast.  This was identified on screening mammography.  Patient had no complaints of skin thickening, breast pain, or nipple discharge.  Finding was followed up with diagnostic mammography and ultrasound which revealed a 1.7 cm suspicious mass and biopsy was performed.  This revealed an invasive mammary carcinoma, ER OR positive, HER-2/guadalupe negative.  Patient has had a previous biopsy in the past.  She has a significant family history of breast cancer including in her daughter, who is recently completing treatment.  The patient herself has multiple medical problems but is very active and lives independently.  She has 3 children and is postmenopausal.  No history of ovarian surgery.  Did take hormonal birth control for some time.  She is here to discuss these new findings.     PMH:  Diane Gaitan  has a past medical history of Arthritis, BENIGN HYPERTENSION (8/28/2002), Cancer (H), CARDIOVASCULAR SCREENING; LDL GOAL LESS THAN 100 (10/31/2010), Depressive disorder (1997), Esophageal reflux, Mild major depression (H) (9/14/2010), Other malaise and fatigue (8/28/2002), and Type 2 diabetes, HbA1c goal < 7% (H) (10/31/2010).  PSH:  Diane Gaitan   has a past surgical history that includes NONSPECIFIC PROCEDURE (10/30/96); breast biopsy, rt/lt (1988); NONSPECIFIC PROCEDURE; hysterectomy, pap no longer indicated (1986); Colonoscopy (N/A, 7/14/2016); Arthroplasty knee (10/18/2012); rotator cuff repair rt/lt (Right); and Esophagoscopy, gastroscopy, duodenoscopy (EGD), combined (N/A, 9/13/2018).     Home medications and allergies reviewed.     Social History:  Diane Gaitan  reports that she quit smoking about 39 years ago. She has a 12.00 pack-year smoking history. She has never used smokeless tobacco. She reports current alcohol use. She reports that she does not use drugs.  Family History:  Diane Gaitan family history includes Breast Cancer in her maternal aunt and sister; Breast Cancer (age of onset: 43) in her daughter; Colon Cancer in her sister; Coronary Artery Disease in her father; Hypertension in her daughter; Skin Cancer in her mother.     ROS:  The 10 point Review of Systems is negative other than noted in the HPI.  No fever or chills.  No recent weight loss or weight gain.  No headache or back pain.     Physical Exam:  Blood pressure 130/80, pulse 101, weight 99.8 kg (220 lb).  220 lbs 0 oz  Pleasant female in no distress.  Patient has a pleasant affect and communicates well.   Pupils equal round and reactive to light.   No cervical lymphadenopathy or thyromegaly.   Lung fields clear, breathing comfortably.   Heart normal sinus rhythm.  No murmurs rubs or gallops.  Bilateral breast exam performed.  Moderately dense lumpy bumpy breasts bilaterally.  Small amount of bruising in the left.  No discrete palpable masses.  No suspicious findings in the right breast.  No axillary lymphadenopathy.  Skin warm, dry.  No obvious rashes or lesions.     All new lab and imaging data was reviewed.  This includes review of mammographic images and pathology reports.      Assessment/plan: Sheree 69-year-old female with a new diagnosis of invasive mammary  carcinoma.  The pathology suggests some lobular features and I have ordered an MRI.  Assuming the MRI does not show extensive disease, I would favor a seed localized lumpectomy with sentinel node biopsy.  I explained the differences between lumpectomy and mastectomy and the similar 20-year survival for both.  Patient understands that lumpectomy would likely be followed by postlumpectomy whole breast radiation.  I explained that the decision to recommend chemotherapy is typically based on final surgical findings and Oncotype.  Her cancer is ER NH positive and she would require antiestrogen therapy after treatment.  She was anxious to get surgery scheduled, but I have ordered an MRI to assess the extent of disease and due to the lobular features.  Surgical co-morbities include hypertension, arthritis, depression, reflux, diabetes, previous non-ST elevation MI, previous stroke.     Chidi Malik M.D.  [facility]  [phone]     Total time spent 60 minutes, 50 minutes of which was spent discussing breast cancer treatment and staging.

## 2019-11-27 NOTE — NURSING NOTE
Breast Patients    BREAST PATIENTS (ALL)    1-Do you have any of the following symptoms? Lump(s) or Mass(es)  2-In which breast are you having the symptoms? left  3-Have you had a Mammogram? Aakash Pollack - Date:  11/5/19  4-Have you ever had a breast cyst drained? No  5-Have you ever had a breast biopsy? Yes:  Left   -   Date:  11/15/19  6-Have you ever had a Breast Cancer? No   7-Is there a history of Breast Cancer in your family? Yes   Relationship to you:    Daughter  8-Have you ever had Ovarian Cancer? No  9-Is there a history of Ovarian Cancer in your family? No  10-Summarize your caffeine intake (i.e. coffee, tea, chocolate, soda etc.): 4-5 cups of coffee per day     BREAST PATIENTS (FEMALE)    11-What age did your periods begin? 11  12-Date your last menstrual period began? ?  13-Number of full-term pregnancies: 3  14-Your age when your first child was born? 19  15-Did you nurse your children? No  16-Are you pregnant now? No  17-Have you begun menopause? Yes  Age Menopause began:  ?  18-Have you had either ovary removed?No  19-Do you have breast implants? No   20-Do you use hormone replacement therapy?  No  21-Have you taken oral contraceptive pills?  Yes, For how many years?  10  22-Have you had an intrauterine device (IUD) placed?  No  23-What is your current bra size?  42DD    Yenifer Blair MA

## 2019-12-04 ENCOUNTER — HOSPITAL ENCOUNTER (OUTPATIENT)
Dept: MRI IMAGING | Facility: CLINIC | Age: 69
Discharge: HOME | End: 2019-12-04
Attending: SURGERY | Admitting: SURGERY
Payer: MEDICARE

## 2019-12-04 ENCOUNTER — TELEPHONE (OUTPATIENT)
Dept: MAMMOGRAPHY | Facility: CLINIC | Age: 69
End: 2019-12-04

## 2019-12-04 ENCOUNTER — PREP FOR PROCEDURE (OUTPATIENT)
Dept: SURGERY | Facility: CLINIC | Age: 69
End: 2019-12-04

## 2019-12-04 DIAGNOSIS — C50.412 MALIGNANT NEOPLASM OF UPPER-OUTER QUADRANT OF LEFT BREAST IN FEMALE, ESTROGEN RECEPTOR POSITIVE (H): ICD-10-CM

## 2019-12-04 DIAGNOSIS — C50.912 MALIGNANT NEOPLASM OF LEFT FEMALE BREAST, UNSPECIFIED ESTROGEN RECEPTOR STATUS, UNSPECIFIED SITE OF BREAST (H): Primary | ICD-10-CM

## 2019-12-04 DIAGNOSIS — Z17.0 MALIGNANT NEOPLASM OF UPPER-OUTER QUADRANT OF LEFT BREAST IN FEMALE, ESTROGEN RECEPTOR POSITIVE (H): ICD-10-CM

## 2019-12-04 LAB
CREAT BLD-MCNC: 0.7 MG/DL (ref 0.52–1.04)
GFR SERPL CREATININE-BSD FRML MDRD: 83 ML/MIN/{1.73_M2}

## 2019-12-04 PROCEDURE — A9585 GADOBUTROL INJECTION: HCPCS | Performed by: SURGERY

## 2019-12-04 PROCEDURE — 82565 ASSAY OF CREATININE: CPT

## 2019-12-04 PROCEDURE — 77049 MRI BREAST C-+ W/CAD BI: CPT

## 2019-12-04 PROCEDURE — 25500064 ZZH RX 255 OP 636: Performed by: SURGERY

## 2019-12-04 RX ORDER — GADOBUTROL 604.72 MG/ML
10 INJECTION INTRAVENOUS ONCE
Status: COMPLETED | OUTPATIENT
Start: 2019-12-04 | End: 2019-12-04

## 2019-12-04 RX ADMIN — GADOBUTROL 10 ML: 604.72 INJECTION INTRAVENOUS at 07:31

## 2019-12-04 NOTE — TELEPHONE ENCOUNTER
Discussed patient's MRI(12/4/19) results below with Dr. Malik who asked that I call Diane to inform of findings and plan for left seed localized lumpectomy with left sentinel lymph node biopsy.     I called Ms. Diane Gaitan, confirmed her full name, date of birth and informed her of the MRI results below.  Patient states she would like to proceed with scheduling the seed localized left breast lumpectomy and left sentinel lymph node biopsy as discussed at her consultation with Dr. Malik on 11/27/19.  I will inform surgery scheduler- [facility] of patient's request and ask [facility] to reach out to patient to assist in scheduling.  Informed patient she should hear from [facility] within 24-48 hours.  Patient verbalized understanding and agrees with the plan of care. Dorothy Lindsey RN, BSN          Exam: Breast MRI, with and without Contrast, and with color encoding      History/Indication: Recently diagnosed LEFT breast cancer.  Additionally, sister and daughter with breast cancer. Positive genetic  testing.     Comparison: Mammogram dated 11/5/2019 and ultrasound dated 11/12/2019.     Technique: Precontrast gradient recall axial nonfat sat T1.  Precontrast gradient recall axial fat-sat T1. Precontrast STIR axial  fat sat. Postcontrast gradient recall axial fat-sat T1 with dynamic  postcontrast acquisitions at 2, 4 and 6 minutes with subtractions.  Delayed high-resolution gradient recall sagittal fat-sat T1. MIP of  subtractions, axial coronal and sagittal. Color encoding of post  contrast dynamic acquisitions. IV contrast: 10 mL Gadavist     Breast composition: Heterogeneous fibroglandular tissue     Background parenchymal enhancement: Moderate     Findings: Irregularly-shaped heterogeneously enhancing mass with  spiculated margins is seen in the upper outer LEFT breast, and  accounts for the biopsied invasive mammary carcinoma. This measures  1.7 x 1.2 x 1.6 cm (series 7 image 99 and series 14 image 128).      No other concerning  areas of enhancement in either breast.      No lymphadenopathy.                                                                      Impression: BI-RADS CATEGORY: 6 - Known Biopsy-Proven  Malignancy-Appropriate Action Should Be Taken.  1. LEFT upper outer quadrant biopsy invasive mammary carcinoma  measures up to 1.7 cm in maximal dimension on MRI.  2. No other concerning areas of enhancement or lymphadenopathy.     Recommendation: Continued oncologic/surgical management.     FIONA KIRBY MD

## 2019-12-05 DIAGNOSIS — C50.912 MALIGNANT NEOPLASM OF LEFT FEMALE BREAST, UNSPECIFIED ESTROGEN RECEPTOR STATUS, UNSPECIFIED SITE OF BREAST (H): Primary | ICD-10-CM

## 2019-12-09 ENCOUNTER — TELEPHONE (OUTPATIENT)
Dept: SURGERY | Facility: CLINIC | Age: 69
End: 2019-12-09

## 2019-12-09 NOTE — TELEPHONE ENCOUNTER
Type of surgery: seed localized left breast lumpectomy and left SLN biopsy  Location of surgery: [facility]  Date and time of surgery: 12/19/19 2:30pm  Surgeon: Dr Malik  Pre-Op Appt Date: pt to schedule  Post-Op Appt Date: pt to schedule   Packet sent out: Yes  Pre-cert/Authorization completed:  Not Applicable  Date: 12/4/19    General anesthesia,   Seed localization scheduled at 11:00am, SLN injection scheduled at 1:30pm

## 2019-12-16 ENCOUNTER — OFFICE VISIT (OUTPATIENT)
Dept: INTERNAL MEDICINE | Facility: CLINIC | Age: 69
End: 2019-12-16
Payer: MEDICARE

## 2019-12-16 ENCOUNTER — HEALTH MAINTENANCE LETTER (OUTPATIENT)
Age: 69
End: 2019-12-16

## 2019-12-16 VITALS
TEMPERATURE: 98.1 F | RESPIRATION RATE: 16 BRPM | SYSTOLIC BLOOD PRESSURE: 130 MMHG | WEIGHT: 224 LBS | BODY MASS INDEX: 35.08 KG/M2 | HEART RATE: 101 BPM | DIASTOLIC BLOOD PRESSURE: 80 MMHG | OXYGEN SATURATION: 97 %

## 2019-12-16 DIAGNOSIS — I10 ESSENTIAL HYPERTENSION, BENIGN: ICD-10-CM

## 2019-12-16 DIAGNOSIS — F32.0 MAJOR DEPRESSIVE DISORDER, SINGLE EPISODE, MILD (H): ICD-10-CM

## 2019-12-16 DIAGNOSIS — E11.9 TYPE 2 DIABETES MELLITUS WITHOUT COMPLICATION, WITHOUT LONG-TERM CURRENT USE OF INSULIN (H): ICD-10-CM

## 2019-12-16 DIAGNOSIS — C50.412 MALIGNANT NEOPLASM OF UPPER-OUTER QUADRANT OF LEFT BREAST IN FEMALE, ESTROGEN RECEPTOR POSITIVE (H): ICD-10-CM

## 2019-12-16 DIAGNOSIS — I21.4 NSTEMI (NON-ST ELEVATED MYOCARDIAL INFARCTION) (H): ICD-10-CM

## 2019-12-16 DIAGNOSIS — Z17.0 MALIGNANT NEOPLASM OF UPPER-OUTER QUADRANT OF LEFT BREAST IN FEMALE, ESTROGEN RECEPTOR POSITIVE (H): ICD-10-CM

## 2019-12-16 DIAGNOSIS — K21.9 GASTROESOPHAGEAL REFLUX DISEASE WITHOUT ESOPHAGITIS: ICD-10-CM

## 2019-12-16 DIAGNOSIS — Z01.818 PREOP GENERAL PHYSICAL EXAM: Primary | ICD-10-CM

## 2019-12-16 LAB
GLUCOSE SERPL-MCNC: 147 MG/DL (ref 70–99)
HGB BLD-MCNC: 12.9 G/DL (ref 11.7–15.7)
PLATELET # BLD AUTO: 172 10E9/L (ref 150–450)
POTASSIUM SERPL-SCNC: 4.8 MMOL/L (ref 3.4–5.3)

## 2019-12-16 PROCEDURE — 85018 HEMOGLOBIN: CPT | Performed by: INTERNAL MEDICINE

## 2019-12-16 PROCEDURE — 84132 ASSAY OF SERUM POTASSIUM: CPT | Performed by: INTERNAL MEDICINE

## 2019-12-16 PROCEDURE — 93000 ELECTROCARDIOGRAM COMPLETE: CPT | Performed by: INTERNAL MEDICINE

## 2019-12-16 PROCEDURE — 36415 COLL VENOUS BLD VENIPUNCTURE: CPT | Performed by: INTERNAL MEDICINE

## 2019-12-16 PROCEDURE — 99215 OFFICE O/P EST HI 40 MIN: CPT | Performed by: INTERNAL MEDICINE

## 2019-12-16 PROCEDURE — 85049 AUTOMATED PLATELET COUNT: CPT | Performed by: INTERNAL MEDICINE

## 2019-12-16 PROCEDURE — 82947 ASSAY GLUCOSE BLOOD QUANT: CPT | Performed by: INTERNAL MEDICINE

## 2019-12-16 RX ORDER — BUPROPION HYDROCHLORIDE 150 MG/1
150 TABLET ORAL EVERY MORNING
Qty: 90 TABLET | Refills: 3 | Status: SHIPPED | OUTPATIENT
Start: 2019-12-16 | End: 2020-11-11

## 2019-12-16 NOTE — PROGRESS NOTES
[facility]  [address] [address]  [phone]  Dept: [phone]    PRE-OP EVALUATION:  Today's date: 2019    Diane Gaitan (: 1950) presents for pre-operative evaluation assessment as requested by Dr. Malik.  She requires evaluation and anesthesia risk assessment prior to undergoing surgery/procedure for treatment of malignant neoplasm of left breast.    Proposed Surgery/ Procedure: Seed localized left breast lumpectomy and left SLN biopsy  Date of Surgery/ Procedure: 19  Time of Surgery/ Procedure: 2:30 pm   Hospital/Surgical Facility: [facility]   Primary Physician: Jac Barry  Type of Anesthesia Anticipated: General    Patient has a Health Care Directive or Living Will: YES    HPI:     HPI related to upcoming procedure: She requires evaluation and anesthesia risk assessment prior to undergoing surgery/procedure for treatment of malignant neoplasm of left breast.    Proposed Surgery/ Procedure: Seed localized left breast lumpectomy and left SLN biopsy  Date of Surgery/ Procedure: 19  Time of Surgery/ Procedure: 2:30 pm     See problem list for active medical problems.  Problems all longstanding and stable, except as noted/documented.  See ROS for pertinent symptoms related to these conditions.      MEDICAL HISTORY:     Patient Active Problem List    Diagnosis Date Noted     Malignant neoplasm of left female breast, unspecified estrogen receptor status, unspecified site of breast (H) 2019     Priority: Medium     Added automatically from request for surgery [id number]       Malignant neoplasm of upper-outer quadrant of left breast in female, estrogen receptor positive (H) 2019     Priority: Medium     Diabetic polyneuropathy associated with type 2 diabetes mellitus (H) 2019     Priority: Medium     Iron deficiency anemia, unspecified iron deficiency anemia type 2019     Priority: Medium     NSTEMI (non-ST  elevated myocardial infarction) (H) 09/13/2018     Priority: Medium     Unstable angina (H) 09/13/2018     Priority: Medium     Type 2 diabetes mellitus without complication, without long-term current use of insulin (H) 06/05/2017     Priority: Medium     Major depressive disorder, single episode, mild (H) 04/28/2016     Priority: Medium     Gastroesophageal reflux disease without esophagitis 04/28/2016     Priority: Medium     Osteoarthrosis, unspecified whether generalized or localized, involving lower leg 10/22/2012     Priority: Medium     Problem list name updated by automated process. Provider to review       Acute posthemorrhagic anemia 10/22/2012     Priority: Medium     S/P total knee replacement 10/18/2012     Priority: Medium     CARDIOVASCULAR SCREENING; LDL GOAL LESS THAN 100 10/31/2010     Priority: Medium     Essential hypertension, benign 08/28/2002     Priority: Medium      Past Medical History:   Diagnosis Date     Arthritis      BENIGN HYPERTENSION 8/28/2002     Cancer (H)     Basal cell carcinoma     CARDIOVASCULAR SCREENING; LDL GOAL LESS THAN 100 10/31/2010     Depressive disorder 1997     Esophageal reflux      Mild major depression (H) 9/14/2010     Other malaise and fatigue 8/28/2002     Type 2 diabetes, HbA1c goal < 7% (H) 10/31/2010     Past Surgical History:   Procedure Laterality Date     ARTHROPLASTY KNEE  10/18/2012    Procedure: ARTHROPLASTY KNEE;  RIGHT TOTAL KNEE ARTHROPLASTY (SMITH & NEPHEW)^ ;  Surgeon: Howard Charles MD;  Location:  OR     BREAST BIOPSY, RT/LT  1988    breast biopsy     C NONSPECIFIC PROCEDURE  10/30/96    skin tags removed     C NONSPECIFIC PROCEDURE      colonic polyps     COLONOSCOPY N/A 7/14/2016    Procedure: COLONOSCOPY;  Surgeon: Curt Patel MD;  Location:  GI     ESOPHAGOSCOPY, GASTROSCOPY, DUODENOSCOPY (EGD), COMBINED N/A 9/13/2018    Procedure: COMBINED ESOPHAGOSCOPY, GASTROSCOPY, DUODENOSCOPY (EGD);  gastroscopy;  Surgeon:  Mac White MD;  Location:  GI     HYSTERECTOMY, PAP NO LONGER INDICATED      abdominal hysterectomy     ROTATOR CUFF REPAIR RT/LT Right      Current Outpatient Medications   Medication Sig Dispense Refill     aspirin 81 MG tablet Take 1 tablet by mouth daily. 90 tablet 3     buPROPion (WELLBUTRIN XL) 150 MG 24 hr tablet Take 1 tablet (150 mg) by mouth every morning 90 tablet 3     calcium carbonate 600 mg-vitamin D 400 units (CALTRATE) 600-400 MG-UNIT per tablet Take 1 tablet by mouth daily       citalopram (CELEXA) 20 MG tablet Take 1 tablet (20 mg) by mouth daily 90 tablet 3     gabapentin (NEURONTIN) 300 MG capsule Take 1 capsule (300 mg) by mouth 3 times daily 270 capsule 1     losartan (COZAAR) 100 MG tablet Take 1 tablet (100 mg) by mouth daily 90 tablet 3     metFORMIN (GLUCOPHAGE) 500 MG tablet Take 1 tablet (500 mg) by mouth 2 times daily (with meals) 180 tablet 3     omeprazole (PRILOSEC) 20 MG DR capsule Take 1 capsule (20 mg) by mouth daily 90 capsule 3     simvastatin (ZOCOR) 20 MG tablet Take 1 tablet (20 mg) by mouth At Bedtime 90 tablet 3     Acetaminophen (TYLENOL PO) Take 1,000 mg by mouth every 6 hours as needed.         OTC products: None, except as noted above    Allergies   Allergen Reactions     Lisinopril Cough     COUGH      Latex Allergy: NO    Social History     Tobacco Use     Smoking status: Former Smoker     Packs/day: 1.00     Years: 12.00     Pack years: 12.00     Last attempt to quit: 10/18/1980     Years since quittin.1     Smokeless tobacco: Never Used   Substance Use Topics     Alcohol use: Yes     Comment: 12 Beers per week     History   Drug Use No       REVIEW OF SYSTEMS:   CONSTITUTIONAL: NEGATIVE for fever, chills, change in weight  INTEGUMENTARY/SKIN: NEGATIVE for worrisome rashes, moles or lesions  EYES: NEGATIVE for vision changes or irritation  ENT/MOUTH: NEGATIVE for ear, mouth and throat problems  RESP: NEGATIVE for significant cough or SOB  CV:  NEGATIVE for chest pain, palpitations or peripheral edema  GI: NEGATIVE for nausea, abdominal pain, heartburn, or change in bowel habits  : NEGATIVE for frequency, dysuria, or hematuria  MUSCULOSKELETAL: NEGATIVE for significant arthralgias or myalgia  NEURO: NEGATIVE for weakness, dizziness or paresthesias  HEME: NEGATIVE for bleeding problems  PSYCHIATRIC: NEGATIVE for changes in mood or affect    EXAM:   /80   Pulse 101   Temp 98.1  F (36.7  C) (Oral)   Resp 16   Wt 101.6 kg (224 lb)   SpO2 97%   BMI 35.08 kg/m      GENERAL APPEARANCE: healthy, alert and no distress     EYES: EOMI, PERRL     HENT: ear canals and TM's normal and nose and mouth without ulcers or lesions     NECK: no adenopathy, no asymmetry, masses, or scars and thyroid normal to palpation     RESP: lungs clear to auscultation - no rales, rhonchi or wheezes     CV: regular rates and rhythm, normal S1 S2, no S3 or S4 and no click or rub     ABDOMEN:  soft, nontender, no HSM or masses and bowel sounds normal     MS: extremities normal- no gross deformities noted.     PSYCH: mentation appears normal. and affect normal/bright    DIAGNOSTICS:     EKG: Normal Sinus Rhythm @ 97, LAE noted, nonspecific ST-T changes  Labs Drawn and in Process:   Unresulted Labs Ordered in the Past 30 Days of this Admission     No orders found from 11/16/2019 to 12/17/2019.          Recent Labs   Lab Test 10/03/19  0813 06/17/19  0747 05/02/19  1050  10/30/18  0950  09/13/18  0416  10/18/12  0915   HGB  --  14.9 15.4   < > 9.5*   < > 8.3*   < > 14.0   PLT  --  164  --   --  280   < > 164   < > 214   INR  --   --   --   --   --   --   --   --  0.97     --   --   --   --   --  138   < > 138   POTASSIUM 4.5 4.7  --   --   --   --  4.2   < > 4.4   CR 0.64  --   --   --   --   --  0.67   < > 0.66   A1C 5.7*  --  5.7*  --   --   --  6.1*   < >  --     < > = values in this interval not displayed.      LDL Cholesterol Calculated   Date Value Ref Range Status    10/03/2019 59 <100 mg/dL Final     Comment:     Desirable:       <100 mg/dl     IMPRESSION:   Reason for surgery/procedure: She requires evaluation and anesthesia risk assessment prior to undergoing surgery/procedure for treatment of malignant neoplasm of left breast.    Proposed Surgery/ Procedure: Seed localized left breast lumpectomy and left SLN biopsy  Date of Surgery/ Procedure: 12/19/19  Time of Surgery/ Procedure: 2:30 pm       The proposed surgical procedure is considered mild risk.    REVISED CARDIAC RISK INDEX  The patient has the following serious cardiovascular risks for perioperative complications such as (MI, PE, VFib and 3  AV Block):  Diabetes Mellitus  INTERPRETATION: 2 risks: Class III (moderate risk - 6.6% complication rate)    The patient has the following additional risks for perioperative complications:  No identified additional risks      ICD-10-CM    1. Preop general physical exam Z01.818 Potassium     Hemoglobin     Platelet count     EKG 12-lead complete w/read - Clinics   2. Malignant neoplasm of upper-outer quadrant of left breast in female, estrogen receptor positive (H) C50.412     Z17.0    3. Type 2 diabetes mellitus without complication, without long-term current use of insulin (H) E11.9 Glucose   4. NSTEMI (non-ST elevated myocardial infarction) (H) I21.4    5. Essential hypertension, benign I10    6. Gastroesophageal reflux disease without esophagitis K21.9    7. Major depressive disorder, single episode, mild (H) F32.0 buPROPion (WELLBUTRIN XL) 150 MG 24 hr tablet       RECOMMENDATIONS:     --Consult hospital rounder / IM to assist post-op medical management    Cardiovascular Risk  Performs 4 METs exercise without symptoms (Light housework (dusting, washing dishes), Climb a flight of stairs and Walk on level ground at 15 minutes per mile (4 miles/hour)) .     Patient is to take all scheduled medications on the day of surgery EXCEPT for modifications listed below.    Diabetes  Medication Use  Hold usual oral and non-insulin diabetic meds (e.g. Metformin) while NPO.     Anticoagulant or Antiplatelet Medication Use  ASPIRIN: Discontinue ASA 7 days prior to procedure to reduce bleeding risk.  It should be resumed post-operatively.      ACE Inhibitor or Angiotensin Receptor Blocker (ARB) Use  Ace inhibitor or Angiotensin Receptor Blocker (ARB) and should HOLD this medication prior to surgery.    APPROVAL GIVEN to proceed with proposed procedure, without further diagnostic evaluation.  Patient informs me that she has been advised that she may dose her medications the morning of her procedure which were discussed.       Signed Electronically by: Jac Barry MD    Copy of this evaluation report is provided to requesting physician, Dr. Malik.    [facility] Preop Guidelines    Revised Cardiac Risk Index

## 2019-12-18 ENCOUNTER — TELEPHONE (OUTPATIENT)
Dept: MAMMOGRAPHY | Facility: CLINIC | Age: 69
End: 2019-12-18

## 2019-12-18 NOTE — TELEPHONE ENCOUNTER
Breast Nurse Care Coordination:  Preoperative call placed to patient today to assess her needs and check in as to whether she has any questions regarding her upcoming breast surgery.     Patient was recently diagnosed with Left Breast Cancer and is scheduled to have a seed localized left breast lumpectomy with left sentinel lymph node biopsy on 12/19/2019 with Dr. Malki at the [facility].       We discussed the seed localization procedure, as well as the surgery and what to expect for recovery.      I reminded patient to wear her supportive sports bra that clasps in the front, along with loose, comfortable fitted clothing.  I informed patient that we will have the final pathology results 2-3 business days after her surgery and Dr. Malik will call her to inform.     Patient is already scheduled for her 1st Post Op appointment with  on 01/02/2020 @ 2:30p.m. at the [facility] [place].     I answered patient's questions thoroughly and completely to her satisfaction, and informed patient that I will reach out to her next week to see how she is feeling, but advised she call us with any questions or concerns.  Patient verbalized understanding and agrees with the plan of care.  Dorothy Lindsey, RN, BSN

## 2019-12-19 ENCOUNTER — HOSPITAL ENCOUNTER (OUTPATIENT)
Dept: MAMMOGRAPHY | Facility: CLINIC | Age: 69
End: 2019-12-19
Attending: SURGERY
Payer: MEDICARE

## 2019-12-19 ENCOUNTER — TRANSFERRED RECORDS (OUTPATIENT)
Dept: HEALTH INFORMATION MANAGEMENT | Facility: CLINIC | Age: 69
End: 2019-12-19

## 2019-12-19 ENCOUNTER — HOSPITAL ENCOUNTER (OUTPATIENT)
Dept: NUCLEAR MEDICINE | Facility: CLINIC | Age: 69
Setting detail: NUCLEAR MEDICINE
End: 2019-12-19
Attending: SURGERY
Payer: MEDICARE

## 2019-12-19 ENCOUNTER — ANESTHESIA (OUTPATIENT)
Dept: SURGERY | Facility: CLINIC | Age: 69
End: 2019-12-19
Payer: MEDICARE

## 2019-12-19 ENCOUNTER — APPOINTMENT (OUTPATIENT)
Dept: SURGERY | Facility: PHYSICIAN GROUP | Age: 69
End: 2019-12-19
Payer: MEDICARE

## 2019-12-19 ENCOUNTER — ANESTHESIA EVENT (OUTPATIENT)
Dept: SURGERY | Facility: CLINIC | Age: 69
End: 2019-12-19
Payer: MEDICARE

## 2019-12-19 ENCOUNTER — HOSPITAL ENCOUNTER (OUTPATIENT)
Facility: CLINIC | Age: 69
Discharge: HOME | End: 2019-12-19
Attending: SURGERY | Admitting: SURGERY
Payer: MEDICARE

## 2019-12-19 VITALS
RESPIRATION RATE: 16 BRPM | DIASTOLIC BLOOD PRESSURE: 99 MMHG | WEIGHT: 225.3 LBS | BODY MASS INDEX: 35.36 KG/M2 | HEIGHT: 67 IN | HEART RATE: 101 BPM | TEMPERATURE: 98.9 F | OXYGEN SATURATION: 95 % | SYSTOLIC BLOOD PRESSURE: 152 MMHG

## 2019-12-19 DIAGNOSIS — C50.912 MALIGNANT NEOPLASM OF LEFT FEMALE BREAST, UNSPECIFIED ESTROGEN RECEPTOR STATUS, UNSPECIFIED SITE OF BREAST (H): ICD-10-CM

## 2019-12-19 DIAGNOSIS — G89.18 POST-OP PAIN: Primary | ICD-10-CM

## 2019-12-19 LAB — GLUCOSE BLDC GLUCOMTR-MCNC: 140 MG/DL (ref 70–99)

## 2019-12-19 PROCEDURE — 19285 PERQ DEV BREAST 1ST US IMAG: CPT | Mod: LT

## 2019-12-19 PROCEDURE — 40000986 MA POST PROCEDURE LEFT

## 2019-12-19 PROCEDURE — 34300033 ZZH RX 343: Performed by: SURGERY

## 2019-12-19 PROCEDURE — 88342 IMHCHEM/IMCYTCHM 1ST ANTB: CPT | Mod: 26 | Performed by: SURGERY

## 2019-12-19 PROCEDURE — 25000566 ZZH SEVOFLURANE, EA 15 MIN: Performed by: SURGERY

## 2019-12-19 PROCEDURE — 36000054 ZZH SURGERY LEVEL 2 W FLUORO 1ST 30 MIN: Performed by: SURGERY

## 2019-12-19 PROCEDURE — 88342 IMHCHEM/IMCYTCHM 1ST ANTB: CPT | Performed by: SURGERY

## 2019-12-19 PROCEDURE — 25000128 H RX IP 250 OP 636: Performed by: SURGERY

## 2019-12-19 PROCEDURE — 27210794 ZZH OR GENERAL SUPPLY STERILE: Performed by: SURGERY

## 2019-12-19 PROCEDURE — 25000128 H RX IP 250 OP 636: Performed by: NURSE ANESTHETIST, CERTIFIED REGISTERED

## 2019-12-19 PROCEDURE — 37000008 ZZH ANESTHESIA TECHNICAL FEE, 1ST 30 MIN: Performed by: SURGERY

## 2019-12-19 PROCEDURE — 71000027 ZZH RECOVERY PHASE 2 EACH 15 MINS: Performed by: SURGERY

## 2019-12-19 PROCEDURE — 25000128 H RX IP 250 OP 636: Performed by: ANESTHESIOLOGY

## 2019-12-19 PROCEDURE — 40000268 MA BREAST SPECIMEN LEFT OR

## 2019-12-19 PROCEDURE — 38525 BIOPSY/REMOVAL LYMPH NODES: CPT | Mod: LT | Performed by: SURGERY

## 2019-12-19 PROCEDURE — 40000170 ZZH STATISTIC PRE-PROCEDURE ASSESSMENT II: Performed by: SURGERY

## 2019-12-19 PROCEDURE — 88307 TISSUE EXAM BY PATHOLOGIST: CPT | Performed by: SURGERY

## 2019-12-19 PROCEDURE — 38792 RA TRACER ID OF SENTINL NODE: CPT

## 2019-12-19 PROCEDURE — 19301 PARTIAL MASTECTOMY: CPT | Mod: LT | Performed by: SURGERY

## 2019-12-19 PROCEDURE — 25000125 ZZHC RX 250: Performed by: SURGERY

## 2019-12-19 PROCEDURE — 37000009 ZZH ANESTHESIA TECHNICAL FEE, EACH ADDTL 15 MIN: Performed by: SURGERY

## 2019-12-19 PROCEDURE — 25000125 ZZHC RX 250: Performed by: NURSE ANESTHETIST, CERTIFIED REGISTERED

## 2019-12-19 PROCEDURE — 25800030 ZZH RX IP 258 OP 636: Performed by: NURSE ANESTHETIST, CERTIFIED REGISTERED

## 2019-12-19 PROCEDURE — 36000052 ZZH SURGERY LEVEL 2 EA 15 ADDTL MIN: Performed by: SURGERY

## 2019-12-19 PROCEDURE — 25000132 ZZH RX MED GY IP 250 OP 250 PS 637: Mod: GY | Performed by: PHYSICIAN ASSISTANT

## 2019-12-19 PROCEDURE — 71000012 ZZH RECOVERY PHASE 1 LEVEL 1 FIRST HR: Performed by: SURGERY

## 2019-12-19 PROCEDURE — 82962 GLUCOSE BLOOD TEST: CPT

## 2019-12-19 PROCEDURE — 88307 TISSUE EXAM BY PATHOLOGIST: CPT | Mod: 26 | Performed by: SURGERY

## 2019-12-19 PROCEDURE — A9520 TC99 TILMANOCEPT DIAG 0.5MCI: HCPCS | Performed by: SURGERY

## 2019-12-19 RX ORDER — HYDROCODONE BITARTRATE AND ACETAMINOPHEN 5; 325 MG/1; MG/1
1 TABLET ORAL
Status: COMPLETED | OUTPATIENT
Start: 2019-12-19 | End: 2019-12-19

## 2019-12-19 RX ORDER — FENTANYL CITRATE 50 UG/ML
25-50 INJECTION, SOLUTION INTRAMUSCULAR; INTRAVENOUS
Status: DISCONTINUED | OUTPATIENT
Start: 2019-12-19 | End: 2019-12-19 | Stop reason: HOSPADM

## 2019-12-19 RX ORDER — ONDANSETRON 2 MG/ML
INJECTION INTRAMUSCULAR; INTRAVENOUS PRN
Status: DISCONTINUED | OUTPATIENT
Start: 2019-12-19 | End: 2019-12-19

## 2019-12-19 RX ORDER — ONDANSETRON 2 MG/ML
4 INJECTION INTRAMUSCULAR; INTRAVENOUS EVERY 30 MIN PRN
Status: DISCONTINUED | OUTPATIENT
Start: 2019-12-19 | End: 2019-12-19 | Stop reason: HOSPADM

## 2019-12-19 RX ORDER — CEFAZOLIN SODIUM 1 G/3ML
1 INJECTION, POWDER, FOR SOLUTION INTRAMUSCULAR; INTRAVENOUS SEE ADMIN INSTRUCTIONS
Status: DISCONTINUED | OUTPATIENT
Start: 2019-12-19 | End: 2019-12-19 | Stop reason: HOSPADM

## 2019-12-19 RX ORDER — BUPIVACAINE HYDROCHLORIDE AND EPINEPHRINE 5; 5 MG/ML; UG/ML
INJECTION, SOLUTION PERINEURAL PRN
Status: DISCONTINUED | OUTPATIENT
Start: 2019-12-19 | End: 2019-12-19 | Stop reason: HOSPADM

## 2019-12-19 RX ORDER — SODIUM CHLORIDE, SODIUM LACTATE, POTASSIUM CHLORIDE, CALCIUM CHLORIDE 600; 310; 30; 20 MG/100ML; MG/100ML; MG/100ML; MG/100ML
INJECTION, SOLUTION INTRAVENOUS CONTINUOUS PRN
Status: DISCONTINUED | OUTPATIENT
Start: 2019-12-19 | End: 2019-12-19

## 2019-12-19 RX ORDER — SODIUM CHLORIDE, SODIUM LACTATE, POTASSIUM CHLORIDE, CALCIUM CHLORIDE 600; 310; 30; 20 MG/100ML; MG/100ML; MG/100ML; MG/100ML
INJECTION, SOLUTION INTRAVENOUS CONTINUOUS
Status: DISCONTINUED | OUTPATIENT
Start: 2019-12-19 | End: 2019-12-19 | Stop reason: HOSPADM

## 2019-12-19 RX ORDER — CEFAZOLIN SODIUM 2 G/100ML
2 INJECTION, SOLUTION INTRAVENOUS
Status: COMPLETED | OUTPATIENT
Start: 2019-12-19 | End: 2019-12-19

## 2019-12-19 RX ORDER — MEPERIDINE HYDROCHLORIDE 25 MG/ML
12.5 INJECTION INTRAMUSCULAR; INTRAVENOUS; SUBCUTANEOUS
Status: DISCONTINUED | OUTPATIENT
Start: 2019-12-19 | End: 2019-12-19 | Stop reason: HOSPADM

## 2019-12-19 RX ORDER — ONDANSETRON 4 MG/1
4 TABLET, ORALLY DISINTEGRATING ORAL EVERY 30 MIN PRN
Status: DISCONTINUED | OUTPATIENT
Start: 2019-12-19 | End: 2019-12-19 | Stop reason: HOSPADM

## 2019-12-19 RX ORDER — LIDOCAINE HYDROCHLORIDE 20 MG/ML
INJECTION, SOLUTION INFILTRATION; PERINEURAL PRN
Status: DISCONTINUED | OUTPATIENT
Start: 2019-12-19 | End: 2019-12-19

## 2019-12-19 RX ORDER — HYDROMORPHONE HYDROCHLORIDE 1 MG/ML
.3-.5 INJECTION, SOLUTION INTRAMUSCULAR; INTRAVENOUS; SUBCUTANEOUS EVERY 10 MIN PRN
Status: DISCONTINUED | OUTPATIENT
Start: 2019-12-19 | End: 2019-12-19 | Stop reason: HOSPADM

## 2019-12-19 RX ORDER — FENTANYL CITRATE 50 UG/ML
INJECTION, SOLUTION INTRAMUSCULAR; INTRAVENOUS PRN
Status: DISCONTINUED | OUTPATIENT
Start: 2019-12-19 | End: 2019-12-19

## 2019-12-19 RX ORDER — PROPOFOL 10 MG/ML
INJECTION, EMULSION INTRAVENOUS PRN
Status: DISCONTINUED | OUTPATIENT
Start: 2019-12-19 | End: 2019-12-19

## 2019-12-19 RX ORDER — AMOXICILLIN 250 MG
1 CAPSULE ORAL 2 TIMES DAILY
Qty: 15 TABLET | Refills: 0 | Status: SHIPPED | OUTPATIENT
Start: 2019-12-19 | End: 2020-01-22

## 2019-12-19 RX ORDER — NALOXONE HYDROCHLORIDE 0.4 MG/ML
.1-.4 INJECTION, SOLUTION INTRAMUSCULAR; INTRAVENOUS; SUBCUTANEOUS
Status: DISCONTINUED | OUTPATIENT
Start: 2019-12-19 | End: 2019-12-19 | Stop reason: HOSPADM

## 2019-12-19 RX ORDER — MAGNESIUM HYDROXIDE 1200 MG/15ML
LIQUID ORAL PRN
Status: DISCONTINUED | OUTPATIENT
Start: 2019-12-19 | End: 2019-12-19 | Stop reason: HOSPADM

## 2019-12-19 RX ORDER — HYDROCODONE BITARTRATE AND ACETAMINOPHEN 5; 325 MG/1; MG/1
1-2 TABLET ORAL EVERY 4 HOURS PRN
Qty: 15 TABLET | Refills: 0 | Status: SHIPPED | OUTPATIENT
Start: 2019-12-19 | End: 2020-03-04

## 2019-12-19 RX ADMIN — PROPOFOL 40 MG: 10 INJECTION, EMULSION INTRAVENOUS at 15:13

## 2019-12-19 RX ADMIN — CEFAZOLIN SODIUM 2 G: 2 INJECTION, SOLUTION INTRAVENOUS at 14:50

## 2019-12-19 RX ADMIN — HYDROMORPHONE HYDROCHLORIDE 0.5 MG: 1 INJECTION, SOLUTION INTRAMUSCULAR; INTRAVENOUS; SUBCUTANEOUS at 15:28

## 2019-12-19 RX ADMIN — LIDOCAINE HYDROCHLORIDE 5 ML: 10 INJECTION, SOLUTION INFILTRATION; PERINEURAL at 10:46

## 2019-12-19 RX ADMIN — ONDANSETRON 4 MG: 2 INJECTION INTRAMUSCULAR; INTRAVENOUS at 15:32

## 2019-12-19 RX ADMIN — FENTANYL CITRATE 50 MCG: 50 INJECTION, SOLUTION INTRAMUSCULAR; INTRAVENOUS at 15:07

## 2019-12-19 RX ADMIN — FENTANYL CITRATE 50 MCG: 0.05 INJECTION, SOLUTION INTRAMUSCULAR; INTRAVENOUS at 16:56

## 2019-12-19 RX ADMIN — PROPOFOL 20 MG: 10 INJECTION, EMULSION INTRAVENOUS at 15:07

## 2019-12-19 RX ADMIN — TILMANOCEPT 0.54 MILLICURIE: KIT at 13:35

## 2019-12-19 RX ADMIN — LIDOCAINE HYDROCHLORIDE 100 MG: 20 INJECTION, SOLUTION INFILTRATION; PERINEURAL at 14:39

## 2019-12-19 RX ADMIN — HYDROCODONE BITARTRATE AND ACETAMINOPHEN 1 TABLET: 5; 325 TABLET ORAL at 16:59

## 2019-12-19 RX ADMIN — PROPOFOL 140 MG: 10 INJECTION, EMULSION INTRAVENOUS at 14:39

## 2019-12-19 RX ADMIN — SODIUM CHLORIDE, POTASSIUM CHLORIDE, SODIUM LACTATE AND CALCIUM CHLORIDE: 600; 310; 30; 20 INJECTION, SOLUTION INTRAVENOUS at 14:37

## 2019-12-19 RX ADMIN — FENTANYL CITRATE 50 MCG: 50 INJECTION, SOLUTION INTRAMUSCULAR; INTRAVENOUS at 14:39

## 2019-12-19 RX ADMIN — HYDROMORPHONE HYDROCHLORIDE 0.5 MG: 1 INJECTION, SOLUTION INTRAMUSCULAR; INTRAVENOUS; SUBCUTANEOUS at 15:12

## 2019-12-19 ASSESSMENT — MIFFLIN-ST. JEOR: SCORE: 1574.58

## 2019-12-19 NOTE — ANESTHESIA PREPROCEDURE EVALUATION
Anesthesia Pre-Procedure Evaluation    Patient: Diane Gaitan   MRN: [id number] : 1950          Preoperative Diagnosis: Malignant neoplasm of left female breast, unspecified estrogen receptor status, unspecified site of breast (H) [C50.912]    Procedure(s):  SEED LOCALIZED LEFT BREAST LUMPECTOMY  LEFT SENTINEL LYMPH NODE BIOPSY    Past Medical History:   Diagnosis Date     Arthritis      BENIGN HYPERTENSION 2002     Cancer (H)     Basal cell carcinoma     CARDIOVASCULAR SCREENING; LDL GOAL LESS THAN 100 10/31/2010     Depressive disorder      Esophageal reflux      Mild major depression (H) 2010     Other malaise and fatigue 2002     Type 2 diabetes, HbA1c goal < 7% (H) 10/31/2010     Past Surgical History:   Procedure Laterality Date     ARTHROPLASTY KNEE  10/18/2012    Procedure: ARTHROPLASTY KNEE;  RIGHT TOTAL KNEE ARTHROPLASTY (SMITH & NEPHEW)^ ;  Surgeon: Howard Charles MD;  Location:  OR     BREAST BIOPSY, RT/LT      breast biopsy     C NONSPECIFIC PROCEDURE  10/30/96    skin tags removed     C NONSPECIFIC PROCEDURE      colonic polyps     COLONOSCOPY N/A 2016    Procedure: COLONOSCOPY;  Surgeon: Curt Patel MD;  Location:  GI     ESOPHAGOSCOPY, GASTROSCOPY, DUODENOSCOPY (EGD), COMBINED N/A 2018    Procedure: COMBINED ESOPHAGOSCOPY, GASTROSCOPY, DUODENOSCOPY (EGD);  gastroscopy;  Surgeon: Mac White MD;  Location:  GI     HYSTERECTOMY, PAP NO LONGER INDICATED      abdominal hysterectomy     ROTATOR CUFF REPAIR RT/LT Right        Anesthesia Evaluation     .             ROS/MED HX    ENT/Pulmonary:       Neurologic:       Cardiovascular:     (+) hypertension----. : . . . :. .       METS/Exercise Tolerance:     Hematologic:         Musculoskeletal:   (+) arthritis,  -       GI/Hepatic:     (+) GERD       Renal/Genitourinary:         Endo:     (+) type II DM .      Psychiatric:     (+) psychiatric history depression     "  Infectious Disease:         Malignancy:   (+) Malignancy History of Breast  Breast CA Active status post.         Other:                                 Lab Results   Component Value Date    WBC 5.3 10/30/2018    HGB 12.9 12/16/2019    HCT 33.5 (L) 10/30/2018     12/16/2019    SED 9 06/03/2014     10/03/2019    POTASSIUM 4.8 12/16/2019    CHLORIDE 104 10/03/2019    CO2 27 10/03/2019    BUN 10 10/03/2019    CR 0.64 10/03/2019     (H) 12/16/2019    JORDON 9.5 10/03/2019    ALBUMIN 4.2 10/03/2019    PROTTOTAL 7.6 10/03/2019    ALT 30 10/03/2019    AST 14 10/03/2019    ALKPHOS 106 10/03/2019    BILITOTAL 0.9 10/03/2019    LIPASE 115 09/13/2018    INR 0.97 10/18/2012    TSH 0.82 10/03/2019    T4 0.93 08/28/2002       Preop Vitals  BP Readings from Last 3 Encounters:   12/19/19 (!) 170/88   12/16/19 130/80   11/27/19 130/80    Pulse Readings from Last 3 Encounters:   12/19/19 93   12/16/19 101   11/27/19 101      Resp Readings from Last 3 Encounters:   12/19/19 18   12/16/19 16   10/03/19 14    SpO2 Readings from Last 3 Encounters:   12/19/19 95%   12/16/19 97%   10/03/19 97%      Temp Readings from Last 1 Encounters:   12/19/19 37.2  C (98.9  F) (Oral)    Ht Readings from Last 1 Encounters:   12/19/19 1.702 m (5' 7\")      Wt Readings from Last 1 Encounters:   12/19/19 102.2 kg (225 lb 4.8 oz)    Estimated body mass index is 35.29 kg/m  as calculated from the following:    Height as of this encounter: 1.702 m (5' 7\").    Weight as of this encounter: 102.2 kg (225 lb 4.8 oz).       Anesthesia Plan      History & Physical Review  History and physical reviewed and following examination; no interval change.    ASA Status:  3 .    NPO Status:  > 8 hours    Plan for General and LMA with Intravenous and Propofol induction.   PONV prophylaxis:  Ondansetron (or other 5HT-3) and Dexamethasone or Solumedrol       Postoperative Care      Consents  Anesthetic plan, risks, benefits and alternatives discussed with:  " Patient..                 Anastacio Charles, DO

## 2019-12-19 NOTE — BRIEF OP NOTE
General Surgery Brief Operative Note    Pre-operative diagnosis: Malignant neoplasm of left female breast   Post-operative diagnosis Same   Procedure: Procedure(s):  SEED LOCALIZED LEFT BREAST LUMPECTOMY  LEFT SENTINEL LYMPH NODE BIOPSY    Surgeon(s), Assistant(s): Surgeon(s) and Role:     * Ruddy Malik MD - Primary     * Chitra Dunham PA-C - Assisting   Estimated blood loss: 10 mL   Drains: None   Specimens: ID Type Source Tests Collected by Time Destination   A : LEFT BREAST LUMPECTOMY Tissue Breast, Left SURGICAL PATHOLOGY EXAM Ruddy Malik MD 12/19/2019  3:14 PM    B : LEFT AXILLARY SENTINEL LYMPH NODE BIOPSY Tissue Lymph Node SURGICAL PATHOLOGY EXAM Ruddy Malik MD 12/19/2019  3:27 PM       Findings: See postop diagnosis   Condition: Stable   Comments:      Chitra Dunham PA-C See dictated operative report for full details

## 2019-12-19 NOTE — PROGRESS NOTES
SBAR Seed Localization    SITUATION:  Patient to breast imaging center for imaging guided seed localizations before breast lumpectomy or excision biopsy with sentinel node injection.    BACKGROUND:  Breast imaging cancer, breast abnormality  Ordered procedure completed: Yes  Special needs identified: No     ASSESSMENT:  SBAR report called to patient care unit because of unexpected event in radiology: No  Allergies and medication list reviewed prior to procedure. Yes  Skin cleansed with ChloraPrep One-Step.  Anesthesia: approximately 5ml of 1% Lidocaine injection subcutaneous before seed insertion administered by the radiologist.   Gauze dressing over insertion site(s).  Post procedure mammogram completed: Yes    Patient tolerance:well    RECOMMENDATIONS:  Patient transferred to Same Day Surgery in stable condition via wheelchair with Breast Imaging Staff.    Please call [facility] [place] [phone] if there are any questions.

## 2019-12-19 NOTE — ANESTHESIA POSTPROCEDURE EVALUATION
Patient: Diane Gaitan    Procedure(s):  SEED LOCALIZED LEFT BREAST LUMPECTOMY  LEFT SENTINEL LYMPH NODE BIOPSY    Diagnosis:Malignant neoplasm of left female breast, unspecified estrogen receptor status, unspecified site of breast (H) [C50.912]  Diagnosis Additional Information: No value filed.    Anesthesia Type:  General, LMA    Note:  Anesthesia Post Evaluation    Patient location during evaluation: PACU  Patient participation: Able to fully participate in evaluation  Level of consciousness: awake and alert  Pain management: adequate  Airway patency: patent  Cardiovascular status: acceptable and hemodynamically stable  Respiratory status: acceptable and unassisted  Hydration status: acceptable  PONV: none             Last vitals:  Vitals:    12/19/19 1605 12/19/19 1615 12/19/19 1630   BP: (!) 146/69 (!) 140/77 (!) 147/88   Pulse:   102   Resp: 20 16 10   Temp:      SpO2: 96% 90% 95%         Electronically Signed By: Anastacio Charles DO  December 19, 2019  4:56 PM

## 2019-12-19 NOTE — DISCHARGE INSTRUCTIONS
[facility]  Discharge Instructions: Post-Operative Breast Surgery    ACTIVITY    Take frequent short walks and increase your activity gradually.      Avoid strenuous physical activity or heavy lifting greater than 15-20 lbs. for 1-2 weeks with arm on the surgery side.  You may climb stairs.    Gentle rotation and stretching of your arms and shoulders will prevent joint stiffness.    You may drive without restrictions when you are not using any prescription pain medication and feel comfortable in a car.    You may return to work/school when you are comfortable without any prescription pain medication.    WOUND CARE    You may remove your outer dressing and shower 48 hours after the surgery.  Pat your incisions dry and leave them open to air.  Re-apply dressing (Band-Aids or gauze/tape) as needed for drainage.    You may have steri-strips (looks like white tape) or Dermabond (looks like glue) on your incisions.  You may peel off the steri-strips 2 weeks after your surgery if they have not peeled off on their own.  If you have Dermabond, it will peel up and fall off on its own.    Do not soak your incisions in a tub or pool for 2 weeks.     Do not apply any lotions, creams, or ointments to your incisions.    A ridge under your incisions is normal and will gradually resolve.    Wear a supportive bra for 1-2 weeks, day and night.    DIET    Start with liquids, then gradually resume your regular diet as tolerated.     Drink plenty of liquids to stay hydrated.    PAIN    Expect some tenderness and discomfort at the incision site(s).  Use the prescribed pain medication at your discretion.  Expect gradual resolution of your pain over several days.    You may take ibuprofen with food (unless you have been told not to) instead of or in addition to your prescribed pain medication.  If you are taking Norco or Percocet, do not take any additional acetaminophen/APAP/Tylenol.    Do not drink  alcohol or drive while you are taking pain medications.    You may apply ice to your incisions in 20 minute intervals as needed for the next 48 hours.      EXPECTATIONS    Pain medications can cause constipation.  Limit use when possible.  Take over the counter stool softener/stimulant, such as Colace or Senna, 1-2 times a day with plenty of water.  You may take a mild over the counter laxative, such as Miralax or a suppository, as needed.      You may discontinue these medications once you are having regular bowel movements and/or are no longer taking your narcotic pain medication.    Blue dye may have been used during your surgery to locate lymph nodes and can cause your urine to be blue/green for several days after surgery.  This is not a cause for concern and will resolve on its own.     RETURN APPOINTMENT    Follow up with your surgeon in 2 weeks.  Please call the office at [phone] to schedule your appointment.      CALL OUR OFFICE -[phone] IF YOU HAVE:     Chills or fever above 101 F.    Increased redness, warmth, or drainage at your incisions.    Significant bleeding.    Pain not relieved by your pain medication or rest.    Increasing pain after the first 48 hours.    Any other concerns or questions.    Revised October 2018      Same Day Surgery Discharge Instructions for  Sedation and General Anesthesia       It's not unusual to feel dizzy, light-headed or faint for up to 24 hours after surgery or while taking pain medication.  If you have these symptoms: sit for a few minutes before standing and have someone assist you when you get up to walk or use the bathroom.      You should rest and relax for the next 24 hours. We recommend you make arrangements to have an adult stay with you for at least 24 hours after your discharge.  Avoid hazardous and strenuous activity.      DO NOT DRIVE any vehicle or operate mechanical equipment for 24 hours following the end of your surgery.  Even though you may  feel normal, your reactions may be affected by the medication you have received.      Do not drink alcoholic beverages for 24 hours following surgery.       Slowly progress to your regular diet as you feel able. It's not unusual to feel nauseated and/or vomit after receiving anesthesia.  If you develop these symptoms, drink clear liquids (apple juice, ginger ale, broth, 7-up, etc. ) until you feel better.  If your nausea and vomiting persists for 24 hours, please notify your surgeon.        All narcotic pain medications, along with inactivity and anesthesia, can cause constipation. Drinking plenty of liquids and increasing fiber intake will help.      For any questions of a medical nature, call your surgeon.      Do not make important decisions for 24 hours.      If you had general anesthesia, you may have a sore throat for a couple of days related to the breathing tube used during surgery.  You may use Cepacol lozenges to help with this discomfort.  If it worsens or if you develop a fever, contact your surgeon.       If you feel your pain is not well managed with the pain medications prescribed by your surgeon, please contact your surgeon's office to let them know so they can address your concerns.

## 2019-12-19 NOTE — ANESTHESIA CARE TRANSFER NOTE
Patient: Diane Gaitan    Procedure(s):  SEED LOCALIZED LEFT BREAST LUMPECTOMY  LEFT SENTINEL LYMPH NODE BIOPSY    Diagnosis: Malignant neoplasm of left female breast, unspecified estrogen receptor status, unspecified site of breast (H) [C50.912]  Diagnosis Additional Information: No value filed.    Anesthesia Type:   General, LMA     Note:  Airway :Face Mask  Patient transferred to:PACU  Comments: To PACU with face mask, 8l/min O2, spontaneous respirations. VSS. Report to RNHandoff Report: Identifed the Patient, Identified the Reponsible Provider, Reviewed the pertinent medical history, Discussed the surgical course, Reviewed Intra-OP anesthesia mangement and issues during anesthesia, Set expectations for post-procedure period and Allowed opportunity for questions and acknowledgement of understanding      Vitals: (Last set prior to Anesthesia Care Transfer)    CRNA VITALS  12/19/2019 1523 - 12/19/2019 1559      12/19/2019             Pulse:  107    SpO2:  96 %    Resp Rate (set):  10                Electronically Signed By: NITZA Rose CRNA  December 19, 2019  3:59 PM

## 2019-12-20 NOTE — OP NOTE
General Surgery Operative Note    PREOPERATIVE DIAGNOSIS:  Malignant neoplasm of left female breast, unspecified estrogen receptor status, unspecified site of breast (H) [C50.912]    POSTOPERATIVE DIAGNOSIS:  same    PROCEDURE:  SEED LOCALIZED LUMPECTOMY WITH SENTINEL LYMPH NODE BIOPSY    ANESTHESIA:  General.    PREOPERATIVE MEDICATIONS:  Ancef    SURGEON:  Ruddy Malik MD    ASSISTANT:  Chitra Dunham PA-C  A first assistant was necessary owing to challenging retraction, visualization, and exposure.    INDICATIONS:  New diagnosis left breast cancer.    DESCRIPTION OF PROCEDURE:  The patient was taken to the operating suite, and LMA was placed.  The left breast and axilla were prepped and draped in a sterile fashion.  Surgeon-initiated timeout was acknowledged.  We made a radial incision and took this down through skin and subcutaneous tissue.  We dissected toward the seed and followed it until we were oriented directly above it.  We then created our partial mastectomy specimen by getting around the tissue deep to and around the seed in accordance with the post-placement mammograms.  We took this down to the chest wall and came around the tumor.  Specimen was then removed and inked according to protocol.      We then set about performing our sentinel lymph node dissection.  A transverse incision was made through a skin crease into the axilla, and I took this down through skin and subcutaneous tissue.  We followed an area of elevated radiotracer counts and encountered slava tissue.  This was removed and sent as sentinel lymph node biopsy.   We then did a tracer count of the remaining axillary bed and found no significant residual radiotracer uptake.  These nodes were sent for permanent section according to the Z11 trial.  The wounds were irrigated at this point and closed in layers.  Specimen mammogram was performed and we received word from the [facility] that the specimen contained the lesion, clip and seed.  The  wounds were dressed, and the patient was awakened and taken to the PACU in stable condition.  At the conclusion of the case, all lap and needle counts were correct.      ESTIMATED BLOOD LOSS:  10 mL    INTRAOPERATIVE FINDINGS:  Palpable mass, grossly normal nodes    Ruddy Malik MD, MD

## 2019-12-24 LAB — COPATH REPORT: NORMAL

## 2019-12-26 ENCOUNTER — TELEPHONE (OUTPATIENT)
Dept: SURGERY | Facility: CLINIC | Age: 69
End: 2019-12-26

## 2019-12-26 NOTE — TELEPHONE ENCOUNTER
Diane is s/p seed localized left breast lumpectomy left sentinel lymph node biopsy on 12/19/2019 with Dr. Malik. Post procedure call placed to patient.    Diane reports healing well at home. She has minimal pain. She is wearing a supportive bra and icing her incisions as needed.     Diane will keep post op appointment with Dr. Malik as scheduled. Due to her insurance, Medicare, we will submit an Oncotype DX on case #: [id number] on 1/2/2020. Message sent to patient's coordinator, Dorothy Lindsey, to submit test on that date. Diane knows to contact us in the interim with additional questions or concerns.    Radha HOGUEN, RN, OCN  Oncology Care Coordinator  [facility]  [facility]  Phone: [phone]

## 2020-01-02 ENCOUNTER — OFFICE VISIT (OUTPATIENT)
Dept: SURGERY | Facility: CLINIC | Age: 70
End: 2020-01-02
Payer: MEDICARE

## 2020-01-02 VITALS
HEIGHT: 67 IN | BODY MASS INDEX: 35.31 KG/M2 | WEIGHT: 225 LBS | HEART RATE: 102 BPM | DIASTOLIC BLOOD PRESSURE: 78 MMHG | SYSTOLIC BLOOD PRESSURE: 120 MMHG

## 2020-01-02 DIAGNOSIS — C50.912 MALIGNANT NEOPLASM OF LEFT FEMALE BREAST, UNSPECIFIED ESTROGEN RECEPTOR STATUS, UNSPECIFIED SITE OF BREAST (H): Primary | ICD-10-CM

## 2020-01-02 DIAGNOSIS — Z09 SURGERY FOLLOW-UP EXAMINATION: ICD-10-CM

## 2020-01-02 PROCEDURE — 99024 POSTOP FOLLOW-UP VISIT: CPT | Performed by: SURGERY

## 2020-01-02 ASSESSMENT — MIFFLIN-ST. JEOR: SCORE: 1573.22

## 2020-01-02 NOTE — PATIENT INSTRUCTIONS
You are scheduled for the following appointments:  1) Dr Myrna Kaye at [facility] on 1/22/20 at 1:20pm  2) Dr Sebastian Navarrete at [facility] Rad[facility] on 1/23/20 at 9am    Please contact Mavis at [facility] with any questions or concerns.

## 2020-01-02 NOTE — PROGRESS NOTES
Surgery Postop Note    Diane Gaitan presents today for surgical followup.  she is doing well following left lumpectomy with SLN biopsy.  Incisions look fine with no signs of wound infection.  Margins were negative, as were sentinel nodes.  Oncotype is pending.  We will arrange for her to see medical and radiation oncology.    I expect her to make a complete recovery.  Thank you for the opportunity to help in her care.    Chidi Malik M.D.  [facility]  [phone]    Please route or send letter to:  Primary Care Provider (PCP) and Referring Provider

## 2020-01-02 NOTE — LETTER
[facility]    [address], [address]  [place], [place] [address]  Phone [phone]  Fax [phone] [address], [address]  [place] [address]  Phone [phone]  Fax [phone]    [email or web address]     2020    Re: Diane Gaitan  : 1950      Surgery Postop Note     Diane Gaitan presents today for surgical followup.  she is doing well following left lumpectomy with SLN biopsy.  Incisions look fine with no signs of wound infection.  Margins were negative, as were sentinel nodes. Oncotype is pending.  We will arrange for her to see medical and radiation oncology.    I expect her to make a complete recovery.  Thank you for the opportunity to help in her care.     Chidi Malik M.D.  [facility]  [phone]

## 2020-01-02 NOTE — TELEPHONE ENCOUNTER
Oncotype Dx Breast Testing ordered today through [employer] on patient's lumpectomy, sentinel lymph node specimen #[id number] per Dr. Ruddy Malik.  I will watch for results and inform patient and Dr. Malik once available in approximately 2 weeks.  Dorothy Lindsey, RN, BSN

## 2020-01-03 NOTE — TELEPHONE ENCOUNTER
RECORDS STATUS - BREAST    RECORDS REQUESTED FROM: Epic/   DATE REQUESTED: 1/22/2020   NOTES DETAILS STATUS   OFFICE NOTE from referring provider Complete  Dr. Ruddy Malik MD   OFFICE NOTE from medical oncologist N/A    OFFICE NOTE from surgeon Complete Epic 12/19/2019 Lumpectomy    OFFICE NOTE from radiation oncologist N/A    DISCHARGE SUMMARY from hospital Complete  12/19/2019    DISCHARGE REPORT from the ER N/A    OPERATIVE REPORT N/A    MEDICATION LIST Complete [facility]   CLINICAL TRIAL TREATMENTS TO DATE     LABS     PATHOLOGY REPORTS  (Tissue diagnosis, Stage, ER/RI percentage positive and intensity of staining, HER2 IHC, FISH, and all biopsies from breast and any distant metastasis)                 Complete Epic 12/19/2019   11/15/2019   Left breast, 2:00, 5.0 cm from nipple, ultrasound-guided core biopsy    GENONOMIC TESTING     TYPE:   (Next Generation Sequencing, including Foundation One testing, and Oncotype score)     IMAGING (NEED IMAGES & REPORT)     CT SCANS Complete PACS   MRI (Brain and Breast)  Complete PACS   MAMMO Complete PACS   ULTRASOUND Complete PACS   PET     BONE SCAN     BRAIN MRI       All records accounted for in [facility].

## 2020-01-14 NOTE — TELEPHONE ENCOUNTER
I called patient this afternoon, confirmed her full name, date of birth and informed her of Oncotype Dx Recurrence Score of 8.     Informed patient that she will discuss in greater detail with medical oncologist- Dr. Kaye, who patient is scheduled to see on 1/22/2020.  I will fax the report to Dr. Kaye and also Radiation Oncologist- Dr. Navarrete, who patient will see on 1/23/20.      Message forward to Dr. Malik to inform.  Patient verbalized understanding.  Dorothy Lindsey, RN, BSN

## 2020-01-14 NOTE — TELEPHONE ENCOUNTER
Copy of results also sent to HIMS department to be scanned into patient's EMR.  Dorothy Lindsey RN, BSN

## 2020-01-21 ENCOUNTER — PATIENT OUTREACH (OUTPATIENT)
Dept: ONCOLOGY | Facility: CLINIC | Age: 70
End: 2020-01-21

## 2020-01-21 NOTE — PROGRESS NOTES
Called Diane, left message on her voice mail regarding her appointment tomorrow 1/22/20 at 0120 PM with Dr. Kaye. Patient was given location, date, time and parking policy of clinic. Viktoriya Wakefield RN,BSN,OCN

## 2020-01-22 ENCOUNTER — PRE VISIT (OUTPATIENT)
Dept: ONCOLOGY | Facility: CLINIC | Age: 70
End: 2020-01-22

## 2020-01-22 ENCOUNTER — ONCOLOGY VISIT (OUTPATIENT)
Dept: ONCOLOGY | Facility: CLINIC | Age: 70
End: 2020-01-22
Attending: SURGERY
Payer: MEDICARE

## 2020-01-22 VITALS
DIASTOLIC BLOOD PRESSURE: 84 MMHG | TEMPERATURE: 98.5 F | RESPIRATION RATE: 16 BRPM | HEIGHT: 67 IN | BODY MASS INDEX: 35.22 KG/M2 | WEIGHT: 224.4 LBS | OXYGEN SATURATION: 97 % | HEART RATE: 105 BPM | SYSTOLIC BLOOD PRESSURE: 150 MMHG

## 2020-01-22 DIAGNOSIS — Z17.0 MALIGNANT NEOPLASM OF UPPER-OUTER QUADRANT OF LEFT BREAST IN FEMALE, ESTROGEN RECEPTOR POSITIVE (H): Primary | ICD-10-CM

## 2020-01-22 DIAGNOSIS — M85.89 OTHER SPECIFIED DISORDERS OF BONE DENSITY AND STRUCTURE, MULTIPLE SITES: ICD-10-CM

## 2020-01-22 DIAGNOSIS — C50.412 MALIGNANT NEOPLASM OF UPPER-OUTER QUADRANT OF LEFT BREAST IN FEMALE, ESTROGEN RECEPTOR POSITIVE (H): Primary | ICD-10-CM

## 2020-01-22 PROCEDURE — 99205 OFFICE O/P NEW HI 60 MIN: CPT | Performed by: INTERNAL MEDICINE

## 2020-01-22 PROCEDURE — G0463 HOSPITAL OUTPT CLINIC VISIT: HCPCS

## 2020-01-22 ASSESSMENT — MIFFLIN-ST. JEOR: SCORE: 1570.5

## 2020-01-22 ASSESSMENT — PAIN SCALES - GENERAL: PAINLEVEL: NO PAIN (0)

## 2020-01-22 NOTE — PROGRESS NOTES
[facility]    Hematology/Oncology New Patient Note      Today's Date: 01/22/20    Reason for Consult: Left breast cancer.      HISTORY OF PRESENT ILLNESS: Diane Gaitan is a 69 year old female who presents with the following oncologic history:  1.  11/5/2019: Mammogram showed focal asymmetry in the lateral left breast at the 2-4 o'clock position.  2.  11/12/2019: Targeted left breast ultrasound showed an irregular hypoechoic mass at the 2 o'clock position, 5 cm from the nipple measuring 1.7 x 1.2 x 1.4 cm.  Survey of the axilla showed no evidence of adenopathy.  3.  11/15/2019: Ultrasound-guided left breast needle biopsy at 2:00, 5 cm from the nipple showed a grade 2 invasive mammary carcinoma ER strongly positive at greater than 95%, WI,Moderate to strongly positive at 80%, HER-2/guadalupe FISH negative.  4.  12/04/2019: Bilateral breast MRI showed left upper outer quadrant biopsy invasive mammary carcinoma measuring up to 1.7 cm and no other concerning areas of enhancement or lymphadenopathy.  5.  12/19/2019: Underwent left breast lumpectomy with left axillary sentinel lymph node biopsy under the care of Dr. Chidi Malik.  Pathology showed a grade 3 invasive pleomorphic lobular carcinoma measuring 1.5 cm, lymphovascular invasion not identified, margins negative for invasive carcinoma.  A total of 3 left axillary sentinel lymph nodes negative for malignancy.  Oncotype DX score = 8, corresponding to a distant recurrence risk at 9 years of 3% after 5 years of hormone blockade therapy and a less than 1% absolute chemotherapy benefit.    Diane reports history of possible TIA in 10/2019 at the time she had a NSTEMI.  She has not had any recurrent imbalances, chest pain, shortness of breath, fevers, chills, night sweats, unintentional weight loss, bowel or bladder dysfunction.  She feels that she has healed well from surgery.      REVIEW OF SYSTEMS:   14 point ROS was reviewed and is negative other than  as noted above in HPI.       HOME MEDICATIONS:  Current Outpatient Medications   Medication Sig Dispense Refill     aspirin 81 MG tablet Take 1 tablet by mouth daily. 90 tablet 3     buPROPion (WELLBUTRIN XL) 150 MG 24 hr tablet Take 1 tablet (150 mg) by mouth every morning 90 tablet 3     calcium carbonate 600 mg-vitamin D 400 units (CALTRATE) 600-400 MG-UNIT per tablet Take 1 tablet by mouth daily       citalopram (CELEXA) 20 MG tablet Take 1 tablet (20 mg) by mouth daily 90 tablet 3     gabapentin (NEURONTIN) 300 MG capsule Take 1 capsule (300 mg) by mouth 3 times daily 270 capsule 1     HYDROcodone-acetaminophen (NORCO) 5-325 MG tablet Take 1-2 tablets by mouth every 4 hours as needed for moderate to severe pain 15 tablet 0     losartan (COZAAR) 100 MG tablet Take 1 tablet (100 mg) by mouth daily 90 tablet 3     metFORMIN (GLUCOPHAGE) 500 MG tablet Take 1 tablet (500 mg) by mouth 2 times daily (with meals) 180 tablet 3     omeprazole (PRILOSEC) 20 MG DR capsule Take 1 capsule (20 mg) by mouth daily 90 capsule 3     simvastatin (ZOCOR) 20 MG tablet Take 1 tablet (20 mg) by mouth At Bedtime 90 tablet 3         ALLERGIES:  Allergies   Allergen Reactions     Lisinopril Cough     COUGH         PAST MEDICAL HISTORY:  Past Medical History:   Diagnosis Date     Arthritis      BENIGN HYPERTENSION 8/28/2002     Cancer (H)     Basal cell carcinoma     CARDIOVASCULAR SCREENING; LDL GOAL LESS THAN 100 10/31/2010     Depressive disorder 1997     Esophageal reflux      Mild major depression (H) 9/14/2010     Other malaise and fatigue 8/28/2002     Type 2 diabetes, HbA1c goal < 7% (H) 10/31/2010   Possible transient ischemic attack.      PAST SURGICAL HISTORY:  Past Surgical History:   Procedure Laterality Date     ARTHROPLASTY KNEE  10/18/2012    Procedure: ARTHROPLASTY KNEE;  RIGHT TOTAL KNEE ARTHROPLASTY (SMITH & NEPHEW)^ ;  Surgeon: Howard Charles MD;  Location: [facility] OR     BIOPSY NODE SENTINEL Left 12/19/2019     Procedure: LEFT SENTINEL LYMPH NODE BIOPSY;  Surgeon: Ruddy Malik MD;  Location:  OR     BREAST BIOPSY, RT/LT  1988    breast biopsy     C NONSPECIFIC PROCEDURE  10/30/96    skin tags removed     C NONSPECIFIC PROCEDURE      colonic polyps     COLONOSCOPY N/A 2016    Procedure: COLONOSCOPY;  Surgeon: Curt Patel MD;  Location:  GI     ESOPHAGOSCOPY, GASTROSCOPY, DUODENOSCOPY (EGD), COMBINED N/A 2018    Procedure: COMBINED ESOPHAGOSCOPY, GASTROSCOPY, DUODENOSCOPY (EGD);  gastroscopy;  Surgeon: Mac White MD;  Location:  GI     HYSTERECTOMY, PAP NO LONGER INDICATED      abdominal hysterectomy     LUMPECTOMY BREAST WITH SEED LOCALIZATION Left 2019    Procedure: SEED LOCALIZED LEFT BREAST LUMPECTOMY;  Surgeon: Ruddy Malik MD;  Location:  OR     ROTATOR CUFF REPAIR RT/LT Right    Ovaries intact.      SOCIAL HISTORY:  Social History     Socioeconomic History     Marital status:      Spouse name: Not on file     Number of children: Not on file     Years of education: Not on file     Highest education level: Not on file   Occupational History     Not on file   Social Needs     Financial resource strain: Not on file     Food insecurity:     Worry: Not on file     Inability: Not on file     Transportation needs:     Medical: Not on file     Non-medical: Not on file   Tobacco Use     Smoking status: Former Smoker     Packs/day: 1.00     Years: 12.00     Pack years: 12.00     Last attempt to quit: 10/18/1980     Years since quittin.2     Smokeless tobacco: Never Used   Substance and Sexual Activity     Alcohol use: Yes     Comment: 12 Beers per week     Drug use: No     Sexual activity: Never     Partners: Male   Lifestyle     Physical activity:     Days per week: Not on file     Minutes per session: Not on file     Stress: Not on file   Relationships     Social connections:     Talks on phone: Not on file     Gets together: Not on file      "Attends Mandaeism service: Not on file     Active member of club or organization: Not on file     Attends meetings of clubs or organizations: Not on file     Relationship status: Not on file     Intimate partner violence:     Fear of current or ex partner: Not on file     Emotionally abused: Not on file     Physically abused: Not on file     Forced sexual activity: Not on file   Other Topics Concern     Parent/sibling w/ CABG, MI or angioplasty before 65F 55M? Not Asked   Social History Narrative     Not on file         FAMILY HISTORY:  Family History   Problem Relation Age of Onset     Breast Cancer Sister      Hypertension Daughter      Breast Cancer Daughter 43     Skin Cancer Mother      Coronary Artery Disease Father      Colon Cancer Sister      Breast Cancer Maternal Aunt         Many Maternal Aunts have had breast Cancer         PHYSICAL EXAM:  Vital signs:  BP (!) 150/84   Pulse 105   Temp 98.5  F (36.9  C) (Oral)   Resp 16   Ht 1.702 m (5' 7\")   Wt 101.8 kg (224 lb 6.4 oz)   SpO2 97%   BMI 35.15 kg/m     ECO  GENERAL/CONSTITUTIONAL: No acute distress.  EYES:  Extraocular movements intact.  No scleral icterus.  ENT/MOUTH: Neck supple. Oropharynx clear, no mucositis.  LYMPH: No anterior cervical, posterior cervical, supraclavicular, axillary or inguinal adenopathy.   BREAST: Firm ridge at the site of the lumpectomy.  No other palpable discrete masses in either breast.  Nipples are everted with no discharge.  No ulceration, erythema, or blistering.  RESPIRATORY: Clear to auscultation bilaterally. No crackles or wheezing.   CARDIOVASCULAR: Regular rate and rhythm without murmurs, gallops, or rubs.  GASTROINTESTINAL: No hepatosplenomegaly, masses, or tenderness.  No guarding.  No distention.  MUSCULOSKELETAL: Warm and well-perfused, no cyanosis, clubbing, or edema.  NEUROLOGIC: Cranial nerves II-XII are intact. Alert, oriented, answers questions appropriately.  INTEGUMENTARY: No rashes or " jaundice.  GAIT: Steady, does not use assistive device      LABS:  CBC RESULTS:   Recent Labs   Lab Test 12/16/19  0837  10/30/18  0950   WBC  --   --  5.3   RBC  --   --  4.83   HGB 12.9   < > 9.5*   HCT  --   --  33.5*   MCV  --   --  69*   MCH  --   --  19.7*   MCHC  --   --  28.4*   RDW  --   --  20.5*      < > 280    < > = values in this interval not displayed.       Recent Labs   Lab Test 12/16/19  0837 10/03/19  0813  09/13/18  0416   NA  --  137  --  138   POTASSIUM 4.8 4.5   < > 4.2   CHLORIDE  --  104  --  104   CO2  --  27  --  25   ANIONGAP  --  6  --  9   * 143*   < > 219*   BUN  --  10  --  10   CR  --  0.64  --  0.67   JORDON  --  9.5  --  8.2*    < > = values in this interval not displayed.         PATHOLOGY:  Reviewed as per HPI.    IMAGING:  Reviewed as per HPI.    ASSESSMENT/PLAN:  Diane Gaitan is a 69 year old female with the following issues:  1.  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  -I discussed that her Oncotype DX score came back as very low risk for recurrence with a score of 8.  This corresponds to a very low rate of distant recurrence of 3% after 5 years of hormone blockade therapy.  If she chooses not to proceed with any adjuvant endocrine therapy, her risk would be approximately double that at about 6%.  Nevertheless, I still recommended some form of adjuvant endocrine therapy as we occasionally see recurrences even with low Oncotype scores if hormone blockade therapy is not pursued.  Furthermore, I did not recommend adjuvant chemotherapy based on her Oncotype results.  However, I did recommend adjuvant radiation therapy followed by adjuvant endocrine therapy, preferably with anastrozole.  She is not a great candidate for  tamoxifen given her history of a possible TIA.  -I discussed the potential side effects of anastrazole, including but not limited to: fatigue, myalgias/arthralgias, bone density loss, decrease sexual drive, vaginal dryness, nausea, headache, difficulty sleeping, hot flashes, night sweats, small cardiovascular risk.  -We will arrange for baseline DEXA scan and repeat every 2 years.  I recommended adequate calcium and vitamin D as well as weightbearing exercise if tolerated.  -A CBC and CMP were already performed recently and do not need to be repeated.  No significant abnormalities on those results.  -She is seeing Dr. Navrarete tomorrow.  I will plan to see her at the end of radiation to finalize decisions regarding adjuvant endocrine therapy.    2.  Diabetes mellitus type 2  -She had a recent hemoglobin A1c that indicated good control of her diabetes.  -She will continue on metformin.    3.  Strong family history of breast cancer  -I offered a genetics consult.  She would like to think about this further.    Return in about 6 weeks.    Myrna Kaye MD  Hematology/Oncology  [facility]    I spent a total of 60 minutes with the patient, with greater than 50% of the time in counseling and coordination of care.

## 2020-01-22 NOTE — PROGRESS NOTES
"Oncology Rooming Note    January 22, 2020 1:10 PM   Diane Gaitan is a 69 year old female who presents for:    Chief Complaint   Patient presents with     Oncology Clinic Visit     Initial Vitals: BP (!) 150/84   Pulse 105   Temp 98.5  F (36.9  C) (Oral)   Resp 16   Ht 1.702 m (5' 7\")   Wt 101.8 kg (224 lb 6.4 oz)   SpO2 97%   BMI 35.15 kg/m   Estimated body mass index is 35.15 kg/m  as calculated from the following:    Height as of this encounter: 1.702 m (5' 7\").    Weight as of this encounter: 101.8 kg (224 lb 6.4 oz). Body surface area is 2.19 meters squared.  No Pain (0) Comment: Data Unavailable   No LMP recorded. Patient is postmenopausal.  Allergies reviewed: Yes  Medications reviewed: Yes    Medications: Medication refills not needed today.  Pharmacy name entered into EPIC:    [facility] [address]  [id number] [place], [place] - [address] [id number] - [place] [facility] [address] [facility] & [address] [place] [address] [id number] - [place], [place] - [address] [facility] [address]    Clinical concerns: no       Shari J. Schoenberger, CMA            "

## 2020-01-22 NOTE — LETTER
1/22/2020         RE: Diane Gaitan  [address] [facility] [address]        Dear Colleague,    Thank you for referring your patient, Diane Gaitan, to the [facility]. Please see a copy of my visit note below.    [facility]    Hematology/Oncology New Patient Note      Today's Date: 01/22/20    Reason for Consult: Left breast cancer.      HISTORY OF PRESENT ILLNESS: Diane Gaitan is a 69 year old female who presents with the following oncologic history:  1.  11/5/2019: Mammogram showed focal asymmetry in the lateral left breast at the 2-4 o'clock position.  2.  11/12/2019: Targeted left breast ultrasound showed an irregular hypoechoic mass at the 2 o'clock position, 5 cm from the nipple measuring 1.7 x 1.2 x 1.4 cm.  Survey of the axilla showed no evidence of adenopathy.  3.  11/15/2019: Ultrasound-guided left breast needle biopsy at 2:00, 5 cm from the nipple showed a grade 2 invasive mammary carcinoma ER strongly positive at greater than 95%, HI,Moderate to strongly positive at 80%, HER-2/guadalupe FISH negative.  4.  12/04/2019: Bilateral breast MRI showed left upper outer quadrant biopsy invasive mammary carcinoma measuring up to 1.7 cm and no other concerning areas of enhancement or lymphadenopathy.  5.  12/19/2019: Underwent left breast lumpectomy with left axillary sentinel lymph node biopsy under the care of Dr. Chidi Malik.  Pathology showed a grade 3 invasive pleomorphic lobular carcinoma measuring 1.5 cm, lymphovascular invasion not identified, margins negative for invasive carcinoma.  A total of 3 left axillary sentinel lymph nodes negative for malignancy.  Oncotype DX score = 8, corresponding to a distant recurrence risk at 9 years of 3% after 5 years of hormone blockade therapy and a less than 1% absolute chemotherapy benefit.    Diane reports history of possible TIA in 10/2019 at the time she had a NSTEMI.  She has not had any recurrent imbalances,  chest pain, shortness of breath, fevers, chills, night sweats, unintentional weight loss, bowel or bladder dysfunction.  She feels that she has healed well from surgery.      REVIEW OF SYSTEMS:   14 point ROS was reviewed and is negative other than as noted above in HPI.       HOME MEDICATIONS:  Current Outpatient Medications   Medication Sig Dispense Refill     aspirin 81 MG tablet Take 1 tablet by mouth daily. 90 tablet 3     buPROPion (WELLBUTRIN XL) 150 MG 24 hr tablet Take 1 tablet (150 mg) by mouth every morning 90 tablet 3     calcium carbonate 600 mg-vitamin D 400 units (CALTRATE) 600-400 MG-UNIT per tablet Take 1 tablet by mouth daily       citalopram (CELEXA) 20 MG tablet Take 1 tablet (20 mg) by mouth daily 90 tablet 3     gabapentin (NEURONTIN) 300 MG capsule Take 1 capsule (300 mg) by mouth 3 times daily 270 capsule 1     HYDROcodone-acetaminophen (NORCO) 5-325 MG tablet Take 1-2 tablets by mouth every 4 hours as needed for moderate to severe pain 15 tablet 0     losartan (COZAAR) 100 MG tablet Take 1 tablet (100 mg) by mouth daily 90 tablet 3     metFORMIN (GLUCOPHAGE) 500 MG tablet Take 1 tablet (500 mg) by mouth 2 times daily (with meals) 180 tablet 3     omeprazole (PRILOSEC) 20 MG DR capsule Take 1 capsule (20 mg) by mouth daily 90 capsule 3     simvastatin (ZOCOR) 20 MG tablet Take 1 tablet (20 mg) by mouth At Bedtime 90 tablet 3         ALLERGIES:  Allergies   Allergen Reactions     Lisinopril Cough     COUGH         PAST MEDICAL HISTORY:  Past Medical History:   Diagnosis Date     Arthritis      BENIGN HYPERTENSION 8/28/2002     Cancer (H)     Basal cell carcinoma     CARDIOVASCULAR SCREENING; LDL GOAL LESS THAN 100 10/31/2010     Depressive disorder 1997     Esophageal reflux      Mild major depression (H) 9/14/2010     Other malaise and fatigue 8/28/2002     Type 2 diabetes, HbA1c goal < 7% (H) 10/31/2010   Possible transient ischemic attack.      PAST SURGICAL HISTORY:  Past Surgical History:    Procedure Laterality Date     ARTHROPLASTY KNEE  10/18/2012    Procedure: ARTHROPLASTY KNEE;  RIGHT TOTAL KNEE ARTHROPLASTY (SMITH & NEPHEW)^ ;  Surgeon: Howard Charles MD;  Location:  OR     BIOPSY NODE SENTINEL Left 2019    Procedure: LEFT SENTINEL LYMPH NODE BIOPSY;  Surgeon: Ruddy Malik MD;  Location:  OR     BREAST BIOPSY, RT/LT  1988    breast biopsy     C NONSPECIFIC PROCEDURE  10/30/96    skin tags removed     C NONSPECIFIC PROCEDURE      colonic polyps     COLONOSCOPY N/A 2016    Procedure: COLONOSCOPY;  Surgeon: Curt Patel MD;  Location:  GI     ESOPHAGOSCOPY, GASTROSCOPY, DUODENOSCOPY (EGD), COMBINED N/A 2018    Procedure: COMBINED ESOPHAGOSCOPY, GASTROSCOPY, DUODENOSCOPY (EGD);  gastroscopy;  Surgeon: Mac White MD;  Location:  GI     HYSTERECTOMY, PAP NO LONGER INDICATED  1986    abdominal hysterectomy     LUMPECTOMY BREAST WITH SEED LOCALIZATION Left 2019    Procedure: SEED LOCALIZED LEFT BREAST LUMPECTOMY;  Surgeon: Ruddy Malik MD;  Location:  OR     ROTATOR CUFF REPAIR RT/LT Right    Ovaries intact.      SOCIAL HISTORY:  Social History     Socioeconomic History     Marital status:      Spouse name: Not on file     Number of children: Not on file     Years of education: Not on file     Highest education level: Not on file   Occupational History     Not on file   Social Needs     Financial resource strain: Not on file     Food insecurity:     Worry: Not on file     Inability: Not on file     Transportation needs:     Medical: Not on file     Non-medical: Not on file   Tobacco Use     Smoking status: Former Smoker     Packs/day: 1.00     Years: 12.00     Pack years: 12.00     Last attempt to quit: 10/18/1980     Years since quittin.2     Smokeless tobacco: Never Used   Substance and Sexual Activity     Alcohol use: Yes     Comment: 12 Beers per week     Drug use: No     Sexual activity: Never      "Partners: Male   Lifestyle     Physical activity:     Days per week: Not on file     Minutes per session: Not on file     Stress: Not on file   Relationships     Social connections:     Talks on phone: Not on file     Gets together: Not on file     Attends Buddhist service: Not on file     Active member of club or organization: Not on file     Attends meetings of clubs or organizations: Not on file     Relationship status: Not on file     Intimate partner violence:     Fear of current or ex partner: Not on file     Emotionally abused: Not on file     Physically abused: Not on file     Forced sexual activity: Not on file   Other Topics Concern     Parent/sibling w/ CABG, MI or angioplasty before 65F 55M? Not Asked   Social History Narrative     Not on file         FAMILY HISTORY:  Family History   Problem Relation Age of Onset     Breast Cancer Sister      Hypertension Daughter      Breast Cancer Daughter 43     Skin Cancer Mother      Coronary Artery Disease Father      Colon Cancer Sister      Breast Cancer Maternal Aunt         Many Maternal Aunts have had breast Cancer         PHYSICAL EXAM:  Vital signs:  BP (!) 150/84   Pulse 105   Temp 98.5  F (36.9  C) (Oral)   Resp 16   Ht 1.702 m (5' 7\")   Wt 101.8 kg (224 lb 6.4 oz)   SpO2 97%   BMI 35.15 kg/m      ECO  GENERAL/CONSTITUTIONAL: No acute distress.  EYES:  Extraocular movements intact.  No scleral icterus.  ENT/MOUTH: Neck supple. Oropharynx clear, no mucositis.  LYMPH: No anterior cervical, posterior cervical, supraclavicular, axillary or inguinal adenopathy.   BREAST: Firm ridge at the site of the lumpectomy.  No other palpable discrete masses in either breast.  Nipples are everted with no discharge.  No ulceration, erythema, or blistering.  RESPIRATORY: Clear to auscultation bilaterally. No crackles or wheezing.   CARDIOVASCULAR: Regular rate and rhythm without murmurs, gallops, or rubs.  GASTROINTESTINAL: No hepatosplenomegaly, masses, or " tenderness.  No guarding.  No distention.  MUSCULOSKELETAL: Warm and well-perfused, no cyanosis, clubbing, or edema.  NEUROLOGIC: Cranial nerves II-XII are intact. Alert, oriented, answers questions appropriately.  INTEGUMENTARY: No rashes or jaundice.  GAIT: Steady, does not use assistive device      LABS:  CBC RESULTS:   Recent Labs   Lab Test 12/16/19  0837  10/30/18  0950   WBC  --   --  5.3   RBC  --   --  4.83   HGB 12.9   < > 9.5*   HCT  --   --  33.5*   MCV  --   --  69*   MCH  --   --  19.7*   MCHC  --   --  28.4*   RDW  --   --  20.5*      < > 280    < > = values in this interval not displayed.       Recent Labs   Lab Test 12/16/19  0837 10/03/19  0813  09/13/18  0416   NA  --  137  --  138   POTASSIUM 4.8 4.5   < > 4.2   CHLORIDE  --  104  --  104   CO2  --  27  --  25   ANIONGAP  --  6  --  9   * 143*   < > 219*   BUN  --  10  --  10   CR  --  0.64  --  0.67   JORDON  --  9.5  --  8.2*    < > = values in this interval not displayed.         PATHOLOGY:  Reviewed as per HPI.    IMAGING:  Reviewed as per HPI.    ASSESSMENT/PLAN:  Diane Gaitan is a 69 year old female with the following issues:  1.  Stage IA, [id number], grade 3 invasive pleomorphic lobular carcinoma of the left upper outer breast, ER strongly positive, AK positive, HER-2/guadalupe FISH negative  -I had a detailed discussion with Diane regarding her diagnosis, overall excellent prognosis, and adjuvant therapeutic options.  -I discussed that her Oncotype DX score came back as very low risk for recurrence with a score of 8.  This corresponds to a very low rate of distant recurrence of 3% after 5 years of hormone blockade therapy.  If she chooses not to proceed with any adjuvant endocrine therapy, her risk would be approximately double that at about 6%.  Nevertheless, I still recommended some form of adjuvant endocrine therapy as we occasionally see recurrences even with low Oncotype scores if hormone blockade therapy is not pursued.   "Furthermore, I did not recommend adjuvant chemotherapy based on her Oncotype results.  However, I did recommend adjuvant radiation therapy followed by adjuvant endocrine therapy, preferably with anastrozole.  She is not a great candidate for tamoxifen given her history of a possible TIA.  -I discussed the potential side effects of anastrazole, including but not limited to: fatigue, myalgias/arthralgias, bone density loss, decrease sexual drive, vaginal dryness, nausea, headache, difficulty sleeping, hot flashes, night sweats, small cardiovascular risk.  -We will arrange for baseline DEXA scan and repeat every 2 years.  I recommended adequate calcium and vitamin D as well as weightbearing exercise if tolerated.  -A CBC and CMP were already performed recently and do not need to be repeated.  No significant abnormalities on those results.  -She is seeing Dr. Navarrete tomorrow.  I will plan to see her at the end of radiation to finalize decisions regarding adjuvant endocrine therapy.    2.  Diabetes mellitus type 2  -She had a recent hemoglobin A1c that indicated good control of her diabetes.  -She will continue on metformin.    3.  Strong family history of breast cancer  -I offered a genetics consult.  She would like to think about this further.    Return in about 6 weeks.    Myrna Kaye MD  Hematology/Oncology  [facility]    I spent a total of 60 minutes with the patient, with greater than 50% of the time in counseling and coordination of care.    Oncology Rooming Note    January 22, 2020 1:10 PM   Diane Gaitan is a 69 year old female who presents for:    Chief Complaint   Patient presents with     Oncology Clinic Visit     Initial Vitals: BP (!) 150/84   Pulse 105   Temp 98.5  F (36.9  C) (Oral)   Resp 16   Ht 1.702 m (5' 7\")   Wt 101.8 kg (224 lb 6.4 oz)   SpO2 97%   BMI 35.15 kg/m    Estimated body mass index is 35.15 kg/m  as calculated from the following:    Height as of this " "encounter: 1.702 m (5' 7\").    Weight as of this encounter: 101.8 kg (224 lb 6.4 oz). Body surface area is 2.19 meters squared.  No Pain (0) Comment: Data Unavailable   No LMP recorded. Patient is postmenopausal.  Allergies reviewed: Yes  Medications reviewed: Yes    Medications: Medication refills not needed today.  Pharmacy name entered into EPIC:    [facility] [address] [facility][id number] - [place], [place] - [address] [id number] - [place], [place] - [address] AT [facility] & [address] [place] [address] DRUG STORE #[id number] [place], [place] - [address] AT [facility] [address]    Clinical concerns: no       Shari J. Schoenberger, [facility]              Again, thank you for allowing me to participate in the care of your patient.        Sincerely,        Myrna Kaye MD    "

## 2020-01-23 ENCOUNTER — TRANSFERRED RECORDS (OUTPATIENT)
Dept: HEALTH INFORMATION MANAGEMENT | Facility: CLINIC | Age: 70
End: 2020-01-23

## 2020-03-02 ENCOUNTER — ALLIED HEALTH/NURSE VISIT (OUTPATIENT)
Dept: INTERNAL MEDICINE | Facility: CLINIC | Age: 70
End: 2020-03-02
Payer: MEDICARE

## 2020-03-02 ENCOUNTER — HOSPITAL ENCOUNTER (OUTPATIENT)
Dept: BONE DENSITY | Facility: CLINIC | Age: 70
Discharge: HOME | End: 2020-03-02
Attending: INTERNAL MEDICINE | Admitting: INTERNAL MEDICINE
Payer: MEDICARE

## 2020-03-02 ENCOUNTER — TELEPHONE (OUTPATIENT)
Dept: INTERNAL MEDICINE | Facility: CLINIC | Age: 70
End: 2020-03-02

## 2020-03-02 VITALS — DIASTOLIC BLOOD PRESSURE: 82 MMHG | SYSTOLIC BLOOD PRESSURE: 146 MMHG

## 2020-03-02 DIAGNOSIS — Z01.30 BP CHECK: Primary | ICD-10-CM

## 2020-03-02 DIAGNOSIS — Z17.0 MALIGNANT NEOPLASM OF UPPER-OUTER QUADRANT OF LEFT BREAST IN FEMALE, ESTROGEN RECEPTOR POSITIVE (H): ICD-10-CM

## 2020-03-02 DIAGNOSIS — M85.89 OTHER SPECIFIED DISORDERS OF BONE DENSITY AND STRUCTURE, MULTIPLE SITES: ICD-10-CM

## 2020-03-02 DIAGNOSIS — C50.412 MALIGNANT NEOPLASM OF UPPER-OUTER QUADRANT OF LEFT BREAST IN FEMALE, ESTROGEN RECEPTOR POSITIVE (H): ICD-10-CM

## 2020-03-02 PROCEDURE — 99207 ZZC NO CHARGE NURSE ONLY: CPT | Performed by: INTERNAL MEDICINE

## 2020-03-02 PROCEDURE — 77080 DXA BONE DENSITY AXIAL: CPT

## 2020-03-02 NOTE — TELEPHONE ENCOUNTER
PCP please see [facility] pharmacy BP readings. Patient has f/u OV scheduled for next week. 3/10/2020    BP Readings from Last 3 Encounters:   03/02/20 (!) 146/82   01/22/20 (!) 150/84   01/02/20 120/78       Ina HOGUEN, RN, PHN

## 2020-03-02 NOTE — NURSING NOTE
Routing message to PCP for review because BP checked at pharmacy is above goal. Recommended patient to follow-up with PCP.  PCP please close this encounter.  Patient was concerned that readings were different in each arm with her home wrist monitor. These two readings are one from right arm, one from left arm and they were the same when checked in pharmacy.

## 2020-03-02 NOTE — TELEPHONE ENCOUNTER
"Patient calling with BP questions:    Her BP at home seems to be fluctuating: SBP-170-100 one day BP was around: 117/72- Using a wrist monitor. Typically's takes BP 3x per day.    Today BP during triage call was: 220/100     HR-112 (had couple cups coffee this morning)    Patient rechecked On left arm: BP was- 163/88  And HR- 108. Patient reports she does not typically check BP on left arm d/t history of lumpectomy    Patient reported, No headaches. occasional lightheadedness and states, \" just a little bit and not all the time. Mostly notices this when I stand up too quickly\". Sx have been present after her radiation sessions. They have informed her she would feel \"lousy\" after completing her sessions. Finished radiation was last Tuesday.    Patient reports during sessions her BP was high but Dr. Tom was not concerned and advised patient to f/u with PCP after radiation sessions were complete.    Triage recommended to have patient come in to [facility] pharmacy to recheck BP as there is quite some discrepancies with home monitor.    Pt. expressed understanding and acceptance of the plan.       Ina HOGUEN, RN, PHN          "

## 2020-03-02 NOTE — TELEPHONE ENCOUNTER
BP Readings from Last 6 Encounters:   01/22/20 (!) 150/84   01/02/20 120/78   12/19/19 (!) 152/99   12/16/19 130/80   11/27/19 130/80   10/03/19 134/78       Ina FUNK BSN, RN, PHN

## 2020-03-03 NOTE — TELEPHONE ENCOUNTER
Blood pressure appear to be slightly higher than desired and would suggest continuing to observe and record and get set up to see me in clinic upon return to check again and consider medication adjustment   Resume normal activity. Resume any medications that were discontinued for surgery. Stop cold compresses Hot compresses to affected lid(s) 2-3 times daily for one month, 5 minutes at a time. Artificial tears prn burning/itching/blurry vision. Wash incisions/ lash line once daily with baby shampoo.

## 2020-03-03 NOTE — PROGRESS NOTES
[facility]    Hematology/Oncology Established Patient Note      Today's Date: 03/04/20    Reason for follow-up: Left breast cancer.      HISTORY OF PRESENT ILLNESS: Diane Gaitan is a 69 year old female who presents with the following oncologic history:  1.  11/5/2019: Mammogram showed focal asymmetry in the lateral left breast at the 2-4 o'clock position.  2.  11/12/2019: Targeted left breast ultrasound showed an irregular hypoechoic mass at the 2 o'clock position, 5 cm from the nipple measuring 1.7 x 1.2 x 1.4 cm.  Survey of the axilla showed no evidence of adenopathy.  3.  11/15/2019: Ultrasound-guided left breast needle biopsy at 2:00, 5 cm from the nipple showed a grade 2 invasive mammary carcinoma ER strongly positive at greater than 95%, CA,Moderate to strongly positive at 80%, HER-2/guadalupe FISH negative.  4.  12/04/2019: Bilateral breast MRI showed left upper outer quadrant biopsy invasive mammary carcinoma measuring up to 1.7 cm and no other concerning areas of enhancement or lymphadenopathy.  5.  12/19/2019: Underwent left breast lumpectomy with left axillary sentinel lymph node biopsy under the care of Dr. Chidi Mlaik.  Pathology showed a grade 3 invasive pleomorphic lobular carcinoma measuring 1.5 cm, lymphovascular invasion not identified, margins negative for invasive carcinoma.  A total of 3 left axillary sentinel lymph nodes negative for malignancy.  Oncotype DX score = 8, corresponding to a distant recurrence risk at 9 years of 3% after 5 years of hormone blockade therapy and a less than 1% absolute chemotherapy benefit.  6.  2/25/2020: Completed adjuvant radiation therapy to the left breast.    INTERIM HISTORY:  Diane reports very mild fatigue and anticipated skin reaction to radiation therapy.  She feels the burning is tolerable with the topicals recommended to her.  Otherwise, she denies any recent fevers, chills, night sweats, bowel or bladder dysfunction.      REVIEW OF  SYSTEMS:   14 point ROS was reviewed and is negative other than as noted above in HPI.       HOME MEDICATIONS:  Current Outpatient Medications   Medication Sig Dispense Refill     aspirin 81 MG tablet Take 1 tablet by mouth daily. 90 tablet 3     buPROPion (WELLBUTRIN XL) 150 MG 24 hr tablet Take 1 tablet (150 mg) by mouth every morning 90 tablet 3     calcium carbonate 600 mg-vitamin D 400 units (CALTRATE) 600-400 MG-UNIT per tablet Take 1 tablet by mouth daily       citalopram (CELEXA) 20 MG tablet Take 1 tablet (20 mg) by mouth daily 90 tablet 3     gabapentin (NEURONTIN) 300 MG capsule Take 1 capsule (300 mg) by mouth 3 times daily 270 capsule 1     HYDROcodone-acetaminophen (NORCO) 5-325 MG tablet Take 1-2 tablets by mouth every 4 hours as needed for moderate to severe pain (Patient not taking: Reported on 1/22/2020) 15 tablet 0     losartan (COZAAR) 100 MG tablet Take 1 tablet (100 mg) by mouth daily 90 tablet 3     metFORMIN (GLUCOPHAGE) 500 MG tablet Take 1 tablet (500 mg) by mouth 2 times daily (with meals) 180 tablet 3     omeprazole (PRILOSEC) 20 MG DR capsule Take 1 capsule (20 mg) by mouth daily 90 capsule 3     simvastatin (ZOCOR) 20 MG tablet Take 1 tablet (20 mg) by mouth At Bedtime 90 tablet 3         ALLERGIES:  Allergies   Allergen Reactions     Lisinopril Cough     COUGH         PAST MEDICAL HISTORY:  Past Medical History:   Diagnosis Date     Arthritis      BENIGN HYPERTENSION 8/28/2002     Cancer (H)     Basal cell carcinoma     CARDIOVASCULAR SCREENING; LDL GOAL LESS THAN 100 10/31/2010     Depressive disorder 1997     Esophageal reflux      Mild major depression (H) 9/14/2010     Other malaise and fatigue 8/28/2002     Type 2 diabetes, HbA1c goal < 7% (H) 10/31/2010   Possible transient ischemic attack.      PAST SURGICAL HISTORY:  Past Surgical History:   Procedure Laterality Date     ARTHROPLASTY KNEE  10/18/2012    Procedure: ARTHROPLASTY KNEE;  RIGHT TOTAL KNEE ARTHROPLASTY (SMITH &  NEPHEW)^ ;  Surgeon: Howard Charles MD;  Location:  OR     BIOPSY NODE SENTINEL Left 2019    Procedure: LEFT SENTINEL LYMPH NODE BIOPSY;  Surgeon: Ruddy Malik MD;  Location:  OR     BREAST BIOPSY, RT/LT  1988    breast biopsy     C NONSPECIFIC PROCEDURE  10/30/96    skin tags removed     C NONSPECIFIC PROCEDURE      colonic polyps     COLONOSCOPY N/A 2016    Procedure: COLONOSCOPY;  Surgeon: Curt Patel MD;  Location:  GI     ESOPHAGOSCOPY, GASTROSCOPY, DUODENOSCOPY (EGD), COMBINED N/A 2018    Procedure: COMBINED ESOPHAGOSCOPY, GASTROSCOPY, DUODENOSCOPY (EGD);  gastroscopy;  Surgeon: Mac White MD;  Location:  GI     HYSTERECTOMY, PAP NO LONGER INDICATED  1986    abdominal hysterectomy     LUMPECTOMY BREAST WITH SEED LOCALIZATION Left 2019    Procedure: SEED LOCALIZED LEFT BREAST LUMPECTOMY;  Surgeon: Ruddy Malik MD;  Location:  OR     ROTATOR CUFF REPAIR RT/LT Right      TUBAL LIGATION      at age 29   Ovaries intact.      SOCIAL HISTORY:  Social History     Socioeconomic History     Marital status:      Spouse name: Not on file     Number of children: Not on file     Years of education: Not on file     Highest education level: Not on file   Occupational History     Not on file   Social Needs     Financial resource strain: Not on file     Food insecurity:     Worry: Not on file     Inability: Not on file     Transportation needs:     Medical: Not on file     Non-medical: Not on file   Tobacco Use     Smoking status: Former Smoker     Packs/day: 1.00     Years: 12.00     Pack years: 12.00     Last attempt to quit: 10/18/1980     Years since quittin.4     Smokeless tobacco: Never Used   Substance and Sexual Activity     Alcohol use: Yes     Comment: 12 Beers per week     Drug use: No     Sexual activity: Never     Partners: Male   Lifestyle     Physical activity:     Days per week: Not on file     Minutes per session:  Not on file     Stress: Not on file   Relationships     Social connections:     Talks on phone: Not on file     Gets together: Not on file     Attends Adventism service: Not on file     Active member of club or organization: Not on file     Attends meetings of clubs or organizations: Not on file     Relationship status: Not on file     Intimate partner violence:     Fear of current or ex partner: Not on file     Emotionally abused: Not on file     Physically abused: Not on file     Forced sexual activity: Not on file   Other Topics Concern     Parent/sibling w/ CABG, MI or angioplasty before 65F 55M? Not Asked   Social History Narrative     Not on file         FAMILY HISTORY:  Family History   Problem Relation Age of Onset     Breast Cancer Sister      Hypertension Daughter      Breast Cancer Daughter 43     Skin Cancer Mother      Coronary Artery Disease Father      Colon Cancer Sister      Breast Cancer Maternal Aunt         Many Maternal Aunts have had breast Cancer         PHYSICAL EXAM:  Vital signs:  There were no vitals taken for this visit.   ECO  GENERAL/CONSTITUTIONAL: No acute distress.  BREAST: Mild erythema and hyperpigmentation over the left breast into the left axilla.  GAIT: Steady, does not use assistive device      LABS:  CBC RESULTS:   Recent Labs   Lab Test 19  0837  10/30/18  0950   WBC  --   --  5.3   RBC  --   --  4.83   HGB 12.9   < > 9.5*   HCT  --   --  33.5*   MCV  --   --  69*   MCH  --   --  19.7*   MCHC  --   --  28.4*   RDW  --   --  20.5*      < > 280    < > = values in this interval not displayed.       Recent Labs   Lab Test 19  0837 10/03/19  0813  18  0416   NA  --  137  --  138   POTASSIUM 4.8 4.5   < > 4.2   CHLORIDE  --  104  --  104   CO2  --  27  --  25   ANIONGAP  --  6  --  9   * 143*   < > 219*   BUN  --  10  --  10   CR  --  0.64  --  0.67   JORDON  --  9.5  --  8.2*    < > = values in this interval not displayed.         PATHOLOGY:  None  new.    IMAGING:  3/2/2020: DEXA scan -- normal bone mineral density.    ASSESSMENT/PLAN:  Diane Gaitan is a 69 year old female with the following issues:  1.  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  -I recommended starting adjuvant endocrine therapy, preferably with anastrozole.  She is not a great candidate for tamoxifen given her history of a possible TIA.  -I again discussed the potential side effects of anastrazole, including but not limited to: fatigue, myalgias/arthralgias, bone density loss, decrease sexual drive, vaginal dryness, nausea, headache, difficulty sleeping, hot flashes, night sweats, small cardiovascular risk.  She agrees to proceed with anastrozole.  -Discussed that her baseline DEXA scan from 3/2/2020 showed normal bone mineral density.  I recommended adequate calcium and vitamin D as well as weightbearing exercise if tolerated.  -Plan to repeat DEXA scan in 2022.    2.  Diabetes mellitus type 2  -She had a recent hemoglobin A1c that indicated good control of her diabetes.    -She will continue on metformin.    3.  Strong family history of breast cancer  -She states that her daughter had already undergone a genetics consult with negative genetic testing.  The  had informed her daughter that none of her other family members would require genetic testing.    Return in 3 months.    Myrna Kaye MD  Hematology/Oncology  [facility]    I spent a total of 20 minutes with the patient, with greater than 50% of the time in counseling and coordination of care.

## 2020-03-03 NOTE — TELEPHONE ENCOUNTER
Patient informed. She reports she felt good today. Pt. expressed understanding and acceptance of the plan. had no further questions at this time.  Advised can call back to clinic at any time with concerns.       Ina YEH, RN, PHN

## 2020-03-04 ENCOUNTER — PATIENT OUTREACH (OUTPATIENT)
Dept: ONCOLOGY | Facility: CLINIC | Age: 70
End: 2020-03-04

## 2020-03-04 ENCOUNTER — ONCOLOGY VISIT (OUTPATIENT)
Dept: ONCOLOGY | Facility: CLINIC | Age: 70
End: 2020-03-04
Attending: INTERNAL MEDICINE
Payer: MEDICARE

## 2020-03-04 VITALS
RESPIRATION RATE: 16 BRPM | TEMPERATURE: 98.2 F | BODY MASS INDEX: 35.35 KG/M2 | HEART RATE: 105 BPM | HEIGHT: 67 IN | SYSTOLIC BLOOD PRESSURE: 150 MMHG | DIASTOLIC BLOOD PRESSURE: 93 MMHG | WEIGHT: 225.2 LBS | OXYGEN SATURATION: 100 %

## 2020-03-04 DIAGNOSIS — E11.9 TYPE 2 DIABETES MELLITUS WITHOUT COMPLICATION, WITHOUT LONG-TERM CURRENT USE OF INSULIN (H): ICD-10-CM

## 2020-03-04 DIAGNOSIS — Z17.0 MALIGNANT NEOPLASM OF UPPER-OUTER QUADRANT OF LEFT BREAST IN FEMALE, ESTROGEN RECEPTOR POSITIVE (H): Primary | ICD-10-CM

## 2020-03-04 DIAGNOSIS — C50.412 MALIGNANT NEOPLASM OF UPPER-OUTER QUADRANT OF LEFT BREAST IN FEMALE, ESTROGEN RECEPTOR POSITIVE (H): Primary | ICD-10-CM

## 2020-03-04 PROCEDURE — G0463 HOSPITAL OUTPT CLINIC VISIT: HCPCS

## 2020-03-04 PROCEDURE — 99214 OFFICE O/P EST MOD 30 MIN: CPT | Performed by: INTERNAL MEDICINE

## 2020-03-04 RX ORDER — ANASTROZOLE 1 MG/1
1 TABLET ORAL DAILY
Qty: 90 TABLET | Refills: 3 | Status: SHIPPED | OUTPATIENT
Start: 2020-03-04 | End: 2020-05-20

## 2020-03-04 ASSESSMENT — MIFFLIN-ST. JEOR: SCORE: 1574.13

## 2020-03-04 ASSESSMENT — PAIN SCALES - GENERAL: PAINLEVEL: MODERATE PAIN (5)

## 2020-03-04 NOTE — PROGRESS NOTES
"Oncology Rooming Note    March 4, 2020 8:40 AM   Diane Gaitan is a 69 year old female who presents for:    Chief Complaint   Patient presents with     Oncology Clinic Visit     Initial Vitals: There were no vitals taken for this visit. Estimated body mass index is 35.15 kg/m  as calculated from the following:    Height as of 1/22/20: 1.702 m (5' 7\").    Weight as of 1/22/20: 101.8 kg (224 lb 6.4 oz). There is no height or weight on file to calculate BSA.  Data Unavailable Comment: Data Unavailable   No LMP recorded. Patient is postmenopausal.  Allergies reviewed: Yes  Medications reviewed: Yes    Medications: Medication refills not needed today.  Pharmacy name entered into EPIC:    [facility] [address] [facility]  [id number] - [place], [place] - [address] [id number] - [place] [address] AT [facility] & [address] [place] [address] [facility]UG STORE #[id number] - [place], [place] - [address] AT [facility] & [address]    Clinical concerns:  doctor was notified.      Shari Arceo MA            "

## 2020-03-04 NOTE — LETTER
3/4/2020         RE: Diane Gaitan  [address] [facility] [address] [facility] [address]        Dear Colleague,    Thank you for referring your patient, Diane Gaitan, to the [facility]. Please see a copy of my visit note below.    [facility]    Hematology/Oncology Established Patient Note      Today's Date: 03/04/20    Reason for follow-up: Left breast cancer.      HISTORY OF PRESENT ILLNESS: Diane Gaitan is a 69 year old female who presents with the following oncologic history:  1.  11/5/2019: Mammogram showed focal asymmetry in the lateral left breast at the 2-4 o'clock position.  2.  11/12/2019: Targeted left breast ultrasound showed an irregular hypoechoic mass at the 2 o'clock position, 5 cm from the nipple measuring 1.7 x 1.2 x 1.4 cm.  Survey of the axilla showed no evidence of adenopathy.  3.  11/15/2019: Ultrasound-guided left breast needle biopsy at 2:00, 5 cm from the nipple showed a grade 2 invasive mammary carcinoma ER strongly positive at greater than 95%, NV,Moderate to strongly positive at 80%, HER-2/guadalupe FISH negative.  4.  12/04/2019: Bilateral breast MRI showed left upper outer quadrant biopsy invasive mammary carcinoma measuring up to 1.7 cm and no other concerning areas of enhancement or lymphadenopathy.  5.  12/19/2019: Underwent left breast lumpectomy with left axillary sentinel lymph node biopsy under the care of Dr. Chidi Malik.  Pathology showed a grade 3 invasive pleomorphic lobular carcinoma measuring 1.5 cm, lymphovascular invasion not identified, margins negative for invasive carcinoma.  A total of 3 left axillary sentinel lymph nodes negative for malignancy.  Oncotype DX score = 8, corresponding to a distant recurrence risk at 9 years of 3% after 5 years of hormone blockade therapy and a less than 1% absolute chemotherapy benefit.  6.  2/25/2020: Completed adjuvant radiation therapy to the left breast.    INTERIM HISTORY:  Diane reports very mild  fatigue and anticipated skin reaction to radiation therapy.  She feels the burning is tolerable with the topicals recommended to her.  Otherwise, she denies any recent fevers, chills, night sweats, bowel or bladder dysfunction.      REVIEW OF SYSTEMS:   14 point ROS was reviewed and is negative other than as noted above in HPI.       HOME MEDICATIONS:  Current Outpatient Medications   Medication Sig Dispense Refill     aspirin 81 MG tablet Take 1 tablet by mouth daily. 90 tablet 3     buPROPion (WELLBUTRIN XL) 150 MG 24 hr tablet Take 1 tablet (150 mg) by mouth every morning 90 tablet 3     calcium carbonate 600 mg-vitamin D 400 units (CALTRATE) 600-400 MG-UNIT per tablet Take 1 tablet by mouth daily       citalopram (CELEXA) 20 MG tablet Take 1 tablet (20 mg) by mouth daily 90 tablet 3     gabapentin (NEURONTIN) 300 MG capsule Take 1 capsule (300 mg) by mouth 3 times daily 270 capsule 1     HYDROcodone-acetaminophen (NORCO) 5-325 MG tablet Take 1-2 tablets by mouth every 4 hours as needed for moderate to severe pain (Patient not taking: Reported on 1/22/2020) 15 tablet 0     losartan (COZAAR) 100 MG tablet Take 1 tablet (100 mg) by mouth daily 90 tablet 3     metFORMIN (GLUCOPHAGE) 500 MG tablet Take 1 tablet (500 mg) by mouth 2 times daily (with meals) 180 tablet 3     omeprazole (PRILOSEC) 20 MG DR capsule Take 1 capsule (20 mg) by mouth daily 90 capsule 3     simvastatin (ZOCOR) 20 MG tablet Take 1 tablet (20 mg) by mouth At Bedtime 90 tablet 3         ALLERGIES:  Allergies   Allergen Reactions     Lisinopril Cough     COUGH         PAST MEDICAL HISTORY:  Past Medical History:   Diagnosis Date     Arthritis      BENIGN HYPERTENSION 8/28/2002     Cancer (H)     Basal cell carcinoma     CARDIOVASCULAR SCREENING; LDL GOAL LESS THAN 100 10/31/2010     Depressive disorder 1997     Esophageal reflux      Mild major depression (H) 9/14/2010     Other malaise and fatigue 8/28/2002     Type 2 diabetes, HbA1c goal < 7%  (H) 10/31/2010   Possible transient ischemic attack.      PAST SURGICAL HISTORY:  Past Surgical History:   Procedure Laterality Date     ARTHROPLASTY KNEE  10/18/2012    Procedure: ARTHROPLASTY KNEE;  RIGHT TOTAL KNEE ARTHROPLASTY (SMITH & NEPHEW)^ ;  Surgeon: Howard Charles MD;  Location:  OR     BIOPSY NODE SENTINEL Left 2019    Procedure: LEFT SENTINEL LYMPH NODE BIOPSY;  Surgeon: Ruddy Malik MD;  Location:  OR     BREAST BIOPSY, RT/LT  1988    breast biopsy     C NONSPECIFIC PROCEDURE  10/30/96    skin tags removed     C NONSPECIFIC PROCEDURE      colonic polyps     COLONOSCOPY N/A 2016    Procedure: COLONOSCOPY;  Surgeon: Curt Patel MD;  Location:  GI     ESOPHAGOSCOPY, GASTROSCOPY, DUODENOSCOPY (EGD), COMBINED N/A 2018    Procedure: COMBINED ESOPHAGOSCOPY, GASTROSCOPY, DUODENOSCOPY (EGD);  gastroscopy;  Surgeon: Mac White MD;  Location:  GI     HYSTERECTOMY, PAP NO LONGER INDICATED      abdominal hysterectomy     LUMPECTOMY BREAST WITH SEED LOCALIZATION Left 2019    Procedure: SEED LOCALIZED LEFT BREAST LUMPECTOMY;  Surgeon: Ruddy Malik MD;  Location:  OR     ROTATOR CUFF REPAIR RT/LT Right      TUBAL LIGATION      at age 29   Ovaries intact.      SOCIAL HISTORY:  Social History     Socioeconomic History     Marital status:      Spouse name: Not on file     Number of children: Not on file     Years of education: Not on file     Highest education level: Not on file   Occupational History     Not on file   Social Needs     Financial resource strain: Not on file     Food insecurity:     Worry: Not on file     Inability: Not on file     Transportation needs:     Medical: Not on file     Non-medical: Not on file   Tobacco Use     Smoking status: Former Smoker     Packs/day: 1.00     Years: 12.00     Pack years: 12.00     Last attempt to quit: 10/18/1980     Years since quittin.4     Smokeless tobacco: Never  Used   Substance and Sexual Activity     Alcohol use: Yes     Comment: 12 Beers per week     Drug use: No     Sexual activity: Never     Partners: Male   Lifestyle     Physical activity:     Days per week: Not on file     Minutes per session: Not on file     Stress: Not on file   Relationships     Social connections:     Talks on phone: Not on file     Gets together: Not on file     Attends Sabianism service: Not on file     Active member of club or organization: Not on file     Attends meetings of clubs or organizations: Not on file     Relationship status: Not on file     Intimate partner violence:     Fear of current or ex partner: Not on file     Emotionally abused: Not on file     Physically abused: Not on file     Forced sexual activity: Not on file   Other Topics Concern     Parent/sibling w/ CABG, MI or angioplasty before 65F 55M? Not Asked   Social History Narrative     Not on file         FAMILY HISTORY:  Family History   Problem Relation Age of Onset     Breast Cancer Sister      Hypertension Daughter      Breast Cancer Daughter 43     Skin Cancer Mother      Coronary Artery Disease Father      Colon Cancer Sister      Breast Cancer Maternal Aunt         Many Maternal Aunts have had breast Cancer         PHYSICAL EXAM:  Vital signs:  There were no vitals taken for this visit.   ECO  GENERAL/CONSTITUTIONAL: No acute distress.  BREAST: Mild erythema and hyperpigmentation over the left breast into the left axilla.  GAIT: Steady, does not use assistive device      LABS:  CBC RESULTS:   Recent Labs   Lab Test 19  0837  10/30/18  0950   WBC  --   --  5.3   RBC  --   --  4.83   HGB 12.9   < > 9.5*   HCT  --   --  33.5*   MCV  --   --  69*   MCH  --   --  19.7*   MCHC  --   --  28.4*   RDW  --   --  20.5*      < > 280    < > = values in this interval not displayed.       Recent Labs   Lab Test 19  0837 10/03/19  0813  18  0416   NA  --  137  --  138   POTASSIUM 4.8 4.5   < > 4.2    CHLORIDE  --  104  --  104   CO2  --  27  --  25   ANIONGAP  --  6  --  9   * 143*   < > 219*   BUN  --  10  --  10   CR  --  0.64  --  0.67   JORDON  --  9.5  --  8.2*    < > = values in this interval not displayed.         PATHOLOGY:  None new.    IMAGING:  3/2/2020: DEXA scan -- normal bone mineral density.    ASSESSMENT/PLAN:  Diane Gaitan is a 69 year old female with the following issues:  1.  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  -I recommended starting adjuvant endocrine therapy, preferably with anastrozole.  She is not a great candidate for tamoxifen given her history of a possible TIA.  -I again discussed the potential side effects of anastrazole, including but not limited to: fatigue, myalgias/arthralgias, bone density loss, decrease sexual drive, vaginal dryness, nausea, headache, difficulty sleeping, hot flashes, night sweats, small cardiovascular risk.  She agrees to proceed with anastrozole.  -Discussed that her baseline DEXA scan from 3/2/2020 showed normal bone mineral density.  I recommended adequate calcium and vitamin D as well as weightbearing exercise if tolerated.  -Plan to repeat DEXA scan in 2022.    2.  Diabetes mellitus type 2  -She had a recent hemoglobin A1c that indicated good control of her diabetes.    -She will continue on metformin.    3.  Strong family history of breast cancer  -She states that her daughter had already undergone a genetics consult with negative genetic testing.  The  had informed her daughter that none of her other family members would require genetic testing.    Return in 3 months.    Myrna Kaye MD  Hematology/Oncology  [facility]    I spent a total of 20 minutes with the patient, with greater than 50% of the time in  "counseling and coordination of care.    Oncology Rooming Note    March 4, 2020 8:40 AM   Diane Gaitan is a 69 year old female who presents for:    Chief Complaint   Patient presents with     Oncology Clinic Visit     Initial Vitals: There were no vitals taken for this visit. Estimated body mass index is 35.15 kg/m  as calculated from the following:    Height as of 1/22/20: 1.702 m (5' 7\").    Weight as of 1/22/20: 101.8 kg (224 lb 6.4 oz). There is no height or weight on file to calculate BSA.  Data Unavailable Comment: Data Unavailable   No LMP recorded. Patient is postmenopausal.  Allergies reviewed: Yes  Medications reviewed: Yes    Medications: Medication refills not needed today.  Pharmacy name entered into EPIC:    [facility]  [facility]  [address] [facility]Y #[id number] [place], [place] - [address] [place] [address] DR[facility] STORE #[id number] - [place], [place] - [address] AT [facility] & [address] [place] [address] DRUG STORE #[id number] [place], [place] - [address] AT [facility] & [facility]    Clinical concerns:  doctor was notified.      Shari Arceo MA              Again, thank you for allowing me to participate in the care of your patient.        Sincerely,        Myrna Kaye MD    "

## 2020-03-04 NOTE — PROGRESS NOTES
Diane given information on Arimidex, Aromtase inhibitor, via [email or web address]. Reviewed side effects with patient including bone pain, decrease in bone density, hot flashes, nausea, night sweats, headaches. Encouraged movement such as walking or yoga to help with the bone pain. She is aware to continue her vitamin D supplement and weight bearing exercise. She is is aware to call clinic if she does not tolerate the medication. Viktoriya Wakefield RN,BSN,OCN

## 2020-03-10 ENCOUNTER — OFFICE VISIT (OUTPATIENT)
Dept: INTERNAL MEDICINE | Facility: CLINIC | Age: 70
End: 2020-03-10
Payer: MEDICARE

## 2020-03-10 VITALS
HEART RATE: 109 BPM | RESPIRATION RATE: 15 BRPM | SYSTOLIC BLOOD PRESSURE: 160 MMHG | DIASTOLIC BLOOD PRESSURE: 84 MMHG | WEIGHT: 224.8 LBS | BODY MASS INDEX: 35.21 KG/M2 | OXYGEN SATURATION: 98 %

## 2020-03-10 DIAGNOSIS — K21.9 GASTROESOPHAGEAL REFLUX DISEASE WITHOUT ESOPHAGITIS: ICD-10-CM

## 2020-03-10 DIAGNOSIS — C50.412 MALIGNANT NEOPLASM OF UPPER-OUTER QUADRANT OF LEFT BREAST IN FEMALE, ESTROGEN RECEPTOR POSITIVE (H): ICD-10-CM

## 2020-03-10 DIAGNOSIS — I10 ESSENTIAL HYPERTENSION, BENIGN: Primary | ICD-10-CM

## 2020-03-10 DIAGNOSIS — E11.9 TYPE 2 DIABETES MELLITUS WITHOUT COMPLICATION, WITHOUT LONG-TERM CURRENT USE OF INSULIN (H): ICD-10-CM

## 2020-03-10 DIAGNOSIS — F32.0 MAJOR DEPRESSIVE DISORDER, SINGLE EPISODE, MILD (H): ICD-10-CM

## 2020-03-10 DIAGNOSIS — Z17.0 MALIGNANT NEOPLASM OF UPPER-OUTER QUADRANT OF LEFT BREAST IN FEMALE, ESTROGEN RECEPTOR POSITIVE (H): ICD-10-CM

## 2020-03-10 DIAGNOSIS — Z13.6 CARDIOVASCULAR SCREENING; LDL GOAL LESS THAN 100: ICD-10-CM

## 2020-03-10 PROCEDURE — 99214 OFFICE O/P EST MOD 30 MIN: CPT | Performed by: INTERNAL MEDICINE

## 2020-03-10 RX ORDER — SIMVASTATIN 20 MG
20 TABLET ORAL AT BEDTIME
Qty: 90 TABLET | Refills: 3 | Status: SHIPPED | OUTPATIENT
Start: 2020-03-10 | End: 2021-06-09

## 2020-03-10 RX ORDER — AMLODIPINE BESYLATE 5 MG/1
5 TABLET ORAL DAILY
Qty: 90 TABLET | Refills: 1 | Status: CANCELLED | OUTPATIENT
Start: 2020-03-10

## 2020-03-10 RX ORDER — GABAPENTIN 300 MG/1
300 CAPSULE ORAL 3 TIMES DAILY
Qty: 270 CAPSULE | Refills: 1 | Status: SHIPPED | OUTPATIENT
Start: 2020-03-10 | End: 2020-06-23

## 2020-03-10 RX ORDER — HYDROCHLOROTHIAZIDE 12.5 MG/1
25 TABLET ORAL DAILY
Qty: 180 TABLET | Refills: 0 | Status: SHIPPED | OUTPATIENT
Start: 2020-03-10 | End: 2020-05-20

## 2020-03-10 RX ORDER — LOSARTAN POTASSIUM 100 MG/1
100 TABLET ORAL DAILY
Qty: 90 TABLET | Refills: 3 | Status: SHIPPED | OUTPATIENT
Start: 2020-03-10 | End: 2021-06-09

## 2020-03-10 RX ORDER — CITALOPRAM HYDROBROMIDE 20 MG/1
20 TABLET ORAL DAILY
Qty: 90 TABLET | Refills: 3 | Status: SHIPPED | OUTPATIENT
Start: 2020-03-10 | End: 2021-06-09

## 2020-03-10 ASSESSMENT — PATIENT HEALTH QUESTIONNAIRE - PHQ9: SUM OF ALL RESPONSES TO PHQ QUESTIONS 1-9: 2

## 2020-03-10 NOTE — PROGRESS NOTES
Subjective     Diane Gaitan is a 69 year old female who presents to clinic today for the following health issues:    HPI     Hypertension Follow-up      Do you check your blood pressure regularly outside of the clinic? Yes     Are you following a low salt diet? Yes    Are your blood pressures ever more than 140 on the top number (systolic) OR more   than 90 on the bottom number (diastolic), for example 140/90? Yes patient states readings 200-130/100-80. Patient states readings have been elevated since having radiation therapy       How many servings of fruits and vegetables do you eat daily?  2-3    On average, how many sweetened beverages do you drink each day (Examples: soda, juice, sweet tea, etc.  Do NOT count diet or artificially sweetened beverages)?   0    How many days per week do you exercise enough to make your heart beat faster? 7    How many minutes a day do you exercise enough to make your heart beat faster? 10-15    How many days per week do you miss taking your medication? 0      Patient Active Problem List   Diagnosis     Essential hypertension, benign     CARDIOVASCULAR SCREENING; LDL GOAL LESS THAN 100     S/P total knee replacement     Osteoarthrosis, unspecified whether generalized or localized, involving lower leg     Acute posthemorrhagic anemia     Major depressive disorder, single episode, mild (H)     Gastroesophageal reflux disease without esophagitis     Type 2 diabetes mellitus without complication, without long-term current use of insulin (H)     NSTEMI (non-ST elevated myocardial infarction) (H)     Unstable angina (H)     Diabetic polyneuropathy associated with type 2 diabetes mellitus (H)     Iron deficiency anemia, unspecified iron deficiency anemia type     Malignant neoplasm of upper-outer quadrant of left breast in female, estrogen receptor positive (H)     Malignant neoplasm of left female breast, unspecified estrogen receptor status, unspecified site of breast (H)     Past  Surgical History:   Procedure Laterality Date     ARTHROPLASTY KNEE  10/18/2012    Procedure: ARTHROPLASTY KNEE;  RIGHT TOTAL KNEE ARTHROPLASTY (SMITH & NEPHEW)^ ;  Surgeon: Howard Charles MD;  Location:  OR     BIOPSY NODE SENTINEL Left 2019    Procedure: LEFT SENTINEL LYMPH NODE BIOPSY;  Surgeon: Ruddy Malik MD;  Location:  OR     BREAST BIOPSY, RT/LT  1988    breast biopsy     C NONSPECIFIC PROCEDURE  10/30/96    skin tags removed     C NONSPECIFIC PROCEDURE      colonic polyps     COLONOSCOPY N/A 2016    Procedure: COLONOSCOPY;  Surgeon: Curt Patel MD;  Location:  GI     ESOPHAGOSCOPY, GASTROSCOPY, DUODENOSCOPY (EGD), COMBINED N/A 2018    Procedure: COMBINED ESOPHAGOSCOPY, GASTROSCOPY, DUODENOSCOPY (EGD);  gastroscopy;  Surgeon: Mac White MD;  Location:  GI     HYSTERECTOMY, PAP NO LONGER INDICATED  1986    abdominal hysterectomy     LUMPECTOMY BREAST WITH SEED LOCALIZATION Left 2019    Procedure: SEED LOCALIZED LEFT BREAST LUMPECTOMY;  Surgeon: Ruddy Malik MD;  Location:  OR     ROTATOR CUFF REPAIR RT/LT Right      TUBAL LIGATION      at age 29       Social History     Tobacco Use     Smoking status: Former Smoker     Packs/day: 1.00     Years: 12.00     Pack years: 12.00     Last attempt to quit: 10/18/1980     Years since quittin.4     Smokeless tobacco: Never Used   Substance Use Topics     Alcohol use: Yes     Comment: 12 Beers per week     Family History   Problem Relation Age of Onset     Breast Cancer Sister      Hypertension Daughter      Breast Cancer Daughter 43     Skin Cancer Mother      Coronary Artery Disease Father      Colon Cancer Sister      Breast Cancer Maternal Aunt         Many Maternal Aunts have had breast Cancer         Current Outpatient Medications   Medication Sig Dispense Refill     anastrozole (ARIMIDEX) 1 MG tablet Take 1 tablet (1 mg) by mouth daily 90 tablet 3     aspirin 81 MG tablet  Take 1 tablet by mouth daily. 90 tablet 3     buPROPion (WELLBUTRIN XL) 150 MG 24 hr tablet Take 1 tablet (150 mg) by mouth every morning 90 tablet 3     calcium carbonate 600 mg-vitamin D 400 units (CALTRATE) 600-400 MG-UNIT per tablet Take 1 tablet by mouth daily       citalopram (CELEXA) 20 MG tablet Take 1 tablet (20 mg) by mouth daily 90 tablet 3     gabapentin (NEURONTIN) 300 MG capsule Take 1 capsule (300 mg) by mouth 3 times daily 270 capsule 1     losartan (COZAAR) 100 MG tablet Take 1 tablet (100 mg) by mouth daily 90 tablet 3     metFORMIN (GLUCOPHAGE) 500 MG tablet Take 1 tablet (500 mg) by mouth 2 times daily (with meals) 180 tablet 3     omeprazole (PRILOSEC) 20 MG DR capsule Take 1 capsule (20 mg) by mouth daily 90 capsule 3     simvastatin (ZOCOR) 20 MG tablet Take 1 tablet (20 mg) by mouth At Bedtime 90 tablet 3     Allergies   Allergen Reactions     Lisinopril Cough     COUGH     BP Readings from Last 3 Encounters:   03/04/20 (!) 150/93   03/02/20 (!) 146/82   01/22/20 (!) 150/84    Wt Readings from Last 3 Encounters:   03/04/20 102.2 kg (225 lb 3.2 oz)   01/22/20 101.8 kg (224 lb 6.4 oz)   01/02/20 102.1 kg (225 lb)           Reviewed and updated as needed this visit by Provider         Review of Systems   ROS COMP: CONSTITUTIONAL: NEGATIVE for fever, chills, change in weight  ENT/MOUTH: NEGATIVE for ear, mouth and throat problems  RESP: NEGATIVE for significant cough or SOB  CV: NEGATIVE for chest pain, palpitations or peripheral edema  GI: NEGATIVE for nausea, abdominal pain, heartburn, or change in bowel habits  : NEGATIVE for frequency, dysuria, or hematuria  MUSCULOSKELETAL: NEGATIVE for significant arthralgias or myalgia  NEURO: NEGATIVE for weakness, dizziness or paresthesias  HEME: NEGATIVE for bleeding problems  PSYCHIATRIC: NEGATIVE for changes in mood or affect      Objective    BP (!) 160/84   Pulse 109   Resp 15   Wt 102 kg (224 lb 12.8 oz)   SpO2 98%   BMI 35.21 kg/m    Body  mass index is 35.21 kg/m .  Physical Exam   GENERAL:  alert and no distress  EYES: Eyes grossly normal to inspection, PERRL and conjunctivae and sclerae normal  HENT: ear canals and TM's normal, nose and mouth without ulcers or lesions  NECK: no adenopathy, no asymmetry, masses, or scars and thyroid normal to palpation  RESP: lungs clear to auscultation - no rales, rhonchi or wheezes  CV: regular rate and rhythm, normal S1 S2, no S3 or S4, no click or rub, no peripheral edema and peripheral pulses strong  MS: no gross musculoskeletal defects noted  NEURO: No FOCAL CHANGES  PSYCH: mentation appears normal, affect normal/bright      Lab Results   Component Value Date    A1C 5.7 10/03/2019    A1C 5.7 05/02/2019    A1C 6.1 09/13/2018    A1C 6.0 04/26/2018    A1C 6.3 11/07/2017     LDL Cholesterol Calculated   Date Value Ref Range Status   10/03/2019 59 <100 mg/dL Final     Comment:     Desirable:       <100 mg/dl       Assessment & Plan     1. Essential hypertension, benign  Discussed options with patient and suggest adding hydrochlorothiazide 25 mg in the a.m. with a recheck of blood pressure in 4 weeks with basic metabolic panel added  - losartan (COZAAR) 100 MG tablet; Take 1 tablet (100 mg) by mouth daily  Dispense: 90 tablet; Refill: 3  - hydrochlorothiazide (HYDRODIURIL) 12.5 MG tablet; Take 2 tablets (25 mg) by mouth daily  Dispense: 180 tablet; Refill: 0  - Basic metabolic panel  (Ca, Cl, CO2, Creat, Gluc, K, Na, BUN); Future    2. Type 2 diabetes mellitus without complication, without long-term current use of insulin (H)  Stable and continuing with current medical management and recheck A1c 6 months from last  - Hemoglobin A1c; Future  - simvastatin (ZOCOR) 20 MG tablet; Take 1 tablet (20 mg) by mouth At Bedtime  Dispense: 90 tablet; Refill: 3  - metFORMIN (GLUCOPHAGE) 500 MG tablet; Take 1 tablet (500 mg) by mouth 2 times daily (with meals)  Dispense: 180 tablet; Refill: 3    3. Malignant neoplasm of  upper-outer quadrant of left breast in female, estrogen receptor positive (H)  Stable now completed with radiation therapy and followed up per surgery.    4. CARDIOVASCULAR SCREENING; LDL GOAL LESS THAN 100  Labs ordered as stable see above LDL at goal  - simvastatin (ZOCOR) 20 MG tablet; Take 1 tablet (20 mg) by mouth At Bedtime  Dispense: 90 tablet; Refill: 3    5. Gastroesophageal reflux disease without esophagitis  Continue with PPI therapy as directed  - omeprazole (PRILOSEC) 20 MG DR capsule; Take 1 capsule (20 mg) by mouth daily  Dispense: 90 capsule; Refill: 3    6. Major depressive disorder, single episode, mild (H)  PHQ 9 stable with score noted as 1, continuing with current therapy  - citalopram (CELEXA) 20 MG tablet; Take 1 tablet (20 mg) by mouth daily  Dispense: 90 tablet; Refill: 3       See Patient Instructions, discussed issues of shingles vaccination once given clearance by oncologist    Return in about 1 month (around 4/10/2020) for BP Recheck, Lab Work appointment.    Jac Barry MD  [facility]

## 2020-03-11 ENCOUNTER — TELEPHONE (OUTPATIENT)
Dept: ONCOLOGY | Facility: CLINIC | Age: 70
End: 2020-03-11

## 2020-03-11 NOTE — TELEPHONE ENCOUNTER
Scheduling left voicemail message for patient requesting a return call regarding scheduling return appointment with Dr Kaye due June 2020.

## 2020-05-20 DIAGNOSIS — I10 ESSENTIAL HYPERTENSION, BENIGN: ICD-10-CM

## 2020-05-20 DIAGNOSIS — C50.412 MALIGNANT NEOPLASM OF UPPER-OUTER QUADRANT OF LEFT BREAST IN FEMALE, ESTROGEN RECEPTOR POSITIVE (H): ICD-10-CM

## 2020-05-20 DIAGNOSIS — Z17.0 MALIGNANT NEOPLASM OF UPPER-OUTER QUADRANT OF LEFT BREAST IN FEMALE, ESTROGEN RECEPTOR POSITIVE (H): ICD-10-CM

## 2020-05-20 RX ORDER — HYDROCHLOROTHIAZIDE 12.5 MG/1
25 TABLET ORAL DAILY
Qty: 180 TABLET | Refills: 0 | Status: SHIPPED | OUTPATIENT
Start: 2020-05-20 | End: 2020-08-11

## 2020-05-20 RX ORDER — ANASTROZOLE 1 MG/1
1 TABLET ORAL DAILY
Qty: 90 TABLET | Refills: 3 | Status: SHIPPED | OUTPATIENT
Start: 2020-05-20 | End: 2020-09-21

## 2020-05-20 NOTE — TELEPHONE ENCOUNTER
Routing refill request to provider for review/approval because:  Drug not on the [facility] refill protocol    out of range:  blood pressure

## 2020-06-02 NOTE — PROGRESS NOTES
"[facility]    Hematology/Oncology Established Patient Note      Today's Date: 06/04/20    Reason for follow-up: Left breast cancer.    Diane Gaitan is a 70 year old female who is being evaluated via a billable telephone visit, as her computer is not working and she does not have a smart phone.    The patient has been notified of following:     \"This telephone visit will be conducted via a call between you and your physician/provider. We have found that certain health care needs can be provided without the need for a physical exam.  This service lets us provide the care you need with a short phone conversation during the COVID-19 pandemic.  If a prescription is necessary we can send it directly to your pharmacy.  If lab work is needed we can place an order for that and you can then stop by our lab to have the test done at a later time.    Telephone visits are billed at different rates depending on your insurance coverage. During this emergency period, for some insurers they may be billed the same as an in-person visit.  Please reach out to your insurance provider with any questions.    If during the course of the call the physician/provider feels a telephone visit is not appropriate, you will not be charged for this service.\"    Patient has given verbal consent for Telephone visit?  Yes    How would you like to obtain your AVS? Mail a copy    HISTORY OF PRESENT ILLNESS: Diane Gaitan is a 70 year old female who presents with the following oncologic history:  1.  11/5/2019: Mammogram showed focal asymmetry in the lateral left breast at the 2-4 o'clock position.  2.  11/12/2019: Targeted left breast ultrasound showed an irregular hypoechoic mass at the 2 o'clock position, 5 cm from the nipple measuring 1.7 x 1.2 x 1.4 cm.  Survey of the axilla showed no evidence of adenopathy.  3.  11/15/2019: Ultrasound-guided left breast needle biopsy at 2:00, 5 cm from the nipple showed a grade 2 invasive " mammary carcinoma ER strongly positive at greater than 95%, MA, moderate to strongly positive at 80%, HER-2/guadalupe FISH negative.  4.  12/04/2019: Bilateral breast MRI showed left upper outer quadrant biopsy invasive mammary carcinoma measuring up to 1.7 cm and no other concerning areas of enhancement or lymphadenopathy.  5.  12/19/2019: Underwent left breast lumpectomy with left axillary sentinel lymph node biopsy under the care of Dr. Chidi Malik.  Pathology showed a grade 3 invasive pleomorphic lobular carcinoma measuring 1.5 cm, lymphovascular invasion not identified, margins negative for invasive carcinoma.  A total of 3 left axillary sentinel lymph nodes negative for malignancy.  Oncotype DX score = 8, corresponding to a distant recurrence risk at 9 years of 3% after 5 years of hormone blockade therapy and a less than 1% absolute chemotherapy benefit.  6.  2/25/2020: Completed adjuvant radiation therapy to the left breast.  7. 3/04/2020: Started adjuvant anastrazole.    INTERIM HISTORY:  Diane reports feeling well after starting anastrazole.  She denies any recent fevers, chills, night sweats, bowel or bladder dysfunction.  She denies any polyarthralgias or hot flashes.      REVIEW OF SYSTEMS:   14 point ROS was reviewed and is negative other than as noted above in HPI.       HOME MEDICATIONS:  Current Outpatient Medications   Medication Sig Dispense Refill     anastrozole (ARIMIDEX) 1 MG tablet Take 1 tablet (1 mg) by mouth daily 90 tablet 3     aspirin 81 MG tablet Take 1 tablet by mouth daily. 90 tablet 3     buPROPion (WELLBUTRIN XL) 150 MG 24 hr tablet Take 1 tablet (150 mg) by mouth every morning 90 tablet 3     calcium carbonate 600 mg-vitamin D 400 units (CALTRATE) 600-400 MG-UNIT per tablet Take 1 tablet by mouth daily       citalopram (CELEXA) 20 MG tablet Take 1 tablet (20 mg) by mouth daily 90 tablet 3     gabapentin (NEURONTIN) 300 MG capsule Take 1 capsule (300 mg) by mouth 3 times daily 270 capsule  1     hydrochlorothiazide (HYDRODIURIL) 12.5 MG tablet Take 2 tablets (25 mg) by mouth daily 180 tablet 0     losartan (COZAAR) 100 MG tablet Take 1 tablet (100 mg) by mouth daily 90 tablet 3     metFORMIN (GLUCOPHAGE) 500 MG tablet Take 1 tablet (500 mg) by mouth 2 times daily (with meals) 180 tablet 3     omeprazole (PRILOSEC) 20 MG DR capsule Take 1 capsule (20 mg) by mouth daily 90 capsule 3     simvastatin (ZOCOR) 20 MG tablet Take 1 tablet (20 mg) by mouth At Bedtime 90 tablet 3         ALLERGIES:  Allergies   Allergen Reactions     Lisinopril Cough     COUGH         PAST MEDICAL HISTORY:  Past Medical History:   Diagnosis Date     Arthritis      BENIGN HYPERTENSION 8/28/2002     Cancer (H)     Basal cell carcinoma     CARDIOVASCULAR SCREENING; LDL GOAL LESS THAN 100 10/31/2010     Depressive disorder 1997     Esophageal reflux      Mild major depression (H) 9/14/2010     Other malaise and fatigue 8/28/2002     Type 2 diabetes, HbA1c goal < 7% (H) 10/31/2010   Possible transient ischemic attack.      PAST SURGICAL HISTORY:  Past Surgical History:   Procedure Laterality Date     ARTHROPLASTY KNEE  10/18/2012    Procedure: ARTHROPLASTY KNEE;  RIGHT TOTAL KNEE ARTHROPLASTY (SMITH & NEPHEW)^ ;  Surgeon: Howard Charles MD;  Location:  OR     BIOPSY NODE SENTINEL Left 12/19/2019    Procedure: LEFT SENTINEL LYMPH NODE BIOPSY;  Surgeon: Ruddy Malik MD;  Location:  OR     BREAST BIOPSY, RT/LT  1988    breast biopsy     C NONSPECIFIC PROCEDURE  10/30/96    skin tags removed     C NONSPECIFIC PROCEDURE      colonic polyps     COLONOSCOPY N/A 7/14/2016    Procedure: COLONOSCOPY;  Surgeon: Curt Patel MD;  Location:  GI     ESOPHAGOSCOPY, GASTROSCOPY, DUODENOSCOPY (EGD), COMBINED N/A 9/13/2018    Procedure: COMBINED ESOPHAGOSCOPY, GASTROSCOPY, DUODENOSCOPY (EGD);  gastroscopy;  Surgeon: Mac White MD;  Location:  GI     HYSTERECTOMY, PAP NO LONGER INDICATED  1986     abdominal hysterectomy     LUMPECTOMY BREAST WITH SEED LOCALIZATION Left 2019    Procedure: SEED LOCALIZED LEFT BREAST LUMPECTOMY;  Surgeon: Ruddy Malik MD;  Location: [facility] OR     ROTATOR CUFF REPAIR RT/LT Right      TUBAL LIGATION      at age 29   Ovaries intact.      SOCIAL HISTORY:  Social History     Socioeconomic History     Marital status:      Spouse name: Not on file     Number of children: Not on file     Years of education: Not on file     Highest education level: Not on file   Occupational History     Not on file   Social Needs     Financial resource strain: Not on file     Food insecurity     Worry: Not on file     Inability: Not on file     Transportation needs     Medical: Not on file     Non-medical: Not on file   Tobacco Use     Smoking status: Former Smoker     Packs/day: 1.00     Years: 12.00     Pack years: 12.00     Last attempt to quit: 10/18/1980     Years since quittin.6     Smokeless tobacco: Never Used   Substance and Sexual Activity     Alcohol use: Yes     Comment: 12 Beers per week     Drug use: No     Sexual activity: Never     Partners: Male   Lifestyle     Physical activity     Days per week: Not on file     Minutes per session: Not on file     Stress: Not on file   Relationships     Social connections     Talks on phone: Not on file     Gets together: Not on file     Attends [employer] service: Not on file     Active member of club or organization: Not on file     Attends meetings of clubs or organizations: Not on file     Relationship status: Not on file     Intimate partner violence     Fear of current or ex partner: Not on file     Emotionally abused: Not on file     Physically abused: Not on file     Forced sexual activity: Not on file   Other Topics Concern     Parent/sibling w/ CABG, MI or angioplasty before 65F 55M? Not Asked   Social History Narrative     Not on file         FAMILY HISTORY:  Family History   Problem Relation Age of Onset     Breast  Cancer Sister      Hypertension Daughter      Breast Cancer Daughter 43     Skin Cancer Mother      Coronary Artery Disease Father      Colon Cancer Sister      Breast Cancer Maternal Aunt         Many Maternal Aunts have had breast Cancer         PHYSICAL EXAM:  Vital signs:  Wt 95.3 kg (210 lb)   BMI 32.89 kg/m     Not performed as this was a telephone visit.    LABS:  CBC RESULTS:   Recent Labs   Lab Test 12/16/19  0837  10/30/18  0950   WBC  --   --  5.3   RBC  --   --  4.83   HGB 12.9   < > 9.5*   HCT  --   --  33.5*   MCV  --   --  69*   MCH  --   --  19.7*   MCHC  --   --  28.4*   RDW  --   --  20.5*      < > 280    < > = values in this interval not displayed.       Recent Labs   Lab Test 12/16/19  0837 10/03/19  0813  09/13/18  0416   NA  --  137  --  138   POTASSIUM 4.8 4.5   < > 4.2   CHLORIDE  --  104  --  104   CO2  --  27  --  25   ANIONGAP  --  6  --  9   * 143*   < > 219*   BUN  --  10  --  10   CR  --  0.64  --  0.67   JORDON  --  9.5  --  8.2*    < > = values in this interval not displayed.       PATHOLOGY:  None new.    IMAGING:  3/2/2020: DEXA scan -- normal bone mineral density.    ASSESSMENT/PLAN:  Diane Gaitan is a 70 year old female with the following issues:  1.  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    -Note she is not a great candidate for tamoxifen given her history of a possible TIA.  -Discussed that her baseline DEXA scan from 3/2/2020 showed normal bone mineral density.  I recommended adequate calcium and vitamin D as well as weightbearing exercise if tolerated. She does use a cane and is careful about her movement and exercise.  -Plan to repeat DEXA scan in 2022.  -Continue annual  mammograms, next due on or after 11/5/2020.    2.  Diabetes mellitus type 2  -Stable.  -She will continue on metformin.    3.  Strong family history of breast cancer  -She states that her daughter had already undergone a genetics consult with negative genetic testing.  The  had informed her daughter that none of her other family members would require genetic testing.    Return in 3 months in-person.    Myrna Kaye MD  Hematology/Oncology  [facility]    Phone call visit duration: 13 minutes

## 2020-06-04 ENCOUNTER — VIRTUAL VISIT (OUTPATIENT)
Dept: ONCOLOGY | Facility: CLINIC | Age: 70
End: 2020-06-04
Attending: INTERNAL MEDICINE
Payer: MEDICARE

## 2020-06-04 VITALS — BODY MASS INDEX: 32.89 KG/M2 | WEIGHT: 210 LBS

## 2020-06-04 DIAGNOSIS — E11.9 TYPE 2 DIABETES MELLITUS WITHOUT COMPLICATION, WITHOUT LONG-TERM CURRENT USE OF INSULIN (H): ICD-10-CM

## 2020-06-04 DIAGNOSIS — C50.412 MALIGNANT NEOPLASM OF UPPER-OUTER QUADRANT OF LEFT BREAST IN FEMALE, ESTROGEN RECEPTOR POSITIVE (H): Primary | ICD-10-CM

## 2020-06-04 DIAGNOSIS — Z17.0 MALIGNANT NEOPLASM OF UPPER-OUTER QUADRANT OF LEFT BREAST IN FEMALE, ESTROGEN RECEPTOR POSITIVE (H): Primary | ICD-10-CM

## 2020-06-04 DIAGNOSIS — Z79.811 AROMATASE INHIBITOR USE: ICD-10-CM

## 2020-06-04 PROCEDURE — 99442 ZZC PHYSICIAN TELEPHONE EVALUATION 11-20 MIN: CPT | Performed by: INTERNAL MEDICINE

## 2020-06-04 ASSESSMENT — PAIN SCALES - GENERAL: PAINLEVEL: NO PAIN (0)

## 2020-06-04 NOTE — LETTER
"    6/4/2020         RE: Diane Gaitan  [address] [facility] [address]        Dear Colleague,    Thank you for referring your patient, Diane Gaitan, to the [facility]. Please see a copy of my visit note below.    [facility]    Hematology/Oncology Established Patient Note      Today's Date: 06/04/20    Reason for follow-up: Left breast cancer.    Diane Gaitan is a 70 year old female who is being evaluated via a billable telephone visit, as her computer is not working and she does not have a smart phone.    The patient has been notified of following:     \"This telephone visit will be conducted via a call between you and your physician/provider. We have found that certain health care needs can be provided without the need for a physical exam.  This service lets us provide the care you need with a short phone conversation during the COVID-19 pandemic.  If a prescription is necessary we can send it directly to your pharmacy.  If lab work is needed we can place an order for that and you can then stop by our lab to have the test done at a later time.    Telephone visits are billed at different rates depending on your insurance coverage. During this emergency period, for some insurers they may be billed the same as an in-person visit.  Please reach out to your insurance provider with any questions.    If during the course of the call the physician/provider feels a telephone visit is not appropriate, you will not be charged for this service.\"    Patient has given verbal consent for Telephone visit?  Yes    How would you like to obtain your AVS? Mail a copy    HISTORY OF PRESENT ILLNESS: Diane Gaitan is a 70 year old female who presents with the following oncologic history:  1.  11/5/2019: Mammogram showed focal asymmetry in the lateral left breast at the 2-4 o'clock position.  2.  11/12/2019: Targeted left breast ultrasound showed an irregular hypoechoic mass at " the 2 o'clock position, 5 cm from the nipple measuring 1.7 x 1.2 x 1.4 cm.  Survey of the axilla showed no evidence of adenopathy.  3.  11/15/2019: Ultrasound-guided left breast needle biopsy at 2:00, 5 cm from the nipple showed a grade 2 invasive mammary carcinoma ER strongly positive at greater than 95%, NH, moderate to strongly positive at 80%, HER-2/guadalupe FISH negative.  4.  12/04/2019: Bilateral breast MRI showed left upper outer quadrant biopsy invasive mammary carcinoma measuring up to 1.7 cm and no other concerning areas of enhancement or lymphadenopathy.  5.  12/19/2019: Underwent left breast lumpectomy with left axillary sentinel lymph node biopsy under the care of Dr. Chidi Malik.  Pathology showed a grade 3 invasive pleomorphic lobular carcinoma measuring 1.5 cm, lymphovascular invasion not identified, margins negative for invasive carcinoma.  A total of 3 left axillary sentinel lymph nodes negative for malignancy.  Oncotype DX score = 8, corresponding to a distant recurrence risk at 9 years of 3% after 5 years of hormone blockade therapy and a less than 1% absolute chemotherapy benefit.  6.  2/25/2020: Completed adjuvant radiation therapy to the left breast.  7. 3/04/2020: Started adjuvant anastrazole.    INTERIM HISTORY:  Diane reports feeling well after starting anastrazole.  She denies any recent fevers, chills, night sweats, bowel or bladder dysfunction.  She denies any polyarthralgias or hot flashes.      REVIEW OF SYSTEMS:   14 point ROS was reviewed and is negative other than as noted above in HPI.       HOME MEDICATIONS:  Current Outpatient Medications   Medication Sig Dispense Refill     anastrozole (ARIMIDEX) 1 MG tablet Take 1 tablet (1 mg) by mouth daily 90 tablet 3     aspirin 81 MG tablet Take 1 tablet by mouth daily. 90 tablet 3     buPROPion (WELLBUTRIN XL) 150 MG 24 hr tablet Take 1 tablet (150 mg) by mouth every morning 90 tablet 3     calcium carbonate 600 mg-vitamin D 400 units  (CALTRATE) 600-400 MG-UNIT per tablet Take 1 tablet by mouth daily       citalopram (CELEXA) 20 MG tablet Take 1 tablet (20 mg) by mouth daily 90 tablet 3     gabapentin (NEURONTIN) 300 MG capsule Take 1 capsule (300 mg) by mouth 3 times daily 270 capsule 1     hydrochlorothiazide (HYDRODIURIL) 12.5 MG tablet Take 2 tablets (25 mg) by mouth daily 180 tablet 0     losartan (COZAAR) 100 MG tablet Take 1 tablet (100 mg) by mouth daily 90 tablet 3     metFORMIN (GLUCOPHAGE) 500 MG tablet Take 1 tablet (500 mg) by mouth 2 times daily (with meals) 180 tablet 3     omeprazole (PRILOSEC) 20 MG DR capsule Take 1 capsule (20 mg) by mouth daily 90 capsule 3     simvastatin (ZOCOR) 20 MG tablet Take 1 tablet (20 mg) by mouth At Bedtime 90 tablet 3         ALLERGIES:  Allergies   Allergen Reactions     Lisinopril Cough     COUGH         PAST MEDICAL HISTORY:  Past Medical History:   Diagnosis Date     Arthritis      BENIGN HYPERTENSION 8/28/2002     Cancer (H)     Basal cell carcinoma     CARDIOVASCULAR SCREENING; LDL GOAL LESS THAN 100 10/31/2010     Depressive disorder 1997     Esophageal reflux      Mild major depression (H) 9/14/2010     Other malaise and fatigue 8/28/2002     Type 2 diabetes, HbA1c goal < 7% (H) 10/31/2010   Possible transient ischemic attack.      PAST SURGICAL HISTORY:  Past Surgical History:   Procedure Laterality Date     ARTHROPLASTY KNEE  10/18/2012    Procedure: ARTHROPLASTY KNEE;  RIGHT TOTAL KNEE ARTHROPLASTY (SMITH & NEPHEW)^ ;  Surgeon: Howard Charles MD;  Location:  OR     BIOPSY NODE SENTINEL Left 12/19/2019    Procedure: LEFT SENTINEL LYMPH NODE BIOPSY;  Surgeon: Ruddy Malik MD;  Location:  OR     BREAST BIOPSY, RT/LT  1988    breast biopsy     C NONSPECIFIC PROCEDURE  10/30/96    skin tags removed     C NONSPECIFIC PROCEDURE      colonic polyps     COLONOSCOPY N/A 7/14/2016    Procedure: COLONOSCOPY;  Surgeon: Curt Patel MD;  Location:  GI      ESOPHAGOSCOPY, GASTROSCOPY, DUODENOSCOPY (EGD), COMBINED N/A 2018    Procedure: COMBINED ESOPHAGOSCOPY, GASTROSCOPY, DUODENOSCOPY (EGD);  gastroscopy;  Surgeon: Mac White MD;  Location:  GI     HYSTERECTOMY, PAP NO LONGER INDICATED  1986    abdominal hysterectomy     LUMPECTOMY BREAST WITH SEED LOCALIZATION Left 2019    Procedure: SEED LOCALIZED LEFT BREAST LUMPECTOMY;  Surgeon: Ruddy Malik MD;  Location:  OR     ROTATOR CUFF REPAIR RT/LT Right      TUBAL LIGATION      at age 29   Ovaries intact.      SOCIAL HISTORY:  Social History     Socioeconomic History     Marital status:      Spouse name: Not on file     Number of children: Not on file     Years of education: Not on file     Highest education level: Not on file   Occupational History     Not on file   Social Needs     Financial resource strain: Not on file     Food insecurity     Worry: Not on file     Inability: Not on file     Transportation needs     Medical: Not on file     Non-medical: Not on file   Tobacco Use     Smoking status: Former Smoker     Packs/day: 1.00     Years: 12.00     Pack years: 12.00     Last attempt to quit: 10/18/1980     Years since quittin.6     Smokeless tobacco: Never Used   Substance and Sexual Activity     Alcohol use: Yes     Comment: 12 Beers per week     Drug use: No     Sexual activity: Never     Partners: Male   Lifestyle     Physical activity     Days per week: Not on file     Minutes per session: Not on file     Stress: Not on file   Relationships     Social connections     Talks on phone: Not on file     Gets together: Not on file     Attends [employer] service: Not on file     Active member of club or organization: Not on file     Attends meetings of clubs or organizations: Not on file     Relationship status: Not on file     Intimate partner violence     Fear of current or ex partner: Not on file     Emotionally abused: Not on file     Physically abused: Not on file      Forced sexual activity: Not on file   Other Topics Concern     Parent/sibling w/ CABG, MI or angioplasty before 65F 55M? Not Asked   Social History Narrative     Not on file         FAMILY HISTORY:  Family History   Problem Relation Age of Onset     Breast Cancer Sister      Hypertension Daughter      Breast Cancer Daughter 43     Skin Cancer Mother      Coronary Artery Disease Father      Colon Cancer Sister      Breast Cancer Maternal Aunt         Many Maternal Aunts have had breast Cancer         PHYSICAL EXAM:  Vital signs:  Wt 95.3 kg (210 lb)   BMI 32.89 kg/m     Not performed as this was a telephone visit.    LABS:  CBC RESULTS:   Recent Labs   Lab Test 12/16/19  0837  10/30/18  0950   WBC  --   --  5.3   RBC  --   --  4.83   HGB 12.9   < > 9.5*   HCT  --   --  33.5*   MCV  --   --  69*   MCH  --   --  19.7*   MCHC  --   --  28.4*   RDW  --   --  20.5*      < > 280    < > = values in this interval not displayed.       Recent Labs   Lab Test 12/16/19  0837 10/03/19  0813  09/13/18  0416   NA  --  137  --  138   POTASSIUM 4.8 4.5   < > 4.2   CHLORIDE  --  104  --  104   CO2  --  27  --  25   ANIONGAP  --  6  --  9   * 143*   < > 219*   BUN  --  10  --  10   CR  --  0.64  --  0.67   JORDON  --  9.5  --  8.2*    < > = values in this interval not displayed.       PATHOLOGY:  None new.    IMAGING:  3/2/2020: DEXA scan -- normal bone mineral density.    ASSESSMENT/PLAN:  Diane Gaitan is a 70 year old female with the following issues:  1.  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    -Note she is not a great candidate for tamoxifen given her history of a possible TIA.  -Discussed that her baseline DEXA scan from  "3/2/2020 showed normal bone mineral density.  I recommended adequate calcium and vitamin D as well as weightbearing exercise if tolerated. She does use a cane and is careful about her movement and exercise.  -Plan to repeat DEXA scan in 2022.  -Continue annual mammograms, next due on or after 11/5/2020.    2.  Diabetes mellitus type 2  -Stable.  -She will continue on metformin.    3.  Strong family history of breast cancer  -She states that her daughter had already undergone a genetics consult with negative genetic testing.  The  had informed her daughter that none of her other family members would require genetic testing.    Return in 3 months in-person.    Myrna Kaye MD  Hematology/Oncology  [facility]    Phone call visit duration: 13 minutes    Diane Gaitan is a 70 year old female who is being evaluated via a billable telephone visit.      The patient has been notified of following:     \"This telephone visit will be conducted via a call between you and your physician/provider. We have found that certain health care needs can be provided without the need for a physical exam.  This service lets us provide the care you need with a short phone conversation.  If a prescription is necessary we can send it directly to your pharmacy.  If lab work is needed we can place an order for that and you can then stop by our lab to have the test done at a later time.    Telephone visits are billed at different rates depending on your insurance coverage. During this emergency period, for some insurers they may be billed the same as an in-person visit.  Please reach out to your insurance provider with any questions.    If during the course of the call the physician/provider feels a telephone visit is not appropriate, you will not be charged for this service.\"    Patient has given verbal consent for Telephone visit?  Yes    What phone number would you like to be contacted at? [phone] "     How would you like to obtain your AVS? Mail a copy        Shari J. Schoenberger, CMA        Again, thank you for allowing me to participate in the care of your patient.        Sincerely,        Myrna Kaye MD

## 2020-06-04 NOTE — PROGRESS NOTES
"Diane Gaitan is a 70 year old female who is being evaluated via a billable telephone visit.      The patient has been notified of following:     \"This telephone visit will be conducted via a call between you and your physician/provider. We have found that certain health care needs can be provided without the need for a physical exam.  This service lets us provide the care you need with a short phone conversation.  If a prescription is necessary we can send it directly to your pharmacy.  If lab work is needed we can place an order for that and you can then stop by our lab to have the test done at a later time.    Telephone visits are billed at different rates depending on your insurance coverage. During this emergency period, for some insurers they may be billed the same as an in-person visit.  Please reach out to your insurance provider with any questions.    If during the course of the call the physician/provider feels a telephone visit is not appropriate, you will not be charged for this service.\"    Patient has given verbal consent for Telephone visit?  Yes    What phone number would you like to be contacted at? [phone]     How would you like to obtain your AVS? Mail a copy        Shari J. Schoenberger, CMA      "

## 2020-06-04 NOTE — LETTER
"    6/4/2020         RE: Diane Gaitan  [address] [facility] [address]        Dear Colleague,    Thank you for referring your patient, Diane Gaitan, to the [facility]. Please see a copy of my visit note below.    [facility]    Hematology/Oncology Established Patient Note      Today's Date: 06/04/20    Reason for follow-up: Left breast cancer.    Diane Gaitan is a 70 year old female who is being evaluated via a billable telephone visit, as her computer is not working and she does not have a smart phone.    The patient has been notified of following:     \"This telephone visit will be conducted via a call between you and your physician/provider. We have found that certain health care needs can be provided without the need for a physical exam.  This service lets us provide the care you need with a short phone conversation during the COVID-19 pandemic.  If a prescription is necessary we can send it directly to your pharmacy.  If lab work is needed we can place an order for that and you can then stop by our lab to have the test done at a later time.    Telephone visits are billed at different rates depending on your insurance coverage. During this emergency period, for some insurers they may be billed the same as an in-person visit.  Please reach out to your insurance provider with any questions.    If during the course of the call the physician/provider feels a telephone visit is not appropriate, you will not be charged for this service.\"    Patient has given verbal consent for Telephone visit?  Yes    How would you like to obtain your AVS? Mail a copy    HISTORY OF PRESENT ILLNESS: Diane Gaitan is a 70 year old female who presents with the following oncologic history:  1.  11/5/2019: Mammogram showed focal asymmetry in the lateral left breast at the 2-4 o'clock position.  2.  11/12/2019: Targeted left breast ultrasound showed an irregular hypoechoic mass at " the 2 o'clock position, 5 cm from the nipple measuring 1.7 x 1.2 x 1.4 cm.  Survey of the axilla showed no evidence of adenopathy.  3.  11/15/2019: Ultrasound-guided left breast needle biopsy at 2:00, 5 cm from the nipple showed a grade 2 invasive mammary carcinoma ER strongly positive at greater than 95%, NH, moderate to strongly positive at 80%, HER-2/guadalupe FISH negative.  4.  12/04/2019: Bilateral breast MRI showed left upper outer quadrant biopsy invasive mammary carcinoma measuring up to 1.7 cm and no other concerning areas of enhancement or lymphadenopathy.  5.  12/19/2019: Underwent left breast lumpectomy with left axillary sentinel lymph node biopsy under the care of Dr. Chidi Malik.  Pathology showed a grade 3 invasive pleomorphic lobular carcinoma measuring 1.5 cm, lymphovascular invasion not identified, margins negative for invasive carcinoma.  A total of 3 left axillary sentinel lymph nodes negative for malignancy.  Oncotype DX score = 8, corresponding to a distant recurrence risk at 9 years of 3% after 5 years of hormone blockade therapy and a less than 1% absolute chemotherapy benefit.  6.  2/25/2020: Completed adjuvant radiation therapy to the left breast.  7. 3/04/2020: Started adjuvant anastrazole.    INTERIM HISTORY:  Diane reports feeling well after starting anastrazole.  She denies any recent fevers, chills, night sweats, bowel or bladder dysfunction.  She denies any polyarthralgias or hot flashes.      REVIEW OF SYSTEMS:   14 point ROS was reviewed and is negative other than as noted above in HPI.       HOME MEDICATIONS:  Current Outpatient Medications   Medication Sig Dispense Refill     anastrozole (ARIMIDEX) 1 MG tablet Take 1 tablet (1 mg) by mouth daily 90 tablet 3     aspirin 81 MG tablet Take 1 tablet by mouth daily. 90 tablet 3     buPROPion (WELLBUTRIN XL) 150 MG 24 hr tablet Take 1 tablet (150 mg) by mouth every morning 90 tablet 3     calcium carbonate 600 mg-vitamin D 400 units  (CALTRATE) 600-400 MG-UNIT per tablet Take 1 tablet by mouth daily       citalopram (CELEXA) 20 MG tablet Take 1 tablet (20 mg) by mouth daily 90 tablet 3     gabapentin (NEURONTIN) 300 MG capsule Take 1 capsule (300 mg) by mouth 3 times daily 270 capsule 1     hydrochlorothiazide (HYDRODIURIL) 12.5 MG tablet Take 2 tablets (25 mg) by mouth daily 180 tablet 0     losartan (COZAAR) 100 MG tablet Take 1 tablet (100 mg) by mouth daily 90 tablet 3     metFORMIN (GLUCOPHAGE) 500 MG tablet Take 1 tablet (500 mg) by mouth 2 times daily (with meals) 180 tablet 3     omeprazole (PRILOSEC) 20 MG DR capsule Take 1 capsule (20 mg) by mouth daily 90 capsule 3     simvastatin (ZOCOR) 20 MG tablet Take 1 tablet (20 mg) by mouth At Bedtime 90 tablet 3         ALLERGIES:  Allergies   Allergen Reactions     Lisinopril Cough     COUGH         PAST MEDICAL HISTORY:  Past Medical History:   Diagnosis Date     Arthritis      BENIGN HYPERTENSION 8/28/2002     Cancer (H)     Basal cell carcinoma     CARDIOVASCULAR SCREENING; LDL GOAL LESS THAN 100 10/31/2010     Depressive disorder 1997     Esophageal reflux      Mild major depression (H) 9/14/2010     Other malaise and fatigue 8/28/2002     Type 2 diabetes, HbA1c goal < 7% (H) 10/31/2010   Possible transient ischemic attack.      PAST SURGICAL HISTORY:  Past Surgical History:   Procedure Laterality Date     ARTHROPLASTY KNEE  10/18/2012    Procedure: ARTHROPLASTY KNEE;  RIGHT TOTAL KNEE ARTHROPLASTY (SMITH & NEPHEW)^ ;  Surgeon: Howard Charles MD;  Location:  OR     BIOPSY NODE SENTINEL Left 12/19/2019    Procedure: LEFT SENTINEL LYMPH NODE BIOPSY;  Surgeon: Ruddy Malik MD;  Location:  OR     BREAST BIOPSY, RT/LT  1988    breast biopsy     C NONSPECIFIC PROCEDURE  10/30/96    skin tags removed     C NONSPECIFIC PROCEDURE      colonic polyps     COLONOSCOPY N/A 7/14/2016    Procedure: COLONOSCOPY;  Surgeon: Curt Patel MD;  Location:  GI      ESOPHAGOSCOPY, GASTROSCOPY, DUODENOSCOPY (EGD), COMBINED N/A 2018    Procedure: COMBINED ESOPHAGOSCOPY, GASTROSCOPY, DUODENOSCOPY (EGD);  gastroscopy;  Surgeon: Mac White MD;  Location:  GI     HYSTERECTOMY, PAP NO LONGER INDICATED  1986    abdominal hysterectomy     LUMPECTOMY BREAST WITH SEED LOCALIZATION Left 2019    Procedure: SEED LOCALIZED LEFT BREAST LUMPECTOMY;  Surgeon: Ruddy Malik MD;  Location:  OR     ROTATOR CUFF REPAIR RT/LT Right      TUBAL LIGATION      at age 29   Ovaries intact.      SOCIAL HISTORY:  Social History     Socioeconomic History     Marital status:      Spouse name: Not on file     Number of children: Not on file     Years of education: Not on file     Highest education level: Not on file   Occupational History     Not on file   Social Needs     Financial resource strain: Not on file     Food insecurity     Worry: Not on file     Inability: Not on file     Transportation needs     Medical: Not on file     Non-medical: Not on file   Tobacco Use     Smoking status: Former Smoker     Packs/day: 1.00     Years: 12.00     Pack years: 12.00     Last attempt to quit: 10/18/1980     Years since quittin.6     Smokeless tobacco: Never Used   Substance and Sexual Activity     Alcohol use: Yes     Comment: 12 Beers per week     Drug use: No     Sexual activity: Never     Partners: Male   Lifestyle     Physical activity     Days per week: Not on file     Minutes per session: Not on file     Stress: Not on file   Relationships     Social connections     Talks on phone: Not on file     Gets together: Not on file     Attends Hinduism service: Not on file     Active member of club or organization: Not on file     Attends meetings of clubs or organizations: Not on file     Relationship status: Not on file     Intimate partner violence     Fear of current or ex partner: Not on file     Emotionally abused: Not on file     Physically abused: Not on file      Forced sexual activity: Not on file   Other Topics Concern     Parent/sibling w/ CABG, MI or angioplasty before 65F 55M? Not Asked   Social History Narrative     Not on file         FAMILY HISTORY:  Family History   Problem Relation Age of Onset     Breast Cancer Sister      Hypertension Daughter      Breast Cancer Daughter 43     Skin Cancer Mother      Coronary Artery Disease Father      Colon Cancer Sister      Breast Cancer Maternal Aunt         Many Maternal Aunts have had breast Cancer         PHYSICAL EXAM:  Vital signs:  Wt 95.3 kg (210 lb)   BMI 32.89 kg/m     Not performed as this was a telephone visit.    LABS:  CBC RESULTS:   Recent Labs   Lab Test 12/16/19  0837  10/30/18  0950   WBC  --   --  5.3   RBC  --   --  4.83   HGB 12.9   < > 9.5*   HCT  --   --  33.5*   MCV  --   --  69*   MCH  --   --  19.7*   MCHC  --   --  28.4*   RDW  --   --  20.5*      < > 280    < > = values in this interval not displayed.       Recent Labs   Lab Test 12/16/19  0837 10/03/19  0813  09/13/18  0416   NA  --  137  --  138   POTASSIUM 4.8 4.5   < > 4.2   CHLORIDE  --  104  --  104   CO2  --  27  --  25   ANIONGAP  --  6  --  9   * 143*   < > 219*   BUN  --  10  --  10   CR  --  0.64  --  0.67   JORDON  --  9.5  --  8.2*    < > = values in this interval not displayed.       PATHOLOGY:  None new.    IMAGING:  3/2/2020: DEXA scan -- normal bone mineral density.    ASSESSMENT/PLAN:  Diane Gaitan is a 70 year old female with the following issues:  1.  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    -Note she is not a great candidate for tamoxifen given her history of a possible TIA.  -Discussed that her baseline DEXA scan from  "3/2/2020 showed normal bone mineral density.  I recommended adequate calcium and vitamin D as well as weightbearing exercise if tolerated. She does use a cane and is careful about her movement and exercise.  -Plan to repeat DEXA scan in 2022.  -Continue annual mammograms, next due on or after 11/5/2020.    2.  Diabetes mellitus type 2  -Stable.  -She will continue on metformin.    3.  Strong family history of breast cancer  -She states that her daughter had already undergone a genetics consult with negative genetic testing.  The  had informed her daughter that none of her other family members would require genetic testing.    Return in 3 months in-person.    Myrna Kaye MD  Hematology/Oncology  [facility]    Phone call visit duration: 13 minutes    Diane Gaitan is a 70 year old female who is being evaluated via a billable telephone visit.      The patient has been notified of following:     \"This telephone visit will be conducted via a call between you and your physician/provider. We have found that certain health care needs can be provided without the need for a physical exam.  This service lets us provide the care you need with a short phone conversation.  If a prescription is necessary we can send it directly to your pharmacy.  If lab work is needed we can place an order for that and you can then stop by our lab to have the test done at a later time.    Telephone visits are billed at different rates depending on your insurance coverage. During this emergency period, for some insurers they may be billed the same as an in-person visit.  Please reach out to your insurance provider with any questions.    If during the course of the call the physician/provider feels a telephone visit is not appropriate, you will not be charged for this service.\"    Patient has given verbal consent for Telephone visit?  Yes    What phone number would you like to be contacted at? [phone] "     How would you like to obtain your AVS? Mail a copy        Shari J. Schoenberger, CMA        Again, thank you for allowing me to participate in the care of your patient.        Sincerely,        Myrna Kaye MD     no

## 2020-06-17 ENCOUNTER — VIRTUAL VISIT (OUTPATIENT)
Dept: INTERNAL MEDICINE | Facility: CLINIC | Age: 70
End: 2020-06-17
Payer: MEDICARE

## 2020-06-17 VITALS — BODY MASS INDEX: 35.16 KG/M2 | WEIGHT: 224 LBS | HEIGHT: 67 IN

## 2020-06-17 DIAGNOSIS — E11.9 TYPE 2 DIABETES MELLITUS WITHOUT COMPLICATION, WITHOUT LONG-TERM CURRENT USE OF INSULIN (H): ICD-10-CM

## 2020-06-17 DIAGNOSIS — I10 ESSENTIAL HYPERTENSION, BENIGN: ICD-10-CM

## 2020-06-17 DIAGNOSIS — R51.9 NONINTRACTABLE HEADACHE, UNSPECIFIED CHRONICITY PATTERN, UNSPECIFIED HEADACHE TYPE: Primary | ICD-10-CM

## 2020-06-17 DIAGNOSIS — Z13.6 CARDIOVASCULAR SCREENING; LDL GOAL LESS THAN 100: ICD-10-CM

## 2020-06-17 PROCEDURE — 99442 ZZC PHYSICIAN TELEPHONE EVALUATION 11-20 MIN: CPT | Performed by: INTERNAL MEDICINE

## 2020-06-17 ASSESSMENT — MIFFLIN-ST. JEOR: SCORE: 1563.69

## 2020-06-17 NOTE — PROGRESS NOTES
"Diane Gaitan is a 70 year old female who is being evaluated via a billable telephone visit.      The patient has been notified of following:     \"This telephone visit will be conducted via a call between you and your physician/provider. We have found that certain health care needs can be provided without the need for a physical exam.  This service lets us provide the care you need with a short phone conversation.  If a prescription is necessary we can send it directly to your pharmacy.  If lab work is needed we can place an order for that and you can then stop by our lab to have the test done at a later time.    Telephone visits are billed at different rates depending on your insurance coverage. During this emergency period, for some insurers they may be billed the same as an in-person visit.  Please reach out to your insurance provider with any questions.    If during the course of the call the physician/provider feels a telephone visit is not appropriate, you will not be charged for this service.\"    Patient has given verbal consent for Telephone visit?  Yes    What phone number would you like to be contacted at? [phone]    How would you like to obtain your AVS? Isamar Michele     Diane Gaitan is a 70 year old female who presents via phone visit today for the following health issues:    HPI  Hypertension Follow-up      Do you check your blood pressure regularly outside of the clinic? Yes     Are you following a low salt diet? Yes    Are your blood pressures ever more than 140 on the top number (systolic) OR more   than 90 on the bottom number (diastolic), for example 140/90? Yes 169/99 this am      How many servings of fruits and vegetables do you eat daily?  2-3    On average, how many sweetened beverages do you drink each day (Examples: soda, juice, sweet tea, etc.  Do NOT count diet or artificially sweetened beverages)?   0    How many days per week do you exercise enough to make your " heart beat faster? 7    How many minutes a day do you exercise enough to make your heart beat faster? 20 - 29    How many days per week do you miss taking your medication? 0    Headaches      Duration: 3 weeks    Description  Location: bilateral in the occipital area, then moves to temples   Character: dull pain, sharp pain  Frequency:  3x a week  Duration:  Throughout the day she can have mild dull headaches    Intensity:  Starts mild and then gets     Accompanying signs and symptoms:    Precipitating or Alleviating factors:  Nausea/vomiting: no  Dizziness: sometimes, sometimes feels unsteady on her feet  Weakness or numbness: sometimes, legs feel weaker. Been using cane  Visual changes: blurry vision after headaches  Fever: no   Sinus or URI symptoms no     History  Head trauma: no  Family history of migraines: no  Previous tests for headaches: no  Neurologist evaluations: no  Able to do daily activities when headache present: YES  Wake with headaches: no  Daily pain medication use: no  Any changes in: covid    Precipitating or Alleviating factors (light/sound/sleep/caffeine): none    Therapies tried and outcome: Does not take anything unless they are severe, some massage on temples helps Outcome - not effective  Frequent/daily pain medication use: no    Patient has had issues of ongoing balance difficulties and atypical symptoms since she underwent to treatment with radiation therapy she underwent an MRI of her head back in October 2019 and this was unremarkable and her symptoms of balance difficulties, headaches and atypical weakness still are present.  She does not have classic symptoms of migrainous headache, cluster headache and with resolution of mild massage her symptoms improve which would imply a tension component.  The atypical issues that she reports in regards to balance, weakness are not reproducible all the time.      Patient Active Problem List   Diagnosis     Essential hypertension, benign      CARDIOVASCULAR SCREENING; LDL GOAL LESS THAN 100     S/P total knee replacement     Osteoarthrosis, unspecified whether generalized or localized, involving lower leg     Acute posthemorrhagic anemia     Major depressive disorder, single episode, mild (H)     Gastroesophageal reflux disease without esophagitis     Type 2 diabetes mellitus without complication, without long-term current use of insulin (H)     NSTEMI (non-ST elevated myocardial infarction) (H)     Unstable angina (H)     Diabetic polyneuropathy associated with type 2 diabetes mellitus (H)     Iron deficiency anemia, unspecified iron deficiency anemia type     Malignant neoplasm of upper-outer quadrant of left breast in female, estrogen receptor positive (H)     Malignant neoplasm of left female breast, unspecified estrogen receptor status, unspecified site of breast (H)     Past Surgical History:   Procedure Laterality Date     ARTHROPLASTY KNEE  10/18/2012    Procedure: ARTHROPLASTY KNEE;  RIGHT TOTAL KNEE ARTHROPLASTY (SMITH & NEPHEW)^ ;  Surgeon: Howard Charles MD;  Location:  OR     BIOPSY NODE SENTINEL Left 12/19/2019    Procedure: LEFT SENTINEL LYMPH NODE BIOPSY;  Surgeon: Ruddy Malik MD;  Location:  OR     BREAST BIOPSY, RT/LT  1988    breast biopsy     C NONSPECIFIC PROCEDURE  10/30/96    skin tags removed     C NONSPECIFIC PROCEDURE      colonic polyps     COLONOSCOPY N/A 7/14/2016    Procedure: COLONOSCOPY;  Surgeon: Curt Patel MD;  Location:  GI     ESOPHAGOSCOPY, GASTROSCOPY, DUODENOSCOPY (EGD), COMBINED N/A 9/13/2018    Procedure: COMBINED ESOPHAGOSCOPY, GASTROSCOPY, DUODENOSCOPY (EGD);  gastroscopy;  Surgeon: Mac White MD;  Location:  GI     HYSTERECTOMY, PAP NO LONGER INDICATED  1986    abdominal hysterectomy     LUMPECTOMY BREAST WITH SEED LOCALIZATION Left 12/19/2019    Procedure: SEED LOCALIZED LEFT BREAST LUMPECTOMY;  Surgeon: Ruddy Malik MD;  Location:  OR      ROTATOR CUFF REPAIR RT/LT Right      TUBAL LIGATION      at age 29       Social History     Tobacco Use     Smoking status: Former Smoker     Packs/day: 1.00     Years: 12.00     Pack years: 12.00     Last attempt to quit: 10/18/1980     Years since quittin.6     Smokeless tobacco: Never Used   Substance Use Topics     Alcohol use: Yes     Comment: 12 Beers per week     Family History   Problem Relation Age of Onset     Breast Cancer Sister      Hypertension Daughter      Breast Cancer Daughter 43     Skin Cancer Mother      Coronary Artery Disease Father      Colon Cancer Sister      Breast Cancer Maternal Aunt         Many Maternal Aunts have had breast Cancer         Current Outpatient Medications   Medication Sig Dispense Refill     anastrozole (ARIMIDEX) 1 MG tablet Take 1 tablet (1 mg) by mouth daily 90 tablet 3     aspirin 81 MG tablet Take 1 tablet by mouth daily. 90 tablet 3     buPROPion (WELLBUTRIN XL) 150 MG 24 hr tablet Take 1 tablet (150 mg) by mouth every morning 90 tablet 3     calcium carbonate 600 mg-vitamin D 400 units (CALTRATE) 600-400 MG-UNIT per tablet Take 1 tablet by mouth daily       citalopram (CELEXA) 20 MG tablet Take 1 tablet (20 mg) by mouth daily 90 tablet 3     gabapentin (NEURONTIN) 300 MG capsule Take 1 capsule (300 mg) by mouth 3 times daily 270 capsule 1     hydrochlorothiazide (HYDRODIURIL) 12.5 MG tablet Take 2 tablets (25 mg) by mouth daily 180 tablet 0     losartan (COZAAR) 100 MG tablet Take 1 tablet (100 mg) by mouth daily 90 tablet 3     metFORMIN (GLUCOPHAGE) 500 MG tablet Take 1 tablet (500 mg) by mouth 2 times daily (with meals) 180 tablet 3     omeprazole (PRILOSEC) 20 MG DR capsule Take 1 capsule (20 mg) by mouth daily 90 capsule 3     simvastatin (ZOCOR) 20 MG tablet Take 1 tablet (20 mg) by mouth At Bedtime 90 tablet 3     Allergies   Allergen Reactions     Lisinopril Cough     COUGH     BP Readings from Last 3 Encounters:   03/10/20 (!) 160/84   20 (!)  "150/93   03/02/20 (!) 146/82    Wt Readings from Last 3 Encounters:   06/17/20 101.6 kg (224 lb)   06/04/20 95.3 kg (210 lb)   03/10/20 102 kg (224 lb 12.8 oz)           Reviewed and updated as needed this visit by Provider         Review of Systems   CONSTITUTIONAL: NEGATIVE for fever, chills, change in weight  ENT/MOUTH: NEGATIVE for ear, mouth and throat problems  RESP: NEGATIVE for significant cough or SOB  CV: NEGATIVE for chest pain, palpitations or peripheral edema  GI: NEGATIVE for nausea, abdominal pain, heartburn, or change in bowel habits  : NEGATIVE for frequency, dysuria, or hematuria  MUSCULOSKELETAL: NEGATIVE for significant arthralgias or myalgia  HEME: NEGATIVE for bleeding problems  PSYCHIATRIC: NEGATIVE for changes in mood or affect       Objective   Reported vitals:  Ht 1.702 m (5' 7\")   Wt 101.6 kg (224 lb)   BMI 35.08 kg/m     healthy, alert and no distress  PSYCH: Alert and oriented times 3; coherent speech, normal   rate and volume, able to articulate logical thoughts, able   to abstract reason, no tangential thoughts, no hallucinations   or delusions  Her affect is normal  RESP: No cough, no audible wheezing, able to talk in full sentences  Remainder of exam unable to be completed due to telephone visits        Lab Results   Component Value Date    A1C 5.7 10/03/2019    A1C 5.7 05/02/2019    A1C 6.1 09/13/2018    A1C 6.0 04/26/2018    A1C 6.3 11/07/2017     LDL Cholesterol Calculated   Date Value Ref Range Status   10/03/2019 59 <100 mg/dL Final     Comment:     Desirable:       <100 mg/dl     Lab Results   Component Value Date    ALT 30 10/03/2019     Creatinine   Date Value Ref Range Status   10/03/2019 0.64 0.52 - 1.04 mg/dL Final       Assessment/Plan:    (R51) Nonintractable headache, unspecified chronicity pattern, unspecified headache type  (primary encounter diagnosis)  Comment: Atypical symptoms with nonprogressive symptoms but recurrent.  Suggest to reimage the head for " Bites reassurance and follow-up with neurology due to ongoing symptomatology.  Plan: MR Brain w/o Contrast, NEUROLOGY ADULT REFERRAL            (E11.9) Type 2 diabetes mellitus without complication, without long-term current use of insulin (H)  Comment: Repeat A1c recommended was blood sugars noted to be at goal per prior lab work.  Plan:     (I10) Essential hypertension, benign  Comment: Stable on therapy continue with current medical management.  Plan:     (Z13.6) CARDIOVASCULAR SCREENING; LDL GOAL LESS THAN 100  Comment: At goal with noted LDL listed above.  Plan: Annual labs recommended.      Return in about 1 month (around 7/17/2020) for Lab Work appointment.  Again advised Shingrix vaccination and routine follow-up for A1c.    Phone call duration:  14 minutes    Jac Barry MD

## 2020-06-19 ENCOUNTER — ALLIED HEALTH/NURSE VISIT (OUTPATIENT)
Dept: INTERNAL MEDICINE | Facility: CLINIC | Age: 70
End: 2020-06-19

## 2020-06-19 VITALS — SYSTOLIC BLOOD PRESSURE: 159 MMHG | DIASTOLIC BLOOD PRESSURE: 105 MMHG

## 2020-06-19 DIAGNOSIS — Z01.30 BLOOD PRESSURE CHECK: Primary | ICD-10-CM

## 2020-06-19 DIAGNOSIS — Z23 NEED FOR VACCINATION: Primary | ICD-10-CM

## 2020-06-19 DIAGNOSIS — I10 ESSENTIAL HYPERTENSION, BENIGN: ICD-10-CM

## 2020-06-19 DIAGNOSIS — E11.9 TYPE 2 DIABETES MELLITUS WITHOUT COMPLICATION, WITHOUT LONG-TERM CURRENT USE OF INSULIN (H): ICD-10-CM

## 2020-06-19 LAB
ANION GAP SERPL CALCULATED.3IONS-SCNC: 5 MMOL/L (ref 3–14)
BUN SERPL-MCNC: 6 MG/DL (ref 7–30)
CALCIUM SERPL-MCNC: 9.5 MG/DL (ref 8.5–10.1)
CHLORIDE SERPL-SCNC: 98 MMOL/L (ref 94–109)
CO2 SERPL-SCNC: 30 MMOL/L (ref 20–32)
CREAT SERPL-MCNC: 0.64 MG/DL (ref 0.52–1.04)
GFR SERPL CREATININE-BSD FRML MDRD: 90 ML/MIN/{1.73_M2}
GLUCOSE SERPL-MCNC: 135 MG/DL (ref 70–99)
HBA1C MFR BLD: 5.7 % (ref 0–5.6)
POTASSIUM SERPL-SCNC: 3.9 MMOL/L (ref 3.4–5.3)
SODIUM SERPL-SCNC: 133 MMOL/L (ref 133–144)

## 2020-06-19 PROCEDURE — 90471 IMMUNIZATION ADMIN: CPT

## 2020-06-19 PROCEDURE — 80048 BASIC METABOLIC PNL TOTAL CA: CPT | Performed by: INTERNAL MEDICINE

## 2020-06-19 PROCEDURE — 83036 HEMOGLOBIN GLYCOSYLATED A1C: CPT | Performed by: INTERNAL MEDICINE

## 2020-06-19 PROCEDURE — 99207 ZZC NO CHARGE LOS: CPT | Performed by: INTERNAL MEDICINE

## 2020-06-19 PROCEDURE — 36415 COLL VENOUS BLD VENIPUNCTURE: CPT | Performed by: INTERNAL MEDICINE

## 2020-06-19 PROCEDURE — 90750 HZV VACC RECOMBINANT IM: CPT

## 2020-06-19 NOTE — PROGRESS NOTES
Informed dr betancourt of the readings.    Pt to check her bp's though the weekend and make a telephone visit with him on monday

## 2020-06-22 ENCOUNTER — TELEPHONE (OUTPATIENT)
Dept: INTERNAL MEDICINE | Facility: CLINIC | Age: 70
End: 2020-06-22

## 2020-06-22 NOTE — TELEPHONE ENCOUNTER
Patient informed. Reasons to be seen in ER discussed. Scheduling for MRI number given to patient.    Ina HOGUEN, RN, PHN

## 2020-06-22 NOTE — TELEPHONE ENCOUNTER
8:00am BP this morning was 173/109, slight headache    Patient reports she does like to drink coffee but did not consume over the weekend. Patient thinking headache r/t caffeine withdrawal. Did take extra strength tylenol over weekend which provides some relief. Patient also reports has lots of stressors which could also be the culprit.    Started taking BP readings at home over the weekend:  158/96  151/78  188/107  187/89  135/79  162/76  142/86  159/89  159/80- last night    BP Readings from Last 3 Encounters:   06/19/20 (!) 159/105   03/10/20 (!) 160/84   03/04/20 (!) 150/93         PCP please advise. Provider schedule is limited this week. Telephone visit schedule on 6/23/2020.     Please advise if ok with this and advise if patient should do any further BP medication adjustments in the meantime. Patient receives medications via Mail, are you ok with adjusting any thing that she has at home? Is she ok with avoiding caffeine for now?     Ina YEH, RN, PHN

## 2020-06-22 NOTE — TELEPHONE ENCOUNTER
Continue to monitor for blood pressure.  I see patient has virtual visit scheduled for tomorrow and we may need to consider adding an additional medication.  If headache worsens patient should be seen in clinic.

## 2020-06-23 ENCOUNTER — VIRTUAL VISIT (OUTPATIENT)
Dept: INTERNAL MEDICINE | Facility: CLINIC | Age: 70
End: 2020-06-23
Payer: MEDICARE

## 2020-06-23 VITALS — BODY MASS INDEX: 35.08 KG/M2 | SYSTOLIC BLOOD PRESSURE: 177 MMHG | DIASTOLIC BLOOD PRESSURE: 98 MMHG | WEIGHT: 224 LBS

## 2020-06-23 DIAGNOSIS — E11.9 TYPE 2 DIABETES MELLITUS WITHOUT COMPLICATION, WITHOUT LONG-TERM CURRENT USE OF INSULIN (H): Primary | ICD-10-CM

## 2020-06-23 DIAGNOSIS — F40.240 CLAUSTROPHOBIA: ICD-10-CM

## 2020-06-23 DIAGNOSIS — I10 ESSENTIAL HYPERTENSION, BENIGN: ICD-10-CM

## 2020-06-23 DIAGNOSIS — E66.01 MORBID OBESITY (H): ICD-10-CM

## 2020-06-23 PROCEDURE — 99442 ZZC PHYSICIAN TELEPHONE EVALUATION 11-20 MIN: CPT | Performed by: INTERNAL MEDICINE

## 2020-06-23 RX ORDER — LOSARTAN POTASSIUM 50 MG/1
50 TABLET ORAL DAILY
Qty: 90 TABLET | Refills: 1 | Status: CANCELLED | OUTPATIENT
Start: 2020-06-23

## 2020-06-23 RX ORDER — LORAZEPAM 0.5 MG/1
TABLET ORAL
Qty: 2 TABLET | Refills: 0 | Status: SHIPPED | OUTPATIENT
Start: 2020-06-23 | End: 2021-07-28

## 2020-06-23 RX ORDER — LORAZEPAM 0.5 MG/1
TABLET ORAL
Refills: 0 | COMMUNITY
Start: 2019-11-27 | End: 2020-06-23

## 2020-06-23 RX ORDER — AMLODIPINE BESYLATE 5 MG/1
5 TABLET ORAL DAILY
Qty: 90 TABLET | Refills: 0 | Status: SHIPPED | OUTPATIENT
Start: 2020-06-23 | End: 2020-09-15

## 2020-06-23 NOTE — PROGRESS NOTES
"Diane Gaitan is a 70 year old female who is being evaluated via a billable telephone visit.      The patient has been notified of following:     \"This telephone visit will be conducted via a call between you and your physician/provider. We have found that certain health care needs can be provided without the need for a physical exam.  This service lets us provide the care you need with a short phone conversation.  If a prescription is necessary we can send it directly to your pharmacy.  If lab work is needed we can place an order for that and you can then stop by our lab to have the test done at a later time.    Telephone visits are billed at different rates depending on your insurance coverage. During this emergency period, for some insurers they may be billed the same as an in-person visit.  Please reach out to your insurance provider with any questions.    If during the course of the call the physician/provider feels a telephone visit is not appropriate, you will not be charged for this service.\"    Patient has given verbal consent for Telephone visit?  Yes    What phone number would you like to be contacted at? [phone]    How would you like to obtain your AVS? Isamar Michele     Diane Gaitan is a 70 year old female who presents via phone visit today for the following health issues:    HPI:    Hypertension Follow-up      Do you check your blood pressure regularly outside of the clinic? Yes  Are you following a low salt diet? Yes    Are your blood pressures ever more than 140 on the top number (systolic) OR more     than 90 on the bottom number (diastolic), for example 140/90? Yes Averaging [phone]     -Patient would like to discuss caffeine use and BP (has discontinued   coffee)     Other concerns:  1. Discuss gabapentin- only using twice daily as not working and would like to discontinue  2. Requesting Lorazepam to use prior to MRI on 6/30 - has used prior.    Patient Active Problem " List   Diagnosis     Essential hypertension, benign     CARDIOVASCULAR SCREENING; LDL GOAL LESS THAN 100     S/P total knee replacement     Osteoarthrosis, unspecified whether generalized or localized, involving lower leg     Acute posthemorrhagic anemia     Major depressive disorder, single episode, mild (H)     Gastroesophageal reflux disease without esophagitis     Type 2 diabetes mellitus without complication, without long-term current use of insulin (H)     NSTEMI (non-ST elevated myocardial infarction) (H)     Unstable angina (H)     Diabetic polyneuropathy associated with type 2 diabetes mellitus (H)     Iron deficiency anemia, unspecified iron deficiency anemia type     Malignant neoplasm of upper-outer quadrant of left breast in female, estrogen receptor positive (H)     Malignant neoplasm of left female breast, unspecified estrogen receptor status, unspecified site of breast (H)     Morbid obesity (H)     Past Surgical History:   Procedure Laterality Date     ARTHROPLASTY KNEE  10/18/2012    Procedure: ARTHROPLASTY KNEE;  RIGHT TOTAL KNEE ARTHROPLASTY (SMITH & NEPHEW)^ ;  Surgeon: Howard Charles MD;  Location:  OR     BIOPSY NODE SENTINEL Left 12/19/2019    Procedure: LEFT SENTINEL LYMPH NODE BIOPSY;  Surgeon: Ruddy Malik MD;  Location:  OR     BREAST BIOPSY, RT/LT  1988    breast biopsy     C NONSPECIFIC PROCEDURE  10/30/96    skin tags removed     C NONSPECIFIC PROCEDURE      colonic polyps     COLONOSCOPY N/A 7/14/2016    Procedure: COLONOSCOPY;  Surgeon: Curt Patel MD;  Location:  GI     ESOPHAGOSCOPY, GASTROSCOPY, DUODENOSCOPY (EGD), COMBINED N/A 9/13/2018    Procedure: COMBINED ESOPHAGOSCOPY, GASTROSCOPY, DUODENOSCOPY (EGD);  gastroscopy;  Surgeon: Mac White MD;  Location:  GI     HYSTERECTOMY, PAP NO LONGER INDICATED  1986    abdominal hysterectomy     LUMPECTOMY BREAST WITH SEED LOCALIZATION Left 12/19/2019    Procedure: SEED LOCALIZED LEFT  BREAST LUMPECTOMY;  Surgeon: Ruddy Malik MD;  Location: [facility] OR     ROTATOR CUFF REPAIR RT/LT Right      TUBAL LIGATION      at age 29       Social History     Tobacco Use     Smoking status: Former Smoker     Packs/day: 1.00     Years: 12.00     Pack years: 12.00     Last attempt to quit: 10/18/1980     Years since quittin.7     Smokeless tobacco: Never Used   Substance Use Topics     Alcohol use: Yes     Comment: 12 Beers per week     Family History   Problem Relation Age of Onset     Breast Cancer Sister      Hypertension Daughter      Breast Cancer Daughter 43     Skin Cancer Mother      Coronary Artery Disease Father      Colon Cancer Sister      Breast Cancer Maternal Aunt         Many Maternal Aunts have had breast Cancer         Current Outpatient Medications   Medication Sig Dispense Refill     amLODIPine (NORVASC) 5 MG tablet Take 1 tablet (5 mg) by mouth daily 90 tablet 0     anastrozole (ARIMIDEX) 1 MG tablet Take 1 tablet (1 mg) by mouth daily 90 tablet 3     aspirin 81 MG tablet Take 1 tablet by mouth daily. 90 tablet 3     buPROPion (WELLBUTRIN XL) 150 MG 24 hr tablet Take 1 tablet (150 mg) by mouth every morning 90 tablet 3     calcium carbonate 600 mg-vitamin D 400 units (CALTRATE) 600-400 MG-UNIT per tablet Take 1 tablet by mouth 2 times daily        citalopram (CELEXA) 20 MG tablet Take 1 tablet (20 mg) by mouth daily 90 tablet 3     hydrochlorothiazide (HYDRODIURIL) 12.5 MG tablet Take 2 tablets (25 mg) by mouth daily 180 tablet 0     LORazepam (ATIVAN) 0.5 MG tablet TK 1 PO 30 MINUTES PRIOR TO MRI, may repeat times 1 2 tablet 0     losartan (COZAAR) 100 MG tablet Take 1 tablet (100 mg) by mouth daily 90 tablet 3     metFORMIN (GLUCOPHAGE) 500 MG tablet Take 1 tablet (500 mg) by mouth 2 times daily (with meals) 180 tablet 3     omeprazole (PRILOSEC) 20 MG DR capsule Take 1 capsule (20 mg) by mouth daily 90 capsule 3     simvastatin (ZOCOR) 20 MG tablet Take 1 tablet (20 mg) by mouth  At Bedtime 90 tablet 3     Allergies   Allergen Reactions     Lisinopril Cough     COUGH     Recent Labs   Lab Test 06/19/20  0952 12/16/19  0837 12/04/19  0742 10/03/19  0813  05/02/19  1050 09/13/18  0416 04/26/18  0809   A1C 5.7*  --   --  5.7*  --  5.7* 6.1* 6.0*   LDL  --   --   --  59  --   --  48 41   HDL  --   --   --  58  --   --  51 48*   TRIG  --   --   --  154*  --   --  103 169*   ALT  --   --   --  30  --   --  22 25   CR 0.64  --   --  0.64  --   --  0.67 0.61   GFRESTIMATED 90  --  83 >90  --   --  87 >90   GFRESTBLACK >90  --  >90 >90  --   --  >90 >90   POTASSIUM 3.9 4.8  --  4.5   < >  --  4.2 4.4   TSH  --   --   --  0.82  --   --  0.91  --     < > = values in this interval not displayed.      BP Readings from Last 3 Encounters:   06/19/20 (!) 159/105   03/10/20 (!) 160/84   03/04/20 (!) 150/93    Wt Readings from Last 3 Encounters:   06/17/20 101.6 kg (224 lb)   06/04/20 95.3 kg (210 lb)   03/10/20 102 kg (224 lb 12.8 oz)            Reviewed and updated as needed this visit by Provider         Review of Systems   CONSTITUTIONAL: NEGATIVE for fever, chills, change in weight, + fatigue  ENT/MOUTH: NEGATIVE for ear, mouth and throat problems  RESP: NEGATIVE for significant cough   CV: NEGATIVE for chest pain, palpitations or peripheral edema  GI: NEGATIVE for nausea, abdominal pain, heartburn, or change in bowel habits  : NEGATIVE for frequency, dysuria, or hematuria  NEURO: NEGATIVE for weakness or paresthesias, mild headache.  HEME: NEGATIVE for bleeding problems  PSYCHIATRIC: NEGATIVE for changes in mood or affect       Objective   Reported vitals:  BP (!) 177/98   Wt 101.6 kg (224 lb)   BMI 35.08 kg/m     180/95 per patient check  healthy, alert and no distress  PSYCH: Alert and oriented times 3; coherent speech, normal   rate and volume, able to articulate logical thoughts, able   to abstract reason, no tangential thoughts, no hallucinations   or delusions  Her affect is normal  RESP: No  cough, no audible wheezing, able to talk in full sentences  Remainder of exam unable to be completed due to telephone visits      Creatinine   Date Value Ref Range Status   06/19/2020 0.64 0.52 - 1.04 mg/dL Final     Lab Results   Component Value Date    A1C 5.7 06/19/2020    A1C 5.7 10/03/2019    A1C 5.7 05/02/2019    A1C 6.1 09/13/2018    A1C 6.0 04/26/2018       Assessment/Plan:    1. Type 2 diabetes mellitus without complication, without long-term current use of insulin (H)  Stable on therapy per A1c    2. Essential hypertension, benign  Start Amlodipine in addition.  Discussed the dosing of the medication as well as the risks and side effects associated using this particular medicine in combination with her current medicines inclusive of her simvastatin.  Discussed the potential side effects associated with statins and amlodipine more likely issues are muscle pain etc.  Patient understands and at this point I think that benefits outweigh the risks and consideration of her current blood pressure levels being quite elevated as well as her symptoms.  She will follow-up with me in 2 weeks with the blood pressure reports to see how she is doing and sooner as needed  - amLODIPine (NORVASC) 5 MG tablet; Take 1 tablet (5 mg) by mouth daily  Dispense: 90 tablet; Refill: 0    3. Claustrophobia  To be used prior to MRI and advised of risks of dosing.  - LORazepam (ATIVAN) 0.5 MG tablet; TK 1 PO 30 MINUTES PRIOR TO MRI, may repeat times 1  Dispense: 2 tablet; Refill: 0    4. Morbid obesity (H)  Encouraged weight loss.      Return in about 2 weeks (around 7/7/2020) for BP Recheck.      Phone call duration:  15 minutes    Jac Barry MD

## 2020-07-01 ENCOUNTER — TELEPHONE (OUTPATIENT)
Dept: INTERNAL MEDICINE | Facility: CLINIC | Age: 70
End: 2020-07-01

## 2020-07-01 NOTE — TELEPHONE ENCOUNTER
If patient symptoms have improved with reduction in blood pressure and she is feeling better without any progression of her pain/headaches etc it would be reasonable to observe at the present time and forego the MRI

## 2020-07-01 NOTE — TELEPHONE ENCOUNTER
"Patient calling. Reports she was started on a new med for her BP in addition to the 2 other medications she is on for her HTN, because her BP was \"over the roof\". Per pt, the new medication has brought her BP to normal (believe she is referring to amlodipine). /73 a few minutes ago. Was supposed to have MRI on Tuesday but could not get ride so rescheduled to next Tuesday. Checking BP 3-4 times a day and recording. Says she is not getting higher than maybe 135/80. Had MRI last October when had stroke. Is wondering if she should still get MRI as scheduled? Not dizzy anymore. Headaches not there anymore. Symptoms got better as BP got better. Started checking BP on June 19th and had readings as high as 188/107, 173/109, 179/106, 194/108. When started taking pills BP started going down. 136/80 this morning. Says the water pill never really helped, but is still taking it in addition to other BP meds. Reports she feels \"like a millilon bucks\". Should she schedule follow up visit?  "

## 2020-07-07 PROBLEM — C50.912 MALIGNANT NEOPLASM OF LEFT FEMALE BREAST, UNSPECIFIED ESTROGEN RECEPTOR STATUS, UNSPECIFIED SITE OF BREAST (H): Status: ACTIVE | Noted: 2019-12-05

## 2020-08-11 DIAGNOSIS — I10 ESSENTIAL HYPERTENSION, BENIGN: ICD-10-CM

## 2020-08-11 RX ORDER — HYDROCHLOROTHIAZIDE 12.5 MG/1
25 TABLET ORAL DAILY
Qty: 180 TABLET | Refills: 0 | Status: SHIPPED | OUTPATIENT
Start: 2020-08-11 | End: 2020-11-11

## 2020-09-15 DIAGNOSIS — I10 ESSENTIAL HYPERTENSION, BENIGN: ICD-10-CM

## 2020-09-15 RX ORDER — AMLODIPINE BESYLATE 5 MG/1
TABLET ORAL
Qty: 90 TABLET | Refills: 0 | Status: SHIPPED | OUTPATIENT
Start: 2020-09-15 | End: 2020-11-11

## 2020-09-18 NOTE — PROGRESS NOTES
[facility]    Hematology/Oncology Established Patient Note      Today's Date: 09/21/20    Reason for follow-up: Left breast cancer.    HISTORY OF PRESENT ILLNESS: Diane Gaitan is a 70 year old female who presents with the following oncologic history:  1.  11/5/2019: Mammogram showed focal asymmetry in the lateral left breast at the 2-4 o'clock position.  2.  11/12/2019: Targeted left breast ultrasound showed an irregular hypoechoic mass at the 2 o'clock position, 5 cm from the nipple measuring 1.7 x 1.2 x 1.4 cm.  Survey of the axilla showed no evidence of adenopathy.  3.  11/15/2019: Ultrasound-guided left breast needle biopsy at 2:00, 5 cm from the nipple showed a grade 2 invasive mammary carcinoma ER strongly positive at greater than 95%, OH, moderate to strongly positive at 80%, HER-2/guadalupe FISH negative.  4.  12/04/2019: Bilateral breast MRI showed left upper outer quadrant biopsy invasive mammary carcinoma measuring up to 1.7 cm and no other concerning areas of enhancement or lymphadenopathy.  5.  12/19/2019: Underwent left breast lumpectomy with left axillary sentinel lymph node biopsy under the care of Dr. Chidi Malik.  Pathology showed a grade 3 invasive pleomorphic lobular carcinoma measuring 1.5 cm, lymphovascular invasion not identified, margins negative for invasive carcinoma.  A total of 3 left axillary sentinel lymph nodes negative for malignancy.  Oncotype DX score = 8, corresponding to a distant recurrence risk at 9 years of 3% after 5 years of hormone blockade therapy and a less than 1% absolute chemotherapy benefit.  6.  2/25/2020: Completed adjuvant radiation therapy to the left breast.  7. 3/04/2020: Started adjuvant anastrazole.    INTERIM HISTORY:  Diane reports feeling well with no side effects from anastrazole.  She denies any recent fevers, chills, night sweats, bowel or bladder dysfunction, cough, or dyspnea.  She denies any polyarthralgias.      REVIEW OF SYSTEMS:    14 point ROS was reviewed and is negative other than as noted above in HPI.       HOME MEDICATIONS:  Current Outpatient Medications   Medication Sig Dispense Refill     amLODIPine (NORVASC) 5 MG tablet TAKE 1 TABLET BY MOUTH DAILY 90 tablet 0     anastrozole (ARIMIDEX) 1 MG tablet Take 1 tablet (1 mg) by mouth daily 90 tablet 3     aspirin 81 MG tablet Take 1 tablet by mouth daily. 90 tablet 3     buPROPion (WELLBUTRIN XL) 150 MG 24 hr tablet Take 1 tablet (150 mg) by mouth every morning 90 tablet 3     calcium carbonate 600 mg-vitamin D 400 units (CALTRATE) 600-400 MG-UNIT per tablet Take 1 tablet by mouth 2 times daily        citalopram (CELEXA) 20 MG tablet Take 1 tablet (20 mg) by mouth daily 90 tablet 3     hydrochlorothiazide (HYDRODIURIL) 12.5 MG tablet Take 2 tablets (25 mg) by mouth daily 180 tablet 0     LORazepam (ATIVAN) 0.5 MG tablet TK 1 PO 30 MINUTES PRIOR TO MRI, may repeat times 1 2 tablet 0     losartan (COZAAR) 100 MG tablet Take 1 tablet (100 mg) by mouth daily 90 tablet 3     metFORMIN (GLUCOPHAGE) 500 MG tablet Take 1 tablet (500 mg) by mouth 2 times daily (with meals) 180 tablet 3     omeprazole (PRILOSEC) 20 MG DR capsule Take 1 capsule (20 mg) by mouth daily 90 capsule 3     simvastatin (ZOCOR) 20 MG tablet Take 1 tablet (20 mg) by mouth At Bedtime 90 tablet 3         ALLERGIES:  Allergies   Allergen Reactions     Lisinopril Cough     COUGH         PAST MEDICAL HISTORY:  Past Medical History:   Diagnosis Date     Arthritis      BENIGN HYPERTENSION 8/28/2002     Cancer (H)     Basal cell carcinoma     CARDIOVASCULAR SCREENING; LDL GOAL LESS THAN 100 10/31/2010     Depressive disorder 1997     Esophageal reflux      Mild major depression (H) 9/14/2010     Other malaise and fatigue 8/28/2002     Type 2 diabetes, HbA1c goal < 7% (H) 10/31/2010   Possible transient ischemic attack.      PAST SURGICAL HISTORY:  Past Surgical History:   Procedure Laterality Date     ARTHROPLASTY KNEE  10/18/2012     Procedure: ARTHROPLASTY KNEE;  RIGHT TOTAL KNEE ARTHROPLASTY (SMITH & NEPHEW)^ ;  Surgeon: Howard Charles MD;  Location:  OR     BIOPSY NODE SENTINEL Left 2019    Procedure: LEFT SENTINEL LYMPH NODE BIOPSY;  Surgeon: Ruddy Malik MD;  Location:  OR     BREAST BIOPSY, RT/LT  1988    breast biopsy     C NONSPECIFIC PROCEDURE  10/30/96    skin tags removed     C NONSPECIFIC PROCEDURE      colonic polyps     COLONOSCOPY N/A 2016    Procedure: COLONOSCOPY;  Surgeon: Curt Patel MD;  Location:  GI     ESOPHAGOSCOPY, GASTROSCOPY, DUODENOSCOPY (EGD), COMBINED N/A 2018    Procedure: COMBINED ESOPHAGOSCOPY, GASTROSCOPY, DUODENOSCOPY (EGD);  gastroscopy;  Surgeon: Mac White MD;  Location:  GI     HYSTERECTOMY, PAP NO LONGER INDICATED  1986    abdominal hysterectomy     LUMPECTOMY BREAST WITH SEED LOCALIZATION Left 2019    Procedure: SEED LOCALIZED LEFT BREAST LUMPECTOMY;  Surgeon: Ruddy Malik MD;  Location:  OR     ROTATOR CUFF REPAIR RT/LT Right      TUBAL LIGATION      at age 29   Ovaries intact.      SOCIAL HISTORY:  Social History     Socioeconomic History     Marital status:      Spouse name: Not on file     Number of children: Not on file     Years of education: Not on file     Highest education level: Not on file   Occupational History     Not on file   Social Needs     Financial resource strain: Not on file     Food insecurity     Worry: Not on file     Inability: Not on file     Transportation needs     Medical: Not on file     Non-medical: Not on file   Tobacco Use     Smoking status: Former Smoker     Packs/day: 1.00     Years: 12.00     Pack years: 12.00     Last attempt to quit: 10/18/1980     Years since quittin.9     Smokeless tobacco: Never Used   Substance and Sexual Activity     Alcohol use: Yes     Comment: 12 Beers per week     Drug use: No     Sexual activity: Never     Partners: Male   Lifestyle      "Physical activity     Days per week: Not on file     Minutes per session: Not on file     Stress: Not on file   Relationships     Social connections     Talks on phone: Not on file     Gets together: Not on file     Attends Tenriism service: Not on file     Active member of club or organization: Not on file     Attends meetings of clubs or organizations: Not on file     Relationship status: Not on file     Intimate partner violence     Fear of current or ex partner: Not on file     Emotionally abused: Not on file     Physically abused: Not on file     Forced sexual activity: Not on file   Other Topics Concern     Parent/sibling w/ CABG, MI or angioplasty before 65F 55M? Not Asked   Social History Narrative     Not on file         FAMILY HISTORY:  Family History   Problem Relation Age of Onset     Breast Cancer Sister      Hypertension Daughter      Breast Cancer Daughter 43     Skin Cancer Mother      Coronary Artery Disease Father      Colon Cancer Sister      Breast Cancer Maternal Aunt         Many Maternal Aunts have had breast Cancer         PHYSICAL EXAM:  Vital signs:  BP (!) 152/84   Pulse 106   Temp 99  F (37.2  C) (Oral)   Resp 22   Ht 1.702 m (5' 7\")   Wt 96.9 kg (213 lb 9.6 oz)   SpO2 95%   BMI 33.45 kg/m     GENERAL/CONSTITUTIONAL: No acute distress.  EYES:   No scleral icterus.  ENT/MOUTH: Neck supple.  LYMPH: No cervical, supraclavicular, axillary or epitrochlear adenopathy.   BREAST: No palpable discrete masses in either breast.  Nipples are everted with no discharge.  No ulceration, erythema, or blistering. Left breast hyperpigmentation present. Left breast smaller than right breast.  RESPIRATORY: No audible cough or wheezing. No labored breathing.  CARDIOVASCULAR: No PMI displacement.  GASTROINTESTINAL: No hepatosplenomegaly, masses, or tenderness.  No guarding.  No distention.  MUSCULOSKELETAL: Warm and well-perfused, no cyanosis, clubbing, or edema.  NEUROLOGIC: No focal motor deficits. " Alert, oriented, answers questions appropriately.  INTEGUMENTARY: No rashes or jaundice.  GAIT: Uses cane for ambulation.    LABS:  CBC RESULTS:   Recent Labs   Lab Test 12/16/19  0837  10/30/18  0950   WBC  --   --  5.3   RBC  --   --  4.83   HGB 12.9   < > 9.5*   HCT  --   --  33.5*   MCV  --   --  69*   MCH  --   --  19.7*   MCHC  --   --  28.4*   RDW  --   --  20.5*      < > 280    < > = values in this interval not displayed.     Last Comprehensive Metabolic Panel:  Sodium   Date Value Ref Range Status   06/19/2020 133 133 - 144 mmol/L Final     Potassium   Date Value Ref Range Status   06/19/2020 3.9 3.4 - 5.3 mmol/L Final     Chloride   Date Value Ref Range Status   06/19/2020 98 94 - 109 mmol/L Final     Carbon Dioxide   Date Value Ref Range Status   06/19/2020 30 20 - 32 mmol/L Final     Anion Gap   Date Value Ref Range Status   06/19/2020 5 3 - 14 mmol/L Final     Glucose   Date Value Ref Range Status   06/19/2020 135 (H) 70 - 99 mg/dL Final     Urea Nitrogen   Date Value Ref Range Status   06/19/2020 6 (L) 7 - 30 mg/dL Final     Creatinine   Date Value Ref Range Status   06/19/2020 0.64 0.52 - 1.04 mg/dL Final     GFR Estimate   Date Value Ref Range Status   06/19/2020 90 >60 mL/min/[1.73_m2] Final     Comment:     Non  GFR Calc  Starting 12/18/2018, serum creatinine based estimated GFR (eGFR) will be   calculated using the Chronic Kidney Disease Epidemiology Collaboration   (CKD-EPI) equation.       Calcium   Date Value Ref Range Status   06/19/2020 9.5 8.5 - 10.1 mg/dL Final     Bilirubin Total   Date Value Ref Range Status   10/03/2019 0.9 0.2 - 1.3 mg/dL Final     Alkaline Phosphatase   Date Value Ref Range Status   10/03/2019 106 40 - 150 U/L Final     ALT   Date Value Ref Range Status   10/03/2019 30 0 - 50 U/L Final     AST   Date Value Ref Range Status   10/03/2019 14 0 - 45 U/L Final       PATHOLOGY:  None new.    IMAGING:  3/2/2020: DEXA scan -- normal bone mineral  density.    ASSESSMENT/PLAN:  Diane Gaitan is a 70 year old female with the following issues:  1.  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    -Note she is not a great candidate for tamoxifen given her history of a possible TIA.  -Her baseline DEXA scan from 3/2/2020 showed normal bone mineral density.  I recommended adequate calcium and vitamin D as well as weightbearing exercise if tolerated. She does use a cane and is careful about her movement and exercise.  -Plan to repeat DEXA scan in 2022.  -Continue annual mammograms, next due on or after 11/5/2020. Will arrange.    2.  Diabetes mellitus type 2  -Stable.  -She will continue on metformin.    3.  Strong family history of breast cancer  -Her daughter had already undergone a genetics consult with negative genetic testing.  The  had informed her daughter that none of her other family members would require genetic testing.    4. Essential hypertension  -Discussed blood pressure high today and should be rechecked with her PMD.  Otherwise continue on losartan, hydrochlorothiazide, and amlodipine. Diane dose monitor her BP at home with her cuff.    Return in 4 months.    Myrna Kaye MD  Hematology/Oncology  [facility]    I spent a total of 25 minutes with the patient, with greater than 50% of the time in counseling and coordination of care.

## 2020-09-21 ENCOUNTER — ONCOLOGY VISIT (OUTPATIENT)
Dept: ONCOLOGY | Facility: CLINIC | Age: 70
End: 2020-09-21
Attending: INTERNAL MEDICINE
Payer: MEDICARE

## 2020-09-21 VITALS
SYSTOLIC BLOOD PRESSURE: 152 MMHG | TEMPERATURE: 99 F | HEIGHT: 67 IN | OXYGEN SATURATION: 95 % | WEIGHT: 213.6 LBS | DIASTOLIC BLOOD PRESSURE: 84 MMHG | BODY MASS INDEX: 33.53 KG/M2 | HEART RATE: 106 BPM | RESPIRATION RATE: 22 BRPM

## 2020-09-21 DIAGNOSIS — I10 ESSENTIAL HYPERTENSION: ICD-10-CM

## 2020-09-21 DIAGNOSIS — C50.412 MALIGNANT NEOPLASM OF UPPER-OUTER QUADRANT OF LEFT BREAST IN FEMALE, ESTROGEN RECEPTOR POSITIVE (H): Primary | ICD-10-CM

## 2020-09-21 DIAGNOSIS — E11.9 TYPE 2 DIABETES MELLITUS WITHOUT COMPLICATION, WITHOUT LONG-TERM CURRENT USE OF INSULIN (H): ICD-10-CM

## 2020-09-21 DIAGNOSIS — Z79.811 AROMATASE INHIBITOR USE: ICD-10-CM

## 2020-09-21 DIAGNOSIS — Z17.0 MALIGNANT NEOPLASM OF UPPER-OUTER QUADRANT OF LEFT BREAST IN FEMALE, ESTROGEN RECEPTOR POSITIVE (H): Primary | ICD-10-CM

## 2020-09-21 PROCEDURE — G0463 HOSPITAL OUTPT CLINIC VISIT: HCPCS

## 2020-09-21 PROCEDURE — 99214 OFFICE O/P EST MOD 30 MIN: CPT | Performed by: INTERNAL MEDICINE

## 2020-09-21 RX ORDER — ANASTROZOLE 1 MG/1
1 TABLET ORAL DAILY
Qty: 90 TABLET | Refills: 3 | Status: SHIPPED | OUTPATIENT
Start: 2020-09-21 | End: 2021-07-28

## 2020-09-21 ASSESSMENT — MIFFLIN-ST. JEOR: SCORE: 1516.51

## 2020-09-21 NOTE — LETTER
9/21/2020         RE: Diane Gaitan  [address] [facility] [address]        Dear Colleague,    Thank you for referring your patient, Diane Gaitan, to the [facility]. Please see a copy of my visit note below.    [facility]    Hematology/Oncology Established Patient Note      Today's Date: 09/21/20    Reason for follow-up: Left breast cancer.    HISTORY OF PRESENT ILLNESS: Diane Gaitan is a 70 year old female who presents with the following oncologic history:  1.  11/5/2019: Mammogram showed focal asymmetry in the lateral left breast at the 2-4 o'clock position.  2.  11/12/2019: Targeted left breast ultrasound showed an irregular hypoechoic mass at the 2 o'clock position, 5 cm from the nipple measuring 1.7 x 1.2 x 1.4 cm.  Survey of the axilla showed no evidence of adenopathy.  3.  11/15/2019: Ultrasound-guided left breast needle biopsy at 2:00, 5 cm from the nipple showed a grade 2 invasive mammary carcinoma ER strongly positive at greater than 95%, LA, moderate to strongly positive at 80%, HER-2/guadalupe FISH negative.  4.  12/04/2019: Bilateral breast MRI showed left upper outer quadrant biopsy invasive mammary carcinoma measuring up to 1.7 cm and no other concerning areas of enhancement or lymphadenopathy.  5.  12/19/2019: Underwent left breast lumpectomy with left axillary sentinel lymph node biopsy under the care of Dr. Chidi Malik.  Pathology showed a grade 3 invasive pleomorphic lobular carcinoma measuring 1.5 cm, lymphovascular invasion not identified, margins negative for invasive carcinoma.  A total of 3 left axillary sentinel lymph nodes negative for malignancy.  Oncotype DX score = 8, corresponding to a distant recurrence risk at 9 years of 3% after 5 years of hormone blockade therapy and a less than 1% absolute chemotherapy benefit.  6.  2/25/2020: Completed adjuvant radiation therapy to the left breast.  7. 3/04/2020: Started adjuvant  anastrazole.    INTERIM HISTORY:  Diane reports feeling well with no side effects from anastrazole.  She denies any recent fevers, chills, night sweats, bowel or bladder dysfunction, cough, or dyspnea.  She denies any polyarthralgias.      REVIEW OF SYSTEMS:   14 point ROS was reviewed and is negative other than as noted above in HPI.       HOME MEDICATIONS:  Current Outpatient Medications   Medication Sig Dispense Refill     amLODIPine (NORVASC) 5 MG tablet TAKE 1 TABLET BY MOUTH DAILY 90 tablet 0     anastrozole (ARIMIDEX) 1 MG tablet Take 1 tablet (1 mg) by mouth daily 90 tablet 3     aspirin 81 MG tablet Take 1 tablet by mouth daily. 90 tablet 3     buPROPion (WELLBUTRIN XL) 150 MG 24 hr tablet Take 1 tablet (150 mg) by mouth every morning 90 tablet 3     calcium carbonate 600 mg-vitamin D 400 units (CALTRATE) 600-400 MG-UNIT per tablet Take 1 tablet by mouth 2 times daily        citalopram (CELEXA) 20 MG tablet Take 1 tablet (20 mg) by mouth daily 90 tablet 3     hydrochlorothiazide (HYDRODIURIL) 12.5 MG tablet Take 2 tablets (25 mg) by mouth daily 180 tablet 0     LORazepam (ATIVAN) 0.5 MG tablet TK 1 PO 30 MINUTES PRIOR TO MRI, may repeat times 1 2 tablet 0     losartan (COZAAR) 100 MG tablet Take 1 tablet (100 mg) by mouth daily 90 tablet 3     metFORMIN (GLUCOPHAGE) 500 MG tablet Take 1 tablet (500 mg) by mouth 2 times daily (with meals) 180 tablet 3     omeprazole (PRILOSEC) 20 MG DR capsule Take 1 capsule (20 mg) by mouth daily 90 capsule 3     simvastatin (ZOCOR) 20 MG tablet Take 1 tablet (20 mg) by mouth At Bedtime 90 tablet 3         ALLERGIES:  Allergies   Allergen Reactions     Lisinopril Cough     COUGH         PAST MEDICAL HISTORY:  Past Medical History:   Diagnosis Date     Arthritis      BENIGN HYPERTENSION 8/28/2002     Cancer (H)     Basal cell carcinoma     CARDIOVASCULAR SCREENING; LDL GOAL LESS THAN 100 10/31/2010     Depressive disorder 1997     Esophageal reflux      Mild major  depression (H) 9/14/2010     Other malaise and fatigue 8/28/2002     Type 2 diabetes, HbA1c goal < 7% (H) 10/31/2010   Possible transient ischemic attack.      PAST SURGICAL HISTORY:  Past Surgical History:   Procedure Laterality Date     ARTHROPLASTY KNEE  10/18/2012    Procedure: ARTHROPLASTY KNEE;  RIGHT TOTAL KNEE ARTHROPLASTY (SMITH & NEPHEW)^ ;  Surgeon: Howard Charles MD;  Location:  OR     BIOPSY NODE SENTINEL Left 12/19/2019    Procedure: LEFT SENTINEL LYMPH NODE BIOPSY;  Surgeon: Ruddy Malik MD;  Location:  OR     BREAST BIOPSY, RT/LT  1988    breast biopsy     C NONSPECIFIC PROCEDURE  10/30/96    skin tags removed     C NONSPECIFIC PROCEDURE      colonic polyps     COLONOSCOPY N/A 7/14/2016    Procedure: COLONOSCOPY;  Surgeon: Curt Patel MD;  Location:  GI     ESOPHAGOSCOPY, GASTROSCOPY, DUODENOSCOPY (EGD), COMBINED N/A 9/13/2018    Procedure: COMBINED ESOPHAGOSCOPY, GASTROSCOPY, DUODENOSCOPY (EGD);  gastroscopy;  Surgeon: Mac White MD;  Location:  GI     HYSTERECTOMY, PAP NO LONGER INDICATED  1986    abdominal hysterectomy     LUMPECTOMY BREAST WITH SEED LOCALIZATION Left 12/19/2019    Procedure: SEED LOCALIZED LEFT BREAST LUMPECTOMY;  Surgeon: Ruddy Malik MD;  Location:  OR     ROTATOR CUFF REPAIR RT/LT Right      TUBAL LIGATION      at age 29   Ovaries intact.      SOCIAL HISTORY:  Social History     Socioeconomic History     Marital status:      Spouse name: Not on file     Number of children: Not on file     Years of education: Not on file     Highest education level: Not on file   Occupational History     Not on file   Social Needs     Financial resource strain: Not on file     Food insecurity     Worry: Not on file     Inability: Not on file     Transportation needs     Medical: Not on file     Non-medical: Not on file   Tobacco Use     Smoking status: Former Smoker     Packs/day: 1.00     Years: 12.00     Pack years: 12.00  "    Last attempt to quit: 10/18/1980     Years since quittin.9     Smokeless tobacco: Never Used   Substance and Sexual Activity     Alcohol use: Yes     Comment: 12 Beers per week     Drug use: No     Sexual activity: Never     Partners: Male   Lifestyle     Physical activity     Days per week: Not on file     Minutes per session: Not on file     Stress: Not on file   Relationships     Social connections     Talks on phone: Not on file     Gets together: Not on file     Attends Evangelical service: Not on file     Active member of club or organization: Not on file     Attends meetings of clubs or organizations: Not on file     Relationship status: Not on file     Intimate partner violence     Fear of current or ex partner: Not on file     Emotionally abused: Not on file     Physically abused: Not on file     Forced sexual activity: Not on file   Other Topics Concern     Parent/sibling w/ CABG, MI or angioplasty before 65F 55M? Not Asked   Social History Narrative     Not on file         FAMILY HISTORY:  Family History   Problem Relation Age of Onset     Breast Cancer Sister      Hypertension Daughter      Breast Cancer Daughter 43     Skin Cancer Mother      Coronary Artery Disease Father      Colon Cancer Sister      Breast Cancer Maternal Aunt         Many Maternal Aunts have had breast Cancer         PHYSICAL EXAM:  Vital signs:  BP (!) 152/84   Pulse 106   Temp 99  F (37.2  C) (Oral)   Resp 22   Ht 1.702 m (5' 7\")   Wt 96.9 kg (213 lb 9.6 oz)   SpO2 95%   BMI 33.45 kg/m     GENERAL/CONSTITUTIONAL: No acute distress.  EYES:   No scleral icterus.  ENT/MOUTH: Neck supple.  LYMPH: No cervical, supraclavicular, axillary or epitrochlear adenopathy.   BREAST: No palpable discrete masses in either breast.  Nipples are everted with no discharge.  No ulceration, erythema, or blistering. Left breast hyperpigmentation present. Left breast smaller than right breast.  RESPIRATORY: No audible cough or wheezing. No " labored breathing.  CARDIOVASCULAR: No PMI displacement.  GASTROINTESTINAL: No hepatosplenomegaly, masses, or tenderness.  No guarding.  No distention.  MUSCULOSKELETAL: Warm and well-perfused, no cyanosis, clubbing, or edema.  NEUROLOGIC: No focal motor deficits. Alert, oriented, answers questions appropriately.  INTEGUMENTARY: No rashes or jaundice.  GAIT: Uses cane for ambulation.    LABS:  CBC RESULTS:   Recent Labs   Lab Test 12/16/19  0837  10/30/18  0950   WBC  --   --  5.3   RBC  --   --  4.83   HGB 12.9   < > 9.5*   HCT  --   --  33.5*   MCV  --   --  69*   MCH  --   --  19.7*   MCHC  --   --  28.4*   RDW  --   --  20.5*      < > 280    < > = values in this interval not displayed.     Last Comprehensive Metabolic Panel:  Sodium   Date Value Ref Range Status   06/19/2020 133 133 - 144 mmol/L Final     Potassium   Date Value Ref Range Status   06/19/2020 3.9 3.4 - 5.3 mmol/L Final     Chloride   Date Value Ref Range Status   06/19/2020 98 94 - 109 mmol/L Final     Carbon Dioxide   Date Value Ref Range Status   06/19/2020 30 20 - 32 mmol/L Final     Anion Gap   Date Value Ref Range Status   06/19/2020 5 3 - 14 mmol/L Final     Glucose   Date Value Ref Range Status   06/19/2020 135 (H) 70 - 99 mg/dL Final     Urea Nitrogen   Date Value Ref Range Status   06/19/2020 6 (L) 7 - 30 mg/dL Final     Creatinine   Date Value Ref Range Status   06/19/2020 0.64 0.52 - 1.04 mg/dL Final     GFR Estimate   Date Value Ref Range Status   06/19/2020 90 >60 mL/min/[1.73_m2] Final     Comment:     Non  GFR Calc  Starting 12/18/2018, serum creatinine based estimated GFR (eGFR) will be   calculated using the Chronic Kidney Disease Epidemiology Collaboration   (CKD-EPI) equation.       Calcium   Date Value Ref Range Status   06/19/2020 9.5 8.5 - 10.1 mg/dL Final     Bilirubin Total   Date Value Ref Range Status   10/03/2019 0.9 0.2 - 1.3 mg/dL Final     Alkaline Phosphatase   Date Value Ref Range Status    10/03/2019 106 40 - 150 U/L Final     ALT   Date Value Ref Range Status   10/03/2019 30 0 - 50 U/L Final     AST   Date Value Ref Range Status   10/03/2019 14 0 - 45 U/L Final       PATHOLOGY:  None new.    IMAGING:  3/2/2020: DEXA scan -- normal bone mineral density.    ASSESSMENT/PLAN:  Diane Gaitan is a 70 year old female with the following issues:  1.  Stage IA, [id number], grade 3 invasive pleomorphic lobular carcinoma of the left upper outer breast, ER strongly positive, RI positive, HER-2/guadalupe FISH negative, Oncotype DX = 8  -Diane has been on adjuvant endocrine therapy with anastrozole for the past 6 months and tolerating this very well with no significant side effects.    -Note she is not a great candidate for tamoxifen given her history of a possible TIA.  -Her baseline DEXA scan from 3/2/2020 showed normal bone mineral density.  I recommended adequate calcium and vitamin D as well as weightbearing exercise if tolerated. She does use a cane and is careful about her movement and exercise.  -Plan to repeat DEXA scan in 2022.  -Continue annual mammograms, next due on or after 11/5/2020. Will arrange.    2.  Diabetes mellitus type 2  -Stable.  -She will continue on metformin.    3.  Strong family history of breast cancer  -Her daughter had already undergone a genetics consult with negative genetic testing.  The  had informed her daughter that none of her other family members would require genetic testing.    4. Essential hypertension  -Discussed blood pressure high today and should be rechecked with her PMD.  Otherwise continue on losartan, hydrochlorothiazide, and amlodipine. Diane dose monitor her BP at home with her cuff.    Return in 4 months.    Myrna Kaye MD  Hematology/Oncology  [facility]    I spent a total of 25 minutes with the patient, with greater than 50% of the time in counseling and coordination of care.      Oncology Rooming Note    September 21,  "2020 8:45 AM   Diane Gaitan is a 70 year old female who presents for:    Chief Complaint   Patient presents with     Oncology Clinic Visit     Initial Vitals: There were no vitals taken for this visit. Estimated body mass index is 35.08 kg/m  as calculated from the following:    Height as of 6/17/20: 1.702 m (5' 7\").    Weight as of 6/23/20: 101.6 kg (224 lb). There is no height or weight on file to calculate BSA.  Data Unavailable Comment: Data Unavailable   No LMP recorded. Patient is postmenopausal.  Allergies reviewed: Yes  Medications reviewed: Yes    Medications: MEDICATION REFILLS NEEDED TODAY. Provider was notified.  Pharmacy name entered into EPIC:    [facility] - [address] [facility]/PHARMACY #[id number] [place], [place] - [address] [facility][id number] - [place], [place] - [address] AT [facility] & [address] [place] [address] DRUG STORE #[id number] [place], [place] - [address] AT [address]    Clinical concerns: Refill needed on Anastrozole. Dr. Kaye was notified.      Adry Ríos CMA  9/21/2020      8:45 AM      Again, thank you for allowing me to participate in the care of your patient.        Sincerely,        Myrna Kaye MD    "

## 2020-09-21 NOTE — PROGRESS NOTES
"Oncology Rooming Note    September 21, 2020 8:45 AM   Diane Gaitan is a 70 year old female who presents for:    Chief Complaint   Patient presents with     Oncology Clinic Visit     Initial Vitals: There were no vitals taken for this visit. Estimated body mass index is 35.08 kg/m  as calculated from the following:    Height as of 6/17/20: 1.702 m (5' 7\").    Weight as of 6/23/20: 101.6 kg (224 lb). There is no height or weight on file to calculate BSA.  Data Unavailable Comment: Data Unavailable   No LMP recorded. Patient is postmenopausal.  Allergies reviewed: Yes  Medications reviewed: Yes    Medications: MEDICATION REFILLS NEEDED TODAY. Provider was notified.  Pharmacy name entered into EPIC:    [facility] - [address] [facility]/PHARMACY #[id number] [place], [place] - [address] [facility] #[id number] - [place], [place] - [address] AT [facility] & [address] [place] [address] DRUG STORE #[id number] - [place], [place] - [address] AT [facility] & [address]    Clinical concerns: Refill needed on Anastrozole. Dr. Kaye was notified.      Adry Ríos CMA  9/21/2020      8:45 AM    "

## 2020-09-30 ENCOUNTER — ALLIED HEALTH/NURSE VISIT (OUTPATIENT)
Dept: INTERNAL MEDICINE | Facility: CLINIC | Age: 70
End: 2020-09-30
Payer: MEDICARE

## 2020-09-30 VITALS — SYSTOLIC BLOOD PRESSURE: 139 MMHG | DIASTOLIC BLOOD PRESSURE: 70 MMHG

## 2020-09-30 DIAGNOSIS — Z01.30 BP CHECK: Primary | ICD-10-CM

## 2020-09-30 PROCEDURE — 99207 ZZC NO CHARGE NURSE ONLY: CPT | Performed by: INTERNAL MEDICINE

## 2020-09-30 NOTE — NURSING NOTE
Diane Gaitan was evaluated at [facility] on September 30, 2020 at which time her blood pressure was:    BP Readings from Last 3 Encounters:   09/30/20 139/70   09/21/20 (!) 152/84   06/23/20 (!) 177/98     Pulse Readings from Last 3 Encounters:   09/21/20 106   03/10/20 109   03/04/20 105       Reviewed lifestyle modifications for blood pressure control and reduction: including making healthy food choices, managing weight, getting regular exercise, smoking cessation, reducing alcohol consumption, monitoring blood pressure regularly.     Symptoms: None    BP Goal:< 140/90 mmHg    BP Assessment:  BP at goal    Potential Reasons for BP too high: NA - Not applicable    BP Follow-Up Plan: Recheck BP in 6 months at pharmacy    Recommendation to Provider: none    Note completed by: **Michell Hernandes, Pharm. D.  [facility]  [phone]  *

## 2020-10-14 ENCOUNTER — TRANSFERRED RECORDS (OUTPATIENT)
Dept: HEALTH INFORMATION MANAGEMENT | Facility: CLINIC | Age: 70
End: 2020-10-14

## 2020-10-14 LAB — RETINOPATHY: NEGATIVE

## 2020-11-06 ENCOUNTER — HOSPITAL ENCOUNTER (OUTPATIENT)
Dept: MAMMOGRAPHY | Facility: CLINIC | Age: 70
Discharge: HOME | End: 2020-11-06
Attending: INTERNAL MEDICINE | Admitting: INTERNAL MEDICINE
Payer: MEDICARE

## 2020-11-06 DIAGNOSIS — Z12.31 VISIT FOR SCREENING MAMMOGRAM: ICD-10-CM

## 2020-11-06 DIAGNOSIS — C50.412 MALIGNANT NEOPLASM OF UPPER-OUTER QUADRANT OF LEFT BREAST IN FEMALE, ESTROGEN RECEPTOR POSITIVE (H): ICD-10-CM

## 2020-11-06 DIAGNOSIS — Z17.0 MALIGNANT NEOPLASM OF UPPER-OUTER QUADRANT OF LEFT BREAST IN FEMALE, ESTROGEN RECEPTOR POSITIVE (H): ICD-10-CM

## 2020-11-06 PROCEDURE — 77063 BREAST TOMOSYNTHESIS BI: CPT

## 2020-11-11 ENCOUNTER — NURSE TRIAGE (OUTPATIENT)
Dept: INTERNAL MEDICINE | Facility: CLINIC | Age: 70
End: 2020-11-11

## 2020-11-11 ENCOUNTER — VIRTUAL VISIT (OUTPATIENT)
Dept: INTERNAL MEDICINE | Facility: CLINIC | Age: 70
End: 2020-11-11
Payer: MEDICARE

## 2020-11-11 DIAGNOSIS — I10 ESSENTIAL HYPERTENSION, BENIGN: ICD-10-CM

## 2020-11-11 DIAGNOSIS — E11.9 TYPE 2 DIABETES MELLITUS WITHOUT COMPLICATION, WITHOUT LONG-TERM CURRENT USE OF INSULIN (H): ICD-10-CM

## 2020-11-11 DIAGNOSIS — Z20.822 EXPOSURE TO COVID-19 VIRUS: Primary | ICD-10-CM

## 2020-11-11 DIAGNOSIS — F32.0 MAJOR DEPRESSIVE DISORDER, SINGLE EPISODE, MILD (H): ICD-10-CM

## 2020-11-11 PROCEDURE — 99442 PR PHYSICIAN TELEPHONE EVALUATION 11-20 MIN: CPT | Mod: 95 | Performed by: INTERNAL MEDICINE

## 2020-11-11 RX ORDER — BUPROPION HYDROCHLORIDE 150 MG/1
150 TABLET ORAL EVERY MORNING
Qty: 90 TABLET | Refills: 3 | Status: SHIPPED | OUTPATIENT
Start: 2020-11-11 | End: 2021-06-09

## 2020-11-11 RX ORDER — HYDROCHLOROTHIAZIDE 12.5 MG/1
25 TABLET ORAL DAILY
Qty: 180 TABLET | Refills: 1 | Status: SHIPPED | OUTPATIENT
Start: 2020-11-11 | End: 2021-06-09

## 2020-11-11 RX ORDER — AMLODIPINE BESYLATE 5 MG/1
5 TABLET ORAL DAILY
Qty: 90 TABLET | Refills: 3 | Status: SHIPPED | OUTPATIENT
Start: 2020-11-11 | End: 2021-07-28

## 2020-11-11 ASSESSMENT — PATIENT HEALTH QUESTIONNAIRE - PHQ9: SUM OF ALL RESPONSES TO PHQ QUESTIONS 1-9: 0

## 2020-11-11 NOTE — TELEPHONE ENCOUNTER
Reason for Call:  Other call back    Detailed comments: Diane is asking that a nurse return a call to her.  Diane thinks she has a cold and is tired,but is wondering if she should be tested for Covid.       Phone Number Patient can be reached at: Home number on file [phone] (home)    Best Time: any    Can we leave a detailed message on this number? YES    Call taken on 11/11/2020 at 11:59 AM by Shea Nino

## 2020-11-11 NOTE — PROGRESS NOTES
"Diane Gaitan is a 70 year old female who is being evaluated via a billable telephone visit.      The patient has been notified of following:     \"This telephone visit will be conducted via a call between you and your physician/provider. We have found that certain health care needs can be provided without the need for a physical exam.  This service lets us provide the care you need with a short phone conversation.  If a prescription is necessary we can send it directly to your pharmacy.  If lab work is needed we can place an order for that and you can then stop by our lab to have the test done at a later time.    Telephone visits are billed at different rates depending on your insurance coverage. During this emergency period, for some insurers they may be billed the same as an in-person visit.  Please reach out to your insurance provider with any questions.    If during the course of the call the physician/provider feels a telephone visit is not appropriate, you will not be charged for this service.\"    Patient has given verbal consent for Telephone visit?  Yes    What phone number would you like to be contacted at? [phone]    How would you like to obtain your AVS? Isamar    Subjective     Diane Gaitan is a 70 year old female who presents via phone visit today for the following health issues:    HPI       Concern for COVID-19  About how many days ago did these symptoms start? 4 days. 11/8/2020- stiff neck, cough. Then Monday, 11/9/2020 felt achy, fatigued then had diarrhea 2x.   Is this your first visit for this illness? Yes  In the 14 days before your symptoms started, have you had close contact with someone with COVID-19 (Coronavirus)? I do not know.  Do you have a fever or chills? No, 98.3  Are you having new or worsening difficulty breathing? No  Do you have new or worsening cough? Yes, I am coughing up mucus.  Have you had any new or unexplained body aches? YES    Have you experienced any of the " following NEW symptoms?    Headache: No    Sore throat: No    Loss of taste or smell: No    Chest pain: No- tightness     Diarrhea: YES- 2 episodes on Monday, now improved     Rash: No  What treatments have you tried? Airborne   Who do you live with? No one   Are you, or a household member, a healthcare worker or a ? No  Do you live in a nursing home, group home, or shelter? YES  Do you have a way to get food/medications if quarantined? Yes, I have a friend or family member who can help me.          Hypertension Follow-up      Do you check your blood pressure regularly outside of the clinic? Yes     Are you following a low salt diet? Yes    Are your blood pressures ever more than 140 on the top number (systolic) OR more   than 90 on the bottom number (diastolic), for example 140/90? No      How many servings of fruits and vegetables do you eat daily?  2-3    On average, how many sweetened beverages do you drink each day (Examples: soda, juice, sweet tea, etc.  Do NOT count diet or artificially sweetened beverages)?   0    How many days per week do you exercise enough to make your heart beat faster? 3 or less    How many minutes a day do you exercise enough to make your heart beat faster? 9 or less    How many days per week do you miss taking your medication? 0     Review of Systems   INTEGUMENTARY/SKIN: NEGATIVE for worrisome rashes, moles or lesions  EYES: NEGATIVE for vision changes or irritation  ENT/MOUTH: NEGATIVE for ear, mouth and throat problems  CV: NEGATIVE for chest pain, palpitations or peripheral edema  : NEGATIVE for frequency, dysuria, or hematuria  MUSCULOSKELETAL: NEGATIVE for significant arthralgias or myalgia  NEURO: NEGATIVE for weakness, dizziness or paresthesias  HEME: NEGATIVE for bleeding problems  PSYCHIATRIC: NEGATIVE for changes in mood or affect       Objective      Vitals:  No vitals were obtained today due to virtual visit.    alert and no distress  PSYCH: Alert and  "oriented times 3; coherent speech, normal   rate and volume, able to articulate logical thoughts, able   to abstract reason, no tangential thoughts, no hallucinations   or delusions  Her affect is normal  RESP: No cough, no audible wheezing, able to talk in full sentences  Remainder of exam unable to be completed due to telephone visits        Assessment/Plan:    Exposure to COVID-19 virus  Will check, reminded to make sure that she stay isolated during the timeframe that the Covid results are pending  - Asymptomatic COVID-19 Virus (Coronavirus) by PCR; Future    Essential hypertension, benign  Stable on therapy  - hydrochlorothiazide (HYDRODIURIL) 12.5 MG tablet; Take 2 tablets (25 mg) by mouth daily  - amLODIPine (NORVASC) 5 MG tablet; Take 1 tablet (5 mg) by mouth daily    Major depressive disorder, single episode, mild (H)  Stable per PHQ 9  - buPROPion (WELLBUTRIN XL) 150 MG 24 hr tablet; Take 1 tablet (150 mg) by mouth every morning    Type 2 diabetes mellitus without complication, without long-term current use of insulin (H)  Labs ordered  - Hemoglobin A1c; Future  - Lipid Profile; Future  - Hepatic panel; Future  - Albumin Random Urine Quantitative with Creat Ratio; Future     BMI:   Estimated body mass index is 33.45 kg/m  as calculated from the following:    Height as of 9/21/20: 1.702 m (5' 7\").    Weight as of 9/21/20: 96.9 kg (213 lb 9.6 oz).        See Patient Instructions    Return in about 1 month (around 12/11/2020) for Lab Work appointment.    Jac Barry MD  [facility]    Phone call duration:  13 minutes                "

## 2020-11-11 NOTE — TELEPHONE ENCOUNTER
Reason for Disposition    [1] COVID-19 infection suspected by caller or triager AND [2] mild symptoms (cough, fever, or others) AND [3] no complications or SOB    Additional Information    Negative: SEVERE difficulty breathing (e.g., struggling for each breath, speaks in single words)    Negative: Difficult to awaken or acting confused (e.g., disoriented, slurred speech)    Negative: Bluish (or gray) lips or face now    Negative: Shock suspected (e.g., cold/pale/clammy skin, too weak to stand, low BP, rapid pulse)    Negative: Sounds like a life-threatening emergency to the triager    Negative: [1] COVID-19 exposure AND [2] no symptoms    Negative: COVID-19 and Breastfeeding, questions about    Negative: [1] Adult with possible COVID-19 symptoms AND [2] triager concerned about severity of symptoms or other causes    Negative: SEVERE or constant chest pain or pressure (Exception: mild central chest pain, present only when coughing)    Negative: MODERATE difficulty breathing (e.g., speaks in phrases, SOB even at rest, pulse 100-120)    Negative: Patient sounds very sick or weak to the triager    Negative: MILD difficulty breathing (e.g., minimal/no SOB at rest, SOB with walking, pulse <100)    Negative: Chest pain or pressure    Negative: Fever > 103 F (39.4 C)    Negative: [1] Fever > 101 F (38.3 C) AND [2] age > 60    Negative: [1] Fever > 100.0 F (37.8 C) AND [2] bedridden (e.g., nursing home patient, CVA, chronic illness, recovering from surgery)    Negative: HIGH RISK patient (e.g., age > 64 years, diabetes, heart or lung disease, weak immune system) (Exception: Has already been evaluated by healthcare provider and has no new or worsening symptoms)    Negative: Fever present > 3 days (72 hours)    Negative: [1] Fever returns after gone for over 24 hours AND [2] symptoms worse or not improved    Negative: [1] Continuous (nonstop) coughing interferes with work or school AND [2] no improvement using cough  "treatment per protocol    Answer Assessment - Initial Assessment Questions  1. COVID-19 DIAGNOSIS: \"Who made your Coronavirus (COVID-19) diagnosis?\" \"Was it confirmed by a positive lab test?\" If not diagnosed by a HCP, ask \"Are there lots of cases (community spread) where you live?\" (See public health department website, if unsure)      No.  2. ONSET: \"When did the COVID-19 symptoms start?\"       11/8/2020- stiff neck but feels she sleeps weird with extra pillows felt fine through out the day. Then Monday, 11/9/2020 felt 'laggy' then had diarrhea 1x. Took Airborne on Monday night, then felt fine yesterday but then developed cough.  3. WORST SYMPTOM: \"What is your worst symptom?\" (e.g., cough, fever, shortness of breath, muscle aches)      Fatigue, occasional dry cough.  4. COUGH: \"Do you have a cough?\" If so, ask: \"How bad is the cough?\"        Dry cough but occasional mucus production.  5. FEVER: \"Do you have a fever?\" If so, ask: \"What is your temperature, how was it measured, and when did it start?\"      No fever. Last temp ta  6. RESPIRATORY STATUS: \"Describe your breathing?\" (e.g., shortness of breath, wheezing, unable to speak)       No SOB  7. BETTER-SAME-WORSE: \"Are you getting better, staying the same or getting worse compared to yesterday?\"  If getting worse, ask, \"In what way?\"      Feeling much better  8. HIGH RISK DISEASE: \"Do you have any chronic medical problems?\" (e.g., asthma, heart or lung disease, weak immune system, etc.)      Per chart review, hx of MI last year, stroke last year,   9. PREGNANCY: \"Is there any chance you are pregnant?\" \"When was your last menstrual period?\"      n/a  10. OTHER SYMPTOMS: \"Do you have any other symptoms?\"  (e.g., chills, fatigue, headache, loss of smell or taste, muscle pain, sore throat)        Fatigue. Diarrhea one day only on 11/9/2020. No headache. No chills. No loss of taste. Some joint pain on 11/9/2020 which was brief.    Protocols used: CORONAVIRUS " (COVID-19) DIAGNOSED OR [id number] 8.4.20

## 2020-11-16 ENCOUNTER — OFFICE VISIT (OUTPATIENT)
Dept: URGENT CARE | Facility: URGENT CARE | Age: 70
End: 2020-11-16
Attending: INTERNAL MEDICINE
Payer: MEDICARE

## 2020-11-16 DIAGNOSIS — Z20.822 EXPOSURE TO COVID-19 VIRUS: ICD-10-CM

## 2020-11-16 PROCEDURE — U0003 INFECTIOUS AGENT DETECTION BY NUCLEIC ACID (DNA OR RNA); SEVERE ACUTE RESPIRATORY SYNDROME CORONAVIRUS 2 (SARS-COV-2) (CORONAVIRUS DISEASE [COVID-19]), AMPLIFIED PROBE TECHNIQUE, MAKING USE OF HIGH THROUGHPUT TECHNOLOGIES AS DESCRIBED BY CMS-2020-01-R: HCPCS | Performed by: INTERNAL MEDICINE

## 2020-11-18 ENCOUNTER — TELEPHONE (OUTPATIENT)
Dept: EMERGENCY MEDICINE | Facility: CLINIC | Age: 70
End: 2020-11-18

## 2020-11-18 LAB
SARS-COV-2 RNA SPEC QL NAA+PROBE: ABNORMAL
SPECIMEN SOURCE: ABNORMAL

## 2020-11-18 NOTE — TELEPHONE ENCOUNTER
"Coronavirus (COVID-19) Notification    Caller Name (Patient, parent, daughter/son, grandparent, etc)  Patient     Reason for call  Notify of Positive Coronavirus (COVID-19) lab results, assess symptoms,  review [facility] recommendations    Lab Result    Lab test:  2019-nCoV rRt-PCR or SARS-CoV-2 PCR    Oropharyngeal AND/OR nasopharyngeal swabs is POSITIVE for 2019-nCoV RNA/SARS-COV-2 PCR (COVID-19 virus)    RN Recommendations/Instructions per [facility] Coronavirus COVID-19 recommendations    Brief introduction script  Introduce self then review script:  \"I am calling on behalf of [employer].  We were notified that your Coronavirus test (COVID-19) for was POSITIVE for the virus.  I have some information to relay to you but first I wanted to mention that the [place] Dept of Health will be contacting you shortly [it's possible MD already called Patient] to talk to you more about how you are feeling and other people you have had contact with who might now also have the virus.  Also, [facility] is Partnering with the [facility] for Covid-19 research, you may be contacted directly by research staff.\"    Assessment (Inquire about Patient's current symptoms)   Assessment   Current Symptoms at time of phone call: (if no symptoms, document No symptoms]  tired    Symptoms onset (if applicable)  11/8/20     If at time of call, Patients symptoms hare worsened, the Patient should contact 911 or have someone drive them to Emergency Dept promptly:      If Patient calling 911, inform 911 personal that you have tested positive for the Coronavirus (COVID-19).  Place mask on and await 911 to arrive.    If Emergency Dept, If possible, please have another adult drive you to the Emergency Dept but you need to wear mask when in contact with other people.      Review information with Patient    Your result was positive. This means you have COVID-19 (coronavirus).  We have sent you a letter that reviews the " information that I'll be reviewing with you now.    How can I protect others?    If you have symptoms: stay home and away from others (self-isolate) until:    You've had no fever--and no medicine that reduces fever--for 1 full day (24 hours). And       Your other symptoms have gotten better. For example, your cough or breathing has improved. And     At least 10 days have passed since your symptoms started. (If you've been told by a doctor that you have a weak immune system, wait 20 days.)     If you don't have symptoms: Stay home and away from others (self-isolate) until at least 10 days have passed since your first positive COVID-19 test. (Date test collected)    During this time:    Stay in your own room, including for meals. Use your own bathroom if you can.    Stay away from others in your home. No hugging, kissing or shaking hands. No visitors.     Don't go to work, school or anywhere else.     Clean  high touch  surfaces often (doorknobs, counters, handles, etc.). Use a household cleaning spray or wipes. You'll find a full list on the EPA website at www.epa.gov/pesticide-registration/list-n-disinfectants-use-against-sars-cov-2.     Cover your mouth and nose with a mask, tissue or other face covering to avoid spreading germs.    Wash your hands and face often with soap and water.    Caregivers in these groups are at risk for severe illness due to COVID-19:  o People 65 years and older  o People who live in a nursing home or long-term care facility  o People with chronic disease (lung, heart, cancer, diabetes, kidney, liver, immunologic)  o People who have a weakened immune system, including those who:  - Are in cancer treatment  - Take medicine that weakens the immune system, such as corticosteroids  - Had a bone marrow or organ transplant  - Have an immune deficiency  - Have poorly controlled HIV or AIDS  - Are obese (body mass index of 40 or higher)  - Smoke regularly    Caregivers should wear gloves while  washing dishes, handling laundry and cleaning bedrooms and bathrooms.    Wash and dry laundry with special caution. Don't shake dirty laundry, and use the warmest water setting you can.    If you have a weakened immune system, ask your doctor about other actions you should take.    For more tips, go to www.cdc.gov/coronavirus/2019-ncov/downloads/10Things.pdf.    You should not go back to work until you meet the guidelines above for ending your home isolation. You don't need to be retested for COVID-19 before going back to work--studies show that you won't spread the virus if it's been at least 10 days since your symptoms started (or 20 days, if you have a weak immune system).    Employers: This document serves as formal notice of your employee's medical guidelines for going back to work. They must meet the above guidelines before going back to work in person.    How can I take care of myself?    1. Get lots of rest. Drink extra fluids (unless a doctor has told you not to).    2. Take Tylenol (acetaminophen) for fever or pain. If you have liver or kidney problems, ask your family doctor if it's okay to take Tylenol.     Take either:     650 mg (two 325 mg pills) every 4 to 6 hours, or     1,000 mg (two 500 mg pills) every 8 hours as needed.     Note: Don't take more than 3,000 mg in one day. Acetaminophen is found in many medicines (both prescribed and over-the-counter medicines). Read all labels to be sure you don't take too much.    For children, check the Tylenol bottle for the right dose (based on their age or weight).    3. If you have other health problems (like cancer, heart failure, an organ transplant or severe kidney disease): Call your specialty clinic if you don't feel better in the next 2 days.    4. Know when to call 911: Emergency warning signs include:    Trouble breathing or shortness of breath    Pain or pressure in the chest that doesn't go away    Feeling confused like you haven't felt before, or  not being able to wake up    Bluish-colored lips or face    5. Sign up for [employer]. We know it's scary to hear that you have COVID-19. We want to track your symptoms to make sure you're okay over the next 2 weeks. Please look for an email from [employer]--this is a free, online program that we'll use to keep in touch. To sign up, follow the link in the email. Learn more at [email or web address].    Where can I get more information?    [facility]: [email or web address]    Coronavirus Basics: www.health.[email or web address].[place]./diseases/coronavirus/basics.html    What to Do If You're Sick: www.cdc.gov/coronavirus/2019-ncov/about/steps-when-sick.html    Ending Home Isolation: www.cdc.gov/coronavirus/2019-ncov/hcp/disposition-in-home-patients.html     Caring for Someone with COVID-19: www.cdc.gov/coronavirus/2019-ncov/if-you-are-sick/care-for-someone.html     [facility] clinical trials (COVID-19 research studies): clinicalaffairs.[email or web address].[facility]/[employer]-clinical-trials     A Positive COVID-19 letter will be sent via [employer] or the mail. (Exception, no letters sent to Presurgerical/Preprocedure Patients)    [Name]  Luisa Munguia RN  [facility]  COVID19 Results Team RN  Ph# [phone]

## 2021-01-15 ENCOUNTER — HEALTH MAINTENANCE LETTER (OUTPATIENT)
Age: 71
End: 2021-01-15

## 2021-01-16 NOTE — PROGRESS NOTES
"[facility]    Hematology/Oncology Established Patient Note      Today's Date: 1/21/2021    Reason for follow-up: Left breast cancer.  Diane Gaitan is a 70 year old female who is being evaluated via a billable telephone visit.      The patient has been notified of following:     \"This telephone visit will be conducted via a call between you and your physician/provider. We have found that certain health care needs can be provided without the need for a physical exam.  This service lets us provide the care you need with a short phone conversation during the COVID-19 pandemic.  If a prescription is necessary we can send it directly to your pharmacy.  If lab work is needed we can place an order for that and you can then stop by our lab to have the test done at a later time.    Telephone visits are billed at different rates depending on your insurance coverage. During this emergency period, for some insurers they may be billed the same as an in-person visit.  Please reach out to your insurance provider with any questions.    If during the course of the call the physician/provider feels a telephone visit is not appropriate, you will not be charged for this service.\"    Patient has given verbal consent for Telephone visit?  Yes    How would you like to obtain your AVS? MyChart    HISTORY OF PRESENT ILLNESS: Diane Gaitan is a 70 year old female who presents with the following oncologic history:  1.  11/5/2019: Mammogram showed focal asymmetry in the lateral left breast at the 2-4 o'clock position.  2.  11/12/2019: Targeted left breast ultrasound showed an irregular hypoechoic mass at the 2 o'clock position, 5 cm from the nipple measuring 1.7 x 1.2 x 1.4 cm.  Survey of the axilla showed no evidence of adenopathy.  3.  11/15/2019: Ultrasound-guided left breast needle biopsy at 2:00, 5 cm from the nipple showed a grade 2 invasive mammary carcinoma ER strongly positive at greater than 95%, MI, " moderate to strongly positive at 80%, HER-2/guadalupe FISH negative.  4.  12/04/2019: Bilateral breast MRI showed left upper outer quadrant biopsy invasive mammary carcinoma measuring up to 1.7 cm and no other concerning areas of enhancement or lymphadenopathy.  5.  12/19/2019: Underwent left breast lumpectomy with left axillary sentinel lymph node biopsy under the care of Dr. Chidi Malik.  Pathology showed a grade 3 invasive pleomorphic lobular carcinoma measuring 1.5 cm, lymphovascular invasion not identified, margins negative for invasive carcinoma.  A total of 3 left axillary sentinel lymph nodes negative for malignancy.  Oncotype DX score = 8, corresponding to a distant recurrence risk at 9 years of 3% after 5 years of hormone blockade therapy and a less than 1% absolute chemotherapy benefit.  6.  2/25/2020: Completed adjuvant radiation therapy to the left breast.  7. 3/04/2020: Started adjuvant anastrazole.    INTERIM HISTORY:  Diane reports having COVID-19 viral infection in 11/2020 and only residual effects from her infection are occasional muscle/bone aches.  She denies any recent fevers, chills, night sweats, bowel or bladder dysfunction, cough, or dyspnea.      REVIEW OF SYSTEMS:   14 point ROS was reviewed and is negative other than as noted above in HPI.       HOME MEDICATIONS:  Current Outpatient Medications   Medication Sig Dispense Refill     amLODIPine (NORVASC) 5 MG tablet Take 1 tablet (5 mg) by mouth daily 90 tablet 3     anastrozole (ARIMIDEX) 1 MG tablet Take 1 tablet (1 mg) by mouth daily 90 tablet 3     aspirin 81 MG tablet Take 1 tablet by mouth daily. 90 tablet 3     buPROPion (WELLBUTRIN XL) 150 MG 24 hr tablet Take 1 tablet (150 mg) by mouth every morning 90 tablet 3     calcium carbonate 600 mg-vitamin D 400 units (CALTRATE) 600-400 MG-UNIT per tablet Take 1 tablet by mouth 2 times daily        citalopram (CELEXA) 20 MG tablet Take 1 tablet (20 mg) by mouth daily 90 tablet 3      hydrochlorothiazide (HYDRODIURIL) 12.5 MG tablet Take 2 tablets (25 mg) by mouth daily 180 tablet 1     LORazepam (ATIVAN) 0.5 MG tablet TK 1 PO 30 MINUTES PRIOR TO MRI, may repeat times 1 2 tablet 0     losartan (COZAAR) 100 MG tablet Take 1 tablet (100 mg) by mouth daily 90 tablet 3     metFORMIN (GLUCOPHAGE) 500 MG tablet Take 1 tablet (500 mg) by mouth 2 times daily (with meals) 180 tablet 3     omeprazole (PRILOSEC) 20 MG DR capsule Take 1 capsule (20 mg) by mouth daily 90 capsule 3     simvastatin (ZOCOR) 20 MG tablet Take 1 tablet (20 mg) by mouth At Bedtime 90 tablet 3         ALLERGIES:  Allergies   Allergen Reactions     Lisinopril Cough     COUGH         PAST MEDICAL HISTORY:  Past Medical History:   Diagnosis Date     Arthritis      BENIGN HYPERTENSION 8/28/2002     Cancer (H)     Basal cell carcinoma     CARDIOVASCULAR SCREENING; LDL GOAL LESS THAN 100 10/31/2010     Depressive disorder 1997     Esophageal reflux      Mild major depression (H) 9/14/2010     Other malaise and fatigue 8/28/2002     Type 2 diabetes, HbA1c goal < 7% (H) 10/31/2010   Possible transient ischemic attack.      PAST SURGICAL HISTORY:  Past Surgical History:   Procedure Laterality Date     ARTHROPLASTY KNEE  10/18/2012    Procedure: ARTHROPLASTY KNEE;  RIGHT TOTAL KNEE ARTHROPLASTY (SMITH & NEPHEW)^ ;  Surgeon: Howard Charles MD;  Location:  OR     BIOPSY NODE SENTINEL Left 12/19/2019    Procedure: LEFT SENTINEL LYMPH NODE BIOPSY;  Surgeon: Ruddy Malik MD;  Location:  OR     BREAST BIOPSY, RT/LT  1988    breast biopsy     COLONOSCOPY N/A 7/14/2016    Procedure: COLONOSCOPY;  Surgeon: Curt Patel MD;  Location:  GI     ESOPHAGOSCOPY, GASTROSCOPY, DUODENOSCOPY (EGD), COMBINED N/A 9/13/2018    Procedure: COMBINED ESOPHAGOSCOPY, GASTROSCOPY, DUODENOSCOPY (EGD);  gastroscopy;  Surgeon: Mac White MD;  Location:  GI     HYSTERECTOMY, PAP NO LONGER INDICATED  1986    abdominal  hysterectomy     LUMPECTOMY BREAST WITH SEED LOCALIZATION Left 2019    Procedure: SEED LOCALIZED LEFT BREAST LUMPECTOMY;  Surgeon: Ruddy Malik MD;  Location: [facility] OR     ROTATOR CUFF REPAIR RT/LT Right      TUBAL LIGATION      at age 29     [facility] NONSPECIFIC PROCEDURE  10/30/96    skin tags removed     [facility] NONSPECIFIC PROCEDURE      colonic polyps   Ovaries intact.      SOCIAL HISTORY:  Social History     Socioeconomic History     Marital status:      Spouse name: Not on file     Number of children: Not on file     Years of education: Not on file     Highest education level: Not on file   Occupational History     Not on file   Social Needs     Financial resource strain: Not on file     Food insecurity     Worry: Not on file     Inability: Not on file     Transportation needs     Medical: Not on file     Non-medical: Not on file   Tobacco Use     Smoking status: Former Smoker     Packs/day: 1.00     Years: 12.00     Pack years: 12.00     Quit date: 10/18/1980     Years since quittin.2     Smokeless tobacco: Never Used   Substance and Sexual Activity     Alcohol use: Yes     Comment: 12 Beers per week     Drug use: No     Sexual activity: Never     Partners: Male   Lifestyle     Physical activity     Days per week: Not on file     Minutes per session: Not on file     Stress: Not on file   Relationships     Social connections     Talks on phone: Not on file     Gets together: Not on file     Attends Bahai service: Not on file     Active member of club or organization: Not on file     Attends meetings of clubs or organizations: Not on file     Relationship status: Not on file     Intimate partner violence     Fear of current or ex partner: Not on file     Emotionally abused: Not on file     Physically abused: Not on file     Forced sexual activity: Not on file   Other Topics Concern     Parent/sibling w/ CABG, MI or angioplasty before 65F 55M? Not Asked   Social History Narrative     Not on  file         FAMILY HISTORY:  Family History   Problem Relation Age of Onset     Breast Cancer Sister      Hypertension Daughter      Breast Cancer Daughter 43     Skin Cancer Mother      Coronary Artery Disease Father      Colon Cancer Sister      Breast Cancer Maternal Aunt         Many Maternal Aunts have had breast Cancer         PHYSICAL EXAM:  Vital signs:  There were no vitals taken for this visit.       LABS:  CBC RESULTS:   Recent Labs   Lab Test 12/16/19  0837  10/30/18  0950   WBC  --   --  5.3   RBC  --   --  4.83   HGB 12.9   < > 9.5*   HCT  --   --  33.5*   MCV  --   --  69*   MCH  --   --  19.7*   MCHC  --   --  28.4*   RDW  --   --  20.5*      < > 280    < > = values in this interval not displayed.     Last Comprehensive Metabolic Panel:  Sodium   Date Value Ref Range Status   06/19/2020 133 133 - 144 mmol/L Final     Potassium   Date Value Ref Range Status   06/19/2020 3.9 3.4 - 5.3 mmol/L Final     Chloride   Date Value Ref Range Status   06/19/2020 98 94 - 109 mmol/L Final     Carbon Dioxide   Date Value Ref Range Status   06/19/2020 30 20 - 32 mmol/L Final     Anion Gap   Date Value Ref Range Status   06/19/2020 5 3 - 14 mmol/L Final     Glucose   Date Value Ref Range Status   06/19/2020 135 (H) 70 - 99 mg/dL Final     Urea Nitrogen   Date Value Ref Range Status   06/19/2020 6 (L) 7 - 30 mg/dL Final     Creatinine   Date Value Ref Range Status   06/19/2020 0.64 0.52 - 1.04 mg/dL Final     GFR Estimate   Date Value Ref Range Status   06/19/2020 90 >60 mL/min/[1.73_m2] Final     Comment:     Non  GFR Calc  Starting 12/18/2018, serum creatinine based estimated GFR (eGFR) will be   calculated using the Chronic Kidney Disease Epidemiology Collaboration   (CKD-EPI) equation.       Calcium   Date Value Ref Range Status   06/19/2020 9.5 8.5 - 10.1 mg/dL Final     Bilirubin Total   Date Value Ref Range Status   10/03/2019 0.9 0.2 - 1.3 mg/dL Final     Alkaline Phosphatase   Date  Value Ref Range Status   10/03/2019 106 40 - 150 U/L Final     ALT   Date Value Ref Range Status   10/03/2019 30 0 - 50 U/L Final     AST   Date Value Ref Range Status   10/03/2019 14 0 - 45 U/L Final       PATHOLOGY:  None new.    IMAGING:  3/2/2020: DEXA scan -- normal bone mineral density.    11/6/2020: Mammogram showed no suspicious findings.    ASSESSMENT/PLAN:  Diane Gaitan is a 70 year old female with the following issues:  1.  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 She is tolerating adjuvant anastrozole very well with no significant side effects.    -Note she is not a great candidate for tamoxifen given her history of a possible TIA.  -Her baseline DEXA scan from 3/2/2020 showed normal bone mineral density.  I recommended adequate calcium and vitamin D as well as weightbearing exercise if tolerated. She does use a cane and is careful about her movement and exercise.  -Plan to repeat DEXA scan in 2022.  -Continue annual mammograms, next due on or after 11/6/2021.    2.  Diabetes mellitus type 2  -Stable.  -She will continue on metformin.    3.  Strong family history of breast cancer  -Her daughter had already undergone a genetics consult with negative genetic testing.  The  had informed her daughter that none of her other family members would require genetic testing.    4. Essential hypertension  -Blood pressure reportedly stable.  -Continue on losartan, hydrochlorothiazide, and amlodipine. Diane dose monitor her BP at home with her cuff.    5. History of COVID-19 viral infection  -She had cough, body aches, fatigue, and diarrhea and tested positive for COVID-19 on 11/16/2020.  -She does not have much residual effect from COVID-19  and has had some intermittent musculoskeletal effects.    Return in 4 months.    Myrna Kaye MD  Hematology/Oncology  [facility]    Phone visit duration: 10 minutes

## 2021-01-21 ENCOUNTER — VIRTUAL VISIT (OUTPATIENT)
Dept: ONCOLOGY | Facility: CLINIC | Age: 71
End: 2021-01-21
Attending: INTERNAL MEDICINE
Payer: MEDICARE

## 2021-01-21 VITALS — BODY MASS INDEX: 29.76 KG/M2 | WEIGHT: 190 LBS

## 2021-01-21 DIAGNOSIS — Z17.0 MALIGNANT NEOPLASM OF UPPER-OUTER QUADRANT OF LEFT BREAST IN FEMALE, ESTROGEN RECEPTOR POSITIVE (H): Primary | ICD-10-CM

## 2021-01-21 DIAGNOSIS — E11.9 TYPE 2 DIABETES MELLITUS WITHOUT COMPLICATION, WITHOUT LONG-TERM CURRENT USE OF INSULIN (H): ICD-10-CM

## 2021-01-21 DIAGNOSIS — Z86.16 HISTORY OF 2019 NOVEL CORONAVIRUS DISEASE (COVID-19): ICD-10-CM

## 2021-01-21 DIAGNOSIS — C50.412 MALIGNANT NEOPLASM OF UPPER-OUTER QUADRANT OF LEFT BREAST IN FEMALE, ESTROGEN RECEPTOR POSITIVE (H): Primary | ICD-10-CM

## 2021-01-21 DIAGNOSIS — Z79.811 AROMATASE INHIBITOR USE: ICD-10-CM

## 2021-01-21 PROCEDURE — 999N001193 HC VIDEO/TELEPHONE VISIT; NO CHARGE

## 2021-01-21 PROCEDURE — 99441 PR PHYSICIAN TELEPHONE EVALUATION 5-10 MIN: CPT | Mod: 95 | Performed by: INTERNAL MEDICINE

## 2021-01-21 ASSESSMENT — PAIN SCALES - GENERAL: PAINLEVEL: NO PAIN (0)

## 2021-01-21 NOTE — PROGRESS NOTES
Diane is a 70 year old who is being evaluated via a billable telephone visit.      What phone number would you like to be contacted at? [phone]  How would you like to obtain your AVS? Mail a copy          Phone call duration:      Radha Taylor, MARIA A

## 2021-01-21 NOTE — LETTER
"    1/21/2021         RE: Diane Gaitan  [address] [facility] [address]        Dear Colleague,    Thank you for referring your patient, Diane Gaitan, to the [facility]. Please see a copy of my visit note below.    [facility]    Hematology/Oncology Established Patient Note      Today's Date: 1/21/2021    Reason for follow-up: Left breast cancer.  Diane Gaitan is a 70 year old female who is being evaluated via a billable telephone visit.      The patient has been notified of following:     \"This telephone visit will be conducted via a call between you and your physician/provider. We have found that certain health care needs can be provided without the need for a physical exam.  This service lets us provide the care you need with a short phone conversation during the COVID-19 pandemic.  If a prescription is necessary we can send it directly to your pharmacy.  If lab work is needed we can place an order for that and you can then stop by our lab to have the test done at a later time.    Telephone visits are billed at different rates depending on your insurance coverage. During this emergency period, for some insurers they may be billed the same as an in-person visit.  Please reach out to your insurance provider with any questions.    If during the course of the call the physician/provider feels a telephone visit is not appropriate, you will not be charged for this service.\"    Patient has given verbal consent for Telephone visit?  Yes    How would you like to obtain your AVS? MyChart    HISTORY OF PRESENT ILLNESS: Diane Gaitan is a 70 year old female who presents with the following oncologic history:  1.  11/5/2019: Mammogram showed focal asymmetry in the lateral left breast at the 2-4 o'clock position.  2.  11/12/2019: Targeted left breast ultrasound showed an irregular hypoechoic mass at the 2 o'clock position, 5 cm from the nipple measuring 1.7 " x 1.2 x 1.4 cm.  Survey of the axilla showed no evidence of adenopathy.  3.  11/15/2019: Ultrasound-guided left breast needle biopsy at 2:00, 5 cm from the nipple showed a grade 2 invasive mammary carcinoma ER strongly positive at greater than 95%, MS, moderate to strongly positive at 80%, HER-2/guadalupe FISH negative.  4.  12/04/2019: Bilateral breast MRI showed left upper outer quadrant biopsy invasive mammary carcinoma measuring up to 1.7 cm and no other concerning areas of enhancement or lymphadenopathy.  5.  12/19/2019: Underwent left breast lumpectomy with left axillary sentinel lymph node biopsy under the care of Dr. Chidi Malik.  Pathology showed a grade 3 invasive pleomorphic lobular carcinoma measuring 1.5 cm, lymphovascular invasion not identified, margins negative for invasive carcinoma.  A total of 3 left axillary sentinel lymph nodes negative for malignancy.  Oncotype DX score = 8, corresponding to a distant recurrence risk at 9 years of 3% after 5 years of hormone blockade therapy and a less than 1% absolute chemotherapy benefit.  6.  2/25/2020: Completed adjuvant radiation therapy to the left breast.  7. 3/04/2020: Started adjuvant anastrazole.    INTERIM HISTORY:  Diane reports having COVID-19 viral infection in 11/2020 and only residual effects from her infection are occasional muscle/bone aches.  She denies any recent fevers, chills, night sweats, bowel or bladder dysfunction, cough, or dyspnea.      REVIEW OF SYSTEMS:   14 point ROS was reviewed and is negative other than as noted above in HPI.       HOME MEDICATIONS:  Current Outpatient Medications   Medication Sig Dispense Refill     amLODIPine (NORVASC) 5 MG tablet Take 1 tablet (5 mg) by mouth daily 90 tablet 3     anastrozole (ARIMIDEX) 1 MG tablet Take 1 tablet (1 mg) by mouth daily 90 tablet 3     aspirin 81 MG tablet Take 1 tablet by mouth daily. 90 tablet 3     buPROPion (WELLBUTRIN XL) 150 MG 24 hr tablet Take 1 tablet (150 mg) by mouth  every morning 90 tablet 3     calcium carbonate 600 mg-vitamin D 400 units (CALTRATE) 600-400 MG-UNIT per tablet Take 1 tablet by mouth 2 times daily        citalopram (CELEXA) 20 MG tablet Take 1 tablet (20 mg) by mouth daily 90 tablet 3     hydrochlorothiazide (HYDRODIURIL) 12.5 MG tablet Take 2 tablets (25 mg) by mouth daily 180 tablet 1     LORazepam (ATIVAN) 0.5 MG tablet TK 1 PO 30 MINUTES PRIOR TO MRI, may repeat times 1 2 tablet 0     losartan (COZAAR) 100 MG tablet Take 1 tablet (100 mg) by mouth daily 90 tablet 3     metFORMIN (GLUCOPHAGE) 500 MG tablet Take 1 tablet (500 mg) by mouth 2 times daily (with meals) 180 tablet 3     omeprazole (PRILOSEC) 20 MG DR capsule Take 1 capsule (20 mg) by mouth daily 90 capsule 3     simvastatin (ZOCOR) 20 MG tablet Take 1 tablet (20 mg) by mouth At Bedtime 90 tablet 3         ALLERGIES:  Allergies   Allergen Reactions     Lisinopril Cough     COUGH         PAST MEDICAL HISTORY:  Past Medical History:   Diagnosis Date     Arthritis      BENIGN HYPERTENSION 8/28/2002     Cancer (H)     Basal cell carcinoma     CARDIOVASCULAR SCREENING; LDL GOAL LESS THAN 100 10/31/2010     Depressive disorder 1997     Esophageal reflux      Mild major depression (H) 9/14/2010     Other malaise and fatigue 8/28/2002     Type 2 diabetes, HbA1c goal < 7% (H) 10/31/2010   Possible transient ischemic attack.      PAST SURGICAL HISTORY:  Past Surgical History:   Procedure Laterality Date     ARTHROPLASTY KNEE  10/18/2012    Procedure: ARTHROPLASTY KNEE;  RIGHT TOTAL KNEE ARTHROPLASTY (SMITH & NEPHEW)^ ;  Surgeon: Howard Charles MD;  Location:  OR     BIOPSY NODE SENTINEL Left 12/19/2019    Procedure: LEFT SENTINEL LYMPH NODE BIOPSY;  Surgeon: Ruddy Malik MD;  Location:  OR     BREAST BIOPSY, RT/LT  1988    breast biopsy     COLONOSCOPY N/A 7/14/2016    Procedure: COLONOSCOPY;  Surgeon: Curt Patel MD;  Location:  GI     ESOPHAGOSCOPY, GASTROSCOPY,  DUODENOSCOPY (EGD), COMBINED N/A 2018    Procedure: COMBINED ESOPHAGOSCOPY, GASTROSCOPY, DUODENOSCOPY (EGD);  gastroscopy;  Surgeon: Mac White MD;  Location:  GI     HYSTERECTOMY, PAP NO LONGER INDICATED      abdominal hysterectomy     LUMPECTOMY BREAST WITH SEED LOCALIZATION Left 2019    Procedure: SEED LOCALIZED LEFT BREAST LUMPECTOMY;  Surgeon: Ruddy Malik MD;  Location:  OR     ROTATOR CUFF REPAIR RT/LT Right      TUBAL LIGATION      at age 29     ZZC NONSPECIFIC PROCEDURE  10/30/96    skin tags removed     Z NONSPECIFIC PROCEDURE      colonic polyps   Ovaries intact.      SOCIAL HISTORY:  Social History     Socioeconomic History     Marital status:      Spouse name: Not on file     Number of children: Not on file     Years of education: Not on file     Highest education level: Not on file   Occupational History     Not on file   Social Needs     Financial resource strain: Not on file     Food insecurity     Worry: Not on file     Inability: Not on file     Transportation needs     Medical: Not on file     Non-medical: Not on file   Tobacco Use     Smoking status: Former Smoker     Packs/day: 1.00     Years: 12.00     Pack years: 12.00     Quit date: 10/18/1980     Years since quittin.2     Smokeless tobacco: Never Used   Substance and Sexual Activity     Alcohol use: Yes     Comment: 12 Beers per week     Drug use: No     Sexual activity: Never     Partners: Male   Lifestyle     Physical activity     Days per week: Not on file     Minutes per session: Not on file     Stress: Not on file   Relationships     Social connections     Talks on phone: Not on file     Gets together: Not on file     Attends Voodoo service: Not on file     Active member of club or organization: Not on file     Attends meetings of clubs or organizations: Not on file     Relationship status: Not on file     Intimate partner violence     Fear of current or ex partner: Not on file      Emotionally abused: Not on file     Physically abused: Not on file     Forced sexual activity: Not on file   Other Topics Concern     Parent/sibling w/ CABG, MI or angioplasty before 65F 55M? Not Asked   Social History Narrative     Not on file         FAMILY HISTORY:  Family History   Problem Relation Age of Onset     Breast Cancer Sister      Hypertension Daughter      Breast Cancer Daughter 43     Skin Cancer Mother      Coronary Artery Disease Father      Colon Cancer Sister      Breast Cancer Maternal Aunt         Many Maternal Aunts have had breast Cancer         PHYSICAL EXAM:  Vital signs:  There were no vitals taken for this visit.       LABS:  CBC RESULTS:   Recent Labs   Lab Test 12/16/19  0837  10/30/18  0950   WBC  --   --  5.3   RBC  --   --  4.83   HGB 12.9   < > 9.5*   HCT  --   --  33.5*   MCV  --   --  69*   MCH  --   --  19.7*   MCHC  --   --  28.4*   RDW  --   --  20.5*      < > 280    < > = values in this interval not displayed.     Last Comprehensive Metabolic Panel:  Sodium   Date Value Ref Range Status   06/19/2020 133 133 - 144 mmol/L Final     Potassium   Date Value Ref Range Status   06/19/2020 3.9 3.4 - 5.3 mmol/L Final     Chloride   Date Value Ref Range Status   06/19/2020 98 94 - 109 mmol/L Final     Carbon Dioxide   Date Value Ref Range Status   06/19/2020 30 20 - 32 mmol/L Final     Anion Gap   Date Value Ref Range Status   06/19/2020 5 3 - 14 mmol/L Final     Glucose   Date Value Ref Range Status   06/19/2020 135 (H) 70 - 99 mg/dL Final     Urea Nitrogen   Date Value Ref Range Status   06/19/2020 6 (L) 7 - 30 mg/dL Final     Creatinine   Date Value Ref Range Status   06/19/2020 0.64 0.52 - 1.04 mg/dL Final     GFR Estimate   Date Value Ref Range Status   06/19/2020 90 >60 mL/min/[1.73_m2] Final     Comment:     Non  GFR Calc  Starting 12/18/2018, serum creatinine based estimated GFR (eGFR) will be   calculated using the Chronic Kidney Disease Epidemiology  Collaboration   (CKD-EPI) equation.       Calcium   Date Value Ref Range Status   06/19/2020 9.5 8.5 - 10.1 mg/dL Final     Bilirubin Total   Date Value Ref Range Status   10/03/2019 0.9 0.2 - 1.3 mg/dL Final     Alkaline Phosphatase   Date Value Ref Range Status   10/03/2019 106 40 - 150 U/L Final     ALT   Date Value Ref Range Status   10/03/2019 30 0 - 50 U/L Final     AST   Date Value Ref Range Status   10/03/2019 14 0 - 45 U/L Final       PATHOLOGY:  None new.    IMAGING:  3/2/2020: DEXA scan -- normal bone mineral density.    11/6/2020: Mammogram showed no suspicious findings.    ASSESSMENT/PLAN:  Diane Gaitan is a 70 year old female with the following issues:  1.  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 She is tolerating adjuvant anastrozole very well with no significant side effects.    -Note she is not a great candidate for tamoxifen given her history of a possible TIA.  -Her baseline DEXA scan from 3/2/2020 showed normal bone mineral density.  I recommended adequate calcium and vitamin D as well as weightbearing exercise if tolerated. She does use a cane and is careful about her movement and exercise.  -Plan to repeat DEXA scan in 2022.  -Continue annual mammograms, next due on or after 11/6/2021.    2.  Diabetes mellitus type 2  -Stable.  -She will continue on metformin.    3.  Strong family history of breast cancer  -Her daughter had already undergone a genetics consult with negative genetic testing.  The  had informed her daughter that none of her other family members would require genetic testing.    4. Essential hypertension  -Blood pressure reportedly stable.  -Continue on losartan, hydrochlorothiazide, and amlodipine.  Diane dose monitor her BP at home with her cuff.    5. History of COVID-19 viral infection  -She had cough, body aches, fatigue, and diarrhea and tested positive for COVID-19 on 11/16/2020.  -She does not have much residual effect from COVID-19 and has had some intermittent musculoskeletal effects.    Return in 4 months.    Myrna Kaye MD  Hematology/Oncology  [facility]    Phone visit duration: 10 minutes    Diane is a 70 year old who is being evaluated via a billable telephone visit.      What phone number would you like to be contacted at? [phone]  How would you like to obtain your AVS? Mail a copy          Phone call duration:      Radha Taylor CMA        Again, thank you for allowing me to participate in the care of your patient.        Sincerely,        Mynra Kaye MD

## 2021-01-21 NOTE — LETTER
"    1/21/2021         RE: Diane Gaitan  [address] [address]        Dear Colleague,    Thank you for referring your patient, Diane Gaitan, to the [facility]. Please see a copy of my visit note below.    [facility]    Hematology/Oncology Established Patient Note      Today's Date: 1/21/2021    Reason for follow-up: Left breast cancer.  Diane Gaitan is a 70 year old female who is being evaluated via a billable telephone visit.      The patient has been notified of following:     \"This telephone visit will be conducted via a call between you and your physician/provider. We have found that certain health care needs can be provided without the need for a physical exam.  This service lets us provide the care you need with a short phone conversation during the COVID-19 pandemic.  If a prescription is necessary we can send it directly to your pharmacy.  If lab work is needed we can place an order for that and you can then stop by our lab to have the test done at a later time.    Telephone visits are billed at different rates depending on your insurance coverage. During this emergency period, for some insurers they may be billed the same as an in-person visit.  Please reach out to your insurance provider with any questions.    If during the course of the call the physician/provider feels a telephone visit is not appropriate, you will not be charged for this service.\"    Patient has given verbal consent for Telephone visit?  Yes    How would you like to obtain your AVS? MyChart    HISTORY OF PRESENT ILLNESS: Diane Gaitan is a 70 year old female who presents with the following oncologic history:  1.  11/5/2019: Mammogram showed focal asymmetry in the lateral left breast at the 2-4 o'clock position.  2.  11/12/2019: Targeted left breast ultrasound showed an irregular hypoechoic mass at the 2 o'clock position, 5 cm from the nipple measuring 1.7 " x 1.2 x 1.4 cm.  Survey of the axilla showed no evidence of adenopathy.  3.  11/15/2019: Ultrasound-guided left breast needle biopsy at 2:00, 5 cm from the nipple showed a grade 2 invasive mammary carcinoma ER strongly positive at greater than 95%, KY, moderate to strongly positive at 80%, HER-2/guadalupe FISH negative.  4.  12/04/2019: Bilateral breast MRI showed left upper outer quadrant biopsy invasive mammary carcinoma measuring up to 1.7 cm and no other concerning areas of enhancement or lymphadenopathy.  5.  12/19/2019: Underwent left breast lumpectomy with left axillary sentinel lymph node biopsy under the care of Dr. Chidi Malik.  Pathology showed a grade 3 invasive pleomorphic lobular carcinoma measuring 1.5 cm, lymphovascular invasion not identified, margins negative for invasive carcinoma.  A total of 3 left axillary sentinel lymph nodes negative for malignancy.  Oncotype DX score = 8, corresponding to a distant recurrence risk at 9 years of 3% after 5 years of hormone blockade therapy and a less than 1% absolute chemotherapy benefit.  6.  2/25/2020: Completed adjuvant radiation therapy to the left breast.  7. 3/04/2020: Started adjuvant anastrazole.    INTERIM HISTORY:  Diane reports having COVID-19 viral infection in 11/2020 and only residual effects from her infection are occasional muscle/bone aches.  She denies any recent fevers, chills, night sweats, bowel or bladder dysfunction, cough, or dyspnea.      REVIEW OF SYSTEMS:   14 point ROS was reviewed and is negative other than as noted above in HPI.       HOME MEDICATIONS:  Current Outpatient Medications   Medication Sig Dispense Refill     amLODIPine (NORVASC) 5 MG tablet Take 1 tablet (5 mg) by mouth daily 90 tablet 3     anastrozole (ARIMIDEX) 1 MG tablet Take 1 tablet (1 mg) by mouth daily 90 tablet 3     aspirin 81 MG tablet Take 1 tablet by mouth daily. 90 tablet 3     buPROPion (WELLBUTRIN XL) 150 MG 24 hr tablet Take 1 tablet (150 mg) by mouth  every morning 90 tablet 3     calcium carbonate 600 mg-vitamin D 400 units (CALTRATE) 600-400 MG-UNIT per tablet Take 1 tablet by mouth 2 times daily        citalopram (CELEXA) 20 MG tablet Take 1 tablet (20 mg) by mouth daily 90 tablet 3     hydrochlorothiazide (HYDRODIURIL) 12.5 MG tablet Take 2 tablets (25 mg) by mouth daily 180 tablet 1     LORazepam (ATIVAN) 0.5 MG tablet TK 1 PO 30 MINUTES PRIOR TO MRI, may repeat times 1 2 tablet 0     losartan (COZAAR) 100 MG tablet Take 1 tablet (100 mg) by mouth daily 90 tablet 3     metFORMIN (GLUCOPHAGE) 500 MG tablet Take 1 tablet (500 mg) by mouth 2 times daily (with meals) 180 tablet 3     omeprazole (PRILOSEC) 20 MG DR capsule Take 1 capsule (20 mg) by mouth daily 90 capsule 3     simvastatin (ZOCOR) 20 MG tablet Take 1 tablet (20 mg) by mouth At Bedtime 90 tablet 3         ALLERGIES:  Allergies   Allergen Reactions     Lisinopril Cough     COUGH         PAST MEDICAL HISTORY:  Past Medical History:   Diagnosis Date     Arthritis      BENIGN HYPERTENSION 8/28/2002     Cancer (H)     Basal cell carcinoma     CARDIOVASCULAR SCREENING; LDL GOAL LESS THAN 100 10/31/2010     Depressive disorder 1997     Esophageal reflux      Mild major depression (H) 9/14/2010     Other malaise and fatigue 8/28/2002     Type 2 diabetes, HbA1c goal < 7% (H) 10/31/2010   Possible transient ischemic attack.      PAST SURGICAL HISTORY:  Past Surgical History:   Procedure Laterality Date     ARTHROPLASTY KNEE  10/18/2012    Procedure: ARTHROPLASTY KNEE;  RIGHT TOTAL KNEE ARTHROPLASTY (SMITH & NEPHEW)^ ;  Surgeon: Howard Charles MD;  Location:  OR     BIOPSY NODE SENTINEL Left 12/19/2019    Procedure: LEFT SENTINEL LYMPH NODE BIOPSY;  Surgeon: Ruddy Malik MD;  Location:  OR     BREAST BIOPSY, RT/LT  1988    breast biopsy     COLONOSCOPY N/A 7/14/2016    Procedure: COLONOSCOPY;  Surgeon: Curt Patel MD;  Location:  GI     ESOPHAGOSCOPY, GASTROSCOPY,  DUODENOSCOPY (EGD), COMBINED N/A 2018    Procedure: COMBINED ESOPHAGOSCOPY, GASTROSCOPY, DUODENOSCOPY (EGD);  gastroscopy;  Surgeon: Mac White MD;  Location:  GI     HYSTERECTOMY, PAP NO LONGER INDICATED      abdominal hysterectomy     LUMPECTOMY BREAST WITH SEED LOCALIZATION Left 2019    Procedure: SEED LOCALIZED LEFT BREAST LUMPECTOMY;  Surgeon: Ruddy Malik MD;  Location:  OR     ROTATOR CUFF REPAIR RT/LT Right      TUBAL LIGATION      at age 29     ZZC NONSPECIFIC PROCEDURE  10/30/96    skin tags removed     Z NONSPECIFIC PROCEDURE      colonic polyps   Ovaries intact.      SOCIAL HISTORY:  Social History     Socioeconomic History     Marital status:      Spouse name: Not on file     Number of children: Not on file     Years of education: Not on file     Highest education level: Not on file   Occupational History     Not on file   Social Needs     Financial resource strain: Not on file     Food insecurity     Worry: Not on file     Inability: Not on file     Transportation needs     Medical: Not on file     Non-medical: Not on file   Tobacco Use     Smoking status: Former Smoker     Packs/day: 1.00     Years: 12.00     Pack years: 12.00     Quit date: 10/18/1980     Years since quittin.2     Smokeless tobacco: Never Used   Substance and Sexual Activity     Alcohol use: Yes     Comment: 12 Beers per week     Drug use: No     Sexual activity: Never     Partners: Male   Lifestyle     Physical activity     Days per week: Not on file     Minutes per session: Not on file     Stress: Not on file   Relationships     Social connections     Talks on phone: Not on file     Gets together: Not on file     Attends Christian service: Not on file     Active member of club or organization: Not on file     Attends meetings of clubs or organizations: Not on file     Relationship status: Not on file     Intimate partner violence     Fear of current or ex partner: Not on file      Emotionally abused: Not on file     Physically abused: Not on file     Forced sexual activity: Not on file   Other Topics Concern     Parent/sibling w/ CABG, MI or angioplasty before 65F 55M? Not Asked   Social History Narrative     Not on file         FAMILY HISTORY:  Family History   Problem Relation Age of Onset     Breast Cancer Sister      Hypertension Daughter      Breast Cancer Daughter 43     Skin Cancer Mother      Coronary Artery Disease Father      Colon Cancer Sister      Breast Cancer Maternal Aunt         Many Maternal Aunts have had breast Cancer         PHYSICAL EXAM:  Vital signs:  There were no vitals taken for this visit.       LABS:  CBC RESULTS:   Recent Labs   Lab Test 12/16/19  0837  10/30/18  0950   WBC  --   --  5.3   RBC  --   --  4.83   HGB 12.9   < > 9.5*   HCT  --   --  33.5*   MCV  --   --  69*   MCH  --   --  19.7*   MCHC  --   --  28.4*   RDW  --   --  20.5*      < > 280    < > = values in this interval not displayed.     Last Comprehensive Metabolic Panel:  Sodium   Date Value Ref Range Status   06/19/2020 133 133 - 144 mmol/L Final     Potassium   Date Value Ref Range Status   06/19/2020 3.9 3.4 - 5.3 mmol/L Final     Chloride   Date Value Ref Range Status   06/19/2020 98 94 - 109 mmol/L Final     Carbon Dioxide   Date Value Ref Range Status   06/19/2020 30 20 - 32 mmol/L Final     Anion Gap   Date Value Ref Range Status   06/19/2020 5 3 - 14 mmol/L Final     Glucose   Date Value Ref Range Status   06/19/2020 135 (H) 70 - 99 mg/dL Final     Urea Nitrogen   Date Value Ref Range Status   06/19/2020 6 (L) 7 - 30 mg/dL Final     Creatinine   Date Value Ref Range Status   06/19/2020 0.64 0.52 - 1.04 mg/dL Final     GFR Estimate   Date Value Ref Range Status   06/19/2020 90 >60 mL/min/[1.73_m2] Final     Comment:     Non  GFR Calc  Starting 12/18/2018, serum creatinine based estimated GFR (eGFR) will be   calculated using the Chronic Kidney Disease Epidemiology  Collaboration   (CKD-EPI) equation.       Calcium   Date Value Ref Range Status   06/19/2020 9.5 8.5 - 10.1 mg/dL Final     Bilirubin Total   Date Value Ref Range Status   10/03/2019 0.9 0.2 - 1.3 mg/dL Final     Alkaline Phosphatase   Date Value Ref Range Status   10/03/2019 106 40 - 150 U/L Final     ALT   Date Value Ref Range Status   10/03/2019 30 0 - 50 U/L Final     AST   Date Value Ref Range Status   10/03/2019 14 0 - 45 U/L Final       PATHOLOGY:  None new.    IMAGING:  3/2/2020: DEXA scan -- normal bone mineral density.    11/6/2020: Mammogram showed no suspicious findings.    ASSESSMENT/PLAN:  Diane Gaitan is a 70 year old female with the following issues:  1.  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 She is tolerating adjuvant anastrozole very well with no significant side effects.    -Note she is not a great candidate for tamoxifen given her history of a possible TIA.  -Her baseline DEXA scan from 3/2/2020 showed normal bone mineral density.  I recommended adequate calcium and vitamin D as well as weightbearing exercise if tolerated. She does use a cane and is careful about her movement and exercise.  -Plan to repeat DEXA scan in 2022.  -Continue annual mammograms, next due on or after 11/6/2021.    2.  Diabetes mellitus type 2  -Stable.  -She will continue on metformin.    3.  Strong family history of breast cancer  -Her daughter had already undergone a genetics consult with negative genetic testing.  The  had informed her daughter that none of her other family members would require genetic testing.    4. Essential hypertension  -Blood pressure reportedly stable.  -Continue on losartan, hydrochlorothiazide, and amlodipine.  Diane dose monitor her BP at home with her cuff.    5. History of COVID-19 viral infection  -She had cough, body aches, fatigue, and diarrhea and tested positive for COVID-19 on 11/16/2020.  -She does not have much residual effect from COVID-19 and has had some intermittent musculoskeletal effects.    Return in 4 months.    Myrna Kaye MD  Hematology/Oncology  [facility]    Phone visit duration: 10 minutes    Diane is a 70 year old who is being evaluated via a billable telephone visit.      What phone number would you like to be contacted at? [phone]  How would you like to obtain your AVS? Mail a copy          Phone call duration:      Radha Taylor CMA        Again, thank you for allowing me to participate in the care of your patient.        Sincerely,        Myrna Kaye MD

## 2021-02-11 DIAGNOSIS — E11.9 TYPE 2 DIABETES MELLITUS WITHOUT COMPLICATION, WITHOUT LONG-TERM CURRENT USE OF INSULIN (H): ICD-10-CM

## 2021-02-11 LAB
ALBUMIN SERPL-MCNC: 3.8 G/DL (ref 3.4–5)
ALP SERPL-CCNC: 103 U/L (ref 40–150)
ALT SERPL W P-5'-P-CCNC: 29 U/L (ref 0–50)
AST SERPL W P-5'-P-CCNC: 20 U/L (ref 0–45)
BILIRUB DIRECT SERPL-MCNC: 0.3 MG/DL (ref 0–0.2)
BILIRUB SERPL-MCNC: 1 MG/DL (ref 0.2–1.3)
CHOLEST SERPL-MCNC: 142 MG/DL
CREAT UR-MCNC: 42 MG/DL
HBA1C MFR BLD: 5.3 % (ref 0–5.6)
HDLC SERPL-MCNC: 69 MG/DL
LDLC SERPL CALC-MCNC: 58 MG/DL
MICROALBUMIN UR-MCNC: <5 MG/L
MICROALBUMIN/CREAT UR: NORMAL MG/G CR (ref 0–25)
NONHDLC SERPL-MCNC: 73 MG/DL
PROT SERPL-MCNC: 7.5 G/DL (ref 6.8–8.8)
TRIGL SERPL-MCNC: 75 MG/DL

## 2021-02-11 PROCEDURE — 80076 HEPATIC FUNCTION PANEL: CPT | Performed by: INTERNAL MEDICINE

## 2021-02-11 PROCEDURE — 80061 LIPID PANEL: CPT | Performed by: INTERNAL MEDICINE

## 2021-02-11 PROCEDURE — 83036 HEMOGLOBIN GLYCOSYLATED A1C: CPT | Performed by: INTERNAL MEDICINE

## 2021-02-11 PROCEDURE — 82043 UR ALBUMIN QUANTITATIVE: CPT | Performed by: INTERNAL MEDICINE

## 2021-02-11 PROCEDURE — 36415 COLL VENOUS BLD VENIPUNCTURE: CPT | Performed by: INTERNAL MEDICINE

## 2021-04-07 ENCOUNTER — ALLIED HEALTH/NURSE VISIT (OUTPATIENT)
Dept: INTERNAL MEDICINE | Facility: CLINIC | Age: 71
End: 2021-04-07
Payer: MEDICARE

## 2021-04-07 VITALS — SYSTOLIC BLOOD PRESSURE: 138 MMHG | DIASTOLIC BLOOD PRESSURE: 80 MMHG

## 2021-04-07 DIAGNOSIS — I10 ESSENTIAL HYPERTENSION: Primary | ICD-10-CM

## 2021-04-07 PROCEDURE — 99207 PR NO CHARGE NURSE ONLY: CPT | Performed by: INTERNAL MEDICINE

## 2021-04-07 NOTE — PROGRESS NOTES
Diane Gaitan was evaluated at [facility] on April 7, 2021 at which time her blood pressure was:    BP Readings from Last 3 Encounters:   04/07/21 138/80   09/30/20 139/70   09/21/20 (!) 152/84     Pulse Readings from Last 3 Encounters:   09/21/20 106   03/10/20 109   03/04/20 105       Reviewed lifestyle modifications for blood pressure control and reduction: including making healthy food choices, managing weight, getting regular exercise, smoking cessation, reducing alcohol consumption, monitoring blood pressure regularly.     Symptoms: None    BP Goal:< 140/90 mmHg    BP Assessment:  BP at goal    Potential Reasons for BP too high: NA - Not applicable    BP Follow-Up Plan: Recheck BP in 6 months at pharmacy    Recommendation to Provider: Continue current therapy    Note completed by: Howard Moreno, PharmD  [facility]  [phone]

## 2021-05-11 NOTE — PROGRESS NOTES
[facility]    Hematology/Oncology Established Patient Note      Today's Date: 5/17/2021    Reason for follow-up: Left breast cancer.    HISTORY OF PRESENT ILLNESS: Diane Gaitan is a 71 year old female who presents with the following oncologic history:  1.  11/5/2019: Mammogram showed focal asymmetry in the lateral left breast at the 2-4 o'clock position.  2.  11/12/2019: Targeted left breast ultrasound showed an irregular hypoechoic mass at the 2 o'clock position, 5 cm from the nipple measuring 1.7 x 1.2 x 1.4 cm.  Survey of the axilla showed no evidence of adenopathy.  3.  11/15/2019: Ultrasound-guided left breast needle biopsy at 2:00, 5 cm from the nipple showed a grade 2 invasive mammary carcinoma ER strongly positive at greater than 95%, ME, moderate to strongly positive at 80%, HER-2/guadalupe FISH negative.  4.  12/04/2019: Bilateral breast MRI showed left upper outer quadrant biopsy invasive mammary carcinoma measuring up to 1.7 cm and no other concerning areas of enhancement or lymphadenopathy.  5.  12/19/2019: Underwent left breast lumpectomy with left axillary sentinel lymph node biopsy under the care of Dr. Chidi Malik.  Pathology showed a grade 3 invasive pleomorphic lobular carcinoma measuring 1.5 cm, lymphovascular invasion not identified, margins negative for invasive carcinoma.  A total of 3 left axillary sentinel lymph nodes negative for malignancy.  Oncotype DX score = 8, corresponding to a distant recurrence risk at 9 years of 3% after 5 years of hormone blockade therapy and a less than 1% absolute chemotherapy benefit.  6.  2/25/2020: Completed adjuvant radiation therapy to the left breast.  7. 3/04/2020: Started adjuvant anastrazole.    INTERIM HISTORY:  Diane reports having continued residual symptoms from COVID-19 viral infection in 11/2020 with leg weakness and tremors.  She denies any recent fevers, chills, night sweats, bowel or bladder dysfunction, cough, or  dyspnea.      REVIEW OF SYSTEMS:   14 point ROS was reviewed and is negative other than as noted above in HPI.       HOME MEDICATIONS:  Current Outpatient Medications   Medication Sig Dispense Refill     amLODIPine (NORVASC) 5 MG tablet Take 1 tablet (5 mg) by mouth daily 90 tablet 3     anastrozole (ARIMIDEX) 1 MG tablet Take 1 tablet (1 mg) by mouth daily 90 tablet 3     aspirin 81 MG tablet Take 1 tablet by mouth daily. 90 tablet 3     buPROPion (WELLBUTRIN XL) 150 MG 24 hr tablet Take 1 tablet (150 mg) by mouth every morning 90 tablet 3     calcium carbonate 600 mg-vitamin D 400 units (CALTRATE) 600-400 MG-UNIT per tablet Take 1 tablet by mouth 2 times daily        citalopram (CELEXA) 20 MG tablet Take 1 tablet (20 mg) by mouth daily 90 tablet 3     hydrochlorothiazide (HYDRODIURIL) 12.5 MG tablet Take 2 tablets (25 mg) by mouth daily 180 tablet 1     LORazepam (ATIVAN) 0.5 MG tablet TK 1 PO 30 MINUTES PRIOR TO MRI, may repeat times 1 2 tablet 0     losartan (COZAAR) 100 MG tablet Take 1 tablet (100 mg) by mouth daily 90 tablet 3     metFORMIN (GLUCOPHAGE) 500 MG tablet Take 1 tablet (500 mg) by mouth 2 times daily (with meals) 180 tablet 3     omeprazole (PRILOSEC) 20 MG DR capsule Take 1 capsule (20 mg) by mouth daily 90 capsule 3     simvastatin (ZOCOR) 20 MG tablet Take 1 tablet (20 mg) by mouth At Bedtime 90 tablet 3         ALLERGIES:  Allergies   Allergen Reactions     Lisinopril Cough     COUGH         PAST MEDICAL HISTORY:  Past Medical History:   Diagnosis Date     Arthritis      BENIGN HYPERTENSION 8/28/2002     Cancer (H)     Basal cell carcinoma     CARDIOVASCULAR SCREENING; LDL GOAL LESS THAN 100 10/31/2010     Depressive disorder 1997     Esophageal reflux      Mild major depression (H) 9/14/2010     Other malaise and fatigue 8/28/2002     Type 2 diabetes, HbA1c goal < 7% (H) 10/31/2010   Possible transient ischemic attack.      PAST SURGICAL HISTORY:  Past Surgical History:   Procedure Laterality  Date     ARTHROPLASTY KNEE  10/18/2012    Procedure: ARTHROPLASTY KNEE;  RIGHT TOTAL KNEE ARTHROPLASTY (SMITH & NEPHEW)^ ;  Surgeon: Howard Charles MD;  Location:  OR     BIOPSY NODE SENTINEL Left 2019    Procedure: LEFT SENTINEL LYMPH NODE BIOPSY;  Surgeon: Ruddy Malik MD;  Location:  OR     BREAST BIOPSY, RT/LT  1988    breast biopsy     COLONOSCOPY N/A 2016    Procedure: COLONOSCOPY;  Surgeon: Curt Patel MD;  Location:  GI     ESOPHAGOSCOPY, GASTROSCOPY, DUODENOSCOPY (EGD), COMBINED N/A 2018    Procedure: COMBINED ESOPHAGOSCOPY, GASTROSCOPY, DUODENOSCOPY (EGD);  gastroscopy;  Surgeon: Mac White MD;  Location:  GI     HYSTERECTOMY, PAP NO LONGER INDICATED  1986    abdominal hysterectomy     LUMPECTOMY BREAST WITH SEED LOCALIZATION Left 2019    Procedure: SEED LOCALIZED LEFT BREAST LUMPECTOMY;  Surgeon: Ruddy Malik MD;  Location:  OR     ROTATOR CUFF REPAIR RT/LT Right      TUBAL LIGATION      at age 29     [facility] NONSPECIFIC PROCEDURE  10/30/96    skin tags removed     [facility] NONSPECIFIC PROCEDURE      colonic polyps   Ovaries intact.      SOCIAL HISTORY:  Social History     Socioeconomic History     Marital status:      Spouse name: Not on file     Number of children: Not on file     Years of education: Not on file     Highest education level: Not on file   Occupational History     Not on file   Social Needs     Financial resource strain: Not on file     Food insecurity     Worry: Not on file     Inability: Not on file     Transportation needs     Medical: Not on file     Non-medical: Not on file   Tobacco Use     Smoking status: Former Smoker     Packs/day: 1.00     Years: 12.00     Pack years: 12.00     Quit date: 10/18/1980     Years since quittin.5     Smokeless tobacco: Never Used   Substance and Sexual Activity     Alcohol use: Yes     Comment: 12 Beers per week     Drug use: No     Sexual activity: Never      "Partners: Male   Lifestyle     Physical activity     Days per week: Not on file     Minutes per session: Not on file     Stress: Not on file   Relationships     Social connections     Talks on phone: Not on file     Gets together: Not on file     Attends Sabianist service: Not on file     Active member of club or organization: Not on file     Attends meetings of clubs or organizations: Not on file     Relationship status: Not on file     Intimate partner violence     Fear of current or ex partner: Not on file     Emotionally abused: Not on file     Physically abused: Not on file     Forced sexual activity: Not on file   Other Topics Concern     Parent/sibling w/ CABG, MI or angioplasty before 65F 55M? Not Asked   Social History Narrative     Not on file         FAMILY HISTORY:  Family History   Problem Relation Age of Onset     Breast Cancer Sister      Hypertension Daughter      Breast Cancer Daughter 43     Skin Cancer Mother      Coronary Artery Disease Father      Colon Cancer Sister      Breast Cancer Maternal Aunt         Many Maternal Aunts have had breast Cancer         PHYSICAL EXAM:  Vital signs:  BP (!) 160/90   Pulse 101   Temp 98.3  F (36.8  C)   Resp 16   Ht 1.702 m (5' 7\")   Wt 96 kg (211 lb 9.6 oz)   SpO2 97%   BMI 33.14 kg/m   GENERAL/CONSTITUTIONAL: No acute distress.  EYES:   No scleral icterus.  ENT/MOUTH: Neck supple.  LYMPH: No cervical, supraclavicular, axillary or epitrochlear adenopathy.   BREAST: No palpable discrete masses in either breast.  Nipples are everted with no discharge.  No ulceration, erythema, or blistering. Left breast mild hyperpigmentation present. Left breast smaller than right breast.  RESPIRATORY: No audible cough or wheezing. No labored breathing.  GASTROINTESTINAL: No hepatosplenomegaly, masses, or tenderness.  No guarding or distention.  MUSCULOSKELETAL: Warm and well-perfused, no cyanosis, clubbing, or edema.  NEUROLOGIC: No focal motor deficits. Alert, " oriented, answers questions appropriately.  INTEGUMENTARY: No rashes or jaundice.  GAIT: Uses cane for ambulation.      LABS:  CBC RESULTS:   Recent Labs   Lab Test 12/16/19  0837  10/30/18  0950   WBC  --   --  5.3   RBC  --   --  4.83   HGB 12.9   < > 9.5*   HCT  --   --  33.5*   MCV  --   --  69*   MCH  --   --  19.7*   MCHC  --   --  28.4*   RDW  --   --  20.5*      < > 280    < > = values in this interval not displayed.     Last Comprehensive Metabolic Panel:  Sodium   Date Value Ref Range Status   06/19/2020 133 133 - 144 mmol/L Final     Potassium   Date Value Ref Range Status   06/19/2020 3.9 3.4 - 5.3 mmol/L Final     Chloride   Date Value Ref Range Status   06/19/2020 98 94 - 109 mmol/L Final     Carbon Dioxide   Date Value Ref Range Status   06/19/2020 30 20 - 32 mmol/L Final     Anion Gap   Date Value Ref Range Status   06/19/2020 5 3 - 14 mmol/L Final     Glucose   Date Value Ref Range Status   06/19/2020 135 (H) 70 - 99 mg/dL Final     Urea Nitrogen   Date Value Ref Range Status   06/19/2020 6 (L) 7 - 30 mg/dL Final     Creatinine   Date Value Ref Range Status   06/19/2020 0.64 0.52 - 1.04 mg/dL Final     GFR Estimate   Date Value Ref Range Status   06/19/2020 90 >60 mL/min/[1.73_m2] Final     Comment:     Non  GFR Calc  Starting 12/18/2018, serum creatinine based estimated GFR (eGFR) will be   calculated using the Chronic Kidney Disease Epidemiology Collaboration   (CKD-EPI) equation.       Calcium   Date Value Ref Range Status   06/19/2020 9.5 8.5 - 10.1 mg/dL Final     Bilirubin Total   Date Value Ref Range Status   02/11/2021 1.0 0.2 - 1.3 mg/dL Final     Alkaline Phosphatase   Date Value Ref Range Status   02/11/2021 103 40 - 150 U/L Final     ALT   Date Value Ref Range Status   02/11/2021 29 0 - 50 U/L Final     AST   Date Value Ref Range Status   02/11/2021 20 0 - 45 U/L Final       PATHOLOGY:  None new.    IMAGING:  3/2/2020: DEXA scan -- normal bone mineral  density.    11/6/2020: Mammogram showed no suspicious findings.    ASSESSMENT/PLAN:  Diane Gaitan is a 71 year old female with the following issues:  1.  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 She is tolerating adjuvant anastrozole very well with no significant side effects.  The leg weakness she has had is related to past COVID-19 infection and not from anastrazole.  -Note she is not a great candidate for tamoxifen given her history of a possible TIA.   -Her baseline DEXA scan from 3/2/2020 showed normal bone mineral density.  I recommended adequate calcium and vitamin D as well as weightbearing exercise if tolerated. She does use a cane and is careful about her movement and exercise.  -Plan to repeat DEXA scan in 2022.  -Continue annual mammograms, next due on or after 11/6/2021.    2.  Diabetes mellitus type 2  -Stable.  -She will continue on metformin.    3.  Strong family history of breast cancer  -Her daughter had already undergone a genetics consult with negative genetic testing.  The  had informed her daughter that none of her other family members would require genetic testing.    4. Essential hypertension  -Blood pressure high today.  -Continue on losartan, hydrochlorothiazide, and amlodipine. Diane does monitor her BP at home with her cuff. Advised follow-up with her primary care team.    5. History of COVID-19 viral infection  -She had cough, body aches, fatigue, and diarrhea and tested positive for COVID-19 on 11/16/2020.  -She has had residual effect from COVID-19 with bilateral leg weakness and tremors.  -Encouraged her to receive COVID-19 vaccine for protection against variants.    Return in 4 months.    Myrna Kaye MD  Hematology/Oncology  [facility]  [facility]    Total time spent: 27 minutes in patient evaluation, discussion of plan of care, and documentation.

## 2021-05-17 ENCOUNTER — ONCOLOGY VISIT (OUTPATIENT)
Dept: ONCOLOGY | Facility: CLINIC | Age: 71
End: 2021-05-17
Attending: INTERNAL MEDICINE
Payer: MEDICARE

## 2021-05-17 VITALS
HEART RATE: 101 BPM | RESPIRATION RATE: 16 BRPM | SYSTOLIC BLOOD PRESSURE: 160 MMHG | DIASTOLIC BLOOD PRESSURE: 90 MMHG | WEIGHT: 211.6 LBS | OXYGEN SATURATION: 97 % | HEIGHT: 67 IN | BODY MASS INDEX: 33.21 KG/M2 | TEMPERATURE: 98.3 F

## 2021-05-17 DIAGNOSIS — Z79.811 AROMATASE INHIBITOR USE: ICD-10-CM

## 2021-05-17 DIAGNOSIS — Z17.0 MALIGNANT NEOPLASM OF UPPER-OUTER QUADRANT OF LEFT BREAST IN FEMALE, ESTROGEN RECEPTOR POSITIVE (H): Primary | ICD-10-CM

## 2021-05-17 DIAGNOSIS — E11.9 TYPE 2 DIABETES MELLITUS WITHOUT COMPLICATION, WITHOUT LONG-TERM CURRENT USE OF INSULIN (H): ICD-10-CM

## 2021-05-17 DIAGNOSIS — C50.412 MALIGNANT NEOPLASM OF UPPER-OUTER QUADRANT OF LEFT BREAST IN FEMALE, ESTROGEN RECEPTOR POSITIVE (H): Primary | ICD-10-CM

## 2021-05-17 DIAGNOSIS — Z86.16 HISTORY OF 2019 NOVEL CORONAVIRUS DISEASE (COVID-19): ICD-10-CM

## 2021-05-17 PROCEDURE — 99214 OFFICE O/P EST MOD 30 MIN: CPT | Performed by: INTERNAL MEDICINE

## 2021-05-17 PROCEDURE — G0463 HOSPITAL OUTPT CLINIC VISIT: HCPCS

## 2021-05-17 ASSESSMENT — PAIN SCALES - GENERAL: PAINLEVEL: NO PAIN (0)

## 2021-05-17 ASSESSMENT — MIFFLIN-ST. JEOR: SCORE: 1502.44

## 2021-05-17 NOTE — LETTER
5/17/2021         RE: Diane Gaitan  [address] [address]        Dear Colleague,    Thank you for referring your patient, Diane Gaitan, to the [facility]. Please see a copy of my visit note below.    [facility]    Hematology/Oncology Established Patient Note      Today's Date: 5/17/2021    Reason for follow-up: Left breast cancer.    HISTORY OF PRESENT ILLNESS: Diane Gaitan is a 71 year old female who presents with the following oncologic history:  1.  11/5/2019: Mammogram showed focal asymmetry in the lateral left breast at the 2-4 o'clock position.  2.  11/12/2019: Targeted left breast ultrasound showed an irregular hypoechoic mass at the 2 o'clock position, 5 cm from the nipple measuring 1.7 x 1.2 x 1.4 cm.  Survey of the axilla showed no evidence of adenopathy.  3.  11/15/2019: Ultrasound-guided left breast needle biopsy at 2:00, 5 cm from the nipple showed a grade 2 invasive mammary carcinoma ER strongly positive at greater than 95%, [place], moderate to strongly positive at 80%, HER-2/guadalupe FISH negative.  4.  12/04/2019: Bilateral breast MRI showed left upper outer quadrant biopsy invasive mammary carcinoma measuring up to 1.7 cm and no other concerning areas of enhancement or lymphadenopathy.  5.  12/19/2019: Underwent left breast lumpectomy with left axillary sentinel lymph node biopsy under the care of Dr. Chidi Malik.  Pathology showed a grade 3 invasive pleomorphic lobular carcinoma measuring 1.5 cm, lymphovascular invasion not identified, margins negative for invasive carcinoma.  A total of 3 left axillary sentinel lymph nodes negative for malignancy.  Oncotype DX score = 8, corresponding to a distant recurrence risk at 9 years of 3% after 5 years of hormone blockade therapy and a less than 1% absolute chemotherapy benefit.  6.  2/25/2020: Completed adjuvant radiation therapy to the left breast.  7. 3/04/2020: Started adjuvant  anastrazole.    INTERIM HISTORY:  Diane reports having continued residual symptoms from COVID-19 viral infection in 11/2020 with leg weakness and tremors.  She denies any recent fevers, chills, night sweats, bowel or bladder dysfunction, cough, or dyspnea.      REVIEW OF SYSTEMS:   14 point ROS was reviewed and is negative other than as noted above in HPI.       HOME MEDICATIONS:  Current Outpatient Medications   Medication Sig Dispense Refill     amLODIPine (NORVASC) 5 MG tablet Take 1 tablet (5 mg) by mouth daily 90 tablet 3     anastrozole (ARIMIDEX) 1 MG tablet Take 1 tablet (1 mg) by mouth daily 90 tablet 3     aspirin 81 MG tablet Take 1 tablet by mouth daily. 90 tablet 3     buPROPion (WELLBUTRIN XL) 150 MG 24 hr tablet Take 1 tablet (150 mg) by mouth every morning 90 tablet 3     calcium carbonate 600 mg-vitamin D 400 units (CALTRATE) 600-400 MG-UNIT per tablet Take 1 tablet by mouth 2 times daily        citalopram (CELEXA) 20 MG tablet Take 1 tablet (20 mg) by mouth daily 90 tablet 3     hydrochlorothiazide (HYDRODIURIL) 12.5 MG tablet Take 2 tablets (25 mg) by mouth daily 180 tablet 1     LORazepam (ATIVAN) 0.5 MG tablet TK 1 PO 30 MINUTES PRIOR TO MRI, may repeat times 1 2 tablet 0     losartan (COZAAR) 100 MG tablet Take 1 tablet (100 mg) by mouth daily 90 tablet 3     metFORMIN (GLUCOPHAGE) 500 MG tablet Take 1 tablet (500 mg) by mouth 2 times daily (with meals) 180 tablet 3     omeprazole (PRILOSEC) 20 MG DR capsule Take 1 capsule (20 mg) by mouth daily 90 capsule 3     simvastatin (ZOCOR) 20 MG tablet Take 1 tablet (20 mg) by mouth At Bedtime 90 tablet 3         ALLERGIES:  Allergies   Allergen Reactions     Lisinopril Cough     COUGH         PAST MEDICAL HISTORY:  Past Medical History:   Diagnosis Date     Arthritis      BENIGN HYPERTENSION 8/28/2002     Cancer (H)     Basal cell carcinoma     CARDIOVASCULAR SCREENING; LDL GOAL LESS THAN 100 10/31/2010     Depressive disorder 1997     Esophageal  reflux      Mild major depression (H) 9/14/2010     Other malaise and fatigue 8/28/2002     Type 2 diabetes, HbA1c goal < 7% (H) 10/31/2010   Possible transient ischemic attack.      PAST SURGICAL HISTORY:  Past Surgical History:   Procedure Laterality Date     ARTHROPLASTY KNEE  10/18/2012    Procedure: ARTHROPLASTY KNEE;  RIGHT TOTAL KNEE ARTHROPLASTY (SMITH & NEPHEW)^ ;  Surgeon: Howard Charles MD;  Location:  OR     BIOPSY NODE SENTINEL Left 12/19/2019    Procedure: LEFT SENTINEL LYMPH NODE BIOPSY;  Surgeon: Ruddy Malik MD;  Location:  OR     BREAST BIOPSY, RT/LT  1988    breast biopsy     COLONOSCOPY N/A 7/14/2016    Procedure: COLONOSCOPY;  Surgeon: Curt Patel MD;  Location:  GI     ESOPHAGOSCOPY, GASTROSCOPY, DUODENOSCOPY (EGD), COMBINED N/A 9/13/2018    Procedure: COMBINED ESOPHAGOSCOPY, GASTROSCOPY, DUODENOSCOPY (EGD);  gastroscopy;  Surgeon: Mac White MD;  Location:  GI     HYSTERECTOMY, PAP NO LONGER INDICATED  1986    abdominal hysterectomy     LUMPECTOMY BREAST WITH SEED LOCALIZATION Left 12/19/2019    Procedure: SEED LOCALIZED LEFT BREAST LUMPECTOMY;  Surgeon: Ruddy Malik MD;  Location:  OR     ROTATOR CUFF REPAIR RT/LT Right      TUBAL LIGATION      at age 29     RUST NONSPECIFIC PROCEDURE  10/30/96    skin tags removed     RUST NONSPECIFIC PROCEDURE      colonic polyps   Ovaries intact.      SOCIAL HISTORY:  Social History     Socioeconomic History     Marital status:      Spouse name: Not on file     Number of children: Not on file     Years of education: Not on file     Highest education level: Not on file   Occupational History     Not on file   Social Needs     Financial resource strain: Not on file     Food insecurity     Worry: Not on file     Inability: Not on file     Transportation needs     Medical: Not on file     Non-medical: Not on file   Tobacco Use     Smoking status: Former Smoker     Packs/day: 1.00     Years:  "12.00     Pack years: 12.00     Quit date: 10/18/1980     Years since quittin.5     Smokeless tobacco: Never Used   Substance and Sexual Activity     Alcohol use: Yes     Comment: 12 Beers per week     Drug use: No     Sexual activity: Never     Partners: Male   Lifestyle     Physical activity     Days per week: Not on file     Minutes per session: Not on file     Stress: Not on file   Relationships     Social connections     Talks on phone: Not on file     Gets together: Not on file     Attends Orthodox service: Not on file     Active member of club or organization: Not on file     Attends meetings of clubs or organizations: Not on file     Relationship status: Not on file     Intimate partner violence     Fear of current or ex partner: Not on file     Emotionally abused: Not on file     Physically abused: Not on file     Forced sexual activity: Not on file   Other Topics Concern     Parent/sibling w/ CABG, MI or angioplasty before 65F 55M? Not Asked   Social History Narrative     Not on file         FAMILY HISTORY:  Family History   Problem Relation Age of Onset     Breast Cancer Sister      Hypertension Daughter      Breast Cancer Daughter 43     Skin Cancer Mother      Coronary Artery Disease Father      Colon Cancer Sister      Breast Cancer Maternal Aunt         Many Maternal Aunts have had breast Cancer         PHYSICAL EXAM:  Vital signs:  BP (!) 160/90   Pulse 101   Temp 98.3  F (36.8  C)   Resp 16   Ht 1.702 m (5' 7\")   Wt 96 kg (211 lb 9.6 oz)   SpO2 97%   BMI 33.14 kg/m   GENERAL/CONSTITUTIONAL: No acute distress.  EYES:   No scleral icterus.  ENT/MOUTH: Neck supple.  LYMPH: No cervical, supraclavicular, axillary or epitrochlear adenopathy.   BREAST: No palpable discrete masses in either breast.  Nipples are everted with no discharge.  No ulceration, erythema, or blistering. Left breast mild hyperpigmentation present. Left breast smaller than right breast.  RESPIRATORY: No audible cough or " wheezing. No labored breathing.  GASTROINTESTINAL: No hepatosplenomegaly, masses, or tenderness.  No guarding or distention.  MUSCULOSKELETAL: Warm and well-perfused, no cyanosis, clubbing, or edema.  NEUROLOGIC: No focal motor deficits. Alert, oriented, answers questions appropriately.  INTEGUMENTARY: No rashes or jaundice.  GAIT: Uses cane for ambulation.      LABS:  CBC RESULTS:   Recent Labs   Lab Test 12/16/19  0837  10/30/18  0950   WBC  --   --  5.3   RBC  --   --  4.83   HGB 12.9   < > 9.5*   HCT  --   --  33.5*   MCV  --   --  69*   MCH  --   --  19.7*   MCHC  --   --  28.4*   RDW  --   --  20.5*      < > 280    < > = values in this interval not displayed.     Last Comprehensive Metabolic Panel:  Sodium   Date Value Ref Range Status   06/19/2020 133 133 - 144 mmol/L Final     Potassium   Date Value Ref Range Status   06/19/2020 3.9 3.4 - 5.3 mmol/L Final     Chloride   Date Value Ref Range Status   06/19/2020 98 94 - 109 mmol/L Final     Carbon Dioxide   Date Value Ref Range Status   06/19/2020 30 20 - 32 mmol/L Final     Anion Gap   Date Value Ref Range Status   06/19/2020 5 3 - 14 mmol/L Final     Glucose   Date Value Ref Range Status   06/19/2020 135 (H) 70 - 99 mg/dL Final     Urea Nitrogen   Date Value Ref Range Status   06/19/2020 6 (L) 7 - 30 mg/dL Final     Creatinine   Date Value Ref Range Status   06/19/2020 0.64 0.52 - 1.04 mg/dL Final     GFR Estimate   Date Value Ref Range Status   06/19/2020 90 >60 mL/min/[1.73_m2] Final     Comment:     Non  GFR Calc  Starting 12/18/2018, serum creatinine based estimated GFR (eGFR) will be   calculated using the Chronic Kidney Disease Epidemiology Collaboration   (CKD-EPI) equation.       Calcium   Date Value Ref Range Status   06/19/2020 9.5 8.5 - 10.1 mg/dL Final     Bilirubin Total   Date Value Ref Range Status   02/11/2021 1.0 0.2 - 1.3 mg/dL Final     Alkaline Phosphatase   Date Value Ref Range Status   02/11/2021 103 40 - 150 U/L  Final     ALT   Date Value Ref Range Status   02/11/2021 29 0 - 50 U/L Final     AST   Date Value Ref Range Status   02/11/2021 20 0 - 45 U/L Final       PATHOLOGY:  None new.    IMAGING:  3/2/2020: DEXA scan -- normal bone mineral density.    11/6/2020: Mammogram showed no suspicious findings.    ASSESSMENT/PLAN:  Diane Gaitan is a 71 year old female with the following issues:  1.  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 She is tolerating adjuvant anastrozole very well with no significant side effects.  The leg weakness she has had is related to past COVID-19 infection and not from anastrazole.  -Note she is not a great candidate for tamoxifen given her history of a possible TIA.   -Her baseline DEXA scan from 3/2/2020 showed normal bone mineral density.  I recommended adequate calcium and vitamin D as well as weightbearing exercise if tolerated. She does use a cane and is careful about her movement and exercise.  -Plan to repeat DEXA scan in 2022.  -Continue annual mammograms, next due on or after 11/6/2021.    2.  Diabetes mellitus type 2  -Stable.  -She will continue on metformin.    3.  Strong family history of breast cancer  -Her daughter had already undergone a genetics consult with negative genetic testing.  The  had informed her daughter that none of her other family members would require genetic testing.    4. Essential hypertension  -Blood pressure high today.  -Continue on losartan, hydrochlorothiazide, and amlodipine. Diane does monitor her BP at home with her cuff. Advised follow-up with her primary care team.    5. History of COVID-19 viral infection  -She had cough, body aches, fatigue, and diarrhea and tested positive for COVID-19 on 11/16/2020.  -She has had  "residual effect from COVID-19 with bilateral leg weakness and tremors.  -Encouraged her to receive COVID-19 vaccine for protection against variants.    Return in 4 months.    Myrna Kaye MD  Hematology/Oncology  [facility]    Total time spent: 27 minutes in patient evaluation, discussion of plan of care, and documentation.      Oncology Rooming Note    May 17, 2021 8:46 AM   Diane Gaitan is a 71 year old female who presents for:    Chief Complaint   Patient presents with     Oncology Clinic Visit     Initial Vitals: There were no vitals taken for this visit. Estimated body mass index is 29.76 kg/m  as calculated from the following:    Height as of 9/21/20: 1.702 m (5' 7\").    Weight as of 1/21/21: 86.2 kg (190 lb). There is no height or weight on file to calculate BSA.  Data Unavailable Comment: Data Unavailable   No LMP recorded. Patient is postmenopausal.  Allergies reviewed: Yes  Medications reviewed: Yes    Medications: Medication refills not needed today.  Pharmacy name entered into EPIC:    [facility] - [address] [facility]  [id number] [place], [place] - [address] [id number] - [place], [place] - [address] [facility] & [address] [place] [address] DRUG STORE #[id number] - [place], [place] - [address] AT [facility] [address]    Clinical concerns:  doctor was notified.      Shari Arceo MA                Again, thank you for allowing me to participate in the care of your patient.        Sincerely,        Myrna Kaye MD    "

## 2021-05-17 NOTE — PROGRESS NOTES
"Oncology Rooming Note    May 17, 2021 8:46 AM   Diane Gaitan is a 71 year old female who presents for:    Chief Complaint   Patient presents with     Oncology Clinic Visit     Initial Vitals: There were no vitals taken for this visit. Estimated body mass index is 29.76 kg/m  as calculated from the following:    Height as of 9/21/20: 1.702 m (5' 7\").    Weight as of 1/21/21: 86.2 kg (190 lb). There is no height or weight on file to calculate BSA.  Data Unavailable Comment: Data Unavailable   No LMP recorded. Patient is postmenopausal.  Allergies reviewed: Yes  Medications reviewed: Yes    Medications: Medication refills not needed today.  Pharmacy name entered into EPIC:    [facility] - [address] [facility]Y #[id number] [place], [place] - [address] [place] [address] DR[facility] STORE #[id number] - [place] - [address] [facility] & [address] [place] [address] DRUG STORE #[id number] - [place], [place] - [address] AT [facility] & [facility]    Clinical concerns:  doctor was notified.      Shari Arceo MA            "

## 2021-06-09 DIAGNOSIS — F32.0 MAJOR DEPRESSIVE DISORDER, SINGLE EPISODE, MILD (H): ICD-10-CM

## 2021-06-09 DIAGNOSIS — E11.9 TYPE 2 DIABETES MELLITUS WITHOUT COMPLICATION, WITHOUT LONG-TERM CURRENT USE OF INSULIN (H): ICD-10-CM

## 2021-06-09 DIAGNOSIS — Z13.6 CARDIOVASCULAR SCREENING; LDL GOAL LESS THAN 100: ICD-10-CM

## 2021-06-09 DIAGNOSIS — Z17.0 MALIGNANT NEOPLASM OF UPPER-OUTER QUADRANT OF LEFT BREAST IN FEMALE, ESTROGEN RECEPTOR POSITIVE (H): ICD-10-CM

## 2021-06-09 DIAGNOSIS — I10 ESSENTIAL HYPERTENSION, BENIGN: ICD-10-CM

## 2021-06-09 DIAGNOSIS — K21.9 GASTROESOPHAGEAL REFLUX DISEASE WITHOUT ESOPHAGITIS: ICD-10-CM

## 2021-06-09 DIAGNOSIS — C50.412 MALIGNANT NEOPLASM OF UPPER-OUTER QUADRANT OF LEFT BREAST IN FEMALE, ESTROGEN RECEPTOR POSITIVE (H): ICD-10-CM

## 2021-06-09 RX ORDER — CITALOPRAM HYDROBROMIDE 20 MG/1
20 TABLET ORAL DAILY
Qty: 90 TABLET | Refills: 3 | Status: SHIPPED | OUTPATIENT
Start: 2021-06-09 | End: 2022-05-06

## 2021-06-09 RX ORDER — BUPROPION HYDROCHLORIDE 150 MG/1
150 TABLET ORAL EVERY MORNING
Qty: 90 TABLET | Refills: 3 | Status: SHIPPED | OUTPATIENT
Start: 2021-06-09 | End: 2022-05-06

## 2021-06-09 RX ORDER — HYDROCHLOROTHIAZIDE 12.5 MG/1
25 TABLET ORAL DAILY
Qty: 180 TABLET | Refills: 1 | Status: SHIPPED | OUTPATIENT
Start: 2021-06-09 | End: 2021-10-28

## 2021-06-09 RX ORDER — SIMVASTATIN 20 MG
20 TABLET ORAL AT BEDTIME
Qty: 90 TABLET | Refills: 0 | Status: SHIPPED | OUTPATIENT
Start: 2021-06-09 | End: 2021-10-28

## 2021-06-09 RX ORDER — LOSARTAN POTASSIUM 100 MG/1
100 TABLET ORAL DAILY
Qty: 90 TABLET | Refills: 3 | Status: SHIPPED | OUTPATIENT
Start: 2021-06-09 | End: 2022-05-06

## 2021-06-09 NOTE — TELEPHONE ENCOUNTER
Routing refill request to provider for review/approval because:  BP not at goal  Over due for diabetes and depression follow up    PHQ-9 score:    PHQ 11/11/2020   PHQ-9 Total Score 0   Q9: Thoughts of better off dead/self-harm past 2 weeks Not at all       Last office visit: 3/10/2020   Future Office Visit:

## 2021-06-09 NOTE — TELEPHONE ENCOUNTER
Prilosec and Simvastatin    Prescription approved per [facility] Refill Protocol.    Ina HOGUEN, RN, PHN

## 2021-06-09 NOTE — LETTER
[facility]  [address] [id number] [phone]            Diane Gaitan  [address] [address]        June 9, 2021    Dear Diane,    While refilling your prescription today, we noticed that you are due to have labs drawn.  We will refill your prescription, but a follow-up appointment must be made before any additional refills can be approved.     Taking care of your health is important to us and we look forward to seeing you in the near future.  Please call us at [phone] or [phone] (or use [employer]) to schedule an appointment.     Please disregard this notice if you have already made an appointment.    Sincerely,        [facility]

## 2021-06-09 NOTE — TELEPHONE ENCOUNTER
I have filled patient's prescriptions but she is due for a basic metabolic panel for which lab orders have been placed.  Please inform

## 2021-06-21 ENCOUNTER — ALLIED HEALTH/NURSE VISIT (OUTPATIENT)
Dept: INTERNAL MEDICINE | Facility: CLINIC | Age: 71
End: 2021-06-21
Payer: MEDICARE

## 2021-06-21 VITALS — SYSTOLIC BLOOD PRESSURE: 138 MMHG | DIASTOLIC BLOOD PRESSURE: 65 MMHG

## 2021-06-21 DIAGNOSIS — Z01.30 BP CHECK: Primary | ICD-10-CM

## 2021-06-21 PROCEDURE — 99207 PR NO CHARGE NURSE ONLY: CPT | Performed by: INTERNAL MEDICINE

## 2021-06-21 NOTE — PROGRESS NOTES
BP checked at pharmacy and noted to be at goal of <140/90.   Recommended patient follow-up in 6 months at the pharmacy.      ~Thank you  Henrietta Jiang, PharmD  Retail Pharmacist  For [facility]

## 2021-06-28 DIAGNOSIS — I10 ESSENTIAL HYPERTENSION, BENIGN: ICD-10-CM

## 2021-06-28 LAB
ANION GAP SERPL CALCULATED.3IONS-SCNC: 5 MMOL/L (ref 3–14)
BUN SERPL-MCNC: 11 MG/DL (ref 7–30)
CALCIUM SERPL-MCNC: 9.2 MG/DL (ref 8.5–10.1)
CHLORIDE SERPL-SCNC: 99 MMOL/L (ref 94–109)
CO2 SERPL-SCNC: 29 MMOL/L (ref 20–32)
CREAT SERPL-MCNC: 0.62 MG/DL (ref 0.52–1.04)
GFR SERPL CREATININE-BSD FRML MDRD: 90 ML/MIN/{1.73_M2}
GLUCOSE SERPL-MCNC: 127 MG/DL (ref 70–99)
POTASSIUM SERPL-SCNC: 4.2 MMOL/L (ref 3.4–5.3)
SODIUM SERPL-SCNC: 133 MMOL/L (ref 133–144)

## 2021-06-28 PROCEDURE — 36415 COLL VENOUS BLD VENIPUNCTURE: CPT | Performed by: INTERNAL MEDICINE

## 2021-06-28 PROCEDURE — 80048 BASIC METABOLIC PNL TOTAL CA: CPT | Performed by: INTERNAL MEDICINE

## 2021-07-28 ENCOUNTER — OFFICE VISIT (OUTPATIENT)
Dept: INTERNAL MEDICINE | Facility: CLINIC | Age: 71
End: 2021-07-28
Payer: MEDICARE

## 2021-07-28 VITALS
DIASTOLIC BLOOD PRESSURE: 84 MMHG | BODY MASS INDEX: 32.89 KG/M2 | RESPIRATION RATE: 16 BRPM | HEART RATE: 103 BPM | TEMPERATURE: 97.8 F | WEIGHT: 210 LBS | SYSTOLIC BLOOD PRESSURE: 136 MMHG | OXYGEN SATURATION: 97 %

## 2021-07-28 DIAGNOSIS — M25.512 CHRONIC LEFT SHOULDER PAIN: ICD-10-CM

## 2021-07-28 DIAGNOSIS — G89.29 CHRONIC LEFT SHOULDER PAIN: ICD-10-CM

## 2021-07-28 DIAGNOSIS — E11.9 TYPE 2 DIABETES MELLITUS WITHOUT COMPLICATION, WITHOUT LONG-TERM CURRENT USE OF INSULIN (H): ICD-10-CM

## 2021-07-28 DIAGNOSIS — C50.412 MALIGNANT NEOPLASM OF UPPER-OUTER QUADRANT OF LEFT BREAST IN FEMALE, ESTROGEN RECEPTOR POSITIVE (H): ICD-10-CM

## 2021-07-28 DIAGNOSIS — F32.0 MAJOR DEPRESSIVE DISORDER, SINGLE EPISODE, MILD (H): ICD-10-CM

## 2021-07-28 DIAGNOSIS — I48.91 ATRIAL FIBRILLATION, UNSPECIFIED TYPE (H): ICD-10-CM

## 2021-07-28 DIAGNOSIS — Z17.0 MALIGNANT NEOPLASM OF UPPER-OUTER QUADRANT OF LEFT BREAST IN FEMALE, ESTROGEN RECEPTOR POSITIVE (H): ICD-10-CM

## 2021-07-28 DIAGNOSIS — E66.01 MORBID OBESITY (H): ICD-10-CM

## 2021-07-28 DIAGNOSIS — I10 ESSENTIAL HYPERTENSION, BENIGN: ICD-10-CM

## 2021-07-28 DIAGNOSIS — F40.240 CLAUSTROPHOBIA: ICD-10-CM

## 2021-07-28 DIAGNOSIS — M62.81 GENERALIZED MUSCLE WEAKNESS: Primary | ICD-10-CM

## 2021-07-28 LAB — HBA1C MFR BLD: 5.7 % (ref 0–5.6)

## 2021-07-28 PROCEDURE — 83036 HEMOGLOBIN GLYCOSYLATED A1C: CPT | Performed by: INTERNAL MEDICINE

## 2021-07-28 PROCEDURE — 99214 OFFICE O/P EST MOD 30 MIN: CPT | Performed by: INTERNAL MEDICINE

## 2021-07-28 PROCEDURE — 36415 COLL VENOUS BLD VENIPUNCTURE: CPT | Performed by: INTERNAL MEDICINE

## 2021-07-28 RX ORDER — ANASTROZOLE 1 MG/1
1 TABLET ORAL DAILY
Qty: 90 TABLET | Refills: 3 | Status: SHIPPED | OUTPATIENT
Start: 2021-07-28 | End: 2022-06-21

## 2021-07-28 RX ORDER — AMLODIPINE BESYLATE 5 MG/1
5 TABLET ORAL DAILY
Qty: 90 TABLET | Refills: 3 | Status: SHIPPED | OUTPATIENT
Start: 2021-07-28 | End: 2022-06-21

## 2021-07-28 RX ORDER — LORAZEPAM 0.5 MG/1
TABLET ORAL
Qty: 2 TABLET | Refills: 0 | Status: SHIPPED | OUTPATIENT
Start: 2021-07-28 | End: 2021-08-09

## 2021-07-28 ASSESSMENT — PATIENT HEALTH QUESTIONNAIRE - PHQ9: SUM OF ALL RESPONSES TO PHQ QUESTIONS 1-9: 0

## 2021-07-28 NOTE — PROGRESS NOTES
"    Assessment & Plan     Generalized muscle weakness  Nonspecific complaints of generalized lower extremity muscle weakness.  Unclear if potentially related to post Covid or secondary source.  Suggest we start with some physical therapy to see if she responds to core strengthening.  I would suggest also MRI imaging of her back to rule out a secondary neurological source.  Potential need for neurology referral may be of benefit.  - PAPITO PT and Hand Referral; Future  - Adult Neurology Referral; Future  - MR Lumbar Spine w/o Contrast; Future    Essential hypertension, benign  Refilled per patient request.  Stable on therapy.  - amLODIPine (NORVASC) 5 MG tablet; Take 1 tablet (5 mg) by mouth daily    Type 2 diabetes mellitus without complication, without long-term current use of insulin (H)  Check A1c level today.  - Hemoglobin A1c; Future    Major depressive disorder, single episode, mild (H)  Stable per patient report as per PHQ 9    Atrial fibrillation, unspecified type (H)  Rate controlled on therapy.    Morbid obesity (H)  Encourage on going weight loss.    Malignant neoplasm of upper-outer quadrant of left breast in female, estrogen receptor positive (H)  Refilled per request.  - anastrozole (ARIMIDEX) 1 MG tablet; Take 1 tablet (1 mg) by mouth daily    Chronic left shoulder pain  Suggest orthopedic referral for determination of need for treatment.  - Orthopedic  Referral; Future    Claustrophobia  Patient will take lorazepam prior to MRI.  - LORazepam (ATIVAN) 0.5 MG tablet; TK 1 PO 30 MINUTES PRIOR TO MRI, may repeat times 1     BMI:   Estimated body mass index is 32.89 kg/m  as calculated from the following:    Height as of 5/17/21: 1.702 m (5' 7\").    Weight as of this encounter: 95.3 kg (210 lb).   Weight management plan: Discussed healthy diet and exercise guidelines    See Patient Instructions    Return in about 1 month (around 8/28/2021) for f/u with routine sub-specialist, f/u Orthopedics, " follow-up physical therapy.    Jac Barry MD  [facility] is a 71 year old who presents for the following health issues     HPI   Chief Complaint   Patient presents with     Leg Problem     Patient c/o leg weakness after having COVID     Tremors     Patient c/o tremors after COVID     Shoulder Pain     Patient wants to discuss Shoulder Surgery eilabilty      Medication Refill     Patient's pharmacy states she is out of refills, but system shows she has at least two for each        Patient is primarily here to discuss some constitutional symptoms that have developed since she was diagnosed with Covid last fall.  She feels generalized weak at times and in fact states she has fallen once.  She notes that her baseline tremors are slightly worse.  She has nonspecific fatigability.  Her symptoms are generalized and more constitutional than focal.  He has no symptoms in her upper extremities.  Her primary symptoms appear to be more associated with generalized leg weakness.  She does not report any bowel or bladder change.    She does report chronic issues with her left shoulder.  She has had an MRI in the past and told she needed evaluation but this is been deferred.  She now has difficulty raising her left arm above her shoulder height.    States she also is in need of a refill of her amlodipine and arimdex.    Review of Systems   CONSTITUTIONAL: NEGATIVE for fever, chills, change in weight  EYES: NEGATIVE for vision changes or irritation  ENT/MOUTH: NEGATIVE for ear, mouth and throat problems  RESP: NEGATIVE for significant cough or SOB  CV: NEGATIVE for chest pain, palpitations or peripheral edema  GI: NEGATIVE for nausea, abdominal pain, heartburn, or change in bowel habits  : NEGATIVE for frequency, dysuria, or hematuria  HEME: NEGATIVE for bleeding problems  PSYCHIATRIC: NEGATIVE for changes in mood or affect      Objective    /84   Pulse 103    Temp 97.8  F (36.6  C) (Temporal)   Resp 16   Wt 95.3 kg (210 lb)   SpO2 97%   BMI 32.89 kg/m    There is no height or weight on file to calculate BMI.  Physical Exam   GENERAL: alert and no distress  EYES: Eyes grossly normal to inspection, PERRL and conjunctivae and sclerae normal  HENT: ear canals and TM's normal, nose and mouth without ulcers or lesions  NECK: no adenopathy, no asymmetry, masses, or scars and thyroid normal to palpation  RESP: lungs clear to auscultation - no rales, rhonchi or wheezes  CV: regular rate and rhythm, normal S1 S2, no S3 or S4, no click or rub,  MS: Her gait is slightly wide-based.  She is using a cane for support.  She is able to arise from a seated position without the use of her hands or the chair.  She has difficulty with deep squats.  Generalized strength is relatively preserved throughout all modalities.  Inability to abduct the left shoulder above shoulder height.  Patient is able to get about 40 degrees.  NEURO: The patient does not manifest any distinct tremors.  There is mild fine extension tremor bilaterally upon full extension.  Cranial nerves II through XII are grossly intact.  Strength of the upper extremity appears to be grossly normal.  PSYCH: mentation appears normal, affect normal/bright  Soft tissue bruises noted to the right knee,  right arm, elbow and wrist.  This is consistent with bruise after the patient's fall      Lab Results   Component Value Date    A1C 5.3 02/11/2021    A1C 5.7 06/19/2020    A1C 5.7 10/03/2019    A1C 5.7 05/02/2019    A1C 6.1 09/13/2018     LDL Cholesterol Calculated   Date Value Ref Range Status   02/11/2021 58 <100 mg/dL Final     Comment:     Desirable:       <100 mg/dl     Lab Results   Component Value Date    ALT 29 02/11/2021       Creatinine   Date Value Ref Range Status   06/28/2021 0.62 0.52 - 1.04 mg/dL Final

## 2021-08-03 ENCOUNTER — THERAPY VISIT (OUTPATIENT)
Dept: PHYSICAL THERAPY | Facility: CLINIC | Age: 71
End: 2021-08-03
Attending: INTERNAL MEDICINE
Payer: MEDICARE

## 2021-08-03 DIAGNOSIS — M62.81 GENERALIZED MUSCLE WEAKNESS: ICD-10-CM

## 2021-08-03 PROCEDURE — 97110 THERAPEUTIC EXERCISES: CPT | Mod: GP | Performed by: PHYSICAL THERAPIST

## 2021-08-03 PROCEDURE — 97161 PT EVAL LOW COMPLEX 20 MIN: CPT | Mod: GP | Performed by: PHYSICAL THERAPIST

## 2021-08-03 NOTE — LETTER
"DEPARTMENT OF HEALTH AND HUMAN SERVICES  CENTERS FOR MEDICARE & MEDICAID SERVICES    PLAN/UPDATED PLAN OF PROGRESS FOR OUTPATIENT REHABILITATION       PATIENTS NAME:  Diane Gaitan   : 1950  PROVIDER NUMBER:    [id number]  HICN:  [id number]   PROVIDER NAME: [facility] [place]  MEDICAL RECORD NUMBER: [id number]   START OF CARE DATE:  SOC Date: 21   TYPE:  PT  PRIMARY/TREATMENT DIAGNOSIS: (Pertinent Medical Diagnosis)  Generalized muscle weakness    VISITS FROM START OF CARE:  Rxs Used: 1     Physical Therapy Initial Evaluation  Therapist Generated HPI Evaluation  Problem details: 21 patient was getting up from a chair, \"my legs were not there, and I went down.  I fell primarily on my right knee.\" She was unable to get up, even with help from her daughter and so called EMS to assist.  Patient reports this has not happened before, nor since and she is now afraid to be without her cane.  MD orders for PT for LE weakness.  History involves patient having COVID 2020 when \"tremors\" in her hands started (still present) and general  weakness. She has neuropathy bilateral feet and lower legs to the mid-shin.  She also has a history of chronic LBP starting with an injury in .  Current episode of left LBP started about 2 months ago, no injury.  Left LBP ranges from 6-9/10, describes as \"dull\", intermittently in the buttock.   She wears a back brace intermittently and sees chiropractic intermittently.  LBP increases with standing >10', walking<5', when rolling in bed, difficulty standing upright first thing in the a.m..  She avoids bending forward due to \"fear of tipping over\".  Symptoms decrease with sitting, Naproxen.  Patient lives in a house by herself, family is there frequently and does her laundry (in the basement) and grocery shopping.  She has 2 stairs to get into the home, with railing, and does several stairs to get into a camper intermittently.  " She does stairs nonreciprocally.  She has been using a SEC for a few years intermittently, now constant since her fall.   .     Pain is the same all the time.  Since onset symptoms are unchanged.  Previous treatment includes chiropractic (a few weeks ago had 5 treatments).  There was mild improvement following previous treatment.  Barriers include:  Lives alone.    Patient Health History  General health as reported by patient is fair.  Pertinent medical history includes: diabetes, high blood pressure and cancer (left breast cancer/lumpectomy and lymph nodes late ).   Red flags:  None as reported by patient.  Medical allergies: other.   Surgeries include:  Cancer surgery and orthopedic surgery. Other surgery history details: Right RCR, B TKA .    Current medications:  High blood pressure medication and anti-depressants (OTC Naproxen, metformin).    Current occupation is Retired - .   Objective:  Standing Alignment:    Knee deviations alignment: significant bruising right anterior/medial knee and shin and lateral calf to the lateral distal thigh.  Gait:  SEC on the right, small step length, stands/walks with slight trunk flexion  Assistive Devices:  Cane  Sit-stand with moderate assist bilateral UEs   TU seconds  SLS:  Unable to do without UE assist on right or left   Lumbar AROM flexion:  Fingertips to below knees  Lumbar AROM extension:  10%  MMT:  Left hip flexion 4/5, abduction and extension 4/5, knee extension 4+/5, knee flexion 5/5, ankle DF 4+/5   MMT:  Right hip flexion 4+/5, abduction and extension 4/5, knee extension and flexion 5/5 (no pain), ankle DF 4+/5  Symptoms prior to test movements:  Pain left LB  Trial correction of sitting posture with lumbar roll:  Decrease pain to nil, better  Trial LEVI with hips against counter:  Minimal ROM available, decrease pain/better     Assessment/Plan:    Patient is a 71 year old female with left LBP with recent fall getting up from chair.  Patient  reports no history of falls and no falls since 7-25-21.  MMT bilateral LEs 4-5/5.  She has history of chronic LB problems, this episode about 2 months.  She has an MRI scheduled as well as referral to neurology.  Started patient with posture correction and LEVI for her lumbar spine, both which gave her significant relief.  Suspect lumbar involvement with her fall vs true LE weakness.   Will reassess next visit.  Patient has the following significant findings with corresponding treatment plan.                Diagnosis 1:  LE weakness  Pain -  self management, education and home program  Decreased ROM/flexibility - therapeutic exercise and home program  Decreased strength - therapeutic exercise, therapeutic activities and home program  Impaired gait - assistive devices and home program  Decreased function - therapeutic activities and home program  Impaired posture - neuro re-education and home program    Cumulative Therapy Evaluation is: Low complexity.  Previous and current functional limitations:  (See Goal Flow Sheet for this information)    Short term and Long term goals: (See Goal Flow Sheet for this information)   Communication ability:  Patient appears to be able to clearly communicate and understand verbal and written communication and follow directions correctly.  Treatment Explanation - The following has been discussed with the patient:   RX ordered/plan of care  Anticipated outcomes  Possible risks and side effects  This patient would benefit from PT intervention to resume normal activities.   Rehab potential is good.    Frequency:  1 X week, once daily  Duration:  for 6 weeks  Discharge Plan:  Achieve all LTG.  Independent in home treatment program.  Reach maximal therapeutic benefit.      Caregiver Signature/Credentials _____________________________ Date ________       Treating Provider: Stefania Ramirez PT    I have reviewed and certified the need for these services and plan of treatment while under my  "care.        PHYSICIAN'S SIGNATURE:   __________________________  Date___________                            Jac Barry MD    Certification period:  Beginning of Cert date period: 08/03/21 to  End of Cert period date: 10/02/21     Functional Level Progress Report: Please see attached \"Goal Flow sheet for Functional level.\"    ____X____ Continue Services or       ________ DC Services                Service dates: From  SOC Date: 08/03/21 date to present                         "

## 2021-08-03 NOTE — TELEPHONE ENCOUNTER
Health Maintenance Due   Topic Date Due   • DTaP/Tdap/Td Vaccine (1 - Tdap) 01/13/2010   • Influenza Vaccine (1) 08/01/2021   • Diabetes Eye Exam  09/15/2021     Patient is due for topics as listed above but is not proceeding with Immunization(s) Dtap/Tdap/Td and Influenza and Diabetes Eye Exam at this time.          ONCOLOGY INTAKE: Records Information      APPT INFORMATION:  Referring provider:  Dr. Ruddy Malik MD  Referring provider s clinic:  [facility]  Reason for visit/diagnosis:  Malignant neoplasm of left female breast, unspecified estrogen receptor status, unspecified site of breast (H) [C50.912]  Has patient been notified of appointment date and time?: Yes    RECORDS INFORMATION:  Were the records received with the referral (via [facility])? No,Internal Referral      Has patient been seen for any external appt for this diagnosis? Yes    If yes, where? [facility]    Has patient had any imaging or procedures outside of [facility] for this condition? Yes      If Yes, where? [facility]    ADDITIONAL INFORMATION:  Patient is on the wait list.

## 2021-08-03 NOTE — PROGRESS NOTES
"Physical Therapy Initial Evaluation  Subjective:  The history is provided by the patient. No  was used.   Therapist Generated HPI Evaluation  Problem details: 7-25-21 patient was getting up from a chair, \"my legs were not there, and I went down.  I fell primarily on my right knee.\" She was unable to get up, even with help from her daughter and so called EMS to assist.  Patient reports this has not happened before, nor since and she is now afraid to be without her cane.  MD orders for PT for LE weakness.  History involves patient having COVID November 2020 when \"tremors\" in her hands started (still present) and general  weakness. She has neuropathy bilateral feet and lower legs to the mid-shin.  She also has a history of chronic LBP starting with an injury in 1972.  Current episode of left LBP started about 2 months ago, no injury.  Left LBP ranges from 6-9/10, describes as \"dull\", intermittently in the buttock.   She wears a back brace intermittently and sees chiropractic intermittently.   LBP increases with standing >10', walking<5', when rolling in bed, difficulty standing upright first thing in the a.m..  She avoids bending forward due to \"fear of tipping over\".  Symptoms decrease with sitting, Naproxen.  Patient lives in a house by herself, family is there frequently and does her laundry (in the basement) and grocery shopping.  She has 2 stairs to get into the home, with railing, and does several stairs to get into a camper intermittently.  She does stairs nonreciprocally.  She has been using a SEC for a few years intermittently, now constant since her fall.   .                     Pain is the same all the time.                          Since onset symptoms are unchanged.    Previous treatment includes chiropractic (a few weeks ago had 5 treatments).  There was mild improvement following previous treatment.       Barriers include:  Lives alone.      Patient Health History           General " health as reported by patient is fair.  Pertinent medical history includes: diabetes, high blood pressure and cancer (left breast cancer/lumpectomy and lymph nodes late ).   Red flags:  None as reported by patient.  Medical allergies: other.   Surgeries include:  Cancer surgery and orthopedic surgery. Other surgery history details: Right RCR, B TKA .    Current medications:  High blood pressure medication and anti-depressants (OTC Naproxen, metformin).    Current occupation is Retired - .                                       Objective:  Standing Alignment:              Knee deviations alignment: significant bruising right anterior/medial knee and shin and lateral calf to the lateral distal thigh.      Gait:  SEC on the right, small step length, stands/walks with slight trunk flexion  Assistive Devices:  Cane        Sit-stand with moderate assist bilateral UEs     TU seconds    SLS:  Unable to do without UE assist on right or left     Lumbar AROM flexion:  Fingertips to below knees  Lumbar AROM extension:  10%    MMT:  Left hip flexion 4/5, abduction and extension 4/5, knee extension 4+/5, knee flexion 5/5, ankle DF 4+/5   MMT:  Right hip flexion 4+/5, abduction and extension 4/5, knee extension and flexion 5/5 (no pain), ankle DF 4+/5    Symptoms prior to test movements:  Pain left LB  Trial correction of sitting posture with lumbar roll:  Decrease pain to nil, better  Trial LEVI with hips against counter:  Minimal ROM available, decrease pain/better       Assessment/Plan:    Patient is a 71 year old female with left LBP with recent fall getting up from chair.  Patient reports no history of falls and no falls since 21.  MMT bilateral LEs 4-5/5.  She has history of chronic LB problems, this episode about 2 months.  She has an MRI scheduled as well as referral to neurology.  Started patient with posture correction and LEVI for her lumbar spine, both which gave her significant relief.  Suspect  lumbar involvement with her fall vs true LE weakness.   Will reassess next visit.  Patient has the following significant findings with corresponding treatment plan.                Diagnosis 1:  LE weakness    Pain -  self management, education and home program  Decreased ROM/flexibility - therapeutic exercise and home program  Decreased strength - therapeutic exercise, therapeutic activities and home program  Impaired gait - assistive devices and home program  Decreased function - therapeutic activities and home program  Impaired posture - neuro re-education and home program      Cumulative Therapy Evaluation is: Low complexity.    Previous and current functional limitations:  (See Goal Flow Sheet for this information)    Short term and Long term goals: (See Goal Flow Sheet for this information)     Communication ability:  Patient appears to be able to clearly communicate and understand verbal and written communication and follow directions correctly.  Treatment Explanation - The following has been discussed with the patient:   RX ordered/plan of care  Anticipated outcomes  Possible risks and side effects  This patient would benefit from PT intervention to resume normal activities.   Rehab potential is good.    Frequency:  1 X week, once daily  Duration:  for 6 weeks  Discharge Plan:  Achieve all LTG.  Independent in home treatment program.  Reach maximal therapeutic benefit.    Please refer to the daily flowsheet for treatment today, total treatment time and time spent performing 1:1 timed codes.

## 2021-08-09 ENCOUNTER — TELEPHONE (OUTPATIENT)
Dept: INTERNAL MEDICINE | Facility: CLINIC | Age: 71
End: 2021-08-09

## 2021-08-09 DIAGNOSIS — F40.240 CLAUSTROPHOBIA: ICD-10-CM

## 2021-08-09 RX ORDER — LORAZEPAM 0.5 MG/1
TABLET ORAL
Qty: 2 TABLET | Refills: 0 | Status: SHIPPED | OUTPATIENT
Start: 2021-08-09 | End: 2022-06-21

## 2021-08-09 NOTE — TELEPHONE ENCOUNTER
Has an MRI 08/11/21  [facility]  Gets medication by mail- Dr. Hartmann ordered her lorazepam through them - it will take weeks to get this prescription through them  patient needs this sent to the local pharmacy asap -  Please send this to the [facility]  Med taye'd up    Thank You,    Jimena EMERY,RN  [facility]  [phone]

## 2021-08-10 ENCOUNTER — THERAPY VISIT (OUTPATIENT)
Dept: PHYSICAL THERAPY | Facility: CLINIC | Age: 71
End: 2021-08-10
Attending: INTERNAL MEDICINE
Payer: MEDICARE

## 2021-08-10 DIAGNOSIS — M62.81 GENERALIZED MUSCLE WEAKNESS: ICD-10-CM

## 2021-08-10 PROCEDURE — 97530 THERAPEUTIC ACTIVITIES: CPT | Mod: GP | Performed by: PHYSICAL THERAPIST

## 2021-08-10 PROCEDURE — 97110 THERAPEUTIC EXERCISES: CPT | Mod: GP | Performed by: PHYSICAL THERAPIST

## 2021-08-11 ENCOUNTER — HOSPITAL ENCOUNTER (OUTPATIENT)
Dept: MRI IMAGING | Facility: CLINIC | Age: 71
Discharge: HOME | End: 2021-08-11
Attending: INTERNAL MEDICINE | Admitting: INTERNAL MEDICINE
Payer: MEDICARE

## 2021-08-11 DIAGNOSIS — M62.81 GENERALIZED MUSCLE WEAKNESS: ICD-10-CM

## 2021-08-11 PROCEDURE — 72148 MRI LUMBAR SPINE W/O DYE: CPT | Mod: ME

## 2021-08-11 PROCEDURE — G1004 CDSM NDSC: HCPCS

## 2021-08-13 NOTE — PROGRESS NOTES
CHIEF COMPLAINT:  No chief complaint on file.       HISTORY OF PRESENT ILLNESS  Ms. Gaitan is a pleasant 71 year old year old female who presents to clinic today with left shoulder pain.  Diane explains that she was told 7 years ago she had a small tear in her rotator cuff shown in an MRI. She now has worsening function, range of motion, and use of her left shoulder.     Onset: gradual, worsening  Location: left shoulder, patient points to top of shoulder.   Quality:  stabbing, sharp and shooting  Duration: 1 years of worsened pain  Severity: 9/10 at worst  Timing: constant  Modifying factors: laying on left side resting and non-use makes it better, movement and use makes it worse  Associated signs & symptoms: Denies feeling of instability, limited ROM and function.   Previous similar pain: Yes, chronic worsening pain for ~10 years  Treatments to date: Naproxen, at home Theraband exercises.     Additional history: as documented, had right rotator cuff repair many years ago, patient doesn't know    Review of Systems:    Have you recently had a a fever, chills, weight loss? No    Do you have any vision problems? No    Do you have any chest pain or edema? No    Do you have any shortness of breath or wheezing?  No    Do you have stomach problems? No    Do you have any numbness or focal weakness? Yes, neuropathy in feet    Do you have diabetes? Yes, Type II    Do you have problems with bleeding or clotting? No    Do you have an rashes or other skin lesions? No    MEDICAL HISTORY  Patient Active Problem List   Diagnosis     Essential hypertension, benign     CARDIOVASCULAR SCREENING; LDL GOAL LESS THAN 100     S/P total knee replacement     Osteoarthrosis, unspecified whether generalized or localized, involving lower leg     Acute posthemorrhagic anemia     Major depressive disorder, single episode, mild (H)     Gastroesophageal reflux disease without esophagitis     Type 2 diabetes mellitus without complication,  without long-term current use of insulin (H)     NSTEMI (non-ST elevated myocardial infarction) (H)     Unstable angina (H)     Diabetic polyneuropathy associated with type 2 diabetes mellitus (H)     Iron deficiency anemia, unspecified iron deficiency anemia type     Malignant neoplasm of upper-outer quadrant of left breast in female, estrogen receptor positive (H)     Malignant neoplasm of left female breast, unspecified estrogen receptor status, unspecified site of breast (H)     Morbid obesity (H)     Atrial fibrillation, unspecified type (H)     Generalized muscle weakness       Current Outpatient Medications   Medication Sig Dispense Refill     amLODIPine (NORVASC) 5 MG tablet Take 1 tablet (5 mg) by mouth daily 90 tablet 3     anastrozole (ARIMIDEX) 1 MG tablet Take 1 tablet (1 mg) by mouth daily 90 tablet 3     aspirin 81 MG tablet Take 1 tablet by mouth daily. 90 tablet 3     buPROPion (WELLBUTRIN XL) 150 MG 24 hr tablet Take 1 tablet (150 mg) by mouth every morning 90 tablet 3     calcium carbonate 600 mg-vitamin D 400 units (CALTRATE) 600-400 MG-UNIT per tablet Take 1 tablet by mouth 2 times daily        citalopram (CELEXA) 20 MG tablet Take 1 tablet (20 mg) by mouth daily 90 tablet 3     hydrochlorothiazide (HYDRODIURIL) 12.5 MG tablet Take 2 tablets (25 mg) by mouth daily 180 tablet 1     LORazepam (ATIVAN) 0.5 MG tablet TK 1 PO 30 MINUTES PRIOR TO MRI, may repeat times 1 2 tablet 0     losartan (COZAAR) 100 MG tablet Take 1 tablet (100 mg) by mouth daily 90 tablet 3     metFORMIN (GLUCOPHAGE) 500 MG tablet Take 1 tablet (500 mg) by mouth 2 times daily (with meals) 180 tablet 3     omeprazole (PRILOSEC) 20 MG DR capsule Take 1 capsule (20 mg) by mouth daily 90 capsule 0     simvastatin (ZOCOR) 20 MG tablet Take 1 tablet (20 mg) by mouth At Bedtime 90 tablet 0       Allergies   Allergen Reactions     Lisinopril Cough     COUGH       Family History   Problem Relation Age of Onset     Breast Cancer Sister       Hypertension Daughter      Breast Cancer Daughter 43     Skin Cancer Mother      Coronary Artery Disease Father      Colon Cancer Sister      Breast Cancer Maternal Aunt         Many Maternal Aunts have had breast Cancer       Additional medical/Social/Surgical histories reviewed in [facility] and updated as appropriate.       PHYSICAL EXAM  There were no vitals taken for this visit.    General  - normal appearance, in no obvious distress  HEENT  - conjunctivae not injected, moist mucous membranes  CV  - normal radial pulse  Pulm  - normal respiratory pattern, non-labored  Musculoskeletal - shoulder  - inspection: normal bone and joint alignment, no obvious deformity, no scapular winging, no AC step-off  - palpation: Tender to palpation rotator cuff insertion, tender anterior and posterior joint lines, normal clavicle, non-tender AC  - ROM: Painful limited in forward elevation external rotation abduction and internal rotation.  To be well-maintained extension.  - strength: 5/5  strength, significant weakness of rotator cuff external rotation, supraspinatus weakness, intact subscapularis.  - special tests:  (-) Speed's  (+) Neer  (+) Hawkin's   (+) Michelle's  (+)Positive drop arm  Neuro  - no sensory or motor deficit, grossly normal coordination, normal muscle tone  Skin  - no ecchymosis, erythema, warmth, or induration, no obvious rash  Psych  - interactive, appropriate, normal mood and affect    IMAGING : XR shoulder left 4V Final results and radiologist's interpretation, available in the [facility] health record. Images were reviewed with the patient/family members in the office today. My personal interpretation of the performed imaging is significant glenohumeral osteoarthrosis of the shoulder, sclerosis of the glenoid humeral head, loss of joint space and large inferior osteophyte.     ASSESSMENT & PLAN  Ms. Gaitan is a 71 year old year old female with history of rotator cuff tear diagnosed via MRI in 2014 who  presents to clinic today with chronic left shoulder pain and limited range of motion.    Diagnosis:   Glenohumeral osteoarthritis of left shoulder  Rotator cuff arthropathy left shoulder    At this point in time, she has significant changes of her glenohumeral joint with osteoarthritic change.  We discussed consideration for glenohumeral injection under ultrasound guidance.  We also discussed consideration for PT.  Lastly we did broach the subject of shoulder arthroplasty.      We will proceed initially with glenohumeral injection to determine the level of improvement.  This provides significant relief we can continue repeating this every 3 to 6 months.  She can use of OTC analgesics as needed otherwise.    She like to address preprocedural anxiety with a dose of lorazepam which she has from her PCP.  This would be appropriate she should take 30 minutes prior to procedure.    It was a pleasure seeing Diane today.    Martin Carpenter DO, CAM  Primary Care Sports Medicine

## 2021-08-18 ENCOUNTER — OFFICE VISIT (OUTPATIENT)
Dept: ORTHOPEDICS | Facility: CLINIC | Age: 71
End: 2021-08-18
Attending: INTERNAL MEDICINE
Payer: MEDICARE

## 2021-08-18 ENCOUNTER — ANCILLARY PROCEDURE (OUTPATIENT)
Dept: GENERAL RADIOLOGY | Facility: CLINIC | Age: 71
End: 2021-08-18
Attending: FAMILY MEDICINE
Payer: MEDICARE

## 2021-08-18 VITALS
HEIGHT: 67 IN | WEIGHT: 209.6 LBS | BODY MASS INDEX: 32.9 KG/M2 | SYSTOLIC BLOOD PRESSURE: 134 MMHG | DIASTOLIC BLOOD PRESSURE: 70 MMHG

## 2021-08-18 DIAGNOSIS — G89.29 CHRONIC LEFT SHOULDER PAIN: ICD-10-CM

## 2021-08-18 DIAGNOSIS — F41.9 ANXIETY DUE TO INVASIVE PROCEDURE: ICD-10-CM

## 2021-08-18 DIAGNOSIS — M25.512 LEFT SHOULDER PAIN: ICD-10-CM

## 2021-08-18 DIAGNOSIS — M12.812 ROTATOR CUFF ARTHROPATHY OF LEFT SHOULDER: Primary | ICD-10-CM

## 2021-08-18 DIAGNOSIS — M25.512 CHRONIC LEFT SHOULDER PAIN: ICD-10-CM

## 2021-08-18 PROCEDURE — 99204 OFFICE O/P NEW MOD 45 MIN: CPT | Performed by: FAMILY MEDICINE

## 2021-08-18 PROCEDURE — 73030 X-RAY EXAM OF SHOULDER: CPT | Mod: LT | Performed by: RADIOLOGY

## 2021-08-18 RX ORDER — LORAZEPAM 0.5 MG/1
0.5 TABLET ORAL EVERY 6 HOURS PRN
Qty: 2 TABLET | Refills: 0 | Status: SHIPPED | OUTPATIENT
Start: 2021-08-18 | End: 2024-08-21

## 2021-08-18 ASSESSMENT — MIFFLIN-ST. JEOR: SCORE: 1493.37

## 2021-08-18 NOTE — LETTER
8/18/2021         RE: Diane Gaitan  [address] [facility] [address]        Dear Colleague,    Thank you for referring your patient, Diane Gaitan, to the [facility] [place]. Please see a copy of my visit note below.    CHIEF COMPLAINT:  No chief complaint on file.       HISTORY OF PRESENT ILLNESS  Ms. Gaitan is a pleasant 71 year old year old female who presents to clinic today with left shoulder pain.  Diane explains that she was told 7 years ago she had a small tear in her rotator cuff shown in an MRI. She now has worsening function, range of motion, and use of her left shoulder.     Onset: gradual, worsening  Location: left shoulder, patient points to top of shoulder.   Quality:  stabbing, sharp and shooting  Duration: 1 years of worsened pain  Severity: 9/10 at worst  Timing: constant  Modifying factors: laying on left side resting and non-use makes it better, movement and use makes it worse  Associated signs & symptoms: Denies feeling of instability, limited ROM and function.   Previous similar pain: Yes, chronic worsening pain for ~10 years  Treatments to date: Naproxen, at home Theraband exercises.     Additional history: as documented, had right rotator cuff repair many years ago, patient doesn't know    Review of Systems:    Have you recently had a a fever, chills, weight loss? No    Do you have any vision problems? No    Do you have any chest pain or edema? No    Do you have any shortness of breath or wheezing?  No    Do you have stomach problems? No    Do you have any numbness or focal weakness? Yes, neuropathy in feet    Do you have diabetes? Yes, Type II    Do you have problems with bleeding or clotting? No    Do you have an rashes or other skin lesions? No    MEDICAL HISTORY  Patient Active Problem List   Diagnosis     Essential hypertension, benign     CARDIOVASCULAR SCREENING; LDL GOAL LESS THAN 100     S/P total knee replacement      Osteoarthrosis, unspecified whether generalized or localized, involving lower leg     Acute posthemorrhagic anemia     Major depressive disorder, single episode, mild (H)     Gastroesophageal reflux disease without esophagitis     Type 2 diabetes mellitus without complication, without long-term current use of insulin (H)     NSTEMI (non-ST elevated myocardial infarction) (H)     Unstable angina (H)     Diabetic polyneuropathy associated with type 2 diabetes mellitus (H)     Iron deficiency anemia, unspecified iron deficiency anemia type     Malignant neoplasm of upper-outer quadrant of left breast in female, estrogen receptor positive (H)     Malignant neoplasm of left female breast, unspecified estrogen receptor status, unspecified site of breast (H)     Morbid obesity (H)     Atrial fibrillation, unspecified type (H)     Generalized muscle weakness       Current Outpatient Medications   Medication Sig Dispense Refill     amLODIPine (NORVASC) 5 MG tablet Take 1 tablet (5 mg) by mouth daily 90 tablet 3     anastrozole (ARIMIDEX) 1 MG tablet Take 1 tablet (1 mg) by mouth daily 90 tablet 3     aspirin 81 MG tablet Take 1 tablet by mouth daily. 90 tablet 3     buPROPion (WELLBUTRIN XL) 150 MG 24 hr tablet Take 1 tablet (150 mg) by mouth every morning 90 tablet 3     calcium carbonate 600 mg-vitamin D 400 units (CALTRATE) 600-400 MG-UNIT per tablet Take 1 tablet by mouth 2 times daily        citalopram (CELEXA) 20 MG tablet Take 1 tablet (20 mg) by mouth daily 90 tablet 3     hydrochlorothiazide (HYDRODIURIL) 12.5 MG tablet Take 2 tablets (25 mg) by mouth daily 180 tablet 1     LORazepam (ATIVAN) 0.5 MG tablet TK 1 PO 30 MINUTES PRIOR TO MRI, may repeat times 1 2 tablet 0     losartan (COZAAR) 100 MG tablet Take 1 tablet (100 mg) by mouth daily 90 tablet 3     metFORMIN (GLUCOPHAGE) 500 MG tablet Take 1 tablet (500 mg) by mouth 2 times daily (with meals) 180 tablet 3     omeprazole (PRILOSEC) 20 MG DR capsule Take 1  capsule (20 mg) by mouth daily 90 capsule 0     simvastatin (ZOCOR) 20 MG tablet Take 1 tablet (20 mg) by mouth At Bedtime 90 tablet 0       Allergies   Allergen Reactions     Lisinopril Cough     COUGH       Family History   Problem Relation Age of Onset     Breast Cancer Sister      Hypertension Daughter      Breast Cancer Daughter 43     Skin Cancer Mother      Coronary Artery Disease Father      Colon Cancer Sister      Breast Cancer Maternal Aunt         Many Maternal Aunts have had breast Cancer       Additional medical/Social/Surgical histories reviewed in [facility] and updated as appropriate.       PHYSICAL EXAM  There were no vitals taken for this visit.    General  - normal appearance, in no obvious distress  HEENT  - conjunctivae not injected, moist mucous membranes  CV  - normal radial pulse  Pulm  - normal respiratory pattern, non-labored  Musculoskeletal - shoulder  - inspection: normal bone and joint alignment, no obvious deformity, no scapular winging, no AC step-off  - palpation: Tender to palpation rotator cuff insertion, tender anterior and posterior joint lines, normal clavicle, non-tender AC  - ROM: Painful limited in forward elevation external rotation abduction and internal rotation.  To be well-maintained extension.  - strength: 5/5  strength, significant weakness of rotator cuff external rotation, supraspinatus weakness, intact subscapularis.  - special tests:  (-) Speed's  (+) Neer  (+) Hawkin's   (+) Michelle's  (+)Positive drop arm  Neuro  - no sensory or motor deficit, grossly normal coordination, normal muscle tone  Skin  - no ecchymosis, erythema, warmth, or induration, no obvious rash  Psych  - interactive, appropriate, normal mood and affect    IMAGING : XR shoulder left 4V Final results and radiologist's interpretation, available in the [facility] health record. Images were reviewed with the patient/family members in the office today. My personal interpretation of the performed imaging is  significant glenohumeral osteoarthrosis of the shoulder, sclerosis of the glenoid humeral head, loss of joint space and large inferior osteophyte.     ASSESSMENT & PLAN  Ms. Gaitan is a 71 year old year old female with history of rotator cuff tear diagnosed via MRI in 2014 who presents to clinic today with chronic left shoulder pain and limited range of motion.    Diagnosis:   Glenohumeral osteoarthritis of left shoulder  Rotator cuff arthropathy left shoulder    At this point in time, she has significant changes of her glenohumeral joint with osteoarthritic change.  We discussed consideration for glenohumeral injection under ultrasound guidance.  We also discussed consideration for PT.  Lastly we did broach the subject of shoulder arthroplasty.      We will proceed initially with glenohumeral injection to determine the level of improvement.  This provides significant relief we can continue repeating this every 3 to 6 months.  She can use of OTC analgesics as needed otherwise.    She like to address preprocedural anxiety with a dose of lorazepam which she has from her PCP.  This would be appropriate she should take 30 minutes prior to procedure.    It was a pleasure seeing Diane today.    Martin Carpenter DO, [facility]  Primary Care Sports Medicine        Again, thank you for allowing me to participate in the care of your patient.        Sincerely,        Martin Carpenter DO

## 2021-08-18 NOTE — PATIENT INSTRUCTIONS
Thank you for choosing [facility]    [facility] CLIN[facility] LOCATIONS  [place] [address] [phone] [facility], [address]th [place]   [place], [address] [place] [address]   [phone]       APPOINTMENTS: [phone]    CARE QUESTIONS: [phone], #3    BILLING QUESTIONS: [phone]    FAX NUMBER: [phone]        Follow up: next week      1. Chronic left shoulder pain        Steroid injection of the left shoulder: intra-articular will be performed next week.    Post-injection instructions:    - Would not soak in a hot tub, bath or swimming pool for 48 hours  - Ok to shower  - Ice today and only do your normal amounts of activity  - The lidocaine (what is giving you pain relief right now) will likely stop working in 1-2 hours.  You will then have pain again, similar to before you received the injection. The corticosteroid will not start working until approximately 1-2 weeks from now.  In a small percentage of people, cortisone can cause flushing/redness in the face. This usually lasts for 1-3 days and resolves. Cool compress and Ibuprofen/Tylenol can help if this happens.

## 2021-08-19 NOTE — PROGRESS NOTES
PROCEDURE ENCOUNTER    [facility]  Orthopedics  Martin Carpenter DO  2021     Name: Diane Gaitan  MRN: [id number]  Age: 71 year old  : 1950    Patient is here today for a left shoulder glenohumeral joint cortisone injection    Glenohumeral Injection - Ultrasound Guided  The patient was informed of the risks and the benefits of the procedure and a written consent was signed.  The patient s left shoulder was prepped with chlorhexidine in sterile fashion.   Local anesthesia was performed using a 27-gauge 1.5-inch needle to administer 3 mL of 1% lidocaine without epi.  80 mg of methylprednisolone suspension was drawn up into a 5 mL syringe with 3 mL of 1% lidocaine w/o Epi.  Injection was performed using sterile technique.  Under ultrasound guidance a 3.5-inch 22-gauge needle was used to enter the left glenohumeral joint.  Posterior approach was used with the patient in lateral recumbent position, arm in neutral position at the side.  Needle placement was visualized and documented with ultrasound.  Ultrasound visualization was necessary due to the small joint space entered.  Injection performed long axis to the probe.  Injection solution visualized within the joint space.  Images were permanently stored for the patient's record.  There were no complications. The patient tolerated the procedure well. There was negligible bleeding.   Therapy scheduled to follow for mobilization.  The patient was instructed to call or go to the emergency room with any unusual pain, swelling, redness, or if otherwise concerned.  Martin Carpenter DO [facility]  Primary Care Sports Medicine  [facility]      Large Joint Injection/Arthocentesis: L glenohumeral joint    Date/Time: 2021 2:12 PM  Performed by: Martin Carpenter DO  Authorized by: Martin Carpenter DO     Indications:  Pain and osteoarthritis  Needle Size:  22 G  Guidance: ultrasound    Approach:  Posterior  Location:  Shoulder      Site:  L  glenohumeral joint  Medications:  80 mg methylPREDNISolone 80 MG/ML; 4 mL lidocaine 1 %  Outcome:  Tolerated well, no immediate complications  Procedure discussed: discussed risks, benefits, and alternatives    Consent Given by:  Patient  Prep: patient was prepped and draped in usual sterile fashion

## 2021-08-26 ENCOUNTER — OFFICE VISIT (OUTPATIENT)
Dept: ORTHOPEDICS | Facility: CLINIC | Age: 71
End: 2021-08-26
Payer: MEDICARE

## 2021-08-26 VITALS
SYSTOLIC BLOOD PRESSURE: 142 MMHG | DIASTOLIC BLOOD PRESSURE: 76 MMHG | HEIGHT: 67 IN | WEIGHT: 209 LBS | BODY MASS INDEX: 32.8 KG/M2

## 2021-08-26 DIAGNOSIS — M12.812 ROTATOR CUFF ARTHROPATHY OF LEFT SHOULDER: Primary | ICD-10-CM

## 2021-08-26 PROCEDURE — 20611 DRAIN/INJ JOINT/BURSA W/US: CPT | Mod: LT | Performed by: FAMILY MEDICINE

## 2021-08-26 RX ORDER — METHYLPREDNISOLONE ACETATE 80 MG/ML
80 INJECTION, SUSPENSION INTRA-ARTICULAR; INTRALESIONAL; INTRAMUSCULAR; SOFT TISSUE
Status: DISCONTINUED | OUTPATIENT
Start: 2021-08-26 | End: 2024-04-04

## 2021-08-26 RX ORDER — LIDOCAINE HYDROCHLORIDE 10 MG/ML
4 INJECTION, SOLUTION INFILTRATION; PERINEURAL
Status: DISCONTINUED | OUTPATIENT
Start: 2021-08-26 | End: 2024-04-04

## 2021-08-26 RX ADMIN — LIDOCAINE HYDROCHLORIDE 4 ML: 10 INJECTION, SOLUTION INFILTRATION; PERINEURAL at 14:12

## 2021-08-26 RX ADMIN — METHYLPREDNISOLONE ACETATE 80 MG: 80 INJECTION, SUSPENSION INTRA-ARTICULAR; INTRALESIONAL; INTRAMUSCULAR; SOFT TISSUE at 14:12

## 2021-08-26 ASSESSMENT — MIFFLIN-ST. JEOR: SCORE: 1490.65

## 2021-08-26 NOTE — PATIENT INSTRUCTIONS
Thank you for choosing [facility]    [facility] CLINIC LOCATIONS  [place] [address] [phone] [facility], [address]th [place]   [place], [place], [address] [place] [address]   [phone]       APPOINTMENTS: [phone]    CARE QUESTIONS: [phone], #3    BILLING QUESTIONS: [phone]    FAX NUMBER: [phone]        Steroid injection of the left shoulder: intra-articular  was performed today in clinic    - Would not soak in a hot tub, bath or swimming pool for 48 hours  - Ok to shower  - Ice today and only do your normal amounts of activity  - The lidocaine (what is giving you pain relief right now) will likely stop working in 1-2 hours.  You will then have pain again, similar to before you received the injection. The corticosteroid will not start working until approximately 1-2 weeks from now.  In a small percentage of people, cortisone can cause flushing/redness in the face. This usually lasts for 1-3 days and resolves. Cool compress and Ibuprofen/Tylenol can help if this happens.      Warm-up exercises and stretching before activities can help prevent injuries. For example, do exercises that strengthen your shoulder muscles. If your arm or shoulder hurts after exercise, putting ice on it may help keep it from getting injured.    Follow safety rules and use any protective equipment recommended for your work or sport.    Avoid activities that cause pain. For example, avoid lifting heavy objects over your head.    Developed by [employer].  Published by [employer].  Copyright  2014 [employer] and/or one of its subsidiaries. All rights reserved.    Rotator Cuff Exercises    Isometric shoulder external rotation:  a doorway with your elbow bent 90 degrees and the back of the wrist on your injured side pressed against the door frame. Try to press your hand outward into the door frame. Hold for 5 seconds. Do 2 sets of  15.    Isometric shoulder internal rotation:  a doorway with your elbow bent 90 degrees and the front of the wrist on your injured side pressed against the door frame. Try to press your palm into the door frame. Hold for 5 seconds. Do 2 sets of 15.  Wand exercise, flexion: Stand upright and hold a stick in both hands, palms down. Stretch your arms by lifting them over your head, keeping your arms straight. Hold for 5 seconds and return to the starting position. Repeat 10 times.    Wand exercise, extension: Stand upright and hold a stick in both hands behind your back. Move the stick away from your back. Hold this position for 5 seconds. Relax and return to the starting position. Repeat 10 times.  Wand exercise, external rotation: Lie on your back and hold a stick in both hands, palms up. Your upper arms should be resting on the floor with your elbows at your sides and bent 90 degrees. Use your uninjured arm to push your injured arm out away from your body. Keep the elbow of your injured arm at your side while it is being pushed. Hold the stretch for 5 seconds. Repeat 10 times.    Wand exercise, shoulder abduction and adduction: Stand and hold a stick with both hands, palms facing away from your body. Rest the stick against the front of your thighs. Use your uninjured arm to push your injured arm out to the side and up as high as possible. Keep your arms straight. Hold for 5 seconds. Repeat 10 times.    Resisted shoulder external rotation: Stand sideways next to a door with your injured arm farther from the door. Tie a knot in the end of the tubing and shut the knot in the door at waist level (or use cable weight machine at gym). Hold the other end of the tubing with the hand of your injured arm. Rest the hand of your injured arm across your stomach. Keeping your elbow in at your side, rotate your arm outward and away from your waist. Slowly return your arm to the starting position. Make sure you keep your  elbow bent 90 degrees and your forearm parallel to the floor. Repeat 10 times. Build up to 2 sets of 15.    Resisted shoulder internal rotation: Stand sideways next to a door with your injured arm closest to the door. Tie a knot in the end of the tubing and shut the knot in the door at waist level (or use cable weight machine at gym). Hold the other end of the tubing with the hand of your injured arm. Bend the elbow of your injured arm 90 degrees. Keeping your elbow in at your side, rotate your forearm across your body and then slowly back to the starting position. Make sure you keep your forearm parallel to the floor. Do 2 sets of 8 to 12.    Scaption: Stand with your arms at your sides and with your elbows straight. Slowly raise your arms to eye level. As you raise your arms, spread them apart so that they are only slightly in front of your body (at about a 30-degree angle to the front of your body). Point your thumbs toward the ceiling. Hold for 2 seconds and lower your arms slowly. Do 2 sets of 15. Progress to holding a soup can or light weight when you are doing the exercise and increase the weight as the exercise gets easier.    Side-lying external rotation: Lie on your uninjured side with your injured arm at your side and your elbow bent 90 degrees. Keeping your elbow against your side, raise your forearm toward the ceiling and hold for 2 seconds. Slowly lower your arm. Do 2 sets of 15. You can start doing this exercise holding a soup can or light weight and gradually increase the weight as long as there is no pain.    Horizontal abduction: Lie on your stomach on a table or the edge of a bed with the arm on your injured side hanging down over the edge. Raise your arm out to the side, with your thumb pointed toward the ceiling, until your arm is parallel to the floor. Hold for 2 seconds and then lower it slowly. Start this exercise with no weight. As you get stronger, add a light weight or hold a soup can. Do  "2 sets of 15.    Push-up with a plus: Begin on the floor on your hands and knees. Keep your hands a shoulder width apart and lift your feet off the floor. Arch your back as high as possible and round your shoulders (this is the \"plus\" part or the exercise). Bend your elbows and lower your body to the floor. Return to the starting position and arch your back again. Do 2 sets of 15.        "

## 2021-08-26 NOTE — LETTER
2021         RE: Diane Gaitan  [address] [facility] [address]        Dear Colleague,    Thank you for referring your patient, Diane Gaitan, to the [facility] [place]. Please see a copy of my visit note below.    PROCEDURE ENCOUNTER    [facility]  Orthopedics  Martin Carpenter DO  2021     Name: Diane Gaitan  MRN: [id number]  Age: 71 year old  : 1950    Patient is here today for a left shoulder glenohumeral joint cortisone injection    Glenohumeral Injection - Ultrasound Guided  The patient was informed of the risks and the benefits of the procedure and a written consent was signed.  The patient s left shoulder was prepped with chlorhexidine in sterile fashion.   Local anesthesia was performed using a 27-gauge 1.5-inch needle to administer 3 mL of 1% lidocaine without epi.  80 mg of methylprednisolone suspension was drawn up into a 5 mL syringe with 3 mL of 1% lidocaine w/o Epi.  Injection was performed using sterile technique.  Under ultrasound guidance a 3.5-inch 22-gauge needle was used to enter the left glenohumeral joint.  Posterior approach was used with the patient in lateral recumbent position, arm in neutral position at the side.  Needle placement was visualized and documented with ultrasound.  Ultrasound visualization was necessary due to the small joint space entered.  Injection performed long axis to the probe.  Injection solution visualized within the joint space.  Images were permanently stored for the patient's record.  There were no complications. The patient tolerated the procedure well. There was negligible bleeding.   Therapy scheduled to follow for mobilization.  The patient was instructed to call or go to the emergency room with any unusual pain, swelling, redness, or if otherwise concerned.  Martin Carpenter DO CAQSM  Primary Care Sports Medicine  [facility]      Large Joint  Injection/Arthocentesis: L glenohumeral joint    Date/Time: 8/26/2021 2:12 PM  Performed by: Martin Carpenter DO  Authorized by: Martin Carpenter DO     Indications:  Pain and osteoarthritis  Needle Size:  22 G  Guidance: ultrasound    Approach:  Posterior  Location:  Shoulder      Site:  L glenohumeral joint  Medications:  80 mg methylPREDNISolone 80 MG/ML; 4 mL lidocaine 1 %  Outcome:  Tolerated well, no immediate complications  Procedure discussed: discussed risks, benefits, and alternatives    Consent Given by:  Patient  Prep: patient was prepped and draped in usual sterile fashion              Again, thank you for allowing me to participate in the care of your patient.        Sincerely,        Martin Carpenter DO

## 2021-08-30 NOTE — PROGRESS NOTES
[facility]/[place]  Section of General Neurology  New Patient Visit      Diane Gaitan MRN# [id number]   Age: 71 year old YOB: 1950     Requesting physician: Jac Durbin     Reason for Consultation: lower extremity weakness, neuropathy        History of Presenting Symptoms:   Diane Gaitan is a 71 year old female who presents today for evaluation of lower extremity weakness  PMH is notable for obesity, low back pain.  Depression, T2DM with documented hx of neuropathy 2/2 to this, ER+ breast cancer still on hormone therapy (is to take this x 5 years total),   COVID infection (November 2020)  She is seeing physical therapy already for her lower extremity weakness/gait weakness.    The weakness in her legs has extended into issues with her gait.  She is still using a cane because she abruptly went down.  She doesn't know why this happened.  This was end of July.  She has low back pain which has improved with physical therapy exercises.  It radiates to her left flank but not down her legs.   She got COVID last November.  Since COVID she notes tremors but no other clear long term symptoms.  It is worse when drinking water.     She lives in [place]  She still works at [employer] a  she bought 23 years ago which she partially owns.  She is an avid [employer] fan    Bowel/bladder symptoms: No issues.     She denies profound weakness currently.  She has no history of lumbar surgeries.       Past Medical History:     Patient Active Problem List   Diagnosis     Essential hypertension, benign     CARDIOVASCULAR SCREENING; LDL GOAL LESS THAN 100     S/P total knee replacement     Osteoarthrosis, unspecified whether generalized or localized, involving lower leg     Acute posthemorrhagic anemia     Major depressive disorder, single episode, mild (H)     Gastroesophageal reflux disease without esophagitis     Type 2 diabetes mellitus without complication,  without long-term current use of insulin (H)     NSTEMI (non-ST elevated myocardial infarction) (H)     Unstable angina (H)     Diabetic polyneuropathy associated with type 2 diabetes mellitus (H)     Iron deficiency anemia, unspecified iron deficiency anemia type     Malignant neoplasm of upper-outer quadrant of left breast in female, estrogen receptor positive (H)     Malignant neoplasm of left female breast, unspecified estrogen receptor status, unspecified site of breast (H)     Morbid obesity (H)     Atrial fibrillation, unspecified type (H)     Generalized muscle weakness     Past Medical History:   Diagnosis Date     Arthritis      BENIGN HYPERTENSION 8/28/2002     Cancer (H)     Basal cell carcinoma     CARDIOVASCULAR SCREENING; LDL GOAL LESS THAN 100 10/31/2010     Depressive disorder 1997     Esophageal reflux      Mild major depression (H) 9/14/2010     Other malaise and fatigue 8/28/2002     Type 2 diabetes, HbA1c goal < 7% (H) 10/31/2010        Past Surgical History:     Past Surgical History:   Procedure Laterality Date     ARTHROPLASTY KNEE  10/18/2012    Procedure: ARTHROPLASTY KNEE;  RIGHT TOTAL KNEE ARTHROPLASTY (SMITH & NEPHEW)^ ;  Surgeon: Howard Charles MD;  Location:  OR     BIOPSY NODE SENTINEL Left 12/19/2019    Procedure: LEFT SENTINEL LYMPH NODE BIOPSY;  Surgeon: Ruddy Malik MD;  Location:  OR     BREAST BIOPSY, RT/LT  1988    breast biopsy     COLONOSCOPY N/A 7/14/2016    Procedure: COLONOSCOPY;  Surgeon: Curt Patel MD;  Location:  GI     ESOPHAGOSCOPY, GASTROSCOPY, DUODENOSCOPY (EGD), COMBINED N/A 9/13/2018    Procedure: COMBINED ESOPHAGOSCOPY, GASTROSCOPY, DUODENOSCOPY (EGD);  gastroscopy;  Surgeon: Mac White MD;  Location:  GI     HYSTERECTOMY, PAP NO LONGER INDICATED  1986    abdominal hysterectomy     LUMPECTOMY BREAST WITH SEED LOCALIZATION Left 12/19/2019    Procedure: SEED LOCALIZED LEFT BREAST LUMPECTOMY;  Surgeon: King  Ruddy Dugan MD;  Location: [facility] OR     ROTATOR CUFF REPAIR RT/LT Right      TUBAL LIGATION      at age 29     [facility] NONSPECIFIC PROCEDURE  10/30/96    skin tags removed     [facility] NONSPECIFIC PROCEDURE      colonic polyps        Social History:     Social History     Tobacco Use     Smoking status: Former Smoker     Packs/day: 1.00     Years: 12.00     Pack years: 12.00     Quit date: 10/18/1980     Years since quittin.8     Smokeless tobacco: Never Used   Substance Use Topics     Alcohol use: Yes     Comment: 12 Beers per week     Drug use: No        Family History:     Family History   Problem Relation Age of Onset     Breast Cancer Sister      Hypertension Daughter      Breast Cancer Daughter 43     Skin Cancer Mother      Coronary Artery Disease Father      Colon Cancer Sister      Breast Cancer Maternal Aunt         Many Maternal Aunts have had breast Cancer        Medications:     Current Outpatient Medications   Medication Sig     amLODIPine (NORVASC) 5 MG tablet Take 1 tablet (5 mg) by mouth daily     anastrozole (ARIMIDEX) 1 MG tablet Take 1 tablet (1 mg) by mouth daily     aspirin 81 MG tablet Take 1 tablet by mouth daily.     buPROPion (WELLBUTRIN XL) 150 MG 24 hr tablet Take 1 tablet (150 mg) by mouth every morning     calcium carbonate 600 mg-vitamin D 400 units (CALTRATE) 600-400 MG-UNIT per tablet Take 1 tablet by mouth 2 times daily      citalopram (CELEXA) 20 MG tablet Take 1 tablet (20 mg) by mouth daily     hydrochlorothiazide (HYDRODIURIL) 12.5 MG tablet Take 2 tablets (25 mg) by mouth daily     LORazepam (ATIVAN) 0.5 MG tablet Take 1 tablet (0.5 mg) by mouth every 6 hours as needed for anxiety     LORazepam (ATIVAN) 0.5 MG tablet TK 1 PO 30 MINUTES PRIOR TO MRI, may repeat times 1     losartan (COZAAR) 100 MG tablet Take 1 tablet (100 mg) by mouth daily     metFORMIN (GLUCOPHAGE) 500 MG tablet Take 1 tablet (500 mg) by mouth 2 times daily (with meals)     omeprazole (PRILOSEC) 20 MG DR capsule  "Take 1 capsule (20 mg) by mouth daily     simvastatin (ZOCOR) 20 MG tablet Take 1 tablet (20 mg) by mouth At Bedtime     Current Facility-Administered Medications   Medication     lidocaine 1 % injection 4 mL     LORazepam (ATIVAN) tablet 0.5 mg     methylPREDNISolone (DEPO-MEDROL) injection 80 mg        Allergies:     Allergies   Allergen Reactions     Lisinopril Cough     COUGH        Review of Systems:   A comprehensive 10 point review of systems (constitutional, ENT, cardiac, peripheral vascular, lymphatic, respiratory, GI, , Musculoskeletal, skin, Neurological) was performed and found to be negative except as described in this note.      Physical Exam:   Vitals: /82 (BP Location: Right arm, Patient Position: Sitting)   Pulse 105   Ht 1.702 m (5' 7\")   Wt 93.9 kg (207 lb)   SpO2 97%   BMI 32.42 kg/m     HEENT: Neck supple with normal range of motion.   CV: peripheral pulse appreciated  Lungs: breathing comfortably  Extremities: no edema  Skin: No rashes    Neuro:   General Appearance: No apparent distress, well-nourished, well-groomed, pleasant     Mental Status: Alert and oriented to person, place, and time. Speech fluent and comprehension intact. No dysarthria. Normal memory, fund of knowledge, attention/concentration, and language    Cranial Nerves:   II: Visual fields: normal  III: Pupils: 3 mm, equal, round, reactive to light   III,IV,VI: Extraocular Movements: intact   V: Facial sensation: intact to light touch  VII: Facial strength: intact without asymmetry  VIII: Hearing: intact grossly  IX: Palate: intact   XI: Shoulder shrug: intact  XII: Tongue movement: normal     Motor Exam:   Upper Extremities  Deltoid  Bicep  Tricep  Wrist Extensors  strength Intrinsic Muscles    Right  5  5  5  5 5 5    Left  5  5  5  5 5 5      Lower Extremities  Hip Flexors  Knee Extensors  Knee   Flexors  Dorsi Flexion  Plantar   Flexion    Right  5  5  5  5  5    Left  5  5  5  5  5    Left hip flexion led to " some low back pain/ +SLR    No drift is present. Fine tremor only noted when arms fully extended,. Tone is normal throughout.    Sensory: No vibration at toes, limited at ankle, pinprick no up to R mid shin, L knee, no propioception at toes b/l.      Coordination: no dysmetria with finger-to-nose bilaterally, heel-to-shin normal    Reflexes: biceps, triceps, brachioradialis, patellar 0, and ankle jerks 0 and symmetric. Toes are mute    Gait: slow and cautious, wide based, high steppage.  Cannot do tandem without holding on to something.  Romberg with some sway.         Data: Pertinent prior to visit   Imaging:  MRI Lumbar spine 8/11/2021    T12-L1:  Disc height maintained. No herniation. Mild bilateral facet  arthropathy. No foraminal or spinal canal stenosis.      L1-L2:  Mild disc height loss. Disc bulge, asymmetric to the right.  Mild bilateral facet arthropathy. Minimal right foraminal stenosis. No  left foraminal stenosis. No spinal canal stenosis.       L2-L3:  Mild disc height loss. Disc bulge. Mild bilateral facet  arthropathy. Mild bilateral foraminal stenosis. Mild spinal canal  stenosis.       L3-L4:  Moderate to severe asymmetrical disc height loss on the left.  Disc bulge asymmetric to the left with left foraminal protrusion  component. Right foraminal/lateral annular fissure. Moderate bilateral  facet arthropathy. Mild right foraminal stenosis. Moderate to severe  left foraminal stenosis. Moderate to severe spinal canal stenosis.       L4-L5:  Mild disc height loss. Disc bulge, asymmetric to the right  with right foraminal and lateral protrusion with annular fissure.  Moderate bilateral facet arthropathy. Moderate stenosis of the medial  right foramen. No left foraminal stenosis. Mild spinal canal stenosis.     L5-S1:  Disc height maintained. No herniation. Severe bilateral facet  arthropathy. No foraminal or spinal canal stenosis.                                                                       IMPRESSION:    1. Degenerative change as detailed, worst at L3-4, left worse than  right.  2. Marrow signal changes surrounding the L3-4 disc joint are  presumably degenerative. Infection cannot be completely excluded.  3. Other degenerative change as detailed    I personally reviewed the above imaging and agree with the findings in the report.      Notable for disk bulge at L4-5 to the right without severe neuronal foraminal narrowing that can be seen currently. L3-L4 disk bulge with moderate to severe  left foraminal stenosis. moderate to severe spinal canal stenosis.          Procedures:  None      Laboratory:  Last Comprehensive Metabolic Panel:  Sodium   Date Value Ref Range Status   06/28/2021 133 133 - 144 mmol/L Final     Potassium   Date Value Ref Range Status   06/28/2021 4.2 3.4 - 5.3 mmol/L Final     Chloride   Date Value Ref Range Status   06/28/2021 99 94 - 109 mmol/L Final     Carbon Dioxide   Date Value Ref Range Status   06/28/2021 29 20 - 32 mmol/L Final     Anion Gap   Date Value Ref Range Status   06/28/2021 5 3 - 14 mmol/L Final     Glucose   Date Value Ref Range Status   06/28/2021 127 (H) 70 - 99 mg/dL Final     Urea Nitrogen   Date Value Ref Range Status   06/28/2021 11 7 - 30 mg/dL Final     Creatinine   Date Value Ref Range Status   06/28/2021 0.62 0.52 - 1.04 mg/dL Final     GFR Estimate   Date Value Ref Range Status   06/28/2021 90 >60 mL/min/[1.73_m2] Final     Comment:     Non  GFR Calc  Starting 12/18/2018, serum creatinine based estimated GFR (eGFR) will be   calculated using the Chronic Kidney Disease Epidemiology Collaboration   (CKD-EPI) equation.       Calcium   Date Value Ref Range Status   06/28/2021 9.2 8.5 - 10.1 mg/dL Final     Lab Results   Component Value Date    WBC 5.3 10/30/2018     Lab Results   Component Value Date    RBC 4.83 10/30/2018     Lab Results   Component Value Date    HGB 12.9 12/16/2019     Lab Results   Component Value Date    HCT 33.5  10/30/2018     No components found for: MCT  Lab Results   Component Value Date    MCV 69 10/30/2018     Lab Results   Component Value Date    MCH 19.7 10/30/2018     Lab Results   Component Value Date    MCHC 28.4 10/30/2018     Lab Results   Component Value Date    RDW 20.5 10/30/2018     Lab Results   Component Value Date     12/16/2019     Lab Results   Component Value Date    A1C 5.7 07/28/2021    A1C 5.3 02/11/2021    A1C 5.7 06/19/2020    A1C 5.7 10/03/2019    A1C 5.7 05/02/2019    A1C 6.1 09/13/2018   Vitamin B12 378 (2018)   TSH   Date Value Ref Range Status   10/03/2019 0.82 0.40 - 4.00 mU/L Final              Assessment and Plan:   Assessment:  Diane Gaitan is a 71 year old female who presents today for evaluation of lower extremity weakness  PMH is notable for obesity, low back pain.  Depression, T2DM with documented hx of neuropathy 2/2 to this, ER+ breast cancer still on hormone therapy (is to take this x 5 years total),  Her weakness is improved.  She feels at her baseline in this regard  I do not detect any weakness today on my exam. Her gait is somewhat limited by inability to feel her feet, and some degree of axial imbalance.  She movements are mildly limited by low back pain, worse on the left.    Of note, her Anastrazole is not well described to be linked to peripheral neuropathy.      Plan:  #Length dependent polyneuropathy  #Weakness-improved  #Low back pain likely secondary to lumbar disk disease  #Enhanced physiological tremor.  #Sensory ataxia    --EMG was discussed as an option to help parse out what might be secondary to neuropathy vs a radiculopathy and if is secondary to a radiculopathy but given that her symptoms of weakness are improving and her sensory symptoms distally have been present for many years we decided to opt against this at this time. If things worsen she will reach out and we can consider pursuing this.   --New vitamin B12 level, SPEP/IFIX get with your next  round of labs given AAN guidelines for your length dependent peripheral neuropathy.  The neuropathy is likely secondary to long standing diabetes mellitus but will make sure there is no other obvious cause/something we can improve upon.    --Low back pain likely secondary to MRI findings as above, best course of action: Keep going to physical therapy, and/or doing home exercises to help with low back pain, balance.  --Follow up in 6 months, call or mychart with questions           Sam Mesa MD   of Neurology   [facility]      The total time of this encounter today amounted to 50 minutes. This time included time spent with the patient, prep work, ordering tests, and performing post visit documentation.

## 2021-08-31 ENCOUNTER — OFFICE VISIT (OUTPATIENT)
Dept: NEUROLOGY | Facility: CLINIC | Age: 71
End: 2021-08-31
Attending: INTERNAL MEDICINE
Payer: MEDICARE

## 2021-08-31 VITALS
WEIGHT: 207 LBS | DIASTOLIC BLOOD PRESSURE: 82 MMHG | SYSTOLIC BLOOD PRESSURE: 133 MMHG | OXYGEN SATURATION: 97 % | HEART RATE: 105 BPM | BODY MASS INDEX: 32.49 KG/M2 | HEIGHT: 67 IN

## 2021-08-31 DIAGNOSIS — G89.29 CHRONIC BILATERAL LOW BACK PAIN WITHOUT SCIATICA: ICD-10-CM

## 2021-08-31 DIAGNOSIS — M62.81 GENERALIZED MUSCLE WEAKNESS: ICD-10-CM

## 2021-08-31 DIAGNOSIS — G62.9 LENGTH-DEPENDENT PERIPHERAL NEUROPATHY: Primary | ICD-10-CM

## 2021-08-31 DIAGNOSIS — E11.42 DIABETIC POLYNEUROPATHY ASSOCIATED WITH TYPE 2 DIABETES MELLITUS (H): ICD-10-CM

## 2021-08-31 DIAGNOSIS — R27.8 SENSORY ATAXIA: ICD-10-CM

## 2021-08-31 DIAGNOSIS — M54.50 CHRONIC BILATERAL LOW BACK PAIN WITHOUT SCIATICA: ICD-10-CM

## 2021-08-31 PROCEDURE — 99204 OFFICE O/P NEW MOD 45 MIN: CPT | Performed by: STUDENT IN AN ORGANIZED HEALTH CARE EDUCATION/TRAINING PROGRAM

## 2021-08-31 PROCEDURE — G0463 HOSPITAL OUTPT CLINIC VISIT: HCPCS

## 2021-08-31 ASSESSMENT — MIFFLIN-ST. JEOR: SCORE: 1481.58

## 2021-08-31 NOTE — NURSING NOTE
Chief Complaint   Patient presents with     Leg Pain     Tremors     Patient is experincing tremors in both hands

## 2021-08-31 NOTE — LETTER
8/31/2021         RE: Diane Gaitan  [address] [address]        Dear Colleague,    Thank you for referring your patient, Diane Gaitan, to the [facility] [place]. Please see a copy of my visit note below.    [facility]  Section of General Neurology  New Patient Visit      Diane Gaitan MRN# [id number]   Age: 71 year old YOB: 1950     Requesting physician: Jac Durbin     Reason for Consultation: lower extremity weakness, neuropathy        History of Presenting Symptoms:   Diane Gaitan is a 71 year old female who presents today for evaluation of lower extremity weakness  PMH is notable for obesity, low back pain.  Depression, T2DM with documented hx of neuropathy 2/2 to this, ER+ breast cancer still on hormone therapy (is to take this x 5 years total),   COVID infection (November 2020)  She is seeing physical therapy already for her lower extremity weakness/gait weakness.    The weakness in her legs has extended into issues with her gait.  She is still using a cane because she abruptly went down.  She doesn't know why this happened.  This was end of July.  She has low back pain which has improved with physical therapy exercises.  It radiates to her left flank but not down her legs.   She got COVID last November.  Since COVID she notes tremors but no other clear long term symptoms.  It is worse when drinking water.     She lives in [place]  She still works at [employer] a  she bought 23 years ago which she partially owns.  She is an avid [employer] fan    Bowel/bladder symptoms: No issues.     She denies profound weakness currently.  She has no history of lumbar surgeries.       Past Medical History:     Patient Active Problem List   Diagnosis     Essential hypertension, benign     CARDIOVASCULAR SCREENING; LDL GOAL LESS THAN 100     S/P total knee replacement     Osteoarthrosis,  unspecified whether generalized or localized, involving lower leg     Acute posthemorrhagic anemia     Major depressive disorder, single episode, mild (H)     Gastroesophageal reflux disease without esophagitis     Type 2 diabetes mellitus without complication, without long-term current use of insulin (H)     NSTEMI (non-ST elevated myocardial infarction) (H)     Unstable angina (H)     Diabetic polyneuropathy associated with type 2 diabetes mellitus (H)     Iron deficiency anemia, unspecified iron deficiency anemia type     Malignant neoplasm of upper-outer quadrant of left breast in female, estrogen receptor positive (H)     Malignant neoplasm of left female breast, unspecified estrogen receptor status, unspecified site of breast (H)     Morbid obesity (H)     Atrial fibrillation, unspecified type (H)     Generalized muscle weakness     Past Medical History:   Diagnosis Date     Arthritis      BENIGN HYPERTENSION 8/28/2002     Cancer (H)     Basal cell carcinoma     CARDIOVASCULAR SCREENING; LDL GOAL LESS THAN 100 10/31/2010     Depressive disorder 1997     Esophageal reflux      Mild major depression (H) 9/14/2010     Other malaise and fatigue 8/28/2002     Type 2 diabetes, HbA1c goal < 7% (H) 10/31/2010        Past Surgical History:     Past Surgical History:   Procedure Laterality Date     ARTHROPLASTY KNEE  10/18/2012    Procedure: ARTHROPLASTY KNEE;  RIGHT TOTAL KNEE ARTHROPLASTY (SMITH & NEPHEW)^ ;  Surgeon: Howard Charles MD;  Location:  OR     BIOPSY NODE SENTINEL Left 12/19/2019    Procedure: LEFT SENTINEL LYMPH NODE BIOPSY;  Surgeon: Ruddy Malik MD;  Location:  OR     BREAST BIOPSY, RT/LT  1988    breast biopsy     COLONOSCOPY N/A 7/14/2016    Procedure: COLONOSCOPY;  Surgeon: Curt Patel MD;  Location:  GI     ESOPHAGOSCOPY, GASTROSCOPY, DUODENOSCOPY (EGD), COMBINED N/A 9/13/2018    Procedure: COMBINED ESOPHAGOSCOPY, GASTROSCOPY, DUODENOSCOPY (EGD);  gastroscopy;   Surgeon: Mac White MD;  Location: [facility] GI     HYSTERECTOMY, PAP NO LONGER INDICATED      abdominal hysterectomy     LUMPECTOMY BREAST WITH SEED LOCALIZATION Left 2019    Procedure: SEED LOCALIZED LEFT BREAST LUMPECTOMY;  Surgeon: Ruddy Malik MD;  Location: [facility] OR     ROTATOR CUFF REPAIR RT/LT Right      TUBAL LIGATION      at age 29     [facility] NONSPECIFIC PROCEDURE  10/30/96    skin tags removed     [facility] NONSPECIFIC PROCEDURE      colonic polyps        Social History:     Social History     Tobacco Use     Smoking status: Former Smoker     Packs/day: 1.00     Years: 12.00     Pack years: 12.00     Quit date: 10/18/1980     Years since quittin.8     Smokeless tobacco: Never Used   Substance Use Topics     Alcohol use: Yes     Comment: 12 Beers per week     Drug use: No        Family History:     Family History   Problem Relation Age of Onset     Breast Cancer Sister      Hypertension Daughter      Breast Cancer Daughter 43     Skin Cancer Mother      Coronary Artery Disease Father      Colon Cancer Sister      Breast Cancer Maternal Aunt         Many Maternal Aunts have had breast Cancer        Medications:     Current Outpatient Medications   Medication Sig     amLODIPine (NORVASC) 5 MG tablet Take 1 tablet (5 mg) by mouth daily     anastrozole (ARIMIDEX) 1 MG tablet Take 1 tablet (1 mg) by mouth daily     aspirin 81 MG tablet Take 1 tablet by mouth daily.     buPROPion (WELLBUTRIN XL) 150 MG 24 hr tablet Take 1 tablet (150 mg) by mouth every morning     calcium carbonate 600 mg-vitamin D 400 units (CALTRATE) 600-400 MG-UNIT per tablet Take 1 tablet by mouth 2 times daily      citalopram (CELEXA) 20 MG tablet Take 1 tablet (20 mg) by mouth daily     hydrochlorothiazide (HYDRODIURIL) 12.5 MG tablet Take 2 tablets (25 mg) by mouth daily     LORazepam (ATIVAN) 0.5 MG tablet Take 1 tablet (0.5 mg) by mouth every 6 hours as needed for anxiety     LORazepam (ATIVAN) 0.5 MG tablet TK 1 PO 30  "MINUTES PRIOR TO MRI, may repeat times 1     losartan (COZAAR) 100 MG tablet Take 1 tablet (100 mg) by mouth daily     metFORMIN (GLUCOPHAGE) 500 MG tablet Take 1 tablet (500 mg) by mouth 2 times daily (with meals)     omeprazole (PRILOSEC) 20 MG DR capsule Take 1 capsule (20 mg) by mouth daily     simvastatin (ZOCOR) 20 MG tablet Take 1 tablet (20 mg) by mouth At Bedtime     Current Facility-Administered Medications   Medication     lidocaine 1 % injection 4 mL     LORazepam (ATIVAN) tablet 0.5 mg     methylPREDNISolone (DEPO-MEDROL) injection 80 mg        Allergies:     Allergies   Allergen Reactions     Lisinopril Cough     COUGH        Review of Systems:   A comprehensive 10 point review of systems (constitutional, ENT, cardiac, peripheral vascular, lymphatic, respiratory, GI, , Musculoskeletal, skin, Neurological) was performed and found to be negative except as described in this note.      Physical Exam:   Vitals: /82 (BP Location: Right arm, Patient Position: Sitting)   Pulse 105   Ht 1.702 m (5' 7\")   Wt 93.9 kg (207 lb)   SpO2 97%   BMI 32.42 kg/m     HEENT: Neck supple with normal range of motion.   CV: peripheral pulse appreciated  Lungs: breathing comfortably  Extremities: no edema  Skin: No rashes    Neuro:   General Appearance: No apparent distress, well-nourished, well-groomed, pleasant     Mental Status: Alert and oriented to person, place, and time. Speech fluent and comprehension intact. No dysarthria. Normal memory, fund of knowledge, attention/concentration, and language    Cranial Nerves:   II: Visual fields: normal  III: Pupils: 3 mm, equal, round, reactive to light   III,IV,VI: Extraocular Movements: intact   V: Facial sensation: intact to light touch  VII: Facial strength: intact without asymmetry  VIII: Hearing: intact grossly  IX: Palate: intact   XI: Shoulder shrug: intact  XII: Tongue movement: normal     Motor Exam:   Upper Extremities  Deltoid  Bicep  Tricep  Wrist " Extensors  strength Intrinsic Muscles    Right  5  5  5  5 5 5    Left  5  5  5  5 5 5      Lower Extremities  Hip Flexors  Knee Extensors  Knee   Flexors  Dorsi Flexion  Plantar   Flexion    Right  5  5  5  5  5    Left  5  5  5  5  5    Left hip flexion led to some low back pain/ +SLR    No drift is present. Fine tremor only noted when arms fully extended,. Tone is normal throughout.    Sensory: No vibration at toes, limited at ankle, pinprick no up to R mid shin, L knee, no propioception at toes b/l.      Coordination: no dysmetria with finger-to-nose bilaterally, heel-to-shin normal    Reflexes: biceps, triceps, brachioradialis, patellar 0, and ankle jerks 0 and symmetric. Toes are mute    Gait: slow and cautious, wide based, high steppage.  Cannot do tandem without holding on to something.  Romberg with some sway.         Data: Pertinent prior to visit   Imaging:  MRI Lumbar spine 8/11/2021    T12-L1:  Disc height maintained. No herniation. Mild bilateral facet  arthropathy. No foraminal or spinal canal stenosis.      L1-L2:  Mild disc height loss. Disc bulge, asymmetric to the right.  Mild bilateral facet arthropathy. Minimal right foraminal stenosis. No  left foraminal stenosis. No spinal canal stenosis.       L2-L3:  Mild disc height loss. Disc bulge. Mild bilateral facet  arthropathy. Mild bilateral foraminal stenosis. Mild spinal canal  stenosis.       L3-L4:  Moderate to severe asymmetrical disc height loss on the left.  Disc bulge asymmetric to the left with left foraminal protrusion  component. Right foraminal/lateral annular fissure. Moderate bilateral  facet arthropathy. Mild right foraminal stenosis. Moderate to severe  left foraminal stenosis. Moderate to severe spinal canal stenosis.       L4-L5:  Mild disc height loss. Disc bulge, asymmetric to the right  with right foraminal and lateral protrusion with annular fissure.  Moderate bilateral facet arthropathy. Moderate stenosis of the  medial  right foramen. No left foraminal stenosis. Mild spinal canal stenosis.     L5-S1:  Disc height maintained. No herniation. Severe bilateral facet  arthropathy. No foraminal or spinal canal stenosis.                                                                      IMPRESSION:    1. Degenerative change as detailed, worst at L3-4, left worse than  right.  2. Marrow signal changes surrounding the L3-4 disc joint are  presumably degenerative. Infection cannot be completely excluded.  3. Other degenerative change as detailed    I personally reviewed the above imaging and agree with the findings in the report.      Notable for disk bulge at L4-5 to the right without severe neuronal foraminal narrowing that can be seen currently. L3-L4 disk bulge with moderate to severe  left foraminal stenosis. moderate to severe spinal canal stenosis.          Procedures:  None      Laboratory:  Last Comprehensive Metabolic Panel:  Sodium   Date Value Ref Range Status   06/28/2021 133 133 - 144 mmol/L Final     Potassium   Date Value Ref Range Status   06/28/2021 4.2 3.4 - 5.3 mmol/L Final     Chloride   Date Value Ref Range Status   06/28/2021 99 94 - 109 mmol/L Final     Carbon Dioxide   Date Value Ref Range Status   06/28/2021 29 20 - 32 mmol/L Final     Anion Gap   Date Value Ref Range Status   06/28/2021 5 3 - 14 mmol/L Final     Glucose   Date Value Ref Range Status   06/28/2021 127 (H) 70 - 99 mg/dL Final     Urea Nitrogen   Date Value Ref Range Status   06/28/2021 11 7 - 30 mg/dL Final     Creatinine   Date Value Ref Range Status   06/28/2021 0.62 0.52 - 1.04 mg/dL Final     GFR Estimate   Date Value Ref Range Status   06/28/2021 90 >60 mL/min/[1.73_m2] Final     Comment:     Non  GFR Calc  Starting 12/18/2018, serum creatinine based estimated GFR (eGFR) will be   calculated using the Chronic Kidney Disease Epidemiology Collaboration   (CKD-EPI) equation.       Calcium   Date Value Ref Range Status    06/28/2021 9.2 8.5 - 10.1 mg/dL Final     Lab Results   Component Value Date    WBC 5.3 10/30/2018     Lab Results   Component Value Date    RBC 4.83 10/30/2018     Lab Results   Component Value Date    HGB 12.9 12/16/2019     Lab Results   Component Value Date    HCT 33.5 10/30/2018     No components found for: MCT  Lab Results   Component Value Date    MCV 69 10/30/2018     Lab Results   Component Value Date    MCH 19.7 10/30/2018     Lab Results   Component Value Date    MCHC 28.4 10/30/2018     Lab Results   Component Value Date    RDW 20.5 10/30/2018     Lab Results   Component Value Date     12/16/2019     Lab Results   Component Value Date    A1C 5.7 07/28/2021    A1C 5.3 02/11/2021    A1C 5.7 06/19/2020    A1C 5.7 10/03/2019    A1C 5.7 05/02/2019    A1C 6.1 09/13/2018   Vitamin B12 378 (2018)   TSH   Date Value Ref Range Status   10/03/2019 0.82 0.40 - 4.00 mU/L Final              Assessment and Plan:   Assessment:  Diane Gaitan is a 71 year old female who presents today for evaluation of lower extremity weakness  PMH is notable for obesity, low back pain.  Depression, T2DM with documented hx of neuropathy 2/2 to this, ER+ breast cancer still on hormone therapy (is to take this x 5 years total),  Her weakness is improved.  She feels at her baseline in this regard  I do not detect any weakness today on my exam. Her gait is somewhat limited by inability to feel her feet, and some degree of axial imbalance.  She movements are mildly limited by low back pain, worse on the left.    Of note, her Anastrazole is not well described to be linked to peripheral neuropathy.      Plan:  #Length dependent polyneuropathy  #Weakness-improved  #Low back pain likely secondary to lumbar disk disease  #Enhanced physiological tremor.  #Sensory ataxia    --EMG was discussed as an option to help parse out what might be secondary to neuropathy vs a radiculopathy and if is secondary to a radiculopathy but given that her  symptoms of weakness are improving and her sensory symptoms distally have been present for many years we decided to opt against this at this time. If things worsen she will reach out and we can consider pursuing this.   --New vitamin B12 level, SPEP/IFIX get with your next round of labs given AAN guidelines for your length dependent peripheral neuropathy.  The neuropathy is likely secondary to long standing diabetes mellitus but will make sure there is no other obvious cause/something we can improve upon.    --Low back pain likely secondary to MRI findings as above, best course of action: Keep going to physical therapy, and/or doing home exercises to help with low back pain, balance.  --Follow up in 6 months, call or mychart with questions           Sam Mesa MD   of Neurology   [facility]      The total time of this encounter today amounted to 50 minutes. This time included time spent with the patient, prep work, ordering tests, and performing post visit documentation.        Again, thank you for allowing me to participate in the care of your patient.        Sincerely,        Floyd Mesa MD

## 2021-08-31 NOTE — PATIENT INSTRUCTIONS
New blood work for whenever you would get blood work otherwise  PT: continue going or doing home exercises as you see fit.    I think the gait is a combo of low back pain, you being a wee bit nervous and the lack sensation in your feet.   EMG/NCS is a test we would consider if your weakness is worsening but it is currently improving with physical therapy.    Your very fine tremor looks like an enhanced physiological tremor to me.  Nothing to worry about.   --Follow up in 6 months, call or mychart with questions

## 2021-09-05 ENCOUNTER — HEALTH MAINTENANCE LETTER (OUTPATIENT)
Age: 71
End: 2021-09-05

## 2021-09-14 ENCOUNTER — VIRTUAL VISIT (OUTPATIENT)
Dept: ONCOLOGY | Facility: CLINIC | Age: 71
End: 2021-09-14
Attending: INTERNAL MEDICINE
Payer: MEDICARE

## 2021-09-14 DIAGNOSIS — Z12.31 ENCOUNTER FOR SCREENING MAMMOGRAM FOR MALIGNANT NEOPLASM OF BREAST: ICD-10-CM

## 2021-09-14 DIAGNOSIS — Z17.0 MALIGNANT NEOPLASM OF UPPER-INNER QUADRANT OF LEFT BREAST IN FEMALE, ESTROGEN RECEPTOR POSITIVE (H): Primary | ICD-10-CM

## 2021-09-14 DIAGNOSIS — C50.312 MALIGNANT NEOPLASM OF LOWER-INNER QUADRANT OF LEFT BREAST IN FEMALE, ESTROGEN RECEPTOR POSITIVE (H): ICD-10-CM

## 2021-09-14 DIAGNOSIS — Z17.0 MALIGNANT NEOPLASM OF LOWER-INNER QUADRANT OF LEFT BREAST IN FEMALE, ESTROGEN RECEPTOR POSITIVE (H): ICD-10-CM

## 2021-09-14 DIAGNOSIS — C50.212 MALIGNANT NEOPLASM OF UPPER-INNER QUADRANT OF LEFT BREAST IN FEMALE, ESTROGEN RECEPTOR POSITIVE (H): Primary | ICD-10-CM

## 2021-09-14 PROCEDURE — 99441 PR PHYSICIAN TELEPHONE EVALUATION 5-10 MIN: CPT | Mod: 95 | Performed by: INTERNAL MEDICINE

## 2021-09-14 NOTE — LETTER
9/14/2021         RE: Diane Gaitan  [address]  [facility] [address]        Dear Colleague,    Thank you for referring your patient, Diane Gaitan, to the [facility]. Please see a copy of my visit note below.    Diane is a 71 year old who is being evaluated via a billable telephone visit.      What phone number would you like to be contacted at? [phone]    Medications updated  No pain - just fatigued    How would you like to obtain your AVS? MyChart     Phone call duration: 5 minutes    [facility]    Hematology/Oncology Established Patient Note Telephone visit       Today's Date: 9/15/2021  Reason for follow-up: Left breast cancer.    HISTORY OF PRESENT ILLNESS: Diane Gaitan is a 71 year old female who presents with the following oncologic history:  1.  11/5/2019: Mammogram showed focal asymmetry in the lateral left breast at the 2-4 o'clock position.  2.  11/12/2019: Targeted left breast ultrasound showed an irregular hypoechoic mass at the 2 o'clock position, 5 cm from the nipple measuring 1.7 x 1.2 x 1.4 cm.  Survey of the axilla showed no evidence of adenopathy.  3.  11/15/2019: Ultrasound-guided left breast needle biopsy at 2:00, 5 cm from the nipple showed a grade 2 invasive mammary carcinoma ER strongly positive at greater than 95%, IN, moderate to strongly positive at 80%, HER-2/guadalupe FISH negative.  4.  12/04/2019: Bilateral breast MRI showed left upper outer quadrant biopsy invasive mammary carcinoma measuring up to 1.7 cm and no other concerning areas of enhancement or lymphadenopathy.  5.  12/19/2019: Underwent left breast lumpectomy with left axillary sentinel lymph node biopsy under the care of Dr. Chidi Malik.  Pathology showed a grade 3 invasive pleomorphic lobular carcinoma measuring 1.5 cm, lymphovascular invasion not identified, margins negative for invasive carcinoma.  A total of 3 left axillary sentinel lymph nodes  negative for malignancy.  Oncotype DX score = 8, corresponding to a distant recurrence risk at 9 years of 3% after 5 years of hormone blockade therapy and a less than 1% absolute chemotherapy benefit.  6.  2/25/2020: Completed adjuvant radiation therapy to the left breast.  7. 3/04/2020: Started adjuvant anastrazole.    INTERIM HISTORY:  Diane is doing well. She does not have any complaints, tolerating anastrozole well. She is due for a mammogram in November       REVIEW OF SYSTEMS:   14 point ROS was reviewed and is negative other than as noted above in HPI.       HOME MEDICATIONS:  Current Outpatient Medications   Medication Sig Dispense Refill     amLODIPine (NORVASC) 5 MG tablet Take 1 tablet (5 mg) by mouth daily 90 tablet 3     anastrozole (ARIMIDEX) 1 MG tablet Take 1 tablet (1 mg) by mouth daily 90 tablet 3     aspirin 81 MG tablet Take 1 tablet by mouth daily. 90 tablet 3     buPROPion (WELLBUTRIN XL) 150 MG 24 hr tablet Take 1 tablet (150 mg) by mouth every morning 90 tablet 3     calcium carbonate 600 mg-vitamin D 400 units (CALTRATE) 600-400 MG-UNIT per tablet Take 1 tablet by mouth 2 times daily        citalopram (CELEXA) 20 MG tablet Take 1 tablet (20 mg) by mouth daily 90 tablet 3     hydrochlorothiazide (HYDRODIURIL) 12.5 MG tablet Take 2 tablets (25 mg) by mouth daily 180 tablet 1     LORazepam (ATIVAN) 0.5 MG tablet Take 1 tablet (0.5 mg) by mouth every 6 hours as needed for anxiety 2 tablet 0     LORazepam (ATIVAN) 0.5 MG tablet TK 1 PO 30 MINUTES PRIOR TO MRI, may repeat times 1 2 tablet 0     losartan (COZAAR) 100 MG tablet Take 1 tablet (100 mg) by mouth daily 90 tablet 3     metFORMIN (GLUCOPHAGE) 500 MG tablet Take 1 tablet (500 mg) by mouth 2 times daily (with meals) 180 tablet 3     omeprazole (PRILOSEC) 20 MG DR capsule Take 1 capsule (20 mg) by mouth daily 90 capsule 0     simvastatin (ZOCOR) 20 MG tablet Take 1 tablet (20 mg) by mouth At Bedtime 90 tablet 0         ALLERGIES:  Allergies    Allergen Reactions     Lisinopril Cough     COUGH         PAST MEDICAL HISTORY:  Past Medical History:   Diagnosis Date     Arthritis      BENIGN HYPERTENSION 8/28/2002     Cancer (H)     Basal cell carcinoma     CARDIOVASCULAR SCREENING; LDL GOAL LESS THAN 100 10/31/2010     Depressive disorder 1997     Esophageal reflux      Mild major depression (H) 9/14/2010     Other malaise and fatigue 8/28/2002     Type 2 diabetes, HbA1c goal < 7% (H) 10/31/2010   Possible transient ischemic attack.      PAST SURGICAL HISTORY:  Past Surgical History:   Procedure Laterality Date     ARTHROPLASTY KNEE  10/18/2012    Procedure: ARTHROPLASTY KNEE;  RIGHT TOTAL KNEE ARTHROPLASTY (SMITH & NEPHEW)^ ;  Surgeon: Howard Charles MD;  Location:  OR     BIOPSY NODE SENTINEL Left 12/19/2019    Procedure: LEFT SENTINEL LYMPH NODE BIOPSY;  Surgeon: Ruddy Malik MD;  Location:  OR     BREAST BIOPSY, RT/LT  1988    breast biopsy     COLONOSCOPY N/A 7/14/2016    Procedure: COLONOSCOPY;  Surgeon: Curt Patel MD;  Location:  GI     ESOPHAGOSCOPY, GASTROSCOPY, DUODENOSCOPY (EGD), COMBINED N/A 9/13/2018    Procedure: COMBINED ESOPHAGOSCOPY, GASTROSCOPY, DUODENOSCOPY (EGD);  gastroscopy;  Surgeon: Mac White MD;  Location:  GI     HYSTERECTOMY, PAP NO LONGER INDICATED  1986    abdominal hysterectomy     LUMPECTOMY BREAST WITH SEED LOCALIZATION Left 12/19/2019    Procedure: SEED LOCALIZED LEFT BREAST LUMPECTOMY;  Surgeon: Ruddy Malik MD;  Location:  OR     ROTATOR CUFF REPAIR RT/LT Right      TUBAL LIGATION      at age 29     Z NONSPECIFIC PROCEDURE  10/30/96    skin tags removed     [facility] NONSPECIFIC PROCEDURE      colonic polyps   Ovaries intact.      SOCIAL HISTORY:  Social History     Socioeconomic History     Marital status:      Spouse name: Not on file     Number of children: Not on file     Years of education: Not on file     Highest education level: Not on file    Occupational History     Not on file   Tobacco Use     Smoking status: Former Smoker     Packs/day: 1.00     Years: 12.00     Pack years: 12.00     Quit date: 10/18/1980     Years since quittin.9     Smokeless tobacco: Never Used   Substance and Sexual Activity     Alcohol use: Yes     Comment: 12 Beers per week     Drug use: No     Sexual activity: Never     Partners: Male   Other Topics Concern     Parent/sibling w/ CABG, MI or angioplasty before 65F 55M? Not Asked   Social History Narrative     Not on file     Social Determinants of Health     Financial Resource Strain:      Difficulty of Paying Living Expenses:    Food Insecurity:      Worried About Running Out of Food in the Last Year:      Ran Out of Food in the Last Year:    Transportation Needs:      Lack of Transportation (Medical):      Lack of Transportation (Non-Medical):    Physical Activity:      Days of Exercise per Week:      Minutes of Exercise per Session:    Stress:      Feeling of Stress :    Social Connections:      Frequency of Communication with Friends and Family:      Frequency of Social Gatherings with Friends and Family:      Attends Faith Services:      Active Member of Clubs or Organizations:      Attends Club or Organization Meetings:      Marital Status:    Intimate Partner Violence:      Fear of Current or Ex-Partner:      Emotionally Abused:      Physically Abused:      Sexually Abused:          FAMILY HISTORY:  Family History   Problem Relation Age of Onset     Breast Cancer Sister      Hypertension Daughter      Breast Cancer Daughter 43     Skin Cancer Mother      Coronary Artery Disease Father      Colon Cancer Sister      Breast Cancer Maternal Aunt         Many Maternal Aunts have had breast Cancer         PHYSICAL EXAM:  Vital signs:  There were no vitals taken for this visit. GENERAL/CONSTITUTIONAL: No acute distress.  Unable to examine as this visit is over the phone.       LABS:  CBC RESULTS:   Recent Labs   Lab  Test 12/16/19  0837  10/30/18  0950   WBC  --   --  5.3   RBC  --   --  4.83   HGB 12.9   < > 9.5*   HCT  --   --  33.5*   MCV  --   --  69*   MCH  --   --  19.7*   MCHC  --   --  28.4*   RDW  --   --  20.5*      < > 280    < > = values in this interval not displayed.     Last Comprehensive Metabolic Panel:  Sodium   Date Value Ref Range Status   06/28/2021 133 133 - 144 mmol/L Final     Potassium   Date Value Ref Range Status   06/28/2021 4.2 3.4 - 5.3 mmol/L Final     Chloride   Date Value Ref Range Status   06/28/2021 99 94 - 109 mmol/L Final     Carbon Dioxide   Date Value Ref Range Status   06/28/2021 29 20 - 32 mmol/L Final     Anion Gap   Date Value Ref Range Status   06/28/2021 5 3 - 14 mmol/L Final     Glucose   Date Value Ref Range Status   06/28/2021 127 (H) 70 - 99 mg/dL Final     Urea Nitrogen   Date Value Ref Range Status   06/28/2021 11 7 - 30 mg/dL Final     Creatinine   Date Value Ref Range Status   06/28/2021 0.62 0.52 - 1.04 mg/dL Final     GFR Estimate   Date Value Ref Range Status   06/28/2021 90 >60 mL/min/[1.73_m2] Final     Comment:     Non  GFR Calc  Starting 12/18/2018, serum creatinine based estimated GFR (eGFR) will be   calculated using the Chronic Kidney Disease Epidemiology Collaboration   (CKD-EPI) equation.       Calcium   Date Value Ref Range Status   06/28/2021 9.2 8.5 - 10.1 mg/dL Final     Bilirubin Total   Date Value Ref Range Status   02/11/2021 1.0 0.2 - 1.3 mg/dL Final     Alkaline Phosphatase   Date Value Ref Range Status   02/11/2021 103 40 - 150 U/L Final     ALT   Date Value Ref Range Status   02/11/2021 29 0 - 50 U/L Final     AST   Date Value Ref Range Status   02/11/2021 20 0 - 45 U/L Final       PATHOLOGY:  None new.    IMAGING:  3/2/2020: DEXA scan -- normal bone mineral density.    11/6/2020: Mammogram showed no suspicious findings.    ASSESSMENT/PLAN:  Diane Gaitan is a 71 year old female with the following issues:  1.  Stage IA,  [id number], grade 3 invasive pleomorphic lobular carcinoma of the left upper outer breast, ER strongly positive, IA positive, HER-2/guadalupe FISH negative, Oncotype DX = 8  -tolerating anastrazole well.   -Note she is not a great candidate for tamoxifen given her history of a possible TIA.   -Her baseline DEXA scan from 3/2/2020 showed normal bone mineral density.  I recommended adequate calcium and vitamin D as well as weightbearing exercise if tolerated. She does use a cane and is careful about her movement and exercise.  -Plan to repeat DEXA scan in 2022.  -Continue annual mammograms, next due on or after 11/6/2021.    2.  Diabetes mellitus type 2  -Stable.  -She will continue on metformin.    3.  Strong family history of breast cancer  -Her daughter had already undergone a genetics consult with negative genetic testing.  The  had informed her daughter that none of her other family members would require genetic testing.    4. Essential hypertension  -Blood pressure high today.  -Continue on losartan, hydrochlorothiazide, and amlodipine. Diane does monitor her BP at home with her cuff. Advised follow-up with her primary care team.    Return to Dr Kaye in 6 months, will order her mammogram for November 2021.    Hiram Velarde MD  .      Again, thank you for allowing me to participate in the care of your patient.        Sincerely,        Hiram Velarde MD

## 2021-09-14 NOTE — PROGRESS NOTES
Diane is a 71 year old who is being evaluated via a billable telephone visit.      What phone number would you like to be contacted at? [phone]    Medications updated  No pain - just fatigued    How would you like to obtain your AVS? Isamar     Phone call duration: 5 minutes

## 2021-09-14 NOTE — LETTER
9/14/2021         RE: Diane Gaitan  [address]  [facility] [address]        Dear Colleague,    Thank you for referring your patient, Diane Gaitan, to the [facility]. Please see a copy of my visit note below.    Diane is a 71 year old who is being evaluated via a billable telephone visit.      What phone number would you like to be contacted at? [phone]    Medications updated  No pain - just fatigued    How would you like to obtain your AVS? MyChart     Phone call duration: 5 minutes    [facility]    Hematology/Oncology Established Patient Note Telephone visit       Today's Date: 9/15/2021  Reason for follow-up: Left breast cancer.    HISTORY OF PRESENT ILLNESS: Diane Gaitan is a 71 year old female who presents with the following oncologic history:  1.  11/5/2019: Mammogram showed focal asymmetry in the lateral left breast at the 2-4 o'clock position.  2.  11/12/2019: Targeted left breast ultrasound showed an irregular hypoechoic mass at the 2 o'clock position, 5 cm from the nipple measuring 1.7 x 1.2 x 1.4 cm.  Survey of the axilla showed no evidence of adenopathy.  3.  11/15/2019: Ultrasound-guided left breast needle biopsy at 2:00, 5 cm from the nipple showed a grade 2 invasive mammary carcinoma ER strongly positive at greater than 95%, MT, moderate to strongly positive at 80%, HER-2/guadalupe FISH negative.  4.  12/04/2019: Bilateral breast MRI showed left upper outer quadrant biopsy invasive mammary carcinoma measuring up to 1.7 cm and no other concerning areas of enhancement or lymphadenopathy.  5.  12/19/2019: Underwent left breast lumpectomy with left axillary sentinel lymph node biopsy under the care of Dr. Chidi Malik.  Pathology showed a grade 3 invasive pleomorphic lobular carcinoma measuring 1.5 cm, lymphovascular invasion not identified, margins negative for invasive carcinoma.  A total of 3 left axillary sentinel lymph nodes  negative for malignancy.  Oncotype DX score = 8, corresponding to a distant recurrence risk at 9 years of 3% after 5 years of hormone blockade therapy and a less than 1% absolute chemotherapy benefit.  6.  2/25/2020: Completed adjuvant radiation therapy to the left breast.  7. 3/04/2020: Started adjuvant anastrazole.    INTERIM HISTORY:  Diane is doing well. She does not have any complaints, tolerating anastrozole well. She is due for a mammogram in November       REVIEW OF SYSTEMS:   14 point ROS was reviewed and is negative other than as noted above in HPI.       HOME MEDICATIONS:  Current Outpatient Medications   Medication Sig Dispense Refill     amLODIPine (NORVASC) 5 MG tablet Take 1 tablet (5 mg) by mouth daily 90 tablet 3     anastrozole (ARIMIDEX) 1 MG tablet Take 1 tablet (1 mg) by mouth daily 90 tablet 3     aspirin 81 MG tablet Take 1 tablet by mouth daily. 90 tablet 3     buPROPion (WELLBUTRIN XL) 150 MG 24 hr tablet Take 1 tablet (150 mg) by mouth every morning 90 tablet 3     calcium carbonate 600 mg-vitamin D 400 units (CALTRATE) 600-400 MG-UNIT per tablet Take 1 tablet by mouth 2 times daily        citalopram (CELEXA) 20 MG tablet Take 1 tablet (20 mg) by mouth daily 90 tablet 3     hydrochlorothiazide (HYDRODIURIL) 12.5 MG tablet Take 2 tablets (25 mg) by mouth daily 180 tablet 1     LORazepam (ATIVAN) 0.5 MG tablet Take 1 tablet (0.5 mg) by mouth every 6 hours as needed for anxiety 2 tablet 0     LORazepam (ATIVAN) 0.5 MG tablet TK 1 PO 30 MINUTES PRIOR TO MRI, may repeat times 1 2 tablet 0     losartan (COZAAR) 100 MG tablet Take 1 tablet (100 mg) by mouth daily 90 tablet 3     metFORMIN (GLUCOPHAGE) 500 MG tablet Take 1 tablet (500 mg) by mouth 2 times daily (with meals) 180 tablet 3     omeprazole (PRILOSEC) 20 MG DR capsule Take 1 capsule (20 mg) by mouth daily 90 capsule 0     simvastatin (ZOCOR) 20 MG tablet Take 1 tablet (20 mg) by mouth At Bedtime 90 tablet 0         ALLERGIES:  Allergies    Allergen Reactions     Lisinopril Cough     COUGH         PAST MEDICAL HISTORY:  Past Medical History:   Diagnosis Date     Arthritis      BENIGN HYPERTENSION 8/28/2002     Cancer (H)     Basal cell carcinoma     CARDIOVASCULAR SCREENING; LDL GOAL LESS THAN 100 10/31/2010     Depressive disorder 1997     Esophageal reflux      Mild major depression (H) 9/14/2010     Other malaise and fatigue 8/28/2002     Type 2 diabetes, HbA1c goal < 7% (H) 10/31/2010   Possible transient ischemic attack.      PAST SURGICAL HISTORY:  Past Surgical History:   Procedure Laterality Date     ARTHROPLASTY KNEE  10/18/2012    Procedure: ARTHROPLASTY KNEE;  RIGHT TOTAL KNEE ARTHROPLASTY (SMITH & NEPHEW)^ ;  Surgeon: Howard Charles MD;  Location:  OR     BIOPSY NODE SENTINEL Left 12/19/2019    Procedure: LEFT SENTINEL LYMPH NODE BIOPSY;  Surgeon: Ruddy Malik MD;  Location:  OR     BREAST BIOPSY, RT/LT  1988    breast biopsy     COLONOSCOPY N/A 7/14/2016    Procedure: COLONOSCOPY;  Surgeon: Curt Patel MD;  Location:  GI     ESOPHAGOSCOPY, GASTROSCOPY, DUODENOSCOPY (EGD), COMBINED N/A 9/13/2018    Procedure: COMBINED ESOPHAGOSCOPY, GASTROSCOPY, DUODENOSCOPY (EGD);  gastroscopy;  Surgeon: Mac White MD;  Location:  GI     HYSTERECTOMY, PAP NO LONGER INDICATED  1986    abdominal hysterectomy     LUMPECTOMY BREAST WITH SEED LOCALIZATION Left 12/19/2019    Procedure: SEED LOCALIZED LEFT BREAST LUMPECTOMY;  Surgeon: Ruddy Malik MD;  Location:  OR     ROTATOR CUFF REPAIR RT/LT Right      TUBAL LIGATION      at age 29     Z NONSPECIFIC PROCEDURE  10/30/96    skin tags removed     [facility] NONSPECIFIC PROCEDURE      colonic polyps   Ovaries intact.      SOCIAL HISTORY:  Social History     Socioeconomic History     Marital status:      Spouse name: Not on file     Number of children: Not on file     Years of education: Not on file     Highest education level: Not on file    Occupational History     Not on file   Tobacco Use     Smoking status: Former Smoker     Packs/day: 1.00     Years: 12.00     Pack years: 12.00     Quit date: 10/18/1980     Years since quittin.9     Smokeless tobacco: Never Used   Substance and Sexual Activity     Alcohol use: Yes     Comment: 12 Beers per week     Drug use: No     Sexual activity: Never     Partners: Male   Other Topics Concern     Parent/sibling w/ CABG, MI or angioplasty before 65F 55M? Not Asked   Social History Narrative     Not on file     Social Determinants of Health     Financial Resource Strain:      Difficulty of Paying Living Expenses:    Food Insecurity:      Worried About Running Out of Food in the Last Year:      Ran Out of Food in the Last Year:    Transportation Needs:      Lack of Transportation (Medical):      Lack of Transportation (Non-Medical):    Physical Activity:      Days of Exercise per Week:      Minutes of Exercise per Session:    Stress:      Feeling of Stress :    Social Connections:      Frequency of Communication with Friends and Family:      Frequency of Social Gatherings with Friends and Family:      Attends [employer] Services:      Active Member of Clubs or Organizations:      Attends Club or Organization Meetings:      Marital Status:    Intimate Partner Violence:      Fear of Current or Ex-Partner:      Emotionally Abused:      Physically Abused:      Sexually Abused:          FAMILY HISTORY:  Family History   Problem Relation Age of Onset     Breast Cancer Sister      Hypertension Daughter      Breast Cancer Daughter 43     Skin Cancer Mother      Coronary Artery Disease Father      Colon Cancer Sister      Breast Cancer Maternal Aunt         Many Maternal Aunts have had breast Cancer         PHYSICAL EXAM:  Vital signs:  There were no vitals taken for this visit. GENERAL/CONSTITUTIONAL: No acute distress.  Unable to examine as this visit is over the phone.       LABS:  CBC RESULTS:   Recent Labs   Lab  Test 12/16/19  0837  10/30/18  0950   WBC  --   --  5.3   RBC  --   --  4.83   HGB 12.9   < > 9.5*   HCT  --   --  33.5*   MCV  --   --  69*   MCH  --   --  19.7*   MCHC  --   --  28.4*   RDW  --   --  20.5*      < > 280    < > = values in this interval not displayed.     Last Comprehensive Metabolic Panel:  Sodium   Date Value Ref Range Status   06/28/2021 133 133 - 144 mmol/L Final     Potassium   Date Value Ref Range Status   06/28/2021 4.2 3.4 - 5.3 mmol/L Final     Chloride   Date Value Ref Range Status   06/28/2021 99 94 - 109 mmol/L Final     Carbon Dioxide   Date Value Ref Range Status   06/28/2021 29 20 - 32 mmol/L Final     Anion Gap   Date Value Ref Range Status   06/28/2021 5 3 - 14 mmol/L Final     Glucose   Date Value Ref Range Status   06/28/2021 127 (H) 70 - 99 mg/dL Final     Urea Nitrogen   Date Value Ref Range Status   06/28/2021 11 7 - 30 mg/dL Final     Creatinine   Date Value Ref Range Status   06/28/2021 0.62 0.52 - 1.04 mg/dL Final     GFR Estimate   Date Value Ref Range Status   06/28/2021 90 >60 mL/min/[1.73_m2] Final     Comment:     Non  GFR Calc  Starting 12/18/2018, serum creatinine based estimated GFR (eGFR) will be   calculated using the Chronic Kidney Disease Epidemiology Collaboration   (CKD-EPI) equation.       Calcium   Date Value Ref Range Status   06/28/2021 9.2 8.5 - 10.1 mg/dL Final     Bilirubin Total   Date Value Ref Range Status   02/11/2021 1.0 0.2 - 1.3 mg/dL Final     Alkaline Phosphatase   Date Value Ref Range Status   02/11/2021 103 40 - 150 U/L Final     ALT   Date Value Ref Range Status   02/11/2021 29 0 - 50 U/L Final     AST   Date Value Ref Range Status   02/11/2021 20 0 - 45 U/L Final       PATHOLOGY:  None new.    IMAGING:  3/2/2020: DEXA scan -- normal bone mineral density.    11/6/2020: Mammogram showed no suspicious findings.    ASSESSMENT/PLAN:  Diane Gaitan is a 71 year old female with the following issues:  1.  Stage IA,  [id number], grade 3 invasive pleomorphic lobular carcinoma of the left upper outer breast, ER strongly positive, AK positive, HER-2/guadalupe FISH negative, Oncotype DX = 8  -tolerating anastrazole well.   -Note she is not a great candidate for tamoxifen given her history of a possible TIA.   -Her baseline DEXA scan from 3/2/2020 showed normal bone mineral density.  I recommended adequate calcium and vitamin D as well as weightbearing exercise if tolerated. She does use a cane and is careful about her movement and exercise.  -Plan to repeat DEXA scan in 2022.  -Continue annual mammograms, next due on or after 11/6/2021.    2.  Diabetes mellitus type 2  -Stable.  -She will continue on metformin.    3.  Strong family history of breast cancer  -Her daughter had already undergone a genetics consult with negative genetic testing.  The  had informed her daughter that none of her other family members would require genetic testing.    4. Essential hypertension  -Blood pressure high today.  -Continue on losartan, hydrochlorothiazide, and amlodipine. Diane does monitor her BP at home with her cuff. Advised follow-up with her primary care team.    Return to Dr Kaye in 6 months, will order her mammogram for November 2021.    Hiram Velarde MD  .      Again, thank you for allowing me to participate in the care of your patient.        Sincerely,        Hiram Velarde MD

## 2021-09-15 NOTE — PROGRESS NOTES
[facility]    Hematology/Oncology Established Patient Note Telephone visit       Today's Date: 9/15/2021  Reason for follow-up: Left breast cancer.    HISTORY OF PRESENT ILLNESS: Diane Gaitan is a 71 year old female who presents with the following oncologic history:  1.  11/5/2019: Mammogram showed focal asymmetry in the lateral left breast at the 2-4 o'clock position.  2.  11/12/2019: Targeted left breast ultrasound showed an irregular hypoechoic mass at the 2 o'clock position, 5 cm from the nipple measuring 1.7 x 1.2 x 1.4 cm.  Survey of the axilla showed no evidence of adenopathy.  3.  11/15/2019: Ultrasound-guided left breast needle biopsy at 2:00, 5 cm from the nipple showed a grade 2 invasive mammary carcinoma ER strongly positive at greater than 95%, TN, moderate to strongly positive at 80%, HER-2/guadalupe FISH negative.  4.  12/04/2019: Bilateral breast MRI showed left upper outer quadrant biopsy invasive mammary carcinoma measuring up to 1.7 cm and no other concerning areas of enhancement or lymphadenopathy.  5.  12/19/2019: Underwent left breast lumpectomy with left axillary sentinel lymph node biopsy under the care of Dr. Chidi Malik.  Pathology showed a grade 3 invasive pleomorphic lobular carcinoma measuring 1.5 cm, lymphovascular invasion not identified, margins negative for invasive carcinoma.  A total of 3 left axillary sentinel lymph nodes negative for malignancy.  Oncotype DX score = 8, corresponding to a distant recurrence risk at 9 years of 3% after 5 years of hormone blockade therapy and a less than 1% absolute chemotherapy benefit.  6.  2/25/2020: Completed adjuvant radiation therapy to the left breast.  7. 3/04/2020: Started adjuvant anastrazole.    INTERIM HISTORY:  Diane is doing well. She does not have any complaints, tolerating anastrozole well. She is due for a mammogram in November       REVIEW OF SYSTEMS:   14 point ROS was reviewed and is negative other than as noted  above in HPI.       HOME MEDICATIONS:  Current Outpatient Medications   Medication Sig Dispense Refill     amLODIPine (NORVASC) 5 MG tablet Take 1 tablet (5 mg) by mouth daily 90 tablet 3     anastrozole (ARIMIDEX) 1 MG tablet Take 1 tablet (1 mg) by mouth daily 90 tablet 3     aspirin 81 MG tablet Take 1 tablet by mouth daily. 90 tablet 3     buPROPion (WELLBUTRIN XL) 150 MG 24 hr tablet Take 1 tablet (150 mg) by mouth every morning 90 tablet 3     calcium carbonate 600 mg-vitamin D 400 units (CALTRATE) 600-400 MG-UNIT per tablet Take 1 tablet by mouth 2 times daily        citalopram (CELEXA) 20 MG tablet Take 1 tablet (20 mg) by mouth daily 90 tablet 3     hydrochlorothiazide (HYDRODIURIL) 12.5 MG tablet Take 2 tablets (25 mg) by mouth daily 180 tablet 1     LORazepam (ATIVAN) 0.5 MG tablet Take 1 tablet (0.5 mg) by mouth every 6 hours as needed for anxiety 2 tablet 0     LORazepam (ATIVAN) 0.5 MG tablet TK 1 PO 30 MINUTES PRIOR TO MRI, may repeat times 1 2 tablet 0     losartan (COZAAR) 100 MG tablet Take 1 tablet (100 mg) by mouth daily 90 tablet 3     metFORMIN (GLUCOPHAGE) 500 MG tablet Take 1 tablet (500 mg) by mouth 2 times daily (with meals) 180 tablet 3     omeprazole (PRILOSEC) 20 MG DR capsule Take 1 capsule (20 mg) by mouth daily 90 capsule 0     simvastatin (ZOCOR) 20 MG tablet Take 1 tablet (20 mg) by mouth At Bedtime 90 tablet 0         ALLERGIES:  Allergies   Allergen Reactions     Lisinopril Cough     COUGH         PAST MEDICAL HISTORY:  Past Medical History:   Diagnosis Date     Arthritis      BENIGN HYPERTENSION 8/28/2002     Cancer (H)     Basal cell carcinoma     CARDIOVASCULAR SCREENING; LDL GOAL LESS THAN 100 10/31/2010     Depressive disorder 1997     Esophageal reflux      Mild major depression (H) 9/14/2010     Other malaise and fatigue 8/28/2002     Type 2 diabetes, HbA1c goal < 7% (H) 10/31/2010   Possible transient ischemic attack.      PAST SURGICAL HISTORY:  Past Surgical History:    Procedure Laterality Date     ARTHROPLASTY KNEE  10/18/2012    Procedure: ARTHROPLASTY KNEE;  RIGHT TOTAL KNEE ARTHROPLASTY (SMITH & NEPHEW)^ ;  Surgeon: Howard Charles MD;  Location:  OR     BIOPSY NODE SENTINEL Left 2019    Procedure: LEFT SENTINEL LYMPH NODE BIOPSY;  Surgeon: Ruddy Malik MD;  Location:  OR     BREAST BIOPSY, RT/LT  1988    breast biopsy     COLONOSCOPY N/A 2016    Procedure: COLONOSCOPY;  Surgeon: Curt Patel MD;  Location:  GI     ESOPHAGOSCOPY, GASTROSCOPY, DUODENOSCOPY (EGD), COMBINED N/A 2018    Procedure: COMBINED ESOPHAGOSCOPY, GASTROSCOPY, DUODENOSCOPY (EGD);  gastroscopy;  Surgeon: Mac White MD;  Location:  GI     HYSTERECTOMY, PAP NO LONGER INDICATED      abdominal hysterectomy     LUMPECTOMY BREAST WITH SEED LOCALIZATION Left 2019    Procedure: SEED LOCALIZED LEFT BREAST LUMPECTOMY;  Surgeon: Ruddy Malik MD;  Location:  OR     ROTATOR CUFF REPAIR RT/LT Right      TUBAL LIGATION      at age 29     [facility] NONSPECIFIC PROCEDURE  10/30/96    skin tags removed     [facility] NONSPECIFIC PROCEDURE      colonic polyps   Ovaries intact.      SOCIAL HISTORY:  Social History     Socioeconomic History     Marital status:      Spouse name: Not on file     Number of children: Not on file     Years of education: Not on file     Highest education level: Not on file   Occupational History     Not on file   Tobacco Use     Smoking status: Former Smoker     Packs/day: 1.00     Years: 12.00     Pack years: 12.00     Quit date: 10/18/1980     Years since quittin.9     Smokeless tobacco: Never Used   Substance and Sexual Activity     Alcohol use: Yes     Comment: 12 Beers per week     Drug use: No     Sexual activity: Never     Partners: Male   Other Topics Concern     Parent/sibling w/ CABG, MI or angioplasty before 65F 55M? Not Asked   Social History Narrative     Not on file     Social Determinants of Health      Financial Resource Strain:      Difficulty of Paying Living Expenses:    Food Insecurity:      Worried About Running Out of Food in the Last Year:      Ran Out of Food in the Last Year:    Transportation Needs:      Lack of Transportation (Medical):      Lack of Transportation (Non-Medical):    Physical Activity:      Days of Exercise per Week:      Minutes of Exercise per Session:    Stress:      Feeling of Stress :    Social Connections:      Frequency of Communication with Friends and Family:      Frequency of Social Gatherings with Friends and Family:      Attends Nondenominational Services:      Active Member of Clubs or Organizations:      Attends Club or Organization Meetings:      Marital Status:    Intimate Partner Violence:      Fear of Current or Ex-Partner:      Emotionally Abused:      Physically Abused:      Sexually Abused:          FAMILY HISTORY:  Family History   Problem Relation Age of Onset     Breast Cancer Sister      Hypertension Daughter      Breast Cancer Daughter 43     Skin Cancer Mother      Coronary Artery Disease Father      Colon Cancer Sister      Breast Cancer Maternal Aunt         Many Maternal Aunts have had breast Cancer         PHYSICAL EXAM:  Vital signs:  There were no vitals taken for this visit. GENERAL/CONSTITUTIONAL: No acute distress.  Unable to examine as this visit is over the phone.       LABS:  CBC RESULTS:   Recent Labs   Lab Test 12/16/19  0837  10/30/18  0950   WBC  --   --  5.3   RBC  --   --  4.83   HGB 12.9   < > 9.5*   HCT  --   --  33.5*   MCV  --   --  69*   MCH  --   --  19.7*   MCHC  --   --  28.4*   RDW  --   --  20.5*      < > 280    < > = values in this interval not displayed.     Last Comprehensive Metabolic Panel:  Sodium   Date Value Ref Range Status   06/28/2021 133 133 - 144 mmol/L Final     Potassium   Date Value Ref Range Status   06/28/2021 4.2 3.4 - 5.3 mmol/L Final     Chloride   Date Value Ref Range Status   06/28/2021 99 94 - 109 mmol/L  Final     Carbon Dioxide   Date Value Ref Range Status   06/28/2021 29 20 - 32 mmol/L Final     Anion Gap   Date Value Ref Range Status   06/28/2021 5 3 - 14 mmol/L Final     Glucose   Date Value Ref Range Status   06/28/2021 127 (H) 70 - 99 mg/dL Final     Urea Nitrogen   Date Value Ref Range Status   06/28/2021 11 7 - 30 mg/dL Final     Creatinine   Date Value Ref Range Status   06/28/2021 0.62 0.52 - 1.04 mg/dL Final     GFR Estimate   Date Value Ref Range Status   06/28/2021 90 >60 mL/min/[1.73_m2] Final     Comment:     Non  GFR Calc  Starting 12/18/2018, serum creatinine based estimated GFR (eGFR) will be   calculated using the Chronic Kidney Disease Epidemiology Collaboration   (CKD-EPI) equation.       Calcium   Date Value Ref Range Status   06/28/2021 9.2 8.5 - 10.1 mg/dL Final     Bilirubin Total   Date Value Ref Range Status   02/11/2021 1.0 0.2 - 1.3 mg/dL Final     Alkaline Phosphatase   Date Value Ref Range Status   02/11/2021 103 40 - 150 U/L Final     ALT   Date Value Ref Range Status   02/11/2021 29 0 - 50 U/L Final     AST   Date Value Ref Range Status   02/11/2021 20 0 - 45 U/L Final       PATHOLOGY:  None new.    IMAGING:  3/2/2020: DEXA scan -- normal bone mineral density.    11/6/2020: Mammogram showed no suspicious findings.    ASSESSMENT/PLAN:  Diane Gaitan is a 71 year old female with the following issues:  1.  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   -Note she is not a great candidate for tamoxifen given her history of a possible TIA.   -Her baseline DEXA scan from 3/2/2020 showed normal bone mineral density.  I recommended adequate calcium and vitamin D as well as weightbearing exercise if tolerated. She does use a cane and is careful about her movement and exercise.  -Plan to repeat DEXA scan in  2022.  -Continue annual mammograms, next due on or after 11/6/2021.    2.  Diabetes mellitus type 2  -Stable.  -She will continue on metformin.    3.  Strong family history of breast cancer  -Her daughter had already undergone a genetics consult with negative genetic testing.  The  had informed her daughter that none of her other family members would require genetic testing.    4. Essential hypertension  -Blood pressure high today.  -Continue on losartan, hydrochlorothiazide, and amlodipine. Diane does monitor her BP at home with her cuff. Advised follow-up with her primary care team.    Return to Dr Kaye in 6 months, will order her mammogram for November 2021.    Hiram Velarde MD  .

## 2021-09-20 ENCOUNTER — TELEPHONE (OUTPATIENT)
Dept: INTERNAL MEDICINE | Facility: CLINIC | Age: 71
End: 2021-09-20

## 2021-09-20 NOTE — TELEPHONE ENCOUNTER
Reason for Call: Request for an order or referral:    Order or referral being requested: Lab orders    Date needed: 9/29/21    Has the patient been seen by the PCP for this problem? YES    Additional comments: patient states Dr. Barry wants her to have labs done. Appt is scheduled for 9/29/21. Please place orders.    Phone number Patient can be reached at:  Home number on file [phone] (home)    Best Time:  n/a    Can we leave a detailed message on this number?  Not Applicable    Call taken on 9/20/2021 at 3:05 PM by Jimena Schofield

## 2021-09-21 ENCOUNTER — DOCUMENTATION ONLY (OUTPATIENT)
Dept: LAB | Facility: CLINIC | Age: 71
End: 2021-09-21

## 2021-09-27 NOTE — TELEPHONE ENCOUNTER
Would be helpful to have a little more information as to which labs patient is requesting as I am unclear

## 2021-09-28 ENCOUNTER — DOCUMENTATION ONLY (OUTPATIENT)
Dept: LAB | Facility: CLINIC | Age: 71
End: 2021-09-28
Payer: MEDICARE

## 2021-09-29 ENCOUNTER — LAB (OUTPATIENT)
Dept: LAB | Facility: CLINIC | Age: 71
End: 2021-09-29
Payer: MEDICARE

## 2021-09-29 DIAGNOSIS — G62.9 LENGTH-DEPENDENT PERIPHERAL NEUROPATHY: ICD-10-CM

## 2021-09-29 LAB
TOTAL PROTEIN SERUM FOR ELP: 7.1 G/DL (ref 6.8–8.8)
VIT B12 SERPL-MCNC: 364 PG/ML (ref 193–986)

## 2021-09-29 PROCEDURE — 86334 IMMUNOFIX E-PHORESIS SERUM: CPT

## 2021-09-29 PROCEDURE — 84155 ASSAY OF PROTEIN SERUM: CPT

## 2021-09-29 PROCEDURE — 84165 PROTEIN E-PHORESIS SERUM: CPT | Performed by: STUDENT IN AN ORGANIZED HEALTH CARE EDUCATION/TRAINING PROGRAM

## 2021-09-29 PROCEDURE — 36415 COLL VENOUS BLD VENIPUNCTURE: CPT

## 2021-09-29 PROCEDURE — 82607 VITAMIN B-12: CPT

## 2021-09-30 LAB
ALBUMIN SERPL ELPH-MCNC: 4 G/DL (ref 3.7–5.1)
ALPHA1 GLOB SERPL ELPH-MCNC: 0.4 G/DL (ref 0.2–0.4)
ALPHA2 GLOB SERPL ELPH-MCNC: 0.9 G/DL (ref 0.5–0.9)
B-GLOBULIN SERPL ELPH-MCNC: 1 G/DL (ref 0.6–1)
GAMMA GLOB SERPL ELPH-MCNC: 0.8 G/DL (ref 0.7–1.6)
M PROTEIN SERPL ELPH-MCNC: 0 G/DL
PROT PATTERN SERPL ELPH-IMP: NORMAL
PROT PATTERN SERPL IFE-IMP: NORMAL

## 2021-10-27 ENCOUNTER — TELEPHONE (OUTPATIENT)
Dept: ONCOLOGY | Facility: CLINIC | Age: 71
End: 2021-10-27

## 2021-10-27 NOTE — TELEPHONE ENCOUNTER
Spoke with patient per RN message request to remind her that her mammogram is due in November. Provided [facility] to [facility] call and schedule. Advised that we would reach out closer to 6 month follow up to schedule. Pt confirmed understanding.

## 2021-10-28 DIAGNOSIS — I10 ESSENTIAL HYPERTENSION, BENIGN: ICD-10-CM

## 2021-10-28 DIAGNOSIS — Z13.6 CARDIOVASCULAR SCREENING; LDL GOAL LESS THAN 100: ICD-10-CM

## 2021-10-28 DIAGNOSIS — E11.9 TYPE 2 DIABETES MELLITUS WITHOUT COMPLICATION, WITHOUT LONG-TERM CURRENT USE OF INSULIN (H): ICD-10-CM

## 2021-10-28 RX ORDER — SIMVASTATIN 20 MG
TABLET ORAL
Qty: 90 TABLET | Refills: 0 | Status: SHIPPED | OUTPATIENT
Start: 2021-10-28 | End: 2022-05-06

## 2021-10-28 RX ORDER — HYDROCHLOROTHIAZIDE 12.5 MG/1
TABLET ORAL
Qty: 180 TABLET | Refills: 1 | Status: SHIPPED | OUTPATIENT
Start: 2021-10-28 | End: 2022-05-06

## 2021-10-28 NOTE — TELEPHONE ENCOUNTER
Routing refill request to provider for review/approval because:  Drug interaction warning    Jung Odonnell RN  [facility] Triage Nurse

## 2021-11-15 ENCOUNTER — TRANSFERRED RECORDS (OUTPATIENT)
Dept: HEALTH INFORMATION MANAGEMENT | Facility: CLINIC | Age: 71
End: 2021-11-15
Payer: MEDICARE

## 2021-11-16 LAB — RETINOPATHY: NEGATIVE

## 2021-11-18 ENCOUNTER — TELEPHONE (OUTPATIENT)
Dept: ONCOLOGY | Facility: CLINIC | Age: 71
End: 2021-11-18
Payer: MEDICARE

## 2021-11-18 NOTE — TELEPHONE ENCOUNTER
FRANCA for scheduling. MA is scheduled. Patient needs virtual return with Dr. Kaye in mid March, 2022.     ----- Message from Viktoriya Wakefield RN sent at 10/29/2021  4:48 PM CDT -----  Regarding: FW: please call patient to schedule    ----- Message -----  From: Viktoriya Wakefield RN  Sent: 10/6/2021   9:14 PM CDT  To: Viktoriya Wakefield RN, [facility]   Subject: please call patient to schedule                  Hello, please call Diane to schedule mammogram for 11/21 and F/U with Dr. Kaye for 3/22. Thanks Viktoriya

## 2021-11-29 DIAGNOSIS — K21.9 GASTROESOPHAGEAL REFLUX DISEASE WITHOUT ESOPHAGITIS: ICD-10-CM

## 2021-11-29 NOTE — TELEPHONE ENCOUNTER
Prescription approved per [facility] Refill Protocol.  Barbie Rogers RN

## 2021-12-01 ENCOUNTER — HOSPITAL ENCOUNTER (OUTPATIENT)
Dept: MAMMOGRAPHY | Facility: CLINIC | Age: 71
Discharge: HOME | End: 2021-12-01
Attending: INTERNAL MEDICINE | Admitting: INTERNAL MEDICINE
Payer: MEDICARE

## 2021-12-01 DIAGNOSIS — Z12.31 ENCOUNTER FOR SCREENING MAMMOGRAM FOR MALIGNANT NEOPLASM OF BREAST: ICD-10-CM

## 2021-12-01 DIAGNOSIS — C50.212 MALIGNANT NEOPLASM OF UPPER-INNER QUADRANT OF LEFT BREAST IN FEMALE, ESTROGEN RECEPTOR POSITIVE (H): ICD-10-CM

## 2021-12-01 DIAGNOSIS — Z17.0 MALIGNANT NEOPLASM OF UPPER-INNER QUADRANT OF LEFT BREAST IN FEMALE, ESTROGEN RECEPTOR POSITIVE (H): ICD-10-CM

## 2021-12-01 PROCEDURE — 77063 BREAST TOMOSYNTHESIS BI: CPT

## 2021-12-10 PROBLEM — M62.81 GENERALIZED MUSCLE WEAKNESS: Status: RESOLVED | Noted: 2021-08-03 | Resolved: 2021-12-10

## 2021-12-10 NOTE — PROGRESS NOTES
Pt seen for 2 visits; IE and one subsequent visit. Reported ex's were going well and no issues with LE's giving out. Cancelled 3rd visit; no additional visits scheduled.   Pt will be discharged.

## 2021-12-22 ENCOUNTER — NURSE TRIAGE (OUTPATIENT)
Dept: INTERNAL MEDICINE | Facility: CLINIC | Age: 71
End: 2021-12-22
Payer: MEDICARE

## 2021-12-22 NOTE — TELEPHONE ENCOUNTER
Patient states she had 2 episodes of explosive diarrhea where she was not able to make it to the bathroom in time once Saturday at [employer] once at a restaurant Tuesday. Normal BMs in between and since these instances. She is wondering if you have a medication OTC you would recommend as this is embarrassing. No abdominal pain or vomiting prior to episodes, no correlation to food that she can think off    Please advise.     Reason for Disposition    MILD-MODERATE diarrhea (e.g., 1-6 times / day more than normal)    Additional Information    Negative: Shock suspected (e.g., cold/pale/clammy skin, too weak to stand, low BP, rapid pulse)    Negative: Difficult to awaken or acting confused (e.g., disoriented, slurred speech)    Negative: Sounds like a life-threatening emergency to the triager    Negative: Vomiting also present and worse than the diarrhea    Negative: Blood in stool and without diarrhea    Negative: SEVERE abdominal pain (e.g., excruciating) and present > 1 hour    Negative: SEVERE abdominal pain and age > 60    Negative: Bloody, black, or tarry bowel movements (Exception: chronic-unchanged black-grey bowel movements and is taking iron pills or Pepto-bismol)    Negative: MODERATE diarrhea (e.g., 4-6 times / day more than normal) and present > 48 hours (2 days)    Negative: SEVERE diarrhea (e.g., 7 or more times / day more than normal) and age > 60 years    Negative: Constant abdominal pain lasting > 2 hours    Negative: Drinking very little and has signs of dehydration (e.g., no urine > 12 hours, very dry mouth, very lightheaded)    Negative: Patient sounds very sick or weak to the triager    Negative: SEVERE diarrhea (e.g., 7 or more times / day more than normal) and present > 24 hours (1 day)    Negative: MODERATE diarrhea (e.g., 4-6 times / day more than normal) and age > 70 years    Negative: Abdominal pain  (Exception: pain clears completely with each passage of diarrhea stool)    Negative: Fever > 101  F (38.3 C)    Negative: Blood in the stool    Negative: Mucus or pus in stool has been present > 2 days and diarrhea is more than mild    Negative: Weak immune system (e.g., HIV positive, cancer chemo, splenectomy, organ transplant, chronic steroids)    Negative: Travel to a foreign country in past month    Negative: Recent antibiotic therapy (i.e., within last 2 months) and diarrhea present > 3 days since antibiotic was stopped    Negative: Recent hospitalization and diarrhea present > 3 days    Negative: Tube feedings (e.g., nasogastric, g-tube, j-tube)    Negative: MILD diarrhea (e.g., 1-3 or more stools than normal in past 24 hours) diarrhea without known cause and present > 7 days    Negative: Patient wants to be seen    Negative: Diarrhea is a chronic symptom (recurrent or ongoing AND lasting > 4 weeks)    Protocols used: DIARRHEA-A-OH

## 2022-02-20 ENCOUNTER — HEALTH MAINTENANCE LETTER (OUTPATIENT)
Age: 72
End: 2022-02-20

## 2022-03-17 ENCOUNTER — TELEPHONE (OUTPATIENT)
Dept: NEUROLOGY | Facility: CLINIC | Age: 72
End: 2022-03-17
Payer: MEDICARE

## 2022-03-17 NOTE — PROGRESS NOTES
[facility]  Section of General Neurology  Return Patient Visit    Diane Gaitan MRN# [id number]   Age: 71 year old YOB: 1950     Brief history of symptoms: The patient was initially seen in neurologic consultation on 8/31 for evaluation of lower extremity sensory changes. Please see the comprehensive neurologic consultation note from that date in the Epic records for details.         Interval history:   Subsequent B12/IFIX lab testing were normal    Still has balance issues.  Has a 4 point cane.  Is doing a lot of walking, a lot of leg exercising.   No falls since our last visit.    She had COVID in 2020 and felt this hastened her decline.    She did see PT previously in [facility].  She was told she didn't need to come back.  She has been doing home PT exercises with them.    She gets winded easily.  This worsened after COVID too.  The did have brain fog previously as well, perhaps persistent to some extent.    Legs wake her up out of sleep and legs are painful as well at times.   She previously was on gabapentin        Past Medical History:     Patient Active Problem List   Diagnosis     Essential hypertension, benign     CARDIOVASCULAR SCREENING; LDL GOAL LESS THAN 100     S/P total knee replacement     Osteoarthrosis, unspecified whether generalized or localized, involving lower leg     Acute posthemorrhagic anemia     Major depressive disorder, single episode, mild (H)     Gastroesophageal reflux disease without esophagitis     Type 2 diabetes mellitus without complication, without long-term current use of insulin (H)     NSTEMI (non-ST elevated myocardial infarction) (H)     Unstable angina (H)     Diabetic polyneuropathy associated with type 2 diabetes mellitus (H)     Iron deficiency anemia, unspecified iron deficiency anemia type     Malignant neoplasm of upper-outer quadrant of left breast in female, estrogen receptor positive (H)     Malignant neoplasm of left female  breast, unspecified estrogen receptor status, unspecified site of breast (H)     Morbid obesity (H)     Atrial fibrillation, unspecified type (H)     Past Medical History:   Diagnosis Date     Arthritis      BENIGN HYPERTENSION 2002     Cancer (H)     Basal cell carcinoma     CARDIOVASCULAR SCREENING; LDL GOAL LESS THAN 100 10/31/2010     Depressive disorder      Esophageal reflux      Mild major depression (H) 2010     Other malaise and fatigue 2002     Type 2 diabetes, HbA1c goal < 7% (H) 10/31/2010        Past Surgical History:     Past Surgical History:   Procedure Laterality Date     ARTHROPLASTY KNEE  10/18/2012    Procedure: ARTHROPLASTY KNEE;  RIGHT TOTAL KNEE ARTHROPLASTY (SMITH & NEPHEW)^ ;  Surgeon: Howard Charles MD;  Location:  OR     BIOPSY NODE SENTINEL Left 2019    Procedure: LEFT SENTINEL LYMPH NODE BIOPSY;  Surgeon: Ruddy Malik MD;  Location:  OR     BREAST BIOPSY, RT/LT  1988    breast biopsy     COLONOSCOPY N/A 2016    Procedure: COLONOSCOPY;  Surgeon: Curt Patel MD;  Location:  GI     ESOPHAGOSCOPY, GASTROSCOPY, DUODENOSCOPY (EGD), COMBINED N/A 2018    Procedure: COMBINED ESOPHAGOSCOPY, GASTROSCOPY, DUODENOSCOPY (EGD);  gastroscopy;  Surgeon: Mac White MD;  Location:  GI     HYSTERECTOMY, PAP NO LONGER INDICATED  1986    abdominal hysterectomy     LUMPECTOMY BREAST WITH SEED LOCALIZATION Left 2019    Procedure: SEED LOCALIZED LEFT BREAST LUMPECTOMY;  Surgeon: Ruddy Malik MD;  Location:  OR     ROTATOR CUFF REPAIR RT/LT Right      TUBAL LIGATION      at age 29     [facility] NONSPECIFIC PROCEDURE  10/30/96    skin tags removed     [facility] NONSPECIFIC PROCEDURE      colonic polyps        Social History:     Social History     Tobacco Use     Smoking status: Former Smoker     Packs/day: 1.00     Years: 12.00     Pack years: 12.00     Quit date: 10/18/1980     Years since quittin.4     Smokeless  tobacco: Never Used   Substance Use Topics     Alcohol use: Yes     Comment: 12 Beers per week     Drug use: No        Family History:     Family History   Problem Relation Age of Onset     Breast Cancer Sister      Hypertension Daughter      Breast Cancer Daughter 43     Skin Cancer Mother      Coronary Artery Disease Father      Colon Cancer Sister      Breast Cancer Maternal Aunt         Many Maternal Aunts have had breast Cancer        Medications:     Current Outpatient Medications   Medication Sig     amLODIPine (NORVASC) 5 MG tablet Take 1 tablet (5 mg) by mouth daily     anastrozole (ARIMIDEX) 1 MG tablet Take 1 tablet (1 mg) by mouth daily     aspirin 81 MG tablet Take 1 tablet by mouth daily.     buPROPion (WELLBUTRIN XL) 150 MG 24 hr tablet Take 1 tablet (150 mg) by mouth every morning     calcium carbonate 600 mg-vitamin D 400 units (CALTRATE) 600-400 MG-UNIT per tablet Take 1 tablet by mouth 2 times daily      citalopram (CELEXA) 20 MG tablet Take 1 tablet (20 mg) by mouth daily     hydrochlorothiazide (HYDRODIURIL) 12.5 MG tablet TAKE TWO TABLETS BY MOUTH EVERY DAY     LORazepam (ATIVAN) 0.5 MG tablet Take 1 tablet (0.5 mg) by mouth every 6 hours as needed for anxiety     LORazepam (ATIVAN) 0.5 MG tablet TK 1 PO 30 MINUTES PRIOR TO MRI, may repeat times 1     losartan (COZAAR) 100 MG tablet Take 1 tablet (100 mg) by mouth daily     metFORMIN (GLUCOPHAGE) 500 MG tablet Take 1 tablet (500 mg) by mouth 2 times daily (with meals)     omeprazole (PRILOSEC) 20 MG DR capsule Take 1 capsule (20 mg) by mouth daily     simvastatin (ZOCOR) 20 MG tablet TAKE ONE TABLET BY MOUTH EVERY DAY AT BEDTIME     Current Facility-Administered Medications   Medication     lidocaine 1 % injection 4 mL     LORazepam (ATIVAN) tablet 0.5 mg     methylPREDNISolone (DEPO-MEDROL) injection 80 mg        Allergies:     Allergies   Allergen Reactions     Lisinopril Cough     COUGH          Physical Exam:   Vitals: BP (!) 135/94    "Pulse 111   Ht 1.702 m (5' 7\")   Wt 89.8 kg (198 lb)   SpO2 98%   BMI 31.01 kg/m     Lungs: breathing comfortably  Extremities: no edema  Skin: No rashes     Neuro:   General Appearance: No apparent distress, well-nourished, well-groomed, pleasant      Mental Status: Alert and oriented to person, place, and time. Speech fluent and comprehension intact. No dysarthria.     Cranial Nerves:   II: Visual fields: normal  III: Pupils: 3 mm, equal, round, reactive to light   III,IV,VI: Extraocular Movements: intact   V: Facial sensation: intact to light touch  VII: Facial strength: intact without asymmetry  VIII: Hearing: intact grossly  IX: Palate: intact   XI: Shoulder shrug: intact  XII: Tongue movement: normal     Motor Exam:   5/5 diffusely       Sensory: No vibration at toes, limited at ankle     Coordination: no dysmetria with finger-to-nose bilaterally, heel-to-shin normal     Reflexes: Remains hyporeflexic.    Gait: Slow and cautious, with cane.            Data: Pertinent prior to visit   Imaging:  Imaging:  MRI Lumbar spine 8/11/2021     T12-L1:  Disc height maintained. No herniation. Mild bilateral facet  arthropathy. No foraminal or spinal canal stenosis.      L1-L2:  Mild disc height loss. Disc bulge, asymmetric to the right.  Mild bilateral facet arthropathy. Minimal right foraminal stenosis. No  left foraminal stenosis. No spinal canal stenosis.       L2-L3:  Mild disc height loss. Disc bulge. Mild bilateral facet  arthropathy. Mild bilateral foraminal stenosis. Mild spinal canal  stenosis.       L3-L4:  Moderate to severe asymmetrical disc height loss on the left.  Disc bulge asymmetric to the left with left foraminal protrusion  component. Right foraminal/lateral annular fissure. Moderate bilateral  facet arthropathy. Mild right foraminal stenosis. Moderate to severe  left foraminal stenosis. Moderate to severe spinal canal stenosis.       L4-L5:  Mild disc height loss. Disc bulge, asymmetric to the " right  with right foraminal and lateral protrusion with annular fissure.  Moderate bilateral facet arthropathy. Moderate stenosis of the medial  right foramen. No left foraminal stenosis. Mild spinal canal stenosis.     L5-S1:  Disc height maintained. No herniation. Severe bilateral facet  arthropathy. No foraminal or spinal canal stenosis.                                                                      IMPRESSION:    1. Degenerative change as detailed, worst at L3-4, left worse than  right.  2. Marrow signal changes surrounding the L3-4 disc joint are  presumably degenerative. Infection cannot be completely excluded.  3. Other degenerative change as detailed     I personally reviewed the above imaging and agree with the findings in the report.       Notable for disk bulge at L4-5 to the right without severe neuronal foraminal narrowing that can be seen currently. L3-L4 disk bulge with moderate to severe  left foraminal stenosis. moderate to severe spinal canal stenosis.      Laboratory:  B12 364   IFIX normal  Lab Results   Component Value Date    A1C 5.7 07/28/2021    A1C 5.3 02/11/2021    A1C 5.7 06/19/2020    A1C 5.7 10/03/2019    A1C 5.7 05/02/2019    A1C 6.1 09/13/2018            Assessment and Plan:   Diane Gaitan is a pleasant 71 year old female who presents today in follow up.   PMH is notable for low back pain,  Depression, T2DM with documented hx of neuropathy 2/2 to this, ER+ breast cancer still on hormone therapy (is to take this x 5 years total),  She still has some degree of balance issues which I do think are related to vibratory sensation loss and subsequent proprioceptive deficits.  She adapts well and uses assistive devices to prevent falls.     Plan:  #Probable length dependent polyneuropathy secondary T2DM  #Low back pain likely secondary to lumbar disk disease  #Sensory ataxia  # Possible restless leg syndrome     --Previous blood work reviewed  --Describes symptoms of possible RLS, may  have worsened since time off of gabapentin.  Would resume gabapentin at 300 mg TID or 300/600.  Script sent.  This could help with pain as well.  --She will check in with PT again to discuss further exercise ideas  --Discussed follow up options, she would prefer PRN which is reasonable.  She will reach out with any questions going forward.      Sam Mesa MD   of Neurology   [facility]      The total time of this encounter today amounted to 27 minutes. This time included time spent with the patient, prep work, ordering tests, and performing post visit documentation.

## 2022-03-18 ENCOUNTER — OFFICE VISIT (OUTPATIENT)
Dept: NEUROLOGY | Facility: CLINIC | Age: 72
End: 2022-03-18
Payer: MEDICARE

## 2022-03-18 VITALS
HEART RATE: 111 BPM | HEIGHT: 67 IN | BODY MASS INDEX: 31.08 KG/M2 | DIASTOLIC BLOOD PRESSURE: 94 MMHG | OXYGEN SATURATION: 98 % | SYSTOLIC BLOOD PRESSURE: 135 MMHG | WEIGHT: 198 LBS

## 2022-03-18 DIAGNOSIS — G25.81 RESTLESS LEG SYNDROME: ICD-10-CM

## 2022-03-18 DIAGNOSIS — G89.29 CHRONIC BILATERAL LOW BACK PAIN WITHOUT SCIATICA: ICD-10-CM

## 2022-03-18 DIAGNOSIS — E11.42 DIABETIC POLYNEUROPATHY ASSOCIATED WITH TYPE 2 DIABETES MELLITUS (H): Primary | ICD-10-CM

## 2022-03-18 DIAGNOSIS — M54.50 CHRONIC BILATERAL LOW BACK PAIN WITHOUT SCIATICA: ICD-10-CM

## 2022-03-18 DIAGNOSIS — R26.89 BALANCE DISORDER: ICD-10-CM

## 2022-03-18 PROCEDURE — 99213 OFFICE O/P EST LOW 20 MIN: CPT | Performed by: STUDENT IN AN ORGANIZED HEALTH CARE EDUCATION/TRAINING PROGRAM

## 2022-03-18 RX ORDER — GABAPENTIN 300 MG/1
300 CAPSULE ORAL 3 TIMES DAILY
Qty: 270 CAPSULE | Refills: 3 | Status: SHIPPED | OUTPATIENT
Start: 2022-03-18 | End: 2023-09-18

## 2022-03-18 RX ORDER — GABAPENTIN 300 MG/1
300 CAPSULE ORAL 3 TIMES DAILY
Qty: 270 CAPSULE | Refills: 1 | Status: SHIPPED | OUTPATIENT
Start: 2022-03-18 | End: 2022-03-18

## 2022-03-18 NOTE — LETTER
3/18/2022         RE: Diane Gaitan  [address] [address]        Dear Colleague,    Thank you for referring your patient, Diane Gaitan, to the [facility]. Please see a copy of my visit note below.    [facility]  Section of General Neurology  Return Patient Visit    Diane Gaitan MRN# [id number]   Age: 71 year old YOB: 1950     Brief history of symptoms: The patient was initially seen in neurologic consultation on 8/31 for evaluation of lower extremity sensory changes. Please see the comprehensive neurologic consultation note from that date in the Epic records for details.         Interval history:   Subsequent B12/IFIX lab testing were normal    Still has balance issues.  Has a 4 point cane.  Is doing a lot of walking, a lot of leg exercising.   No falls since our last visit.    She had COVID in 2020 and felt this hastened her decline.    She did see PT previously in [facility].  She was told she didn't need to come back.  She has been doing home PT exercises with them.    She gets winded easily.  This worsened after COVID too.  The did have brain fog previously as well, perhaps persistent to some extent.    Legs wake her up out of sleep and legs are painful as well at times.   She previously was on gabapentin        Past Medical History:     Patient Active Problem List   Diagnosis     Essential hypertension, benign     CARDIOVASCULAR SCREENING; LDL GOAL LESS THAN 100     S/P total knee replacement     Osteoarthrosis, unspecified whether generalized or localized, involving lower leg     Acute posthemorrhagic anemia     Major depressive disorder, single episode, mild (H)     Gastroesophageal reflux disease without esophagitis     Type 2 diabetes mellitus without complication, without long-term current use of insulin (H)     NSTEMI (non-ST elevated myocardial infarction) (H)     Unstable angina (H)     Diabetic  polyneuropathy associated with type 2 diabetes mellitus (H)     Iron deficiency anemia, unspecified iron deficiency anemia type     Malignant neoplasm of upper-outer quadrant of left breast in female, estrogen receptor positive (H)     Malignant neoplasm of left female breast, unspecified estrogen receptor status, unspecified site of breast (H)     Morbid obesity (H)     Atrial fibrillation, unspecified type (H)     Past Medical History:   Diagnosis Date     Arthritis      BENIGN HYPERTENSION 8/28/2002     Cancer (H)     Basal cell carcinoma     CARDIOVASCULAR SCREENING; LDL GOAL LESS THAN 100 10/31/2010     Depressive disorder 1997     Esophageal reflux      Mild major depression (H) 9/14/2010     Other malaise and fatigue 8/28/2002     Type 2 diabetes, HbA1c goal < 7% (H) 10/31/2010        Past Surgical History:     Past Surgical History:   Procedure Laterality Date     ARTHROPLASTY KNEE  10/18/2012    Procedure: ARTHROPLASTY KNEE;  RIGHT TOTAL KNEE ARTHROPLASTY (SMITH & NEPHEW)^ ;  Surgeon: Howard Charles MD;  Location:  OR     BIOPSY NODE SENTINEL Left 12/19/2019    Procedure: LEFT SENTINEL LYMPH NODE BIOPSY;  Surgeon: Ruddy Malik MD;  Location:  OR     BREAST BIOPSY, RT/LT  1988    breast biopsy     COLONOSCOPY N/A 7/14/2016    Procedure: COLONOSCOPY;  Surgeon: Curt Patel MD;  Location:  GI     ESOPHAGOSCOPY, GASTROSCOPY, DUODENOSCOPY (EGD), COMBINED N/A 9/13/2018    Procedure: COMBINED ESOPHAGOSCOPY, GASTROSCOPY, DUODENOSCOPY (EGD);  gastroscopy;  Surgeon: Mac White MD;  Location:  GI     HYSTERECTOMY, PAP NO LONGER INDICATED  1986    abdominal hysterectomy     LUMPECTOMY BREAST WITH SEED LOCALIZATION Left 12/19/2019    Procedure: SEED LOCALIZED LEFT BREAST LUMPECTOMY;  Surgeon: Ruddy Malik MD;  Location:  OR     ROTATOR CUFF REPAIR RT/LT Right      TUBAL LIGATION      at age 29     [email or web address] NONSPECIFIC PROCEDURE  10/30/96    skin tags removed      [facility] NONSPECIFIC PROCEDURE      colonic polyps        Social History:     Social History     Tobacco Use     Smoking status: Former Smoker     Packs/day: 1.00     Years: 12.00     Pack years: 12.00     Quit date: 10/18/1980     Years since quittin.4     Smokeless tobacco: Never Used   Substance Use Topics     Alcohol use: Yes     Comment: 12 Beers per week     Drug use: No        Family History:     Family History   Problem Relation Age of Onset     Breast Cancer Sister      Hypertension Daughter      Breast Cancer Daughter 43     Skin Cancer Mother      Coronary Artery Disease Father      Colon Cancer Sister      Breast Cancer Maternal Aunt         Many Maternal Aunts have had breast Cancer        Medications:     Current Outpatient Medications   Medication Sig     amLODIPine (NORVASC) 5 MG tablet Take 1 tablet (5 mg) by mouth daily     anastrozole (ARIMIDEX) 1 MG tablet Take 1 tablet (1 mg) by mouth daily     aspirin 81 MG tablet Take 1 tablet by mouth daily.     buPROPion (WELLBUTRIN XL) 150 MG 24 hr tablet Take 1 tablet (150 mg) by mouth every morning     calcium carbonate 600 mg-vitamin D 400 units (CALTRATE) 600-400 MG-UNIT per tablet Take 1 tablet by mouth 2 times daily      citalopram (CELEXA) 20 MG tablet Take 1 tablet (20 mg) by mouth daily     hydrochlorothiazide (HYDRODIURIL) 12.5 MG tablet TAKE TWO TABLETS BY MOUTH EVERY DAY     LORazepam (ATIVAN) 0.5 MG tablet Take 1 tablet (0.5 mg) by mouth every 6 hours as needed for anxiety     LORazepam (ATIVAN) 0.5 MG tablet TK 1 PO 30 MINUTES PRIOR TO MRI, may repeat times 1     losartan (COZAAR) 100 MG tablet Take 1 tablet (100 mg) by mouth daily     metFORMIN (GLUCOPHAGE) 500 MG tablet Take 1 tablet (500 mg) by mouth 2 times daily (with meals)     omeprazole (PRILOSEC) 20 MG DR capsule Take 1 capsule (20 mg) by mouth daily     simvastatin (ZOCOR) 20 MG tablet TAKE ONE TABLET BY MOUTH EVERY DAY AT BEDTIME     Current Facility-Administered Medications  "  Medication     lidocaine 1 % injection 4 mL     LORazepam (ATIVAN) tablet 0.5 mg     methylPREDNISolone (DEPO-MEDROL) injection 80 mg        Allergies:     Allergies   Allergen Reactions     Lisinopril Cough     COUGH          Physical Exam:   Vitals: BP (!) 135/94   Pulse 111   Ht 1.702 m (5' 7\")   Wt 89.8 kg (198 lb)   SpO2 98%   BMI 31.01 kg/m     Lungs: breathing comfortably  Extremities: no edema  Skin: No rashes     Neuro:   General Appearance: No apparent distress, well-nourished, well-groomed, pleasant      Mental Status: Alert and oriented to person, place, and time. Speech fluent and comprehension intact. No dysarthria.     Cranial Nerves:   II: Visual fields: normal  III: Pupils: 3 mm, equal, round, reactive to light   III,IV,VI: Extraocular Movements: intact   V: Facial sensation: intact to light touch  VII: Facial strength: intact without asymmetry  VIII: Hearing: intact grossly  IX: Palate: intact   XI: Shoulder shrug: intact  XII: Tongue movement: normal     Motor Exam:   5/5 diffusely       Sensory: No vibration at toes, limited at ankle     Coordination: no dysmetria with finger-to-nose bilaterally, heel-to-shin normal     Reflexes: Remains hyporeflexic.    Gait: Slow and cautious, with cane.            Data: Pertinent prior to visit   Imaging:  Imaging:  MRI Lumbar spine 8/11/2021     T12-L1:  Disc height maintained. No herniation. Mild bilateral facet  arthropathy. No foraminal or spinal canal stenosis.      L1-L2:  Mild disc height loss. Disc bulge, asymmetric to the right.  Mild bilateral facet arthropathy. Minimal right foraminal stenosis. No  left foraminal stenosis. No spinal canal stenosis.       L2-L3:  Mild disc height loss. Disc bulge. Mild bilateral facet  arthropathy. Mild bilateral foraminal stenosis. Mild spinal canal  stenosis.       L3-L4:  Moderate to severe asymmetrical disc height loss on the left.  Disc bulge asymmetric to the left with left foraminal " protrusion  component. Right foraminal/lateral annular fissure. Moderate bilateral  facet arthropathy. Mild right foraminal stenosis. Moderate to severe  left foraminal stenosis. Moderate to severe spinal canal stenosis.       L4-L5:  Mild disc height loss. Disc bulge, asymmetric to the right  with right foraminal and lateral protrusion with annular fissure.  Moderate bilateral facet arthropathy. Moderate stenosis of the medial  right foramen. No left foraminal stenosis. Mild spinal canal stenosis.     L5-S1:  Disc height maintained. No herniation. Severe bilateral facet  arthropathy. No foraminal or spinal canal stenosis.                                                                      IMPRESSION:    1. Degenerative change as detailed, worst at L3-4, left worse than  right.  2. Marrow signal changes surrounding the L3-4 disc joint are  presumably degenerative. Infection cannot be completely excluded.  3. Other degenerative change as detailed     I personally reviewed the above imaging and agree with the findings in the report.       Notable for disk bulge at L4-5 to the right without severe neuronal foraminal narrowing that can be seen currently. L3-L4 disk bulge with moderate to severe  left foraminal stenosis. moderate to severe spinal canal stenosis.      Laboratory:  B12 364   IFIX normal  Lab Results   Component Value Date    A1C 5.7 07/28/2021    A1C 5.3 02/11/2021    A1C 5.7 06/19/2020    A1C 5.7 10/03/2019    A1C 5.7 05/02/2019    A1C 6.1 09/13/2018            Assessment and Plan:   Diane Gaitan is a pleasant 71 year old female who presents today in follow up.   PMH is notable for low back pain,  Depression, T2DM with documented hx of neuropathy 2/2 to this, ER+ breast cancer still on hormone therapy (is to take this x 5 years total),  She still has some degree of balance issues which I do think are related to vibratory sensation loss and subsequent proprioceptive deficits.  She adapts well and uses  assistive devices to prevent falls.     Plan:  #Probable length dependent polyneuropathy secondary T2DM  #Low back pain likely secondary to lumbar disk disease  #Sensory ataxia  # Possible restless leg syndrome     --Previous blood work reviewed  --Describes symptoms of possible RLS, may have worsened since time off of gabapentin.  Would resume gabapentin at 300 mg TID or 300/600.  Script sent.  This could help with pain as well.  --She will check in with PT again to discuss further exercise ideas  --Discussed follow up options, she would prefer PRN which is reasonable.  She will reach out with any questions going forward.      Sam Mesa MD   of Neurology   [facility]      The total time of this encounter today amounted to 27 minutes. This time included time spent with the patient, prep work, ordering tests, and performing post visit documentation.        Again, thank you for allowing me to participate in the care of your patient.        Sincerely,        Floyd Mesa MD

## 2022-03-18 NOTE — NURSING NOTE
"Diane Gaitan is a 71 year old female who presents for:  Chief Complaint   Patient presents with     Follow Up     peripheral neuropathy        Initial Vitals:  BP (!) 135/94   Pulse 111   Ht 1.702 m (5' 7\")   Wt 89.8 kg (198 lb)   SpO2 98%   BMI 31.01 kg/m   Estimated body mass index is 31.01 kg/m  as calculated from the following:    Height as of this encounter: 1.702 m (5' 7\").    Weight as of this encounter: 89.8 kg (198 lb).. Body surface area is 2.06 meters squared. BP completed using cuff size: regular    Nursing Comments:     Zoey Pagan    "

## 2022-03-18 NOTE — PATIENT INSTRUCTIONS
Take 300 mg at night, then go to 300 mg twice a day.  Then go to 300/600.    Check back in with PT  Let me know if you are Dr. Barry have any further questions going forward.

## 2022-04-27 NOTE — PATIENT INSTRUCTIONS
1. Arrange for mammogram in 11/2020.  2. RTC MD in 4 months.    Please call to schedule.     Patient is a 52 y.o. aa female with a PMHx of asthma, fatty liver, HTN, and KALYANI who presents to the Emergency Department for SOB which onset gradually at the beginning of this month. Patient currently being seen by pulmonology outpatient for asthma.  She was treated with doxycycline and steroids without improvement.  Patient was seen by pulmonology today and was referred to the ED.  She reports shortness of breath is worse on exertion and relieves with rest.  States that she may have had fever when the symptoms 1st started however not now.  Reports nonproductive cough.    In the ED, chest xray concerning bilateral interstitial infiltrates. D dimer elevated and CTA chest was pending at time of admission. She was started on rocephin and azithromycin. HM consulted and patient placed in observation for pna.

## 2022-04-30 NOTE — PROGRESS NOTES
Diane is a 72 year old who is being evaluated via a billable video visit.      How would you like to obtain your AVS? Mail a copy  If the video visit is dropped, the invitation should be resent by: Text to cell phone: [phone]  Will anyone else be joining your video visit? Patricia       Lois Witt VF     Video Start Time: 2:09 PM  Video-Visit Details    Type of service:  Video Visit (converted to telephone due to patient difficulty with video access).    Video End Time:2:22 PM    Originating Location (pt. Location): Home    Distant Location (provider location):  [facility]     Platform used for Video Visit: [facility]    Hematology/Oncology Established Patient Note      Today's Date: 5/9/2022    Reason for follow-up: Left breast cancer.    HISTORY OF PRESENT ILLNESS: Diane Gaitan is a 72 year old female who presents with the following oncologic history:  1.  11/5/2019: Mammogram showed focal asymmetry in the lateral left breast at the 2-4 o'clock position.  2.  11/12/2019: Targeted left breast ultrasound showed an irregular hypoechoic mass at the 2 o'clock position, 5 cm from the nipple measuring 1.7 x 1.2 x 1.4 cm.  Survey of the axilla showed no evidence of adenopathy.  3.  11/15/2019: Ultrasound-guided left breast needle biopsy at 2:00, 5 cm from the nipple showed a grade 2 invasive mammary carcinoma ER strongly positive at greater than 95%, GA, moderate to strongly positive at 80%, HER-2/guadalupe FISH negative.  4.  12/04/2019: Bilateral breast MRI showed left upper outer quadrant biopsy invasive mammary carcinoma measuring up to 1.7 cm and no other concerning areas of enhancement or lymphadenopathy.  5.  12/19/2019: Underwent left breast lumpectomy with left axillary sentinel lymph node biopsy under the care of Dr. Chidi Malik.  Pathology showed a grade 3 invasive pleomorphic lobular carcinoma measuring 1.5 cm, lymphovascular invasion not identified,  margins negative for invasive carcinoma.  A total of 3 left axillary sentinel lymph nodes negative for malignancy.  Oncotype DX score = 8, corresponding to a distant recurrence risk at 9 years of 3% after 5 years of hormone blockade therapy and a less than 1% absolute chemotherapy benefit.  6.  2/25/2020: Completed adjuvant radiation therapy to the left breast.  7. 3/04/2020: Started adjuvant anastrazole.    INTERIM HISTORY:  Diane reports no new breast issues. She still has weak lower extremities post-COVID from 11/2020.  She uses a cane. She has occasional shaking of her extremities.    REVIEW OF SYSTEMS:   14 point ROS was reviewed and is negative other than as noted above in HPI.       HOME MEDICATIONS:  Current Outpatient Medications   Medication Sig Dispense Refill     amLODIPine (NORVASC) 5 MG tablet Take 1 tablet (5 mg) by mouth daily 90 tablet 3     anastrozole (ARIMIDEX) 1 MG tablet Take 1 tablet (1 mg) by mouth daily 90 tablet 3     aspirin 81 MG tablet Take 1 tablet by mouth daily. 90 tablet 3     buPROPion (WELLBUTRIN XL) 150 MG 24 hr tablet Take 1 tablet (150 mg) by mouth every morning 90 tablet 3     calcium carbonate 600 mg-vitamin D 400 units (CALTRATE) 600-400 MG-UNIT per tablet Take 1 tablet by mouth 2 times daily        citalopram (CELEXA) 20 MG tablet Take 1 tablet (20 mg) by mouth daily 90 tablet 3     gabapentin (NEURONTIN) 300 MG capsule Take 1 capsule (300 mg) by mouth 3 times daily 270 capsule 3     hydrochlorothiazide (HYDRODIURIL) 12.5 MG tablet TAKE TWO TABLETS BY MOUTH EVERY  tablet 1     LORazepam (ATIVAN) 0.5 MG tablet Take 1 tablet (0.5 mg) by mouth every 6 hours as needed for anxiety 2 tablet 0     LORazepam (ATIVAN) 0.5 MG tablet TK 1 PO 30 MINUTES PRIOR TO MRI, may repeat times 1 2 tablet 0     losartan (COZAAR) 100 MG tablet Take 1 tablet (100 mg) by mouth daily 90 tablet 3     metFORMIN (GLUCOPHAGE) 500 MG tablet Take 1 tablet (500 mg) by mouth 2 times daily (with meals)  180 tablet 3     omeprazole (PRILOSEC) 20 MG DR capsule Take 1 capsule (20 mg) by mouth daily 90 capsule 1     simvastatin (ZOCOR) 20 MG tablet TAKE ONE TABLET BY MOUTH EVERY DAY AT BEDTIME 90 tablet 0         ALLERGIES:  Allergies   Allergen Reactions     Lisinopril Cough     COUGH         PAST MEDICAL HISTORY:  Past Medical History:   Diagnosis Date     Arthritis      BENIGN HYPERTENSION 8/28/2002     Cancer (H)     Basal cell carcinoma     CARDIOVASCULAR SCREENING; LDL GOAL LESS THAN 100 10/31/2010     Depressive disorder 1997     Esophageal reflux      Mild major depression (H) 9/14/2010     Other malaise and fatigue 8/28/2002     Type 2 diabetes, HbA1c goal < 7% (H) 10/31/2010   Possible transient ischemic attack.      PAST SURGICAL HISTORY:  Past Surgical History:   Procedure Laterality Date     ARTHROPLASTY KNEE  10/18/2012    Procedure: ARTHROPLASTY KNEE;  RIGHT TOTAL KNEE ARTHROPLASTY (SMITH & NEPHEW)^ ;  Surgeon: Howard Charles MD;  Location:  OR     BIOPSY NODE SENTINEL Left 12/19/2019    Procedure: LEFT SENTINEL LYMPH NODE BIOPSY;  Surgeon: Ruddy Malik MD;  Location:  OR     BREAST BIOPSY, RT/LT  1988    breast biopsy     COLONOSCOPY N/A 7/14/2016    Procedure: COLONOSCOPY;  Surgeon: Curt Patel MD;  Location:  GI     ESOPHAGOSCOPY, GASTROSCOPY, DUODENOSCOPY (EGD), COMBINED N/A 9/13/2018    Procedure: COMBINED ESOPHAGOSCOPY, GASTROSCOPY, DUODENOSCOPY (EGD);  gastroscopy;  Surgeon: Mac White MD;  Location:  GI     HYSTERECTOMY, PAP NO LONGER INDICATED  1986    abdominal hysterectomy     LUMPECTOMY BREAST WITH SEED LOCALIZATION Left 12/19/2019    Procedure: SEED LOCALIZED LEFT BREAST LUMPECTOMY;  Surgeon: Ruddy Malik MD;  Location:  OR     ROTATOR CUFF REPAIR RT/LT Right      TUBAL LIGATION      at age 29     [facility] NONSPECIFIC PROCEDURE  10/30/96    skin tags removed     [facility] NONSPECIFIC PROCEDURE      colonic polyps   Ovaries  intact.      SOCIAL HISTORY:  Social History     Socioeconomic History     Marital status:      Spouse name: Not on file     Number of children: Not on file     Years of education: Not on file     Highest education level: Not on file   Occupational History     Not on file   Tobacco Use     Smoking status: Former Smoker     Packs/day: 1.00     Years: 12.00     Pack years: 12.00     Quit date: 10/18/1980     Years since quittin.5     Smokeless tobacco: Never Used   Substance and Sexual Activity     Alcohol use: Yes     Comment: 12 Beers per week     Drug use: No     Sexual activity: Never     Partners: Male   Other Topics Concern     Parent/sibling w/ CABG, MI or angioplasty before 65F 55M? Not Asked   Social History Narrative     Not on file     Social Determinants of Health     Financial Resource Strain: Not on file   Food Insecurity: Not on file   Transportation Needs: Not on file   Physical Activity: Not on file   Stress: Not on file   Social Connections: Not on file   Intimate Partner Violence: Not on file   Housing Stability: Not on file         FAMILY HISTORY:  Family History   Problem Relation Age of Onset     Breast Cancer Sister      Hypertension Daughter      Breast Cancer Daughter 43     Skin Cancer Mother      Coronary Artery Disease Father      Colon Cancer Sister      Breast Cancer Maternal Aunt         Many Maternal Aunts have had breast Cancer         PHYSICAL EXAM:  Vital signs:  There were no vitals taken for this visit.   Not performed as this was a telephone visit.    LABS:  CBC RESULTS:   Recent Labs   Lab Test 19  0837  10/30/18  0950   WBC  --   --  5.3   RBC  --   --  4.83   HGB 12.9   < > 9.5*   HCT  --   --  33.5*   MCV  --   --  69*   MCH  --   --  19.7*   MCHC  --   --  28.4*   RDW  --   --  20.5*      < > 280    < > = values in this interval not displayed.     Last Comprehensive Metabolic Panel:  Sodium   Date Value Ref Range Status   2021 133 133 - 144  mmol/L Final     Potassium   Date Value Ref Range Status   06/28/2021 4.2 3.4 - 5.3 mmol/L Final     Chloride   Date Value Ref Range Status   06/28/2021 99 94 - 109 mmol/L Final     Carbon Dioxide   Date Value Ref Range Status   06/28/2021 29 20 - 32 mmol/L Final     Anion Gap   Date Value Ref Range Status   06/28/2021 5 3 - 14 mmol/L Final     Glucose   Date Value Ref Range Status   06/28/2021 127 (H) 70 - 99 mg/dL Final     Urea Nitrogen   Date Value Ref Range Status   06/28/2021 11 7 - 30 mg/dL Final     Creatinine   Date Value Ref Range Status   06/28/2021 0.62 0.52 - 1.04 mg/dL Final     GFR Estimate   Date Value Ref Range Status   06/28/2021 90 >60 mL/min/[1.73_m2] Final     Comment:     Non  GFR Calc  Starting 12/18/2018, serum creatinine based estimated GFR (eGFR) will be   calculated using the Chronic Kidney Disease Epidemiology Collaboration   (CKD-EPI) equation.       Calcium   Date Value Ref Range Status   06/28/2021 9.2 8.5 - 10.1 mg/dL Final     Bilirubin Total   Date Value Ref Range Status   02/11/2021 1.0 0.2 - 1.3 mg/dL Final     Alkaline Phosphatase   Date Value Ref Range Status   02/11/2021 103 40 - 150 U/L Final     ALT   Date Value Ref Range Status   02/11/2021 29 0 - 50 U/L Final     AST   Date Value Ref Range Status   02/11/2021 20 0 - 45 U/L Final       PATHOLOGY:  None new.    IMAGING:  3/2/2020: DEXA scan -- normal bone mineral density.    12/1/2021: Mammogram showed no suspicious findings.    ASSESSMENT/PLAN:  Diane Gaitan is a 72 year old female with the following issues:  1.  Stage IA, [id number], grade 3 invasive pleomorphic lobular carcinoma of the left upper outer breast, ER strongly positive, AR positive, HER-2/guadalupe FISH negative, Oncotype DX = 8  -Diane has no clinical evidence for recurrent breast cancer based on clinical history. She is tolerating adjuvant anastrozole very well with no significant side effects.  The leg weakness she has had is related to past  COVID-19 infection and not from anastrazole.  -Note she is not a great candidate for tamoxifen given her history of a possible TIA.   -Her baseline DEXA scan from 3/2/2020 showed normal bone mineral density.  I recommended adequate calcium and vitamin D as well as weightbearing exercise if tolerated. She does use a cane and is careful about her movement and exercise.  -Plan to repeat DEXA scan in 2022, prior to next visit.  -Continue annual mammograms, next due 12/2022.    2.  Diabetes mellitus type 2  -Stable.  -She will continue on metformin.    3.  Strong family history of breast cancer  -Her daughter had already undergone a genetics consult with negative genetic testing.  The  had informed her daughter that none of her other family members would require genetic testing.    4. Essential hypertension  -Continue on losartan, hydrochlorothiazide, and amlodipine. Diane does monitor her BP at home with her cuff. Advised follow-up with her primary care team.    5. History of COVID-19 viral infection  -She had cough, body aches, fatigue, and diarrhea and tested positive for COVID-19 on 11/16/2020.  -She has had residual effect from COVID-19 with bilateral leg weakness and tremors.  -She did receive J&J COVID-19 vaccination.    Return in 6 months with DEXA.    Myrna Kaye MD  Hematology/Oncology  [facility]    Total time spent: 30 minutes in patient evaluation, discussion of plan of care, and documentation.

## 2022-05-05 DIAGNOSIS — I10 ESSENTIAL HYPERTENSION, BENIGN: ICD-10-CM

## 2022-05-05 DIAGNOSIS — Z13.6 CARDIOVASCULAR SCREENING; LDL GOAL LESS THAN 100: ICD-10-CM

## 2022-05-05 DIAGNOSIS — K21.9 GASTROESOPHAGEAL REFLUX DISEASE WITHOUT ESOPHAGITIS: ICD-10-CM

## 2022-05-05 DIAGNOSIS — E11.9 TYPE 2 DIABETES MELLITUS WITHOUT COMPLICATION, WITHOUT LONG-TERM CURRENT USE OF INSULIN (H): ICD-10-CM

## 2022-05-05 DIAGNOSIS — F32.0 MAJOR DEPRESSIVE DISORDER, SINGLE EPISODE, MILD (H): ICD-10-CM

## 2022-05-06 RX ORDER — LOSARTAN POTASSIUM 100 MG/1
TABLET ORAL
Qty: 90 TABLET | Refills: 0 | Status: SHIPPED | OUTPATIENT
Start: 2022-05-06 | End: 2022-06-21

## 2022-05-06 RX ORDER — HYDROCHLOROTHIAZIDE 12.5 MG/1
TABLET ORAL
Qty: 180 TABLET | Refills: 0 | Status: SHIPPED | OUTPATIENT
Start: 2022-05-06 | End: 2022-06-21

## 2022-05-06 RX ORDER — CITALOPRAM HYDROBROMIDE 20 MG/1
TABLET ORAL
Qty: 90 TABLET | Refills: 0 | Status: SHIPPED | OUTPATIENT
Start: 2022-05-06 | End: 2022-06-21 | Stop reason: DRUGHIGH

## 2022-05-06 RX ORDER — BUPROPION HYDROCHLORIDE 150 MG/1
TABLET ORAL
Qty: 90 TABLET | Refills: 0 | Status: SHIPPED | OUTPATIENT
Start: 2022-05-06 | End: 2022-06-21

## 2022-05-06 RX ORDER — SIMVASTATIN 20 MG
TABLET ORAL
Qty: 90 TABLET | Refills: 0 | Status: SHIPPED | OUTPATIENT
Start: 2022-05-06 | End: 2022-06-21

## 2022-05-06 NOTE — TELEPHONE ENCOUNTER
Routing refill request to provider for review/approval because:  LDL Cholesterol Calculated   Date Value Ref Range Status   02/11/2021 58 <100 mg/dL Final     Comment:     Desirable:       <100 mg/dl     PHQ-9 score:    PHQ 7/28/2021   PHQ-9 Total Score 0   Q9: Thoughts of better off dead/self-harm past 2 weeks Not at all     BP Readings from Last 3 Encounters:   03/18/22 (!) 135/94   08/31/21 133/82   08/26/21 (!) 142/76           Jung Odonnell, RN  [facility] Triage Nurse

## 2022-05-09 ENCOUNTER — VIRTUAL VISIT (OUTPATIENT)
Dept: ONCOLOGY | Facility: CLINIC | Age: 72
End: 2022-05-09
Attending: INTERNAL MEDICINE
Payer: MEDICARE

## 2022-05-09 DIAGNOSIS — Z17.0 MALIGNANT NEOPLASM OF UPPER-OUTER QUADRANT OF LEFT BREAST IN FEMALE, ESTROGEN RECEPTOR POSITIVE (H): Primary | ICD-10-CM

## 2022-05-09 DIAGNOSIS — M85.9 DISORDER OF BONE DENSITY AND STRUCTURE, UNSPECIFIED: ICD-10-CM

## 2022-05-09 DIAGNOSIS — Z12.31 ENCOUNTER FOR SCREENING MAMMOGRAM FOR MALIGNANT NEOPLASM OF BREAST: ICD-10-CM

## 2022-05-09 DIAGNOSIS — Z79.811 LONG TERM (CURRENT) USE OF AROMATASE INHIBITORS: ICD-10-CM

## 2022-05-09 DIAGNOSIS — E11.9 TYPE 2 DIABETES MELLITUS WITHOUT COMPLICATION, WITHOUT LONG-TERM CURRENT USE OF INSULIN (H): ICD-10-CM

## 2022-05-09 DIAGNOSIS — C50.412 MALIGNANT NEOPLASM OF UPPER-OUTER QUADRANT OF LEFT BREAST IN FEMALE, ESTROGEN RECEPTOR POSITIVE (H): Primary | ICD-10-CM

## 2022-05-09 PROCEDURE — G0463 HOSPITAL OUTPT CLINIC VISIT: HCPCS | Mod: PN,RTG | Performed by: INTERNAL MEDICINE

## 2022-05-09 PROCEDURE — 99214 OFFICE O/P EST MOD 30 MIN: CPT | Mod: 95 | Performed by: INTERNAL MEDICINE

## 2022-05-09 NOTE — LETTER
5/9/2022         RE: Diane Gaitan  [address]  [place] [address]        Dear Colleague,    Thank you for referring your patient, Diane Gaitan, to the [facility]. Please see a copy of my visit note below.    Diane is a 72 year old who is being evaluated via a billable video visit.      How would you like to obtain your AVS? Mail a copy  If the video visit is dropped, the invitation should be resent by: Text to cell phone: [phone]  Will anyone else be joining your video visit? No       Lois Witt VF     Video Start Time: 2:09 PM  Video-Visit Details    Type of service:  Video Visit (converted to telephone due to patient difficulty with video access).    Video End Time:2:22 PM    Originating Location (pt. Location): Home    Distant Location (provider location):  [facility]     Platform used for Video Visit: [facility]    Hematology/Oncology Established Patient Note      Today's Date: 5/9/2022    Reason for follow-up: Left breast cancer.    HISTORY OF PRESENT ILLNESS: Diane Gaitan is a 72 year old female who presents with the following oncologic history:  1.  11/5/2019: Mammogram showed focal asymmetry in the lateral left breast at the 2-4 o'clock position.  2.  11/12/2019: Targeted left breast ultrasound showed an irregular hypoechoic mass at the 2 o'clock position, 5 cm from the nipple measuring 1.7 x 1.2 x 1.4 cm.  Survey of the axilla showed no evidence of adenopathy.  3.  11/15/2019: Ultrasound-guided left breast needle biopsy at 2:00, 5 cm from the nipple showed a grade 2 invasive mammary carcinoma ER strongly positive at greater than 95%, MS, moderate to strongly positive at 80%, HER-2/guadalupe FISH negative.  4.  12/04/2019: Bilateral breast MRI showed left upper outer quadrant biopsy invasive mammary carcinoma measuring up to 1.7 cm and no other concerning areas of enhancement or  lymphadenopathy.  5.  12/19/2019: Underwent left breast lumpectomy with left axillary sentinel lymph node biopsy under the care of Dr. Chidi Malik.  Pathology showed a grade 3 invasive pleomorphic lobular carcinoma measuring 1.5 cm, lymphovascular invasion not identified, margins negative for invasive carcinoma.  A total of 3 left axillary sentinel lymph nodes negative for malignancy.  Oncotype DX score = 8, corresponding to a distant recurrence risk at 9 years of 3% after 5 years of hormone blockade therapy and a less than 1% absolute chemotherapy benefit.  6.  2/25/2020: Completed adjuvant radiation therapy to the left breast.  7. 3/04/2020: Started adjuvant anastrazole.    INTERIM HISTORY:  Diane reports no new breast issues. She still has weak lower extremities post-COVID from 11/2020.  She uses a cane. She has occasional shaking of her extremities.    REVIEW OF SYSTEMS:   14 point ROS was reviewed and is negative other than as noted above in HPI.       HOME MEDICATIONS:  Current Outpatient Medications   Medication Sig Dispense Refill     amLODIPine (NORVASC) 5 MG tablet Take 1 tablet (5 mg) by mouth daily 90 tablet 3     anastrozole (ARIMIDEX) 1 MG tablet Take 1 tablet (1 mg) by mouth daily 90 tablet 3     aspirin 81 MG tablet Take 1 tablet by mouth daily. 90 tablet 3     buPROPion (WELLBUTRIN XL) 150 MG 24 hr tablet Take 1 tablet (150 mg) by mouth every morning 90 tablet 3     calcium carbonate 600 mg-vitamin D 400 units (CALTRATE) 600-400 MG-UNIT per tablet Take 1 tablet by mouth 2 times daily        citalopram (CELEXA) 20 MG tablet Take 1 tablet (20 mg) by mouth daily 90 tablet 3     gabapentin (NEURONTIN) 300 MG capsule Take 1 capsule (300 mg) by mouth 3 times daily 270 capsule 3     hydrochlorothiazide (HYDRODIURIL) 12.5 MG tablet TAKE TWO TABLETS BY MOUTH EVERY  tablet 1     LORazepam (ATIVAN) 0.5 MG tablet Take 1 tablet (0.5 mg) by mouth every 6 hours as needed for anxiety 2 tablet 0     LORazepam  (ATIVAN) 0.5 MG tablet TK 1 PO 30 MINUTES PRIOR TO MRI, may repeat times 1 2 tablet 0     losartan (COZAAR) 100 MG tablet Take 1 tablet (100 mg) by mouth daily 90 tablet 3     metFORMIN (GLUCOPHAGE) 500 MG tablet Take 1 tablet (500 mg) by mouth 2 times daily (with meals) 180 tablet 3     omeprazole (PRILOSEC) 20 MG DR capsule Take 1 capsule (20 mg) by mouth daily 90 capsule 1     simvastatin (ZOCOR) 20 MG tablet TAKE ONE TABLET BY MOUTH EVERY DAY AT BEDTIME 90 tablet 0         ALLERGIES:  Allergies   Allergen Reactions     Lisinopril Cough     COUGH         PAST MEDICAL HISTORY:  Past Medical History:   Diagnosis Date     Arthritis      BENIGN HYPERTENSION 8/28/2002     Cancer (H)     Basal cell carcinoma     CARDIOVASCULAR SCREENING; LDL GOAL LESS THAN 100 10/31/2010     Depressive disorder 1997     Esophageal reflux      Mild major depression (H) 9/14/2010     Other malaise and fatigue 8/28/2002     Type 2 diabetes, HbA1c goal < 7% (H) 10/31/2010   Possible transient ischemic attack.      PAST SURGICAL HISTORY:  Past Surgical History:   Procedure Laterality Date     ARTHROPLASTY KNEE  10/18/2012    Procedure: ARTHROPLASTY KNEE;  RIGHT TOTAL KNEE ARTHROPLASTY (SMITH & NEPHEW)^ ;  Surgeon: Howard Charles MD;  Location:  OR     BIOPSY NODE SENTINEL Left 12/19/2019    Procedure: LEFT SENTINEL LYMPH NODE BIOPSY;  Surgeon: Ruddy Malik MD;  Location:  OR     BREAST BIOPSY, RT/LT  1988    breast biopsy     COLONOSCOPY N/A 7/14/2016    Procedure: COLONOSCOPY;  Surgeon: Curt Patel MD;  Location:  GI     ESOPHAGOSCOPY, GASTROSCOPY, DUODENOSCOPY (EGD), COMBINED N/A 9/13/2018    Procedure: COMBINED ESOPHAGOSCOPY, GASTROSCOPY, DUODENOSCOPY (EGD);  gastroscopy;  Surgeon: Mac White MD;  Location:  GI     HYSTERECTOMY, PAP NO LONGER INDICATED  1986    abdominal hysterectomy     LUMPECTOMY BREAST WITH SEED LOCALIZATION Left 12/19/2019    Procedure: SEED LOCALIZED LEFT BREAST  LUMPECTOMY;  Surgeon: Ruddy Malik MD;  Location: [facility] OR     ROTATOR CUFF REPAIR RT/LT Right      TUBAL LIGATION      at age 29     ZZC NONSPECIFIC PROCEDURE  10/30/96    skin tags removed     [facility] NONSPECIFIC PROCEDURE      colonic polyps   Ovaries intact.      SOCIAL HISTORY:  Social History     Socioeconomic History     Marital status:      Spouse name: Not on file     Number of children: Not on file     Years of education: Not on file     Highest education level: Not on file   Occupational History     Not on file   Tobacco Use     Smoking status: Former Smoker     Packs/day: 1.00     Years: 12.00     Pack years: 12.00     Quit date: 10/18/1980     Years since quittin.5     Smokeless tobacco: Never Used   Substance and Sexual Activity     Alcohol use: Yes     Comment: 12 Beers per week     Drug use: No     Sexual activity: Never     Partners: Male   Other Topics Concern     Parent/sibling w/ CABG, MI or angioplasty before 65F 55M? Not Asked   Social History Narrative     Not on file     Social Determinants of Health     Financial Resource Strain: Not on file   Food Insecurity: Not on file   Transportation Needs: Not on file   Physical Activity: Not on file   Stress: Not on file   Social Connections: Not on file   Intimate Partner Violence: Not on file   Housing Stability: Not on file         FAMILY HISTORY:  Family History   Problem Relation Age of Onset     Breast Cancer Sister      Hypertension Daughter      Breast Cancer Daughter 43     Skin Cancer Mother      Coronary Artery Disease Father      Colon Cancer Sister      Breast Cancer Maternal Aunt         Many Maternal Aunts have had breast Cancer         PHYSICAL EXAM:  Vital signs:  There were no vitals taken for this visit.   Not performed as this was a telephone visit.    LABS:  CBC RESULTS:   Recent Labs   Lab Test 19  0837  10/30/18  0950   WBC  --   --  5.3   RBC  --   --  4.83   HGB 12.9   < > 9.5*   HCT  --   --  33.5*   MCV   --   --  69*   MCH  --   --  19.7*   MCHC  --   --  28.4*   RDW  --   --  20.5*      < > 280    < > = values in this interval not displayed.     Last Comprehensive Metabolic Panel:  Sodium   Date Value Ref Range Status   06/28/2021 133 133 - 144 mmol/L Final     Potassium   Date Value Ref Range Status   06/28/2021 4.2 3.4 - 5.3 mmol/L Final     Chloride   Date Value Ref Range Status   06/28/2021 99 94 - 109 mmol/L Final     Carbon Dioxide   Date Value Ref Range Status   06/28/2021 29 20 - 32 mmol/L Final     Anion Gap   Date Value Ref Range Status   06/28/2021 5 3 - 14 mmol/L Final     Glucose   Date Value Ref Range Status   06/28/2021 127 (H) 70 - 99 mg/dL Final     Urea Nitrogen   Date Value Ref Range Status   06/28/2021 11 7 - 30 mg/dL Final     Creatinine   Date Value Ref Range Status   06/28/2021 0.62 0.52 - 1.04 mg/dL Final     GFR Estimate   Date Value Ref Range Status   06/28/2021 90 >60 mL/min/[1.73_m2] Final     Comment:     Non  GFR Calc  Starting 12/18/2018, serum creatinine based estimated GFR (eGFR) will be   calculated using the Chronic Kidney Disease Epidemiology Collaboration   (CKD-EPI) equation.       Calcium   Date Value Ref Range Status   06/28/2021 9.2 8.5 - 10.1 mg/dL Final     Bilirubin Total   Date Value Ref Range Status   02/11/2021 1.0 0.2 - 1.3 mg/dL Final     Alkaline Phosphatase   Date Value Ref Range Status   02/11/2021 103 40 - 150 U/L Final     ALT   Date Value Ref Range Status   02/11/2021 29 0 - 50 U/L Final     AST   Date Value Ref Range Status   02/11/2021 20 0 - 45 U/L Final       PATHOLOGY:  None new.    IMAGING:  3/2/2020: DEXA scan -- normal bone mineral density.    12/1/2021: Mammogram showed no suspicious findings.    ASSESSMENT/PLAN:  Diane Gaitan is a 72 year old female with the following issues:  1.  Stage IA, [id number], grade 3 invasive pleomorphic lobular carcinoma of the left upper outer breast, ER strongly positive, ID positive, HER-2/guadalupe  FISH negative, Oncotype DX = 8  -Diane has no clinical evidence for recurrent breast cancer based on clinical history. She is tolerating adjuvant anastrozole very well with no significant side effects.  The leg weakness she has had is related to past COVID-19 infection and not from anastrazole.  -Note she is not a great candidate for tamoxifen given her history of a possible TIA.   -Her baseline DEXA scan from 3/2/2020 showed normal bone mineral density.  I recommended adequate calcium and vitamin D as well as weightbearing exercise if tolerated. She does use a cane and is careful about her movement and exercise.  -Plan to repeat DEXA scan in 2022, prior to next visit.  -Continue annual mammograms, next due 12/2022.    2.  Diabetes mellitus type 2  -Stable.  -She will continue on metformin.    3.  Strong family history of breast cancer  -Her daughter had already undergone a genetics consult with negative genetic testing.  The  had informed her daughter that none of her other family members would require genetic testing.    4. Essential hypertension  -Continue on losartan, hydrochlorothiazide, and amlodipine. Diane does monitor her BP at home with her cuff. Advised follow-up with her primary care team.    5. History of COVID-19 viral infection  -She had cough, body aches, fatigue, and diarrhea and tested positive for COVID-19 on 11/16/2020.  -She has had residual effect from COVID-19 with bilateral leg weakness and tremors.  -She did receive J&J COVID-19 vaccination.    Return in 6 months with DEXA.    Myrna Kaye MD  Hematology/Oncology  [facility]    Total time spent: 30 minutes in patient evaluation, discussion of plan of care, and documentation.        Again, thank you for allowing me to participate in the care of your patient.        Sincerely,        Myrna Kaye MD

## 2022-05-09 NOTE — LETTER
5/9/2022         RE: Diane Gaitan  [address]  [facility] [address]        Dear Colleague,    Thank you for referring your patient, Diane Gaitan, to the [facility]. Please see a copy of my visit note below.    Diane is a 72 year old who is being evaluated via a billable video visit.      How would you like to obtain your AVS? Mail a copy  If the video visit is dropped, the invitation should be resent by: Text to cell phone: [phone]  Will anyone else be joining your video visit? No       Lois Witt VF     Video Start Time: 2:09 PM  Video-Visit Details    Type of service:  Video Visit (converted to telephone due to patient difficulty with video access).    Video End Time:2:22 PM    Originating Location (pt. Location): Home    Distant Location (provider location):  [facility]     Platform used for Video Visit: [facility]    Hematology/Oncology Established Patient Note      Today's Date: 5/9/2022    Reason for follow-up: Left breast cancer.    HISTORY OF PRESENT ILLNESS: Diane Gaitan is a 72 year old female who presents with the following oncologic history:  1.  11/5/2019: Mammogram showed focal asymmetry in the lateral left breast at the 2-4 o'clock position.  2.  11/12/2019: Targeted left breast ultrasound showed an irregular hypoechoic mass at the 2 o'clock position, 5 cm from the nipple measuring 1.7 x 1.2 x 1.4 cm.  Survey of the axilla showed no evidence of adenopathy.  3.  11/15/2019: Ultrasound-guided left breast needle biopsy at 2:00, 5 cm from the nipple showed a grade 2 invasive mammary carcinoma ER strongly positive at greater than 95%, PA, moderate to strongly positive at 80%, HER-2/guadalupe FISH negative.  4.  12/04/2019: Bilateral breast MRI showed left upper outer quadrant biopsy invasive mammary carcinoma measuring up to 1.7 cm and no other concerning areas of enhancement or  lymphadenopathy.  5.  12/19/2019: Underwent left breast lumpectomy with left axillary sentinel lymph node biopsy under the care of Dr. Chidi Malik.  Pathology showed a grade 3 invasive pleomorphic lobular carcinoma measuring 1.5 cm, lymphovascular invasion not identified, margins negative for invasive carcinoma.  A total of 3 left axillary sentinel lymph nodes negative for malignancy.  Oncotype DX score = 8, corresponding to a distant recurrence risk at 9 years of 3% after 5 years of hormone blockade therapy and a less than 1% absolute chemotherapy benefit.  6.  2/25/2020: Completed adjuvant radiation therapy to the left breast.  7. 3/04/2020: Started adjuvant anastrazole.    INTERIM HISTORY:  Diane reports no new breast issues. She still has weak lower extremities post-COVID from 11/2020.  She uses a cane. She has occasional shaking of her extremities.    REVIEW OF SYSTEMS:   14 point ROS was reviewed and is negative other than as noted above in HPI.       HOME MEDICATIONS:  Current Outpatient Medications   Medication Sig Dispense Refill     amLODIPine (NORVASC) 5 MG tablet Take 1 tablet (5 mg) by mouth daily 90 tablet 3     anastrozole (ARIMIDEX) 1 MG tablet Take 1 tablet (1 mg) by mouth daily 90 tablet 3     aspirin 81 MG tablet Take 1 tablet by mouth daily. 90 tablet 3     buPROPion (WELLBUTRIN XL) 150 MG 24 hr tablet Take 1 tablet (150 mg) by mouth every morning 90 tablet 3     calcium carbonate 600 mg-vitamin D 400 units (CALTRATE) 600-400 MG-UNIT per tablet Take 1 tablet by mouth 2 times daily        citalopram (CELEXA) 20 MG tablet Take 1 tablet (20 mg) by mouth daily 90 tablet 3     gabapentin (NEURONTIN) 300 MG capsule Take 1 capsule (300 mg) by mouth 3 times daily 270 capsule 3     hydrochlorothiazide (HYDRODIURIL) 12.5 MG tablet TAKE TWO TABLETS BY MOUTH EVERY  tablet 1     LORazepam (ATIVAN) 0.5 MG tablet Take 1 tablet (0.5 mg) by mouth every 6 hours as needed for anxiety 2 tablet 0     LORazepam  (ATIVAN) 0.5 MG tablet TK 1 PO 30 MINUTES PRIOR TO MRI, may repeat times 1 2 tablet 0     losartan (COZAAR) 100 MG tablet Take 1 tablet (100 mg) by mouth daily 90 tablet 3     metFORMIN (GLUCOPHAGE) 500 MG tablet Take 1 tablet (500 mg) by mouth 2 times daily (with meals) 180 tablet 3     omeprazole (PRILOSEC) 20 MG DR capsule Take 1 capsule (20 mg) by mouth daily 90 capsule 1     simvastatin (ZOCOR) 20 MG tablet TAKE ONE TABLET BY MOUTH EVERY DAY AT BEDTIME 90 tablet 0         ALLERGIES:  Allergies   Allergen Reactions     Lisinopril Cough     COUGH         PAST MEDICAL HISTORY:  Past Medical History:   Diagnosis Date     Arthritis      BENIGN HYPERTENSION 8/28/2002     Cancer (H)     Basal cell carcinoma     CARDIOVASCULAR SCREENING; LDL GOAL LESS THAN 100 10/31/2010     Depressive disorder 1997     Esophageal reflux      Mild major depression (H) 9/14/2010     Other malaise and fatigue 8/28/2002     Type 2 diabetes, HbA1c goal < 7% (H) 10/31/2010   Possible transient ischemic attack.      PAST SURGICAL HISTORY:  Past Surgical History:   Procedure Laterality Date     ARTHROPLASTY KNEE  10/18/2012    Procedure: ARTHROPLASTY KNEE;  RIGHT TOTAL KNEE ARTHROPLASTY (SMITH & NEPHEW)^ ;  Surgeon: Howard Charles MD;  Location:  OR     BIOPSY NODE SENTINEL Left 12/19/2019    Procedure: LEFT SENTINEL LYMPH NODE BIOPSY;  Surgeon: Ruddy Malik MD;  Location:  OR     BREAST BIOPSY, RT/LT  1988    breast biopsy     COLONOSCOPY N/A 7/14/2016    Procedure: COLONOSCOPY;  Surgeon: Curt Patel MD;  Location:  GI     ESOPHAGOSCOPY, GASTROSCOPY, DUODENOSCOPY (EGD), COMBINED N/A 9/13/2018    Procedure: COMBINED ESOPHAGOSCOPY, GASTROSCOPY, DUODENOSCOPY (EGD);  gastroscopy;  Surgeon: Mac White MD;  Location:  GI     HYSTERECTOMY, PAP NO LONGER INDICATED  1986    abdominal hysterectomy     LUMPECTOMY BREAST WITH SEED LOCALIZATION Left 12/19/2019    Procedure: SEED LOCALIZED LEFT BREAST  LUMPECTOMY;  Surgeon: Ruddy Malik MD;  Location: [facility] OR     ROTATOR CUFF REPAIR RT/LT Right      TUBAL LIGATION      at age 29     ZZC NONSPECIFIC PROCEDURE  10/30/96    skin tags removed     [facility] NONSPECIFIC PROCEDURE      colonic polyps   Ovaries intact.      SOCIAL HISTORY:  Social History     Socioeconomic History     Marital status:      Spouse name: Not on file     Number of children: Not on file     Years of education: Not on file     Highest education level: Not on file   Occupational History     Not on file   Tobacco Use     Smoking status: Former Smoker     Packs/day: 1.00     Years: 12.00     Pack years: 12.00     Quit date: 10/18/1980     Years since quittin.5     Smokeless tobacco: Never Used   Substance and Sexual Activity     Alcohol use: Yes     Comment: 12 Beers per week     Drug use: No     Sexual activity: Never     Partners: Male   Other Topics Concern     Parent/sibling w/ CABG, MI or angioplasty before 65F 55M? Not Asked   Social History Narrative     Not on file     Social Determinants of Health     Financial Resource Strain: Not on file   Food Insecurity: Not on file   Transportation Needs: Not on file   Physical Activity: Not on file   Stress: Not on file   Social Connections: Not on file   Intimate Partner Violence: Not on file   Housing Stability: Not on file         FAMILY HISTORY:  Family History   Problem Relation Age of Onset     Breast Cancer Sister      Hypertension Daughter      Breast Cancer Daughter 43     Skin Cancer Mother      Coronary Artery Disease Father      Colon Cancer Sister      Breast Cancer Maternal Aunt         Many Maternal Aunts have had breast Cancer         PHYSICAL EXAM:  Vital signs:  There were no vitals taken for this visit.   Not performed as this was a telephone visit.    LABS:  CBC RESULTS:   Recent Labs   Lab Test 19  0837  10/30/18  0950   WBC  --   --  5.3   RBC  --   --  4.83   HGB 12.9   < > 9.5*   HCT  --   --  33.5*   MCV   --   --  69*   MCH  --   --  19.7*   MCHC  --   --  28.4*   RDW  --   --  20.5*      < > 280    < > = values in this interval not displayed.     Last Comprehensive Metabolic Panel:  Sodium   Date Value Ref Range Status   06/28/2021 133 133 - 144 mmol/L Final     Potassium   Date Value Ref Range Status   06/28/2021 4.2 3.4 - 5.3 mmol/L Final     Chloride   Date Value Ref Range Status   06/28/2021 99 94 - 109 mmol/L Final     Carbon Dioxide   Date Value Ref Range Status   06/28/2021 29 20 - 32 mmol/L Final     Anion Gap   Date Value Ref Range Status   06/28/2021 5 3 - 14 mmol/L Final     Glucose   Date Value Ref Range Status   06/28/2021 127 (H) 70 - 99 mg/dL Final     Urea Nitrogen   Date Value Ref Range Status   06/28/2021 11 7 - 30 mg/dL Final     Creatinine   Date Value Ref Range Status   06/28/2021 0.62 0.52 - 1.04 mg/dL Final     GFR Estimate   Date Value Ref Range Status   06/28/2021 90 >60 mL/min/[1.73_m2] Final     Comment:     Non  GFR Calc  Starting 12/18/2018, serum creatinine based estimated GFR (eGFR) will be   calculated using the Chronic Kidney Disease Epidemiology Collaboration   (CKD-EPI) equation.       Calcium   Date Value Ref Range Status   06/28/2021 9.2 8.5 - 10.1 mg/dL Final     Bilirubin Total   Date Value Ref Range Status   02/11/2021 1.0 0.2 - 1.3 mg/dL Final     Alkaline Phosphatase   Date Value Ref Range Status   02/11/2021 103 40 - 150 U/L Final     ALT   Date Value Ref Range Status   02/11/2021 29 0 - 50 U/L Final     AST   Date Value Ref Range Status   02/11/2021 20 0 - 45 U/L Final       PATHOLOGY:  None new.    IMAGING:  3/2/2020: DEXA scan -- normal bone mineral density.    12/1/2021: Mammogram showed no suspicious findings.    ASSESSMENT/PLAN:  Diane Gaitan is a 72 year old female with the following issues:  1.  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 She is tolerating adjuvant anastrozole very well with no significant side effects.  The leg weakness she has had is related to past COVID-19 infection and not from anastrazole.  -Note she is not a great candidate for tamoxifen given her history of a possible TIA.   -Her baseline DEXA scan from 3/2/2020 showed normal bone mineral density.  I recommended adequate calcium and vitamin D as well as weightbearing exercise if tolerated. She does use a cane and is careful about her movement and exercise.  -Plan to repeat DEXA scan in 2022, prior to next visit.  -Continue annual mammograms, next due 12/2022.    2.  Diabetes mellitus type 2  -Stable.  -She will continue on metformin.    3.  Strong family history of breast cancer  -Her daughter had already undergone a genetics consult with negative genetic testing.  The  had informed her daughter that none of her other family members would require genetic testing.    4. Essential hypertension  -Continue on losartan, hydrochlorothiazide, and amlodipine. Diane does monitor her BP at home with her cuff. Advised follow-up with her primary care team.    5. History of COVID-19 viral infection  -She had cough, body aches, fatigue, and diarrhea and tested positive for COVID-19 on 11/16/2020.  -She has had residual effect from COVID-19 with bilateral leg weakness and tremors.  -She did receive J&J COVID-19 vaccination.    Return in 6 months with DEXA.    Myrna Kaye MD  Hematology/Oncology  [facility]    Total time spent: 30 minutes in patient evaluation, discussion of plan of care, and documentation.        Again, thank you for allowing me to participate in the care of your patient.        Sincerely,        Myrna Kaye MD

## 2022-06-21 ENCOUNTER — HOSPITAL ENCOUNTER (EMERGENCY)
Facility: CLINIC | Age: 72
Discharge: HOME | End: 2022-06-21
Attending: PHYSICIAN ASSISTANT | Admitting: PHYSICIAN ASSISTANT
Payer: MEDICARE

## 2022-06-21 ENCOUNTER — OFFICE VISIT (OUTPATIENT)
Dept: INTERNAL MEDICINE | Facility: CLINIC | Age: 72
End: 2022-06-21
Payer: MEDICARE

## 2022-06-21 VITALS
OXYGEN SATURATION: 98 % | RESPIRATION RATE: 16 BRPM | DIASTOLIC BLOOD PRESSURE: 67 MMHG | TEMPERATURE: 98.7 F | HEART RATE: 98 BPM | SYSTOLIC BLOOD PRESSURE: 135 MMHG

## 2022-06-21 VITALS
HEART RATE: 96 BPM | BODY MASS INDEX: 29.27 KG/M2 | SYSTOLIC BLOOD PRESSURE: 138 MMHG | TEMPERATURE: 99 F | HEIGHT: 67 IN | OXYGEN SATURATION: 98 % | DIASTOLIC BLOOD PRESSURE: 72 MMHG | RESPIRATION RATE: 16 BRPM | WEIGHT: 186.5 LBS

## 2022-06-21 DIAGNOSIS — R06.00 DYSPNEA, UNSPECIFIED TYPE: ICD-10-CM

## 2022-06-21 DIAGNOSIS — K21.9 GASTROESOPHAGEAL REFLUX DISEASE WITHOUT ESOPHAGITIS: ICD-10-CM

## 2022-06-21 DIAGNOSIS — Z13.6 CARDIOVASCULAR SCREENING; LDL GOAL LESS THAN 100: ICD-10-CM

## 2022-06-21 DIAGNOSIS — E11.42 DIABETIC POLYNEUROPATHY ASSOCIATED WITH TYPE 2 DIABETES MELLITUS (H): ICD-10-CM

## 2022-06-21 DIAGNOSIS — D64.9 ANEMIA, UNSPECIFIED TYPE: ICD-10-CM

## 2022-06-21 DIAGNOSIS — I10 ESSENTIAL HYPERTENSION, BENIGN: ICD-10-CM

## 2022-06-21 DIAGNOSIS — I48.91 ATRIAL FIBRILLATION, UNSPECIFIED TYPE (H): ICD-10-CM

## 2022-06-21 DIAGNOSIS — Z12.31 VISIT FOR SCREENING MAMMOGRAM: ICD-10-CM

## 2022-06-21 DIAGNOSIS — D50.9 MICROCYTIC ANEMIA: ICD-10-CM

## 2022-06-21 DIAGNOSIS — M79.10 MYALGIA: ICD-10-CM

## 2022-06-21 DIAGNOSIS — C50.412 MALIGNANT NEOPLASM OF UPPER-OUTER QUADRANT OF LEFT BREAST IN FEMALE, ESTROGEN RECEPTOR POSITIVE (H): ICD-10-CM

## 2022-06-21 DIAGNOSIS — Z17.0 MALIGNANT NEOPLASM OF UPPER-OUTER QUADRANT OF LEFT BREAST IN FEMALE, ESTROGEN RECEPTOR POSITIVE (H): ICD-10-CM

## 2022-06-21 DIAGNOSIS — E66.01 MORBID OBESITY (H): ICD-10-CM

## 2022-06-21 DIAGNOSIS — E11.9 TYPE 2 DIABETES MELLITUS WITHOUT COMPLICATION, WITHOUT LONG-TERM CURRENT USE OF INSULIN (H): ICD-10-CM

## 2022-06-21 DIAGNOSIS — Z12.11 COLON CANCER SCREENING: ICD-10-CM

## 2022-06-21 DIAGNOSIS — Z00.00 ENCOUNTER FOR SUBSEQUENT ANNUAL WELLNESS VISIT IN MEDICARE PATIENT: Primary | ICD-10-CM

## 2022-06-21 DIAGNOSIS — F32.0 MAJOR DEPRESSIVE DISORDER, SINGLE EPISODE, MILD (H): ICD-10-CM

## 2022-06-21 DIAGNOSIS — I21.4 NSTEMI (NON-ST ELEVATED MYOCARDIAL INFARCTION) (H): ICD-10-CM

## 2022-06-21 PROBLEM — C50.912 MALIGNANT NEOPLASM OF LEFT FEMALE BREAST, UNSPECIFIED ESTROGEN RECEPTOR STATUS, UNSPECIFIED SITE OF BREAST (H): Status: RESOLVED | Noted: 2019-12-05 | Resolved: 2022-06-21

## 2022-06-21 LAB
ABO/RH(D): NORMAL
ALBUMIN SERPL-MCNC: 3.6 G/DL (ref 3.4–5)
ALBUMIN SERPL-MCNC: 3.8 G/DL (ref 3.4–5)
ALP SERPL-CCNC: 101 U/L (ref 40–150)
ALP SERPL-CCNC: 106 U/L (ref 40–150)
ALT SERPL W P-5'-P-CCNC: 16 U/L (ref 0–50)
ALT SERPL W P-5'-P-CCNC: 16 U/L (ref 0–50)
ANION GAP SERPL CALCULATED.3IONS-SCNC: 7 MMOL/L (ref 3–14)
ANION GAP SERPL CALCULATED.3IONS-SCNC: 8 MMOL/L (ref 3–14)
ANTIBODY SCREEN: NEGATIVE
AST SERPL W P-5'-P-CCNC: 10 U/L (ref 0–45)
AST SERPL W P-5'-P-CCNC: 11 U/L (ref 0–45)
ATRIAL RATE - MUSE: 100 BPM
BASOPHILS # BLD AUTO: 0 10E3/UL (ref 0–0.2)
BASOPHILS NFR BLD AUTO: 1 %
BILIRUB SERPL-MCNC: 0.7 MG/DL (ref 0.2–1.3)
BILIRUB SERPL-MCNC: 0.9 MG/DL (ref 0.2–1.3)
BLD PROD TYP BPU: NORMAL
BLOOD COMPONENT TYPE: NORMAL
BUN SERPL-MCNC: 16 MG/DL (ref 7–30)
BUN SERPL-MCNC: 18 MG/DL (ref 7–30)
CALCIUM SERPL-MCNC: 10 MG/DL (ref 8.5–10.1)
CALCIUM SERPL-MCNC: 9.6 MG/DL (ref 8.5–10.1)
CHLORIDE BLD-SCNC: 102 MMOL/L (ref 94–109)
CHLORIDE BLD-SCNC: 102 MMOL/L (ref 94–109)
CHOLEST SERPL-MCNC: 120 MG/DL
CK SERPL-CCNC: 42 U/L (ref 30–225)
CO2 SERPL-SCNC: 24 MMOL/L (ref 20–32)
CO2 SERPL-SCNC: 26 MMOL/L (ref 20–32)
CODING SYSTEM: NORMAL
CREAT SERPL-MCNC: 0.71 MG/DL (ref 0.52–1.04)
CREAT SERPL-MCNC: 0.8 MG/DL (ref 0.52–1.04)
CREAT UR-MCNC: 108 MG/DL
CROSSMATCH: NORMAL
CRP SERPL-MCNC: <5 MG/L
DIASTOLIC BLOOD PRESSURE - MUSE: NORMAL MMHG
EOSINOPHIL # BLD AUTO: 0.1 10E3/UL (ref 0–0.7)
EOSINOPHIL NFR BLD AUTO: 1 %
ERYTHROCYTE [DISTWIDTH] IN BLOOD BY AUTOMATED COUNT: 15.9 % (ref 10–15)
ERYTHROCYTE [DISTWIDTH] IN BLOOD BY AUTOMATED COUNT: 16.3 % (ref 10–15)
ERYTHROCYTE [SEDIMENTATION RATE] IN BLOOD BY WESTERGREN METHOD: 36 MM/HR (ref 0–30)
FASTING STATUS PATIENT QL REPORTED: YES
FERRITIN SERPL-MCNC: 8 NG/ML (ref 8–252)
GFR SERPL CREATININE-BSD FRML MDRD: 77 ML/MIN/1.73M2
GFR SERPL CREATININE-BSD FRML MDRD: 89 ML/MIN/1.73M2
GLUCOSE BLD-MCNC: 129 MG/DL (ref 70–99)
GLUCOSE BLD-MCNC: 140 MG/DL (ref 70–99)
HBA1C MFR BLD: 5.5 % (ref 0–5.6)
HCT VFR BLD AUTO: 27.2 % (ref 35–47)
HCT VFR BLD AUTO: 27.4 % (ref 35–47)
HDLC SERPL-MCNC: 67 MG/DL
HGB BLD-MCNC: 7.5 G/DL (ref 11.7–15.7)
HGB BLD-MCNC: 7.7 G/DL (ref 11.7–15.7)
HOLD SPECIMEN: NORMAL
HOLD SPECIMEN: NORMAL
IMM GRANULOCYTES # BLD: 0 10E3/UL
IMM GRANULOCYTES NFR BLD: 1 %
INTERPRETATION ECG - MUSE: NORMAL
IRON SATN MFR SERPL: 3 % (ref 15–46)
IRON SERPL-MCNC: 14 UG/DL (ref 35–180)
ISSUE DATE AND TIME: NORMAL
LDLC SERPL CALC-MCNC: 33 MG/DL
LYMPHOCYTES # BLD AUTO: 0.7 10E3/UL (ref 0.8–5.3)
LYMPHOCYTES NFR BLD AUTO: 12 %
MCH RBC QN AUTO: 18.1 PG (ref 26.5–33)
MCH RBC QN AUTO: 18.1 PG (ref 26.5–33)
MCHC RBC AUTO-ENTMCNC: 27.4 G/DL (ref 31.5–36.5)
MCHC RBC AUTO-ENTMCNC: 28.3 G/DL (ref 31.5–36.5)
MCV RBC AUTO: 64 FL (ref 78–100)
MCV RBC AUTO: 66 FL (ref 78–100)
MICROALBUMIN UR-MCNC: 8 MG/L
MICROALBUMIN/CREAT UR: 7.41 MG/G CR (ref 0–25)
MONOCYTES # BLD AUTO: 0.5 10E3/UL (ref 0–1.3)
MONOCYTES NFR BLD AUTO: 8 %
NEUTROPHILS # BLD AUTO: 4.8 10E3/UL (ref 1.6–8.3)
NEUTROPHILS NFR BLD AUTO: 77 %
NONHDLC SERPL-MCNC: 53 MG/DL
NRBC # BLD AUTO: 0 10E3/UL
NRBC BLD AUTO-RTO: 0 /100
P AXIS - MUSE: 44 DEGREES
PLATELET # BLD AUTO: 260 10E3/UL (ref 150–450)
PLATELET # BLD AUTO: 301 10E3/UL (ref 150–450)
POTASSIUM BLD-SCNC: 3.9 MMOL/L (ref 3.4–5.3)
POTASSIUM BLD-SCNC: 4.1 MMOL/L (ref 3.4–5.3)
PR INTERVAL - MUSE: 154 MS
PROT SERPL-MCNC: 7 G/DL (ref 6.8–8.8)
PROT SERPL-MCNC: 7.4 G/DL (ref 6.8–8.8)
QRS DURATION - MUSE: 80 MS
QT - MUSE: 344 MS
QTC - MUSE: 439 MS
R AXIS - MUSE: -15 DEGREES
RBC # BLD AUTO: 4.15 10E6/UL (ref 3.8–5.2)
RBC # BLD AUTO: 4.25 10E6/UL (ref 3.8–5.2)
SARS-COV-2 RNA RESP QL NAA+PROBE: NEGATIVE
SODIUM SERPL-SCNC: 134 MMOL/L (ref 133–144)
SODIUM SERPL-SCNC: 135 MMOL/L (ref 133–144)
SPECIMEN EXPIRATION DATE: NORMAL
SYSTOLIC BLOOD PRESSURE - MUSE: NORMAL MMHG
T AXIS - MUSE: 42 DEGREES
TIBC SERPL-MCNC: 484 UG/DL (ref 240–430)
TRIGL SERPL-MCNC: 102 MG/DL
TROPONIN I SERPL HS-MCNC: 5 NG/L
TSH SERPL DL<=0.005 MIU/L-ACNC: 0.78 MU/L (ref 0.4–4)
UNIT ABO/RH: NORMAL
UNIT NUMBER: NORMAL
UNIT STATUS: NORMAL
UNIT TYPE ISBT: 9500
VENTRICULAR RATE- MUSE: 98 BPM
VIT B12 SERPL-MCNC: 308 PG/ML (ref 232–1245)
WBC # BLD AUTO: 6.1 10E3/UL (ref 4–11)
WBC # BLD AUTO: 6.1 10E3/UL (ref 4–11)

## 2022-06-21 PROCEDURE — 86923 COMPATIBILITY TEST ELECTRIC: CPT | Performed by: PHYSICIAN ASSISTANT

## 2022-06-21 PROCEDURE — 99285 EMERGENCY DEPT VISIT HI MDM: CPT | Mod: 25

## 2022-06-21 PROCEDURE — 86850 RBC ANTIBODY SCREEN: CPT | Performed by: PHYSICIAN ASSISTANT

## 2022-06-21 PROCEDURE — 86901 BLOOD TYPING SEROLOGIC RH(D): CPT | Performed by: PHYSICIAN ASSISTANT

## 2022-06-21 PROCEDURE — 82607 VITAMIN B-12: CPT | Performed by: INTERNAL MEDICINE

## 2022-06-21 PROCEDURE — 86140 C-REACTIVE PROTEIN: CPT | Performed by: INTERNAL MEDICINE

## 2022-06-21 PROCEDURE — 36415 COLL VENOUS BLD VENIPUNCTURE: CPT | Performed by: INTERNAL MEDICINE

## 2022-06-21 PROCEDURE — 84484 ASSAY OF TROPONIN QUANT: CPT | Performed by: PHYSICIAN ASSISTANT

## 2022-06-21 PROCEDURE — C9803 HOPD COVID-19 SPEC COLLECT: HCPCS

## 2022-06-21 PROCEDURE — 82947 ASSAY GLUCOSE BLOOD QUANT: CPT | Performed by: EMERGENCY MEDICINE

## 2022-06-21 PROCEDURE — 83036 HEMOGLOBIN GLYCOSYLATED A1C: CPT | Performed by: INTERNAL MEDICINE

## 2022-06-21 PROCEDURE — 80050 GENERAL HEALTH PANEL: CPT | Performed by: INTERNAL MEDICINE

## 2022-06-21 PROCEDURE — 80061 LIPID PANEL: CPT | Performed by: INTERNAL MEDICINE

## 2022-06-21 PROCEDURE — 36430 TRANSFUSION BLD/BLD COMPNT: CPT

## 2022-06-21 PROCEDURE — 82043 UR ALBUMIN QUANTITATIVE: CPT | Performed by: INTERNAL MEDICINE

## 2022-06-21 PROCEDURE — U0003 INFECTIOUS AGENT DETECTION BY NUCLEIC ACID (DNA OR RNA); SEVERE ACUTE RESPIRATORY SYNDROME CORONAVIRUS 2 (SARS-COV-2) (CORONAVIRUS DISEASE [COVID-19]), AMPLIFIED PROBE TECHNIQUE, MAKING USE OF HIGH THROUGHPUT TECHNOLOGIES AS DESCRIBED BY CMS-2020-01-R: HCPCS | Performed by: EMERGENCY MEDICINE

## 2022-06-21 PROCEDURE — 93005 ELECTROCARDIOGRAM TRACING: CPT

## 2022-06-21 PROCEDURE — 86850 RBC ANTIBODY SCREEN: CPT | Performed by: EMERGENCY MEDICINE

## 2022-06-21 PROCEDURE — 82728 ASSAY OF FERRITIN: CPT | Performed by: INTERNAL MEDICINE

## 2022-06-21 PROCEDURE — 85025 COMPLETE CBC W/AUTO DIFF WBC: CPT | Performed by: EMERGENCY MEDICINE

## 2022-06-21 PROCEDURE — U0003 INFECTIOUS AGENT DETECTION BY NUCLEIC ACID (DNA OR RNA); SEVERE ACUTE RESPIRATORY SYNDROME CORONAVIRUS 2 (SARS-COV-2) (CORONAVIRUS DISEASE [COVID-19]), AMPLIFIED PROBE TECHNIQUE, MAKING USE OF HIGH THROUGHPUT TECHNOLOGIES AS DESCRIBED BY CMS-2020-01-R: HCPCS | Performed by: PHYSICIAN ASSISTANT

## 2022-06-21 PROCEDURE — 85652 RBC SED RATE AUTOMATED: CPT | Performed by: INTERNAL MEDICINE

## 2022-06-21 PROCEDURE — 99214 OFFICE O/P EST MOD 30 MIN: CPT | Mod: 25 | Performed by: INTERNAL MEDICINE

## 2022-06-21 PROCEDURE — 99397 PER PM REEVAL EST PAT 65+ YR: CPT | Performed by: INTERNAL MEDICINE

## 2022-06-21 PROCEDURE — 83550 IRON BINDING TEST: CPT | Performed by: INTERNAL MEDICINE

## 2022-06-21 PROCEDURE — 36415 COLL VENOUS BLD VENIPUNCTURE: CPT | Performed by: EMERGENCY MEDICINE

## 2022-06-21 PROCEDURE — 80053 COMPREHEN METABOLIC PANEL: CPT | Performed by: PHYSICIAN ASSISTANT

## 2022-06-21 PROCEDURE — 84484 ASSAY OF TROPONIN QUANT: CPT | Performed by: EMERGENCY MEDICINE

## 2022-06-21 PROCEDURE — P9016 RBC LEUKOCYTES REDUCED: HCPCS | Performed by: PHYSICIAN ASSISTANT

## 2022-06-21 PROCEDURE — 82550 ASSAY OF CK (CPK): CPT | Performed by: INTERNAL MEDICINE

## 2022-06-21 PROCEDURE — 85025 COMPLETE CBC W/AUTO DIFF WBC: CPT | Performed by: PHYSICIAN ASSISTANT

## 2022-06-21 RX ORDER — AMLODIPINE BESYLATE 5 MG/1
5 TABLET ORAL DAILY
Qty: 90 TABLET | Refills: 3 | Status: SHIPPED | OUTPATIENT
Start: 2022-06-21 | End: 2023-07-31

## 2022-06-21 RX ORDER — LOSARTAN POTASSIUM 100 MG/1
100 TABLET ORAL DAILY
Qty: 90 TABLET | Refills: 3 | Status: SHIPPED | OUTPATIENT
Start: 2022-06-21 | End: 2023-07-26

## 2022-06-21 RX ORDER — ANASTROZOLE 1 MG/1
1 TABLET ORAL DAILY
Qty: 90 TABLET | Refills: 3 | Status: SHIPPED | OUTPATIENT
Start: 2022-06-21 | End: 2023-05-18

## 2022-06-21 RX ORDER — HYDROCHLOROTHIAZIDE 12.5 MG/1
25 TABLET ORAL DAILY
Qty: 180 TABLET | Refills: 3 | Status: SHIPPED | OUTPATIENT
Start: 2022-06-21 | End: 2023-05-18

## 2022-06-21 RX ORDER — CITALOPRAM HYDROBROMIDE 20 MG/1
20 TABLET ORAL DAILY
Qty: 90 TABLET | Refills: 3 | Status: CANCELLED | OUTPATIENT
Start: 2022-06-21

## 2022-06-21 RX ORDER — SIMVASTATIN 20 MG
TABLET ORAL
Qty: 90 TABLET | Refills: 3 | Status: SHIPPED | OUTPATIENT
Start: 2022-06-21 | End: 2023-07-31

## 2022-06-21 RX ORDER — BUPROPION HYDROCHLORIDE 150 MG/1
150 TABLET ORAL EVERY MORNING
Qty: 90 TABLET | Refills: 3 | Status: SHIPPED | OUTPATIENT
Start: 2022-06-21 | End: 2023-07-31

## 2022-06-21 RX ORDER — CITALOPRAM HYDROBROMIDE 40 MG/1
40 TABLET ORAL DAILY
Qty: 90 TABLET | Refills: 1 | Status: SHIPPED | OUTPATIENT
Start: 2022-06-21 | End: 2022-11-14

## 2022-06-21 RX ORDER — FERROUS SULFATE 325(65) MG
325 TABLET ORAL 2 TIMES DAILY
Qty: 60 TABLET | Refills: 0 | Status: SHIPPED | OUTPATIENT
Start: 2022-06-21 | End: 2022-07-25

## 2022-06-21 ASSESSMENT — ENCOUNTER SYMPTOMS
MYALGIAS: 1
HEMATOCHEZIA: 0
CHILLS: 0
HEMATURIA: 0
PARESTHESIAS: 0
DIZZINESS: 1
HEARTBURN: 0
EYE PAIN: 0
FEVER: 0
ARTHRALGIAS: 1
WEAKNESS: 1
COUGH: 0
DIARRHEA: 0
PALPITATIONS: 0
SORE THROAT: 0
NERVOUS/ANXIOUS: 1
ABDOMINAL PAIN: 0
APPETITE CHANGE: 1
WEAKNESS: 1
CONSTIPATION: 0
FREQUENCY: 0
LIGHT-HEADEDNESS: 1
ACTIVITY CHANGE: 1
NAUSEA: 0
DYSURIA: 0
BREAST MASS: 0
JOINT SWELLING: 0
HEADACHES: 0
FATIGUE: 1
SHORTNESS OF BREATH: 1

## 2022-06-21 ASSESSMENT — PATIENT HEALTH QUESTIONNAIRE - PHQ9
SUM OF ALL RESPONSES TO PHQ QUESTIONS 1-9: 9
SUM OF ALL RESPONSES TO PHQ QUESTIONS 1-9: 9
10. IF YOU CHECKED OFF ANY PROBLEMS, HOW DIFFICULT HAVE THESE PROBLEMS MADE IT FOR YOU TO DO YOUR WORK, TAKE CARE OF THINGS AT HOME, OR GET ALONG WITH OTHER PEOPLE: VERY DIFFICULT

## 2022-06-21 ASSESSMENT — ACTIVITIES OF DAILY LIVING (ADL)
CURRENT_FUNCTION: HOUSEWORK REQUIRES ASSISTANCE
CURRENT_FUNCTION: PREPARING MEALS REQUIRES ASSISTANCE
CURRENT_FUNCTION: SHOPPING REQUIRES ASSISTANCE
CURRENT_FUNCTION: TRANSPORTATION REQUIRES ASSISTANCE
CURRENT_FUNCTION: LAUNDRY REQUIRES ASSISTANCE

## 2022-06-21 NOTE — ED PROVIDER NOTES
History   Chief Complaint:  Fatigue and Abnormal Labs       The history is provided by the patient.      Diane Gaitan is a 72 year old female who presents with fatigue and abnormal labs. She has been feeling weak for the past 6 to 8 months and initially thought it was due to long-term effect from a previous Covid-19 infection. She notes there are days were she does not want to get out of bed. Also, she has to use a walker to ambulate due to lightheadedness and has sustained multiple falls within the past year. She was seen this morning for a routine check up and when she got home afterwards, she received a call from the doctor informing her that her hemoglobin was 7. She was then advised to go to the ED for further assessment. At bedside, she mentions her appetite has been decreased lately and she has not been eating properly. She is not on blood thinners, but takes a daily 81 mg Asprin.  She denies cough, fever, abdominal pain, back pain, or syncope. She also denies rectal bleeding, hematochezia, or hematuria.     Review of Systems   Constitutional: Positive for activity change, appetite change and fatigue.   Neurological: Positive for weakness (Generalized) and light-headedness.   All other systems reviewed and are negative.    Allergies:  Lisinopril    Medications:  Norvasc   Arimidex   Asprin   Wellbutrin   Celexa   Gabapentin   Hydrochlorothiazide   Ativan   Cozaar   Metformin   Omeprazole   Simvastatin     Past Medical History:     HTN   Osteoarthrosis   Acute posthemorrhagic anemia   Major depressive disorder   GERD   Type 2 DM   NSTEMI   Unstable angina   Diabetic polyneuropathy associated with type 2 diabetes mellitus (H)  Iron deficiency anemia, unspecified iron deficiency anemia type  Malignant neoplasm of upper-outer quadrant of left breast in female, estrogen receptor positive (H)  Morbid obesity (H)  Atrial fibrillation, unspecified type (H)    Past Surgical History:    R knee arthroplasty   L  sentinel node biopsy   Bilateral breast biopsy  Abdominal hysterectomy   L breast lumpectomy   R rotator cuff repair   Tubal ligation   Skin tag removal     Family History:    Sister - breast cancer, colon cancer   Daughter - HTN, breast cancer   Mother - skin cancer   Father CAD     Social History:  The patient presents to the ED w/ a family member via private vehicle     Physical Exam     Patient Vitals for the past 24 hrs:   BP Temp Temp src Pulse Resp SpO2   06/21/22 1630 136/65 98.7  F (37.1  C) -- 96 16 98 %   06/21/22 1600 122/56 -- -- 90 -- 96 %   06/21/22 1530 121/64 -- -- 105 -- 97 %   06/21/22 1445 119/62 98.6  F (37  C) Oral 95 16 98 %   06/21/22 1436 133/68 -- -- 102 16 --   06/21/22 1430 133/68 98.9  F (37.2  C) Oral 102 16 97 %   06/21/22 1209 121/66 98.3  F (36.8  C) Temporal 117 16 100 %       Physical Exam  General: Pale appearing, pleasant female, resting on exam bed  HEENT: No evidence of trauma.  Conjunctive are clear.  Extraocular eye movements intact.  Neck range of motion intact.  Nose and throat clear.  Respiratory: Good breath sounds bilaterally  Cardiovascular: Normal rate and rhythm   Gastrointestinal: Soft, nontender.   Musculoskeletal: Atraumatic  Skin: Exposed skin clear.  Neurologic: Alert.  Psych:  Patient is cooperative, with normal affect.    Emergency Department Course   ECG:  ECG taken at 1329, ECG read at 1335  Normal sinus rhythm with sinus arrhythmia   Voltage criteria for left ventricular hypertrophy   Abnormal ECG   Rate 98 bpm. LA interval 154 ms. QRS duration 80 ms. QT/QTc 344/439 ms. P-R-T axes 44 -15 42.     Laboratory:  Labs Ordered and Resulted from Time of ED Arrival to Time of ED Departure   COMPREHENSIVE METABOLIC PANEL - Abnormal       Result Value    Sodium 134      Potassium 3.9      Chloride 102      Carbon Dioxide (CO2) 24      Anion Gap 8      Urea Nitrogen 18      Creatinine 0.80      Calcium 9.6      Glucose 140 (*)     Alkaline Phosphatase 101      AST 10       ALT 16      Protein Total 7.0      Albumin 3.6      Bilirubin Total 0.7      GFR Estimate 77     CBC WITH PLATELETS AND DIFFERENTIAL - Abnormal    WBC Count 6.1      RBC Count 4.15      Hemoglobin 7.5 (*)     Hematocrit 27.4 (*)     MCV 66 (*)     MCH 18.1 (*)     MCHC 27.4 (*)     RDW 15.9 (*)     Platelet Count 260      % Neutrophils 77      % Lymphocytes 12      % Monocytes 8      % Eosinophils 1      % Basophils 1      % Immature Granulocytes 1      NRBCs per 100 WBC 0      Absolute Neutrophils 4.8      Absolute Lymphocytes 0.7 (*)     Absolute Monocytes 0.5      Absolute Eosinophils 0.1      Absolute Basophils 0.0      Absolute Immature Granulocytes 0.0      Absolute NRBCs 0.0     TROPONIN I - Normal    Troponin I High Sensitivity 5     COVID-19 VIRUS (CORONAVIRUS) BY PCR - Normal    SARS CoV2 PCR Negative     TYPE AND SCREEN, ADULT    ABO/RH(D) O NEG      Antibody Screen Negative      SPECIMEN EXPIRATION DATE [id number]     PREPARE RED BLOOD CELLS (UNIT)    CROSSMATCH Compatible      UNIT ABO/RH O Neg      Unit Number [id number]      Unit Status Issued      Blood Component Type Red Blood Cells      Product Code R7440F02      CODING SYSTEM BDQF241      UNIT TYPE ISBT 9500      ISSUE DATE AND TIME [id number]     PREPARE RED BLOOD CELLS (UNIT)   TRANSFUSE RED BLOOD CELLS (UNIT)   ABO/RH TYPE AND SCREEN     Emergency Department Course:    Reviewed:  I reviewed nursing notes, vitals and past medical history    Assessments:  1351 I obtained history and examined the patient as noted above.   1559 I rechecked the patient and updated.  1628 I rechecked the patient and updated.  1700 I rechecked the patient and explained findings. I discussed plan for discharge home.    Interventions:  Medications - No data to display    Disposition:  The patient was discharged to home.     Impression & Plan   Medical Decision Making:  Diane Gaitan is a 72 year old female with history of diabetes, presents emergency  room today for evaluation of fatigue for months, lightheadedness, and desires to not get out of bed.  See HPI.  She is tachycardic.  She appears pale but is in no acute distress.  Her EKG is unremarkable.  Her hemoglobin is 7.5.  CMP, troponin, COVID and influenza test unremarkable.  At this point I had a long conversation with the patient regarding indications for blood.  Through shared decision-making, we will transfuse her 1 unit here in the department given that she is almost at a level of 7 and that she is tachycardic when she presented.  She remained clinically and vitally stable for over 5 hours.  She feels better after 1 unit.  She is able to ambulate and still feels lightheaded but a little bit better.  She is to take iron as prescribed by her primary and follow-up this week for repeat evaluation and ER follow-up.  She is discharged home with her daughter.  She is comfortable with the plan and has no further questions.  Her dyspnea on exertion and lightheadedness is almost certainly secondary to her level of anemia and less likely unstable angina, PE, pneumonia, or other at this time.    Diagnosis:    ICD-10-CM    1. Microcytic anemia  D50.9        Discharge Medications:  New Prescriptions    No medications on file       Scribe Disclosure:  I, Ivan Cortes, am serving as a scribe at 1:30 PM on 6/21/2022 to document services personally performed by Regis Garza PA-C based on my observations and the provider's statements to me.        Regis Garza PA-C  06/21/22 1265

## 2022-06-21 NOTE — ED NOTES
Pt started on 1 unit of blood transfusion.  Pt is vitally stable and shows no signs of reaction after 15 minutes.  Will continue to monitor

## 2022-06-21 NOTE — PROGRESS NOTES
"SUBJECTIVE:   Diane Gaitan is a 72 year old female who presents for Preventive Visit.    Patient has been advised of split billing requirements and indicates understanding: Yes  Are you in the first 12 months of your Medicare coverage?  No    Healthy Habits:     In general, how would you rate your overall health?  Poor    Frequency of exercise:  4-5 days/week    Duration of exercise:  15-30 minutes    Do you usually eat at least 4 servings of fruit and vegetables a day, include whole grains    & fiber and avoid regularly eating high fat or \"junk\" foods?  No    Taking medications regularly:  Yes    Medication side effects:  None    Ability to successfully perform activities of daily living:  Transportation requires assistance, shopping requires assistance, preparing meals requires assistance, housework requires assistance and laundry requires assistance    Home Safety:  No safety concerns identified    Hearing Impairment:  No hearing concerns    In the past 6 months, have you been bothered by leaking of urine?  No    In general, how would you rate your overall mental or emotional health?  Fair      PHQ-2 Total Score: 2    Additional concerns today:  Yes     Answers for HPI/ROS submitted by the patient on 6/21/2022  If you checked off any problems, how difficult have these problems made it for you to do your work, take care of things at home, or get along with other people?: Very difficult  PHQ9 TOTAL SCORE: 9      Do you feel safe in your environment? Yes    Have you ever done Advance Care Planning? (For example, a Health Directive, POLST, or a discussion with a medical provider or your loved ones about your wishes): No, advance care planning information given to patient to review.  Advanced care planning was discussed at today's visit.       Fall risk  Fallen 2 or more times in the past year?: Yes  Any fall with injury in the past year?: No  Timed Up and Go Test (>13.5 is fall risk; contact physician) : " 7    Cognitive Screening   1) Repeat 3 items (Leader, Season, Table)    2) Clock draw: NORMAL  3) 3 item recall: Recalls 2 objects   Results: NORMAL clock, 1-2 items recalled: COGNITIVE IMPAIRMENT LESS LIKELY    Mini-CogTM Copyright S Nina. Licensed by the author for use in [facility]; reprinted with permission (pola@[facility]). All rights reserved.      Do you have sleep apnea, excessive snoring or daytime drowsiness?: no    Reviewed and updated as needed this visit by clinical staff                    Reviewed and updated as needed this visit by Provider                   Social History     Tobacco Use     Smoking status: Former Smoker     Packs/day: 1.00     Years: 12.00     Pack years: 12.00     Quit date: 10/18/1980     Years since quittin.7     Smokeless tobacco: Never Used   Substance Use Topics     Alcohol use: Yes     Comment: 12 Beers per week         Alcohol Use 2022   Prescreen: >3 drinks/day or >7 drinks/week? No     PROBLEMS TO ADD ON...  1. Shaking in hands, seen by Neurology who started her back on gabapentin for neuropathy   2. Pain in both shoulders, patient seen by sports medicine last August and given injection in left shoulder that helped for a few months. Pain with lifting both arms  3. Dizziness with getting up too quickly   4. Problems with falling asleep and staying asleep, patient requesting medication   5. Shortness of breath with any exertion x 8 months, no CP.    Patient notes significant exertional shortness of breath and fatigability with limited activity.  She believes that this has been ongoing and more persistent for about 6 months.  She reports just coming at the clinic visit me and walking up the stairs causing significant windedness and fatigability.  She has had nonspecific complaints of generalized weakness shakiness and achiness in her muscle and joints for which she has been seen by her neurologist as well as her sports medicine doctor.  She has been  given injections, has undergone imaging studies and has a notable history of neuropathy.  She also has been reporting nonspecific dizziness associated with upright positional changes.    Current providers sharing in care for this patient include:   Patient Care Team:  Jac Barry MD as PCP - General  Jac Barry MD as Assigned PCP  Myrna Kaye MD as Assigned Cancer Care Provider  Martin Carpenter DO as Assigned Musculoskeletal Provider  Floyd Mesa MD as Assigned Neuroscience Provider    The following health maintenance items are reviewed in Epic and correct as of today:  Health Maintenance Due   Topic Date Due     MEDICARE ANNUAL WELLNESS VISIT  Never done     DIABETIC FOOT EXAM  12/13/2019     COVID-19 Vaccine (2 - Booster for Vadim series) 09/15/2021     FALL RISK ASSESSMENT  11/11/2021     A1C  01/28/2022     PHQ-9  01/28/2022     LIPID  02/11/2022     MICROALBUMIN  02/11/2022     ANNUAL REVIEW OF HM ORDERS  06/09/2022     BMP  06/28/2022       Immunization History   Administered Date(s) Administered     COVID-19,PF,Vadim 07/21/2021     Influenza (High Dose) 3 valent vaccine 09/17/2015, 09/15/2016, 11/07/2017, 10/18/2018, 10/03/2019     Influenza (IIV3) PF 12/05/1997, 11/03/2003, 12/22/2004, 10/18/2005, 11/27/2006, 10/24/2007, 11/25/2008, 09/23/2009, 09/28/2010, 10/01/2011, 10/11/2012, 10/01/2014     Influenza Vaccine IM > 6 months Valent IIV4 (Alfuria,Fluzone) 10/17/2013     Influenza, Quad, High Dose, Pf, 65yr+ (Fluzone HD) 09/30/2020     Pneumo Conj 13-V (2010&after) 09/17/2015     Pneumococcal 23 valent 06/25/2013, 10/18/2018     TD (ADULT, 7+) 01/25/2005     TDAP Vaccine (Adacel) 01/19/2015     Zoster vaccine recombinant adjuvanted (SHINGRIX) 06/19/2020, 09/30/2020     Current Outpatient Medications   Medication     amLODIPine (NORVASC) 5 MG tablet     anastrozole (ARIMIDEX) 1 MG tablet     aspirin 81 MG tablet     buPROPion (WELLBUTRIN XL) 150 MG 24 hr tablet     calcium carbonate 600  "mg-vitamin D 400 units (CALTRATE) 600-400 MG-UNIT per tablet     citalopram (CELEXA) 40 MG tablet     gabapentin (NEURONTIN) 300 MG capsule     hydrochlorothiazide (HYDRODIURIL) 12.5 MG tablet     losartan (COZAAR) 100 MG tablet     metFORMIN (GLUCOPHAGE) 500 MG tablet     omeprazole (PRILOSEC) 20 MG DR capsule     simvastatin (ZOCOR) 20 MG tablet     LORazepam (ATIVAN) 0.5 MG tablet     Current Facility-Administered Medications   Medication     lidocaine 1 % injection 4 mL     LORazepam (ATIVAN) tablet 0.5 mg     methylPREDNISolone (DEPO-MEDROL) injection 80 mg           Review of Systems   Constitutional: Negative for chills and fever.   HENT: Negative for congestion, ear pain, hearing loss and sore throat.    Eyes: Negative for pain and visual disturbance.   Respiratory: Positive for shortness of breath. Negative for cough.    Cardiovascular: Negative for chest pain, palpitations and peripheral edema.   Gastrointestinal: Negative for abdominal pain, constipation, diarrhea, heartburn, hematochezia and nausea.   Breasts:  Negative for tenderness, breast mass and discharge.   Genitourinary: Negative for dysuria, frequency, genital sores, hematuria, pelvic pain, urgency, vaginal bleeding and vaginal discharge.   Musculoskeletal: Positive for arthralgias and myalgias. Negative for joint swelling.   Skin: Negative for rash.   Neurological: Positive for dizziness and weakness. Negative for headaches and paresthesias.   Psychiatric/Behavioral: Negative for mood changes. The patient is nervous/anxious.        OBJECTIVE:   /72   Pulse 96   Temp 99  F (37.2  C) (Temporal)   Resp 16   Ht 1.702 m (5' 7\")   Wt 84.6 kg (186 lb 8 oz)   SpO2 98%   BMI 29.21 kg/m   Estimated body mass index is 29.21 kg/m  as calculated from the following:    Height as of this encounter: 1.702 m (5' 7\").    Weight as of this encounter: 84.6 kg (186 lb 8 oz).  Physical Exam  GENERAL: alert and no distress  EYES: Eyes grossly normal to " inspection, PERRL and conjunctivae and sclerae normal  HENT: ear canals and TM's normal, nose and mouth without ulcers or lesions  NECK: no adenopathy, no asymmetry, masses, or scars and thyroid normal to palpation  RESP: lungs clear to auscultation - no rales, rhonchi or wheezes  CV: regular rate and rhythm, normal S1 S2, no S3 or S4, no murmur, click or rub  MS: no gross musculoskeletal defects noted  NEURO: No new focal changes are noted.  The patient uses an assist device in the form of a cane.  There is difficulty arising from a seated position.  There is proximal shoulder and hip generalized weakness.  The gait is wide-based.  PSYCH: mentation appears normal, affect LAT        ASSESSMENT / PLAN:   (Z00.00) Encounter for subsequent annual wellness visit in Medicare patient  (primary encounter diagnosis)  Comment: Discussed updating COVID-vaccine but patient would like to wait.  Plan: CBC with platelets, Comprehensive metabolic         panel            (E11.9) Type 2 diabetes mellitus without complication, without long-term current use of insulin (H)  Comment: Recheck lab work accordingly, continue with meds as ordered per  Plan: Comprehensive metabolic panel, Lipid Profile,         Hemoglobin A1c, Albumin Random Urine         Quantitative with Creat Ratio, metFORMIN         (GLUCOPHAGE) 500 MG tablet, simvastatin (ZOCOR)        20 MG tablet            (I21.4) NSTEMI (non-ST elevated myocardial infarction) (H)  Comment: Noticed prior history and continue with risk reduction therapy  Plan:     (C50.412,  Z17.0) Malignant neoplasm of upper-outer quadrant of left breast in female, estrogen receptor positive (H)  Comment: Following up in oncology clinic as ordered.  Continuing with Arimidex  Plan: anastrozole (ARIMIDEX) 1 MG tablet            (I48.91) Atrial fibrillation, unspecified type (H)  Comment: Rate controlled.  On therapy.  Plan:     (E66.01) Morbid obesity (H)  Comment: Urged ongoing weight loss as patient  has been successful in such  Plan:     (I10) Essential hypertension, benign  Comment: Stable and continue with current medical management.  Medication refilled.  Plan: Comprehensive metabolic panel, amLODIPine         (NORVASC) 5 MG tablet, hydrochlorothiazide         (HYDRODIURIL) 12.5 MG tablet, losartan (COZAAR)        100 MG tablet            (Z13.6) CARDIOVASCULAR SCREENING; LDL GOAL LESS THAN 100  Comment: Labs ordered as fasting per routine.  Advised patient to hold statin for the next couple weeks to see if improves some of her muscular complaints.  Plan: Lipid Profile, simvastatin (ZOCOR) 20 MG tablet            (E11.42) Diabetic polyneuropathy associated with type 2 diabetes mellitus (H)  Comment: Noted as baseline history potentially contributing to above complaints.  Plan:     (F32.0) Major depressive disorder, single episode, mild (H)  Comment: Reviewed increasing citalopram to 40 mg daily.  Discussed dosing and side effect profile of such.  Follow-up pending results.  Discussed the risks associated with increasing the medication above the recommended 20 mg dose.  She is comfortable with giving the medicine a try.    Called and discussed with patient on 6/30/2022 how she was doing in regards to medication.  Discussed also potential side effects of increasing dose.  She states she is doing well and would like to continue with medication as ordered.    Plan: buPROPion (WELLBUTRIN XL) 150 MG 24 hr tablet,         OFFICE/OUTPT VISIT,EST,LEVL IV, citalopram         (CELEXA) 40 MG tablet        Viewed PHQ-9    (K21.9) Gastroesophageal reflux disease without esophagitis  Comment: Tinea with PPI therapy  Plan: omeprazole (PRILOSEC) 20 MG DR capsule            (Z12.11) Colon cancer screening  Comment: Prior colonoscopy reviewed.  Suggest FIT testing  Plan: Fecal colorectal cancer screen FIT            (Z12.31) Visit for screening mammogram  Comment: Recommended annual mammography due in December of this  year.  Plan:     (R06.00) Dyspnea, unspecified type  Comment: Atypical symptoms.  Suggest echocardiogram for reassurance.  Continue with medical management, routine lab work.  Plan: Echocardiogram Complete, ESR: Erythrocyte         sedimentation rate, CRP, inflammation,         OFFICE/OUTPT VISIT,EST,LEVL IV            (M79.10) Myalgia  Comment: Hold statin as directed.  Lab work accordingly.  Question symptoms consistent with mild PMR.  Plan: ESR: Erythrocyte sedimentation rate, CRP,         inflammation, OFFICE/OUTPT VISIT,EST,LEVL IV,         CK total, TSH with free T4 reflex            (D64.9) Anemia, unspecified type  Comment: Called and discussed results of hemoglobin being low with patient.  Patient reports significant fatigability and exertional symptoms upon getting home simply coming to see me in the clinic.  I reported her hemoglobin results to her and informed her that this could be a component of her symptoms and thus have suggested that she may be best suited to be seen in the emergency room due to is the significant change in her hemoglobin level and her symptoms.  I have advised her that she may benefit from starting supplemental iron and I have added some routine labs to her blood work today and also suggested that she may need further assessment in regards to an upper endoscopy and lower endoscopy again pending her ER evaluation.  I have placed a referral to see Dr. White as she has seen him in the past dating to 2018 with similar presentation she is agreeable and will follow through with me accordingly.  She informs me that she will present to the ER.  I called and contacted the ER for transfer information.  Plan: Ferritin, Folate, Iron & Iron Binding Capacity,        Vitamin B12, ferrous sulfate (FEROSUL) 325 (65         Fe) MG tablet, Adult Gastro Ref - Procedure         Only            Patient has been advised of split billing requirements and indicates understanding:  "Yes    COUNSELING:  Reviewed preventive health counseling, as reflected in patient instructions       Regular exercise       Healthy diet/nutrition    Estimated body mass index is 31.01 kg/m  as calculated from the following:    Height as of 3/18/22: 1.702 m (5' 7\").    Weight as of 3/18/22: 89.8 kg (198 lb).    Weight management plan: Discussed healthy diet and exercise guidelines    She reports that she quit smoking about 41 years ago. She has a 12.00 pack-year smoking history. She has never used smokeless tobacco.      Appropriate preventive services were discussed with this patient, including applicable screening as appropriate for cardiovascular disease, diabetes, osteopenia/osteoporosis, and glaucoma.  As appropriate for age/gender, discussed screening for colorectal cancer, prostate cancer, breast cancer, and cervical cancer. Checklist reviewing preventive services available has been given to the patient.    Reviewed patients plan of care and provided an AVS. The Basic Care Plan (routine screening as documented in Health Maintenance) for Diane meets the Care Plan requirement. This Care Plan has been established and reviewed with the Patient.    Counseling Resources:  ATP IV Guidelines  Pooled Cohorts Equation Calculator  Breast Cancer Risk Calculator  Breast Cancer: Medication to Reduce Risk  FRAX Risk Assessment  ICSI Preventive Guidelines  Dietary Guidelines for Americans, 2010  [employer] MyPlate  ASA Prophylaxis  Lung CA Screening    Jac Barry MD  [facility]    Identified Health Risks:  "

## 2022-06-22 ENCOUNTER — TELEPHONE (OUTPATIENT)
Dept: INTERNAL MEDICINE | Facility: CLINIC | Age: 72
End: 2022-06-22

## 2022-06-22 NOTE — TELEPHONE ENCOUNTER
Patient worked in on Tuesday. Informed that GI will be calling to get up endoscopy and colonoscopy

## 2022-06-22 NOTE — TELEPHONE ENCOUNTER
Reason for Call:  Same Day Appointment, Requested Provider:  Dr Barry    PCP: Jac Barry    Reason for visit: follow-up ER [facility] for hemoglobin, was seen yesterday    Duration of symptoms:     Have you been treated for this in the past? No    Additional comments: please call. Patient is wondering if Dr Barry would work her in, in the next few days, for follow up. She declined another provider. Declined virtual.     Can we leave a detailed message on this number? YES    Phone number patient can be reached at: Home number on file [phone] (home)    Best Time: any    Call taken on 6/22/2022 at 7:54 AM by Miracle De La Paz

## 2022-06-22 NOTE — TELEPHONE ENCOUNTER
Pt states she was seen at ED yesterday for anemia, given 1 unit blood. Told to schedule appt with PCP within 2 days for f/u. No appts available. Pt states she is feeling better since transfusion but still has some slight dizziness. Please advise.     Marilyn HERNANDEZ RN  EP Triage

## 2022-06-22 NOTE — TELEPHONE ENCOUNTER
I did send patient message in regards to recent lab work.  Patient should also be getting a phone call from Dr. White's office to potentially assess for upper and lower endoscopy.  Suggest trying to get patient in when able per schedule availability, may use same-day appointment but suggest not using virtual appointments.

## 2022-06-24 ENCOUNTER — PATIENT OUTREACH (OUTPATIENT)
Dept: INTERNAL MEDICINE | Facility: CLINIC | Age: 72
End: 2022-06-24

## 2022-06-24 NOTE — TELEPHONE ENCOUNTER
What type of discharge? Emergency Department  Risk of Hospital admission or ED visit: 72%  Is a TCM episode required? No  When should the patient follow up with PCP? within 30 days of discharge.    Jugn Odonnell RN  [facility]

## 2022-06-24 NOTE — TELEPHONE ENCOUNTER
"Hospital/TCU/ED for chronic condition Discharge Protocol    \"Hi, my name is Barbie Rogers RN, a registered nurse, and I am calling from [facility].  I am calling to follow up and see how things are going for you after your recent emergency visit/hospital/TCU stay.\"    Tell me how you are doing now that you are home?\" I've been feeling pretty good. I'm taking iron BID and I'm feeling well, and I'm getting more sleep.      Discharge Instructions    \"Let's review your discharge instructions.  What is/are the follow-up recommendations?  Pt. Response: See PCP in 1 week    \"Has an appointment with your primary care provider been scheduled?\"   Yes. (confirm) 6/28/22    \"When you see the provider, I would recommend that you bring your medications with you.\"    Medications    \"Tell me what changed about your medicines when you discharged?\"    Changes to chronic meds?    0-1    \"What questions do you have about your medications?\"    None     New diagnoses of heart failure, COPD, diabetes, or MI?    No              Post Discharge Medication Reconciliation Status: discharge medications reconciled, continue medications without change.    Was MTM referral placed (*Make sure to put transitions as reason for referral)?   No    Call Summary    \"What questions or concerns do you have about your recent visit and your follow-up care?\"     none    \"If you have questions or things don't continue to improve, we encourage you contact us through the main clinic number (give number).  Even if the clinic is not open, triage nurses are available 24/7 to help you.     We would like you to know that our clinic has extended hours (provide information).  We also have urgent care (provide details on closest location and hours/contact info)\"      \"Thank you for your time and take care!\"             "

## 2022-06-27 NOTE — PROGRESS NOTES
Assessment & Plan     Anemia, unspecified type  Need to rule out GI source.  Patient does see hematology and oncology and I will forward a message regarding clinic visit.  Iron studies noted as being iron deficient.  Continue with iron replacement therapy.  Continue with PPI therapy.  Follow-up in GI clinic for upper endoscopy and lower endoscopy with consideration of small bowel enteroscopy pending results.  - CBC with platelets; Future    Type 2 diabetes mellitus without complication, without long-term current use of insulin (H)  Lab Results   Component Value Date    A1C 5.5 06/21/2022    A1C 5.7 07/28/2021    A1C 5.3 02/11/2021    A1C 5.7 06/19/2020    A1C 5.7 10/03/2019    A1C 5.7 05/02/2019    A1C 6.1 09/13/2018   Able at present time and continue with current medical management    Essential hypertension, benign  Currently at goal on therapy and continuing as directed.    Morbid obesity (H)  Encouraged ongoing weight loss and weight reduction.       See Patient Instructions    Return in about 1 month (around 7/28/2022) for with Gastroenterology for consultation, Lab Work appointment.    Jac Barry MD  [facility]    Leny Contreras is a 72 year old, presenting for the following health issues:  ER F/U      History of Present Illness       Reason for visit:  Recheck    She eats 2-3 servings of fruits and vegetables daily.She consumes 1 sweetened beverage(s) daily.She exercises with enough effort to increase her heart rate 20 to 29 minutes per day.  She exercises with enough effort to increase her heart rate 3 or less days per week.   She is taking medications regularly.       ED/UC Followup:    Facility:  [facility] ER  Date of visit: 6/21/22  Reason for visit: Microcytic anemia   Current Status: Feeling better, taking iron twice daily     Patient is seen today after ER visit.  She was seen for a wellness exam and noted to be significantly anemic.  She presented to the  emergency room for assessment at which time she was evaluated and received transfusion of packed red blood cells.  She was discharged to home.  Prior to her ER visit a referral was placed to Dr. White's office.  Of note I did contact Dr. White's office today who informed me that they contacted the patient yesterday via phone message to schedule both upper and lower endoscopies for reassessment.  The patient was not aware a message was left and she will check her machine and schedule.    Currently she is markedly improved in regards to her symptoms.  She is up and about and moving around.  She is also started on supplemental iron.  She is continuing with PPI therapy.  She reports her stools are dark but this was noted after initiating iron.  Her fatigability and issues as described on her prior clinic visit have improved.  Her lab results were discussed.          Review of Systems   CONSTITUTIONAL: NEGATIVE for fever, chills, change in weight  EYES: NEGATIVE for vision changes or irritation  ENT/MOUTH: NEGATIVE for ear, mouth and throat problems  RESP: NEGATIVE for significant cough or SOB  CV: NEGATIVE for chest pain, palpitations or peripheral edema  : NEGATIVE for frequency, dysuria, or hematuria  MUSCULOSKELETAL: NEGATIVE for significant arthralgias or myalgia  NEURO: NEGATIVE for weakness, dizziness or paresthesias  HEME: NEGATIVE for bleeding problems  PSYCHIATRIC: NEGATIVE for changes in mood or affect      Objective    /68   Pulse 87   Temp 97.2  F (36.2  C) (Temporal)   Resp 16   Wt 83.7 kg (184 lb 9.6 oz)   SpO2 97%   BMI 28.91 kg/m    Body mass index is 28.91 kg/m .     Physical Exam:    GENERAL: alert and no distress  EYES: Eyes grossly normal to inspection, PERRL and conjunctivae and sclerae normal  HENT: ear canals and TM's normal, nose and mouth without ulcers or lesions  NECK: no adenopathy, no asymmetry, masses, or scars and thyroid normal to palpation  RESP: lungs clear to  auscultation - no rales, rhonchi or wheezes  CV: regular rate and rhythm, normal S1 S2, no S3 or S4, no click or rub  NEURO: No focal changes  PSYCH: mentation appears normal, affect normal/bright              .  ..

## 2022-06-28 ENCOUNTER — OFFICE VISIT (OUTPATIENT)
Dept: INTERNAL MEDICINE | Facility: CLINIC | Age: 72
End: 2022-06-28
Payer: MEDICARE

## 2022-06-28 VITALS
RESPIRATION RATE: 16 BRPM | SYSTOLIC BLOOD PRESSURE: 130 MMHG | OXYGEN SATURATION: 97 % | TEMPERATURE: 97.2 F | WEIGHT: 184.6 LBS | BODY MASS INDEX: 28.91 KG/M2 | DIASTOLIC BLOOD PRESSURE: 68 MMHG | HEART RATE: 87 BPM

## 2022-06-28 DIAGNOSIS — I10 ESSENTIAL HYPERTENSION, BENIGN: ICD-10-CM

## 2022-06-28 DIAGNOSIS — D64.9 ANEMIA, UNSPECIFIED TYPE: Primary | ICD-10-CM

## 2022-06-28 DIAGNOSIS — E66.01 MORBID OBESITY (H): ICD-10-CM

## 2022-06-28 DIAGNOSIS — E11.9 TYPE 2 DIABETES MELLITUS WITHOUT COMPLICATION, WITHOUT LONG-TERM CURRENT USE OF INSULIN (H): ICD-10-CM

## 2022-06-28 LAB
ERYTHROCYTE [DISTWIDTH] IN BLOOD BY AUTOMATED COUNT: 20.8 % (ref 10–15)
FOLATE SERPL-MCNC: 12.7 NG/ML (ref 4.6–34.8)
HCT VFR BLD AUTO: 30.2 % (ref 35–47)
HGB BLD-MCNC: 8.7 G/DL (ref 11.7–15.7)
MCH RBC QN AUTO: 19.3 PG (ref 26.5–33)
MCHC RBC AUTO-ENTMCNC: 28.8 G/DL (ref 31.5–36.5)
MCV RBC AUTO: 67 FL (ref 78–100)
PLATELET # BLD AUTO: 279 10E3/UL (ref 150–450)
RBC # BLD AUTO: 4.5 10E6/UL (ref 3.8–5.2)
WBC # BLD AUTO: 6 10E3/UL (ref 4–11)

## 2022-06-28 PROCEDURE — 99214 OFFICE O/P EST MOD 30 MIN: CPT | Performed by: INTERNAL MEDICINE

## 2022-06-28 PROCEDURE — 36415 COLL VENOUS BLD VENIPUNCTURE: CPT | Performed by: INTERNAL MEDICINE

## 2022-06-28 PROCEDURE — 82746 ASSAY OF FOLIC ACID SERUM: CPT | Performed by: INTERNAL MEDICINE

## 2022-06-28 PROCEDURE — 85027 COMPLETE CBC AUTOMATED: CPT | Performed by: INTERNAL MEDICINE

## 2022-06-28 NOTE — Clinical Note
Dr. Kaye,  I just wanted to drop a quick note to you in regards to our mutual patient Diane Gaitan. She saw me for an annual wellness exam and had numerous constitutional complaints as per my clinic note.  In summary, she was found to be significantly anemic and iron deficient.  She was started on a PPI and underwent an ER visit where she received a transfusion and subsequently now is tentatively being scheduled for GI evaluation through Dr. White who she has seen in the past for similar issues.  I just wanted to let you know about her current anemia in the setting of her issues for which she is being evaluated by you.  Sincerely,  Jac Barry MD  [facility] internal medicine.

## 2022-06-29 PROCEDURE — 82274 ASSAY TEST FOR BLOOD FECAL: CPT | Performed by: INTERNAL MEDICINE

## 2022-07-01 LAB — HEMOCCULT STL QL IA: NEGATIVE

## 2022-07-13 ENCOUNTER — TRANSFERRED RECORDS (OUTPATIENT)
Dept: INTERNAL MEDICINE | Facility: CLINIC | Age: 72
End: 2022-07-13

## 2022-07-13 PROCEDURE — 88305 TISSUE EXAM BY PATHOLOGIST: CPT | Performed by: PATHOLOGY

## 2022-07-13 PROCEDURE — 88305 TISSUE EXAM BY PATHOLOGIST: CPT | Mod: TC,ORL | Performed by: INTERNAL MEDICINE

## 2022-07-14 ENCOUNTER — LAB REQUISITION (OUTPATIENT)
Dept: LAB | Facility: CLINIC | Age: 72
End: 2022-07-14
Payer: MEDICARE

## 2022-07-14 DIAGNOSIS — D50.9 IRON DEFICIENCY ANEMIA, UNSPECIFIED: ICD-10-CM

## 2022-07-14 DIAGNOSIS — K31.89 OTHER DISEASES OF STOMACH AND DUODENUM: ICD-10-CM

## 2022-07-14 DIAGNOSIS — K44.9 DIAPHRAGMATIC HERNIA WITHOUT OBSTRUCTION OR GANGRENE: ICD-10-CM

## 2022-07-15 LAB
PATH REPORT.COMMENTS IMP SPEC: NORMAL
PATH REPORT.COMMENTS IMP SPEC: NORMAL
PATH REPORT.FINAL DX SPEC: NORMAL
PATH REPORT.GROSS SPEC: NORMAL
PATH REPORT.MICROSCOPIC SPEC OTHER STN: NORMAL
PATH REPORT.RELEVANT HX SPEC: NORMAL
PHOTO IMAGE: NORMAL

## 2022-07-15 PROCEDURE — 88305 TISSUE EXAM BY PATHOLOGIST: CPT | Mod: 26 | Performed by: PATHOLOGY

## 2022-07-25 DIAGNOSIS — D64.9 ANEMIA, UNSPECIFIED TYPE: ICD-10-CM

## 2022-07-25 RX ORDER — FERROUS SULFATE 325(65) MG
325 TABLET ORAL 2 TIMES DAILY
Qty: 60 TABLET | Refills: 2 | Status: SHIPPED | OUTPATIENT
Start: 2022-07-25 | End: 2023-09-18

## 2022-07-25 NOTE — TELEPHONE ENCOUNTER
Patient requesting 40mg Citalopram to be decreased back to 20mg. Pt received letter in mail from pharmacy explaining 40mg may increase chances of heart complications.    Please place recommended follow-up labs from 6/28 result notes, route back to team to assist patient in scheduling lab-only appt      Can we leave a detailed message on this number? YES  Phone number patient can be reached at: Home number on file [phone] (home)    Barbie Rogers, RN  [facility] Triage

## 2022-08-01 ENCOUNTER — TELEPHONE (OUTPATIENT)
Dept: INTERNAL MEDICINE | Facility: CLINIC | Age: 72
End: 2022-08-01

## 2022-08-01 DIAGNOSIS — D64.9 ANEMIA, UNSPECIFIED TYPE: Primary | ICD-10-CM

## 2022-08-01 NOTE — TELEPHONE ENCOUNTER
Patient called, stated she has been waiting since last week for a call to get a lab appointment scheduled.     Please order any labs if necessary and patient will be contacted to schedule.

## 2022-08-01 NOTE — TELEPHONE ENCOUNTER
These note I do not schedule the lab appointments.  If patient wants to get a lab appointment scheduled she needs to make that appointment on her own.    Are already lab orders in the computer

## 2022-08-01 NOTE — TELEPHONE ENCOUNTER
Patient informed and scheduled. Patient requesting that lab results are call to her, as she cannot view mychart over her phone. FYI only.

## 2022-08-05 ENCOUNTER — LAB (OUTPATIENT)
Dept: LAB | Facility: CLINIC | Age: 72
End: 2022-08-05
Payer: MEDICARE

## 2022-08-05 DIAGNOSIS — D64.9 ANEMIA, UNSPECIFIED TYPE: ICD-10-CM

## 2022-08-05 LAB
ERYTHROCYTE [DISTWIDTH] IN BLOOD BY AUTOMATED COUNT: 22 % (ref 10–15)
HCT VFR BLD AUTO: 36.8 % (ref 35–47)
HGB BLD-MCNC: 11.6 G/DL (ref 11.7–15.7)
IRON SATN MFR SERPL: 29 % (ref 15–46)
IRON SERPL-MCNC: 123 UG/DL (ref 35–180)
MCH RBC QN AUTO: 22.9 PG (ref 26.5–33)
MCHC RBC AUTO-ENTMCNC: 31.5 G/DL (ref 31.5–36.5)
MCV RBC AUTO: 73 FL (ref 78–100)
PLATELET # BLD AUTO: 255 10E3/UL (ref 150–450)
RBC # BLD AUTO: 5.06 10E6/UL (ref 3.8–5.2)
TIBC SERPL-MCNC: 431 UG/DL (ref 240–430)
WBC # BLD AUTO: 5.2 10E3/UL (ref 4–11)

## 2022-08-05 PROCEDURE — 36415 COLL VENOUS BLD VENIPUNCTURE: CPT

## 2022-08-05 PROCEDURE — 82728 ASSAY OF FERRITIN: CPT

## 2022-08-05 PROCEDURE — 83550 IRON BINDING TEST: CPT

## 2022-08-05 PROCEDURE — 85027 COMPLETE CBC AUTOMATED: CPT

## 2022-08-06 LAB — FERRITIN SERPL-MCNC: 27 NG/ML (ref 8–252)

## 2022-09-23 ENCOUNTER — TELEPHONE (OUTPATIENT)
Dept: INTERNAL MEDICINE | Facility: CLINIC | Age: 72
End: 2022-09-23

## 2022-09-23 DIAGNOSIS — D64.9 ANEMIA, UNSPECIFIED TYPE: Primary | ICD-10-CM

## 2022-09-23 NOTE — TELEPHONE ENCOUNTER
Order/Referral Request    Who is requesting: Pt    Orders being requested: Lab - Iron, A1c, Hb, ferritin, etc.    Reason service is needed/diagnosis: Due to abnormal labs and due to current Rx's    When are orders needed by: at your convenience    Has this been discussed with Provider: Yes    Does patient have a preference on a Group/Provider/Facility? [facility]    Does patient have an appointment scheduled?: No - please route to TC's to call Pt once orders are in to schedule deana't    Where to send orders: located in EPIC    Could we send this information to you in [employer] or would you prefer to receive a phone call?:   Patient would prefer a phone call   Okay to leave a detailed message?: Yes at Home number on file [phone] (home)

## 2022-10-06 ENCOUNTER — NURSE TRIAGE (OUTPATIENT)
Dept: NURSING | Facility: CLINIC | Age: 72
End: 2022-10-06

## 2022-10-06 NOTE — TELEPHONE ENCOUNTER
Nurse Triage SBAR    Is this a 2nd Level Triage? YES, LICENSED PRACTITIONER REVIEW IS REQUIRED    Situation: Diane called stating she has had LLE swelling since Monday night. Today she went out to eat with a nurse friend who told her she needs to be evaluated for a DVT.    Background: Diane states this has been ongoing since Monday night.     Assessment: Diane states she is able to bear weight on her leg. She has been elevating it as well as soaking it in epsom salt which possible helped a little bit. The swelling extends to her knee. She denies pain.    Protocol Recommended Disposition:   See HCP Within 4 Hours (Or PCP Triage)    Recommendation: Recommendation for Diane to be seen within 4 hours by PCP. UC is now closed as well as the clinic so she is unfortunately directed to ER which she is hesitant to do. On call provider called who stated to go to the ER today. Diane is trying to find a ride but is unsure if she will be able to. She was advised to call an ambulance if need be. She does not want to do this either. She states she will go to UC tomorrow if she cannot get to the ER tonight. I attempted to call her UC to verify that they can r/u DVTs but they did not answer. I advised her to call them before going tomorrow (if this is what she decides to do).     Paged to provider    Reason for Disposition    [1] Thigh, calf, or ankle swelling AND [2] only 1 side    Additional Information    Negative: SEVERE difficulty breathing (e.g., struggling for each breath, speaks in single words)    Negative: Looks like a broken bone or dislocated joint (e.g., crooked or deformed)    Negative: Sounds like a life-threatening emergency to the triager    Negative: Difficulty breathing at rest    Negative: Entire foot is cool or blue in comparison to other side    Negative: [1] Can't walk or can barely walk AND [2] new-onset    Negative: [1] Difficulty breathing with exertion (e.g., walking) AND [2] new-onset or worsening     Negative: [1] Red area or streak AND [2] fever    Negative: [1] Swelling is painful to touch AND [2] fever    Negative: [1] Cast on leg or ankle AND [2] now increased pain    Negative: Patient sounds very sick or weak to the triager    Negative: SEVERE leg swelling (e.g., swelling extends above knee, entire leg is swollen, weeping fluid)    Negative: [1] Red area or streak [2] large (> 2 in. or 5 cm)    Protocols used: LEG SWELLING AND EDEMA-TOMMIE-    Gale Martin RN  [facility] Nurse Advisor   10/6/2022  6:57 PM

## 2022-10-07 ENCOUNTER — HOSPITAL ENCOUNTER (EMERGENCY)
Facility: CLINIC | Age: 72
Discharge: HOME | End: 2022-10-07
Attending: EMERGENCY MEDICINE | Admitting: EMERGENCY MEDICINE
Payer: MEDICARE

## 2022-10-07 ENCOUNTER — APPOINTMENT (OUTPATIENT)
Dept: GENERAL RADIOLOGY | Facility: CLINIC | Age: 72
End: 2022-10-07
Attending: EMERGENCY MEDICINE
Payer: MEDICARE

## 2022-10-07 ENCOUNTER — APPOINTMENT (OUTPATIENT)
Dept: ULTRASOUND IMAGING | Facility: CLINIC | Age: 72
End: 2022-10-07
Attending: EMERGENCY MEDICINE
Payer: MEDICARE

## 2022-10-07 VITALS
DIASTOLIC BLOOD PRESSURE: 72 MMHG | OXYGEN SATURATION: 97 % | SYSTOLIC BLOOD PRESSURE: 131 MMHG | WEIGHT: 181 LBS | TEMPERATURE: 98.5 F | BODY MASS INDEX: 27.43 KG/M2 | HEART RATE: 86 BPM | RESPIRATION RATE: 16 BRPM | HEIGHT: 68 IN

## 2022-10-07 DIAGNOSIS — S93.402A SPRAIN OF LEFT ANKLE, UNSPECIFIED LIGAMENT, INITIAL ENCOUNTER: ICD-10-CM

## 2022-10-07 DIAGNOSIS — M25.472 LEFT ANKLE SWELLING: ICD-10-CM

## 2022-10-07 PROCEDURE — 73610 X-RAY EXAM OF ANKLE: CPT | Mod: LT

## 2022-10-07 PROCEDURE — 99284 EMERGENCY DEPT VISIT MOD MDM: CPT | Mod: 25

## 2022-10-07 PROCEDURE — 93971 EXTREMITY STUDY: CPT | Mod: LT

## 2022-10-07 ASSESSMENT — ENCOUNTER SYMPTOMS
JOINT SWELLING: 1
ARTHRALGIAS: 1

## 2022-10-07 ASSESSMENT — ACTIVITIES OF DAILY LIVING (ADL)
ADLS_ACUITY_SCORE: 42
ADLS_ACUITY_SCORE: 40

## 2022-10-07 NOTE — ED PROVIDER NOTES
"  History   Chief Complaint:  Leg Swelling       HPI   Diane Gaitan is a 72 year old female with history of HTN, atrial fibrillation, and type II diabetes mellitus who presents with four days of left foot pain. No known trauma or injury to the area. She has been able to walk on her foot using a cane. A Salonpas patch offered some relief for the pain. Patient notes that her left ankle became more swollen yesterday as well. No recent fevers. She takes a daily 81 mg Aspirin, but is not otherwise anticoagulated. No history of DVT/PE.    Review of Systems   Musculoskeletal: Positive for arthralgias (left foot) and joint swelling.   All other systems reviewed and are negative.    Allergies:  Lisinopril    Medications:  Norvasc  Armidex  Wellbutrin  Celexa  Neurontin  Hydrodiuril  Zocor  Prilosec  Glucophage  Ativan  Cozaar  Aspirin 81 mg     Past Medical History:     HTN  Osteoarthrosis   Iron deficiency anemia  MDD  GERD  Type II diabetes mellitus  Atrial fibrillation   Breast cancer  Morbid obesity   Diabetic polyneuropathy   Unstable angina  NSTEMI   Colon polyps      Past Surgical History:    Breast biopsy, left  Hysterectomy   Colonoscopy  Arthroplasty knee, right  Rotator cuff repair, right  EGD  Lumpectomy breast with seed localization, left  Tubal ligation    Family History:    Mother: Skin cancer  Father: CAD  Sister: Breast cancer, colon cancer   Daughter: Breast cancer, HTN    Social History:  The patient presents to the ED with her son Eugenio  PCP: Jac Barry MD, Internal Medicine     Physical Exam     Patient Vitals for the past 24 hrs:   BP Temp Temp src Pulse Resp SpO2 Height Weight   10/07/22 1554 -- -- -- -- -- 97 % -- --   10/07/22 1553 131/72 -- -- 86 -- -- -- --   10/07/22 1305 -- -- -- -- -- 97 % -- --   10/07/22 1303 139/80 -- -- 92 -- -- -- --   10/07/22 1101 120/76 98.5  F (36.9  C) Temporal 103 16 98 % 1.737 m (5' 8.4\") 82.1 kg (181 lb)       Physical Exam  General: Patient in mild " distress.  Alert and cooperative with exam. Normal mentation  HEENT: NC/AT. Conjunctiva without injection or scleral icterus. External ears normal.  Respiratory: Breathing comfortably on room air  CV: Normal rate, all extremities well perfused  GI:  Non-distended abdomen  Skin: Warm, dry, no rashes/open wounds on exposed skin  Musculoskeletal: No obvious deformities  LLE: Swelling and bruising to her lateral malleolus. Mild tenderness to palpation. No significant calf tenderness or swelling. Foot exam normal. CMS intact.   Neuro: Alert, answers questions appropriately. No gross motor deficits    Emergency Department Course     Imaging:  XR Ankle Left G/E 3 Views   Final Result   IMPRESSION: No fracture. Ankle mortise is intact. Mild soft tissue   swelling around the ankle. No degenerative changes.      ABDIAS LEE MD            SYSTEM ID:  [id number]      US Lower Extremity Venous Duplex Left   Final Result   IMPRESSION: No evidence of deep venous thrombosis.      MIMA DENNY MD            SYSTEM ID:  [id number]        Report per radiology    Emergency Department Course:   Reviewed:  I reviewed nursing notes, vitals, past medical history and Care Everywhere    Assessments:  1539 I obtained history and examined the patient as noted above. I also explained the findings at this time.     Disposition:  The patient was discharged to home.     Impression & Plan   Medical Decision Making:  Diane Gaitan is a 72 year old female who presents for evaluation of ankle pain/swelling.  Exam is most consistent with a mild ankle sprain/soft tissue injury.  X-ray and DVT study ordered from triage without significant acute findings.   No signs of septic arthritis, gout, pseudogout, fracture, cellulitis, etc. The patients neurovascular status is normal.  Discussed supportive care and return precautions.  Patient discharged home.    Diagnosis:    ICD-10-CM    1. Sprain of left ankle, unspecified ligament, initial encounter   S93.402A    2. Left ankle swelling  M25.472          Scribe Disclosure:  I, Rosie Gould, am serving as a scribe at 1:25 PM on 10/7/2022 to document services personally performed by Mehran Linares DO based on my observations and the provider's statements to me.          Mehran Linares DO  10/07/22 2207

## 2022-10-07 NOTE — ED TRIAGE NOTES
Left leg swelling and foot swelling since Monday, started with foot swelling then since yesterday left ankle has been swollen and getting more painful. History of diabetes.      Triage Assessment     Row Name 10/07/22 1108       Triage Assessment (Adult)    Airway WDL WDL       Respiratory WDL    Respiratory WDL WDL       Skin Circulation/Temperature WDL    Skin Circulation/Temperature WDL WDL       Cardiac WDL    Cardiac WDL WDL       Peripheral/Neurovascular WDL    Peripheral Neurovascular WDL WDL       Cognitive/Neuro/Behavioral WDL    Cognitive/Neuro/Behavioral WDL WDL

## 2022-10-10 ENCOUNTER — PATIENT OUTREACH (OUTPATIENT)
Dept: INTERNAL MEDICINE | Facility: CLINIC | Age: 72
End: 2022-10-10

## 2022-10-10 NOTE — TELEPHONE ENCOUNTER
What type of discharge? Emergency Department  Risk of Hospital admission or ED visit: 72%  Is a TCM episode required? No  When should the patient follow up with PCP? within 30 days of discharge.    Jocelyne Garcia RN  [facility]

## 2022-10-23 ENCOUNTER — HEALTH MAINTENANCE LETTER (OUTPATIENT)
Age: 72
End: 2022-10-23

## 2022-11-09 DIAGNOSIS — F32.0 MAJOR DEPRESSIVE DISORDER, SINGLE EPISODE, MILD (H): ICD-10-CM

## 2022-11-10 NOTE — TELEPHONE ENCOUNTER
Routing refill request to provider for review/approval because:  Refill request for 20 mg tablets, upon kinjal review patient is also ordered for 40 mg tablets.    Terri Lainez RN

## 2022-11-11 ENCOUNTER — MYC MEDICAL ADVICE (OUTPATIENT)
Dept: INTERNAL MEDICINE | Facility: CLINIC | Age: 72
End: 2022-11-11

## 2022-11-11 NOTE — TELEPHONE ENCOUNTER
Megant message sent to the patient to clarify what dose of the medication she has been taking.    Terri Lainez RN

## 2022-11-14 RX ORDER — CITALOPRAM HYDROBROMIDE 20 MG/1
TABLET ORAL
Qty: 90 TABLET | Refills: 0 | Status: SHIPPED | OUTPATIENT
Start: 2022-11-14 | End: 2023-02-09

## 2022-11-14 NOTE — TELEPHONE ENCOUNTER
Per 11/11/22 MyChart encounter, patient reported they are taking the 20 mg dose of citalopram.     Per 8/10/22 telephone encounter, Pt stated she is taking citalopram 20mg. Pt stated she [facility] stating that people over 60 yrs old and taking this medication at 40mg could be at risk for severe heart issues. Pt stated she is doing just fine with the 20mg once a day.     Citalopram 40 mg taken off of active med list.

## 2022-11-22 ENCOUNTER — TRANSFERRED RECORDS (OUTPATIENT)
Dept: HEALTH INFORMATION MANAGEMENT | Facility: CLINIC | Age: 72
End: 2022-11-22

## 2022-11-22 LAB — RETINOPATHY: NEGATIVE

## 2022-12-08 ENCOUNTER — LAB (OUTPATIENT)
Dept: LAB | Facility: CLINIC | Age: 72
End: 2022-12-08
Payer: MEDICARE

## 2022-12-08 DIAGNOSIS — E11.42 DIABETIC POLYNEUROPATHY ASSOCIATED WITH TYPE 2 DIABETES MELLITUS (H): Primary | ICD-10-CM

## 2022-12-08 DIAGNOSIS — D64.9 ANEMIA, UNSPECIFIED TYPE: ICD-10-CM

## 2022-12-08 LAB
ERYTHROCYTE [DISTWIDTH] IN BLOOD BY AUTOMATED COUNT: 12.5 % (ref 10–15)
FERRITIN SERPL-MCNC: 32 NG/ML (ref 11–328)
HBA1C MFR BLD: 5 % (ref 0–5.6)
HCT VFR BLD AUTO: 35.7 % (ref 35–47)
HGB BLD-MCNC: 12 G/DL (ref 11.7–15.7)
IRON BINDING CAPACITY (ROCHE): 425 UG/DL (ref 240–430)
IRON SATN MFR SERPL: 11 % (ref 15–46)
IRON SERPL-MCNC: 45 UG/DL (ref 37–145)
MCH RBC QN AUTO: 26.7 PG (ref 26.5–33)
MCHC RBC AUTO-ENTMCNC: 33.6 G/DL (ref 31.5–36.5)
MCV RBC AUTO: 80 FL (ref 78–100)
PLATELET # BLD AUTO: 249 10E3/UL (ref 150–450)
RBC # BLD AUTO: 4.49 10E6/UL (ref 3.8–5.2)
WBC # BLD AUTO: 5.4 10E3/UL (ref 4–11)

## 2022-12-08 PROCEDURE — 83036 HEMOGLOBIN GLYCOSYLATED A1C: CPT

## 2022-12-08 PROCEDURE — 83540 ASSAY OF IRON: CPT

## 2022-12-08 PROCEDURE — 82728 ASSAY OF FERRITIN: CPT

## 2022-12-08 PROCEDURE — 85027 COMPLETE CBC AUTOMATED: CPT

## 2022-12-08 PROCEDURE — 83550 IRON BINDING TEST: CPT

## 2022-12-08 PROCEDURE — 36415 COLL VENOUS BLD VENIPUNCTURE: CPT

## 2023-01-18 ENCOUNTER — HOSPITAL ENCOUNTER (OUTPATIENT)
Dept: MAMMOGRAPHY | Facility: CLINIC | Age: 73
Discharge: HOME | End: 2023-01-18
Attending: INTERNAL MEDICINE
Payer: MEDICARE

## 2023-01-18 ENCOUNTER — HOSPITAL ENCOUNTER (OUTPATIENT)
Dept: BONE DENSITY | Facility: CLINIC | Age: 73
Discharge: HOME | End: 2023-01-18
Attending: INTERNAL MEDICINE
Payer: MEDICARE

## 2023-01-18 DIAGNOSIS — Z79.811 LONG TERM (CURRENT) USE OF AROMATASE INHIBITORS: ICD-10-CM

## 2023-01-18 DIAGNOSIS — Z12.31 ENCOUNTER FOR SCREENING MAMMOGRAM FOR MALIGNANT NEOPLASM OF BREAST: ICD-10-CM

## 2023-01-18 DIAGNOSIS — C50.412 MALIGNANT NEOPLASM OF UPPER-OUTER QUADRANT OF LEFT BREAST IN FEMALE, ESTROGEN RECEPTOR POSITIVE (H): ICD-10-CM

## 2023-01-18 DIAGNOSIS — M85.9 DISORDER OF BONE DENSITY AND STRUCTURE, UNSPECIFIED: ICD-10-CM

## 2023-01-18 DIAGNOSIS — Z17.0 MALIGNANT NEOPLASM OF UPPER-OUTER QUADRANT OF LEFT BREAST IN FEMALE, ESTROGEN RECEPTOR POSITIVE (H): ICD-10-CM

## 2023-01-18 PROCEDURE — 77080 DXA BONE DENSITY AXIAL: CPT

## 2023-01-18 PROCEDURE — 77067 SCR MAMMO BI INCL CAD: CPT

## 2023-01-23 ENCOUNTER — TELEPHONE (OUTPATIENT)
Dept: INTERNAL MEDICINE | Facility: CLINIC | Age: 73
End: 2023-01-23
Payer: MEDICARE

## 2023-01-23 DIAGNOSIS — D64.9 ANEMIA, UNSPECIFIED TYPE: Primary | ICD-10-CM

## 2023-01-23 NOTE — TELEPHONE ENCOUNTER
"Patient called stating Dr. Barry wanted her to recheck labs in one month. Not clear exactly which labs to order from lab note - attached below. Provider to order and send note to Angie Kauffman to schedule.       12/08/22: \"Your Hemoglobin A1C and blood sugar tests look good and I would continue with your medication without change.  These tests should be repeated in 6 months.     I am also pleased to inform you that your hemoglobin is now returned to a low normal range and is considerably improved from when last checked.  Your iron studies are also stable.  I think it would be reasonable continue with your iron replacement therapy for another month or so and then consider rechecking these labs again at that time.     Dr. Barry\"  "

## 2023-02-01 ENCOUNTER — PATIENT OUTREACH (OUTPATIENT)
Dept: ONCOLOGY | Facility: CLINIC | Age: 73
End: 2023-02-01
Payer: MEDICARE

## 2023-02-01 NOTE — PROGRESS NOTES
Pt called in today to cancel today's apt with Dr. Kaye.  Pt was scheduled to have 6 month follow up today with Dr. Kaye.  Pt said she was up all night with nausea, vomiting and diarrhea.  She said that she had about 4 episodes of vomiting and 4 episodes of liquid diarrhea.  She said that her daughter owns a  center and she thinks she picked up the stomach bug while visiting with her daughter at the center.  She said kids in the  have also had the stomach bug.  Pt denies any chest pain, SOB, dizziness or light-headedness. She said the vomiting and diarrhea have slowed down this morning and she is feeling some better but states she doesn't feel like coming in for the appointment today and doesn't want to expose others to the stomach flu.  She denies fevers, denies blood in the stool, denies abdominal pain.  She said that she has been able to keep fluids down this morning.  Encouraged patient to continue to take small, frequent sips of liquids.  Encouraged her to eat small, bland meals when she feels able.  Advised patient to go to urgent care or ED if the nausea, vomiting and diarrhea worsen or if she has any other concerning symptoms.  Pt voiced understanding.  Apt cancelled per patient's request.  Pt said she will call back to reschedule her apt with Dr. Kaye when she is feeling better.  Writer will route message to Dr. Kaye and Dr. Vargas Bazan's RNCC, to update them.    Marguerite Marquis, RN, BSN    Triage Nurse Advisor  [facility]  [phone]

## 2023-02-09 DIAGNOSIS — F32.0 MAJOR DEPRESSIVE DISORDER, SINGLE EPISODE, MILD (H): ICD-10-CM

## 2023-02-09 RX ORDER — CITALOPRAM HYDROBROMIDE 20 MG/1
20 TABLET ORAL DAILY
Qty: 90 TABLET | Refills: 0 | Status: SHIPPED | OUTPATIENT
Start: 2023-02-09 | End: 2023-05-18

## 2023-03-30 NOTE — PROGRESS NOTES
Injectable Influenza Immunization Documentation    1.  Is the person to be vaccinated sick today?   No    2. Does the person to be vaccinated have an allergy to a component   of the vaccine?   No  Egg Allergy Algorithm Link    3. Has the person to be vaccinated ever had a serious reaction   to influenza vaccine in the past?   No    4. Has the person to be vaccinated ever had Guillain-Barré syndrome?   No    Form completed by Raquel Leal CMA            Clindamycin Pregnancy And Lactation Text: This medication can be used in pregnancy if certain situations. Clindamycin is also present in breast milk.

## 2023-05-18 DIAGNOSIS — I10 ESSENTIAL HYPERTENSION, BENIGN: ICD-10-CM

## 2023-05-18 DIAGNOSIS — E11.42 DIABETIC POLYNEUROPATHY ASSOCIATED WITH TYPE 2 DIABETES MELLITUS (H): ICD-10-CM

## 2023-05-18 DIAGNOSIS — M54.50 CHRONIC BILATERAL LOW BACK PAIN WITHOUT SCIATICA: ICD-10-CM

## 2023-05-18 DIAGNOSIS — G89.29 CHRONIC BILATERAL LOW BACK PAIN WITHOUT SCIATICA: ICD-10-CM

## 2023-05-18 DIAGNOSIS — C50.412 MALIGNANT NEOPLASM OF UPPER-OUTER QUADRANT OF LEFT BREAST IN FEMALE, ESTROGEN RECEPTOR POSITIVE (H): ICD-10-CM

## 2023-05-18 DIAGNOSIS — F32.0 MAJOR DEPRESSIVE DISORDER, SINGLE EPISODE, MILD (H): ICD-10-CM

## 2023-05-18 DIAGNOSIS — G25.81 RESTLESS LEG SYNDROME: ICD-10-CM

## 2023-05-18 DIAGNOSIS — Z17.0 MALIGNANT NEOPLASM OF UPPER-OUTER QUADRANT OF LEFT BREAST IN FEMALE, ESTROGEN RECEPTOR POSITIVE (H): ICD-10-CM

## 2023-05-18 RX ORDER — CITALOPRAM HYDROBROMIDE 20 MG/1
20 TABLET ORAL DAILY
Qty: 90 TABLET | Refills: 0 | Status: SHIPPED | OUTPATIENT
Start: 2023-05-18 | End: 2023-07-31

## 2023-05-18 RX ORDER — ANASTROZOLE 1 MG/1
1 TABLET ORAL DAILY
Qty: 90 TABLET | Refills: 3 | Status: SHIPPED | OUTPATIENT
Start: 2023-05-18 | End: 2024-08-01

## 2023-05-18 RX ORDER — HYDROCHLOROTHIAZIDE 12.5 MG/1
25 TABLET ORAL DAILY
Qty: 180 TABLET | Refills: 0 | Status: SHIPPED | OUTPATIENT
Start: 2023-05-18 | End: 2023-07-26

## 2023-05-18 NOTE — TELEPHONE ENCOUNTER
Prescription approved per [facility] Refill Protocol.  Terri Lainez, RN  [facility] Triage Nurse

## 2023-05-22 ENCOUNTER — PATIENT OUTREACH (OUTPATIENT)
Dept: CARE COORDINATION | Facility: CLINIC | Age: 73
End: 2023-05-22
Payer: MEDICARE

## 2023-06-06 ENCOUNTER — PATIENT OUTREACH (OUTPATIENT)
Dept: CARE COORDINATION | Facility: CLINIC | Age: 73
End: 2023-06-06
Payer: MEDICARE

## 2023-06-10 ENCOUNTER — TRANSFERRED RECORDS (OUTPATIENT)
Dept: MULTI SPECIALTY CLINIC | Facility: CLINIC | Age: 73
End: 2023-06-10

## 2023-06-10 LAB — RETINOPATHY: NORMAL

## 2023-06-18 ENCOUNTER — HEALTH MAINTENANCE LETTER (OUTPATIENT)
Age: 73
End: 2023-06-18

## 2023-07-26 DIAGNOSIS — I10 ESSENTIAL HYPERTENSION, BENIGN: ICD-10-CM

## 2023-07-26 DIAGNOSIS — Z13.6 CARDIOVASCULAR SCREENING; LDL GOAL LESS THAN 100: ICD-10-CM

## 2023-07-26 DIAGNOSIS — F32.0 MAJOR DEPRESSIVE DISORDER, SINGLE EPISODE, MILD (H): ICD-10-CM

## 2023-07-26 DIAGNOSIS — E11.9 TYPE 2 DIABETES MELLITUS WITHOUT COMPLICATION, WITHOUT LONG-TERM CURRENT USE OF INSULIN (H): ICD-10-CM

## 2023-07-26 DIAGNOSIS — K21.9 GASTROESOPHAGEAL REFLUX DISEASE WITHOUT ESOPHAGITIS: ICD-10-CM

## 2023-07-26 RX ORDER — SIMVASTATIN 20 MG
TABLET ORAL
Qty: 90 TABLET | Refills: 0 | OUTPATIENT
Start: 2023-07-26

## 2023-07-26 RX ORDER — BUPROPION HYDROCHLORIDE 150 MG/1
TABLET ORAL
Qty: 90 TABLET | Refills: 3 | OUTPATIENT
Start: 2023-07-26

## 2023-07-26 RX ORDER — CITALOPRAM HYDROBROMIDE 20 MG/1
TABLET ORAL
Qty: 90 TABLET | Refills: 0 | OUTPATIENT
Start: 2023-07-26

## 2023-07-26 RX ORDER — HYDROCHLOROTHIAZIDE 12.5 MG/1
TABLET ORAL
Qty: 180 TABLET | Refills: 0 | Status: SHIPPED | OUTPATIENT
Start: 2023-07-26 | End: 2023-09-18

## 2023-07-26 RX ORDER — LOSARTAN POTASSIUM 100 MG/1
TABLET ORAL
Qty: 90 TABLET | Refills: 0 | Status: SHIPPED | OUTPATIENT
Start: 2023-07-26 | End: 2023-09-18

## 2023-07-26 NOTE — LETTER
July 26, 2023    Diane Gaitan  [address] [address]        Dear Diane,    While refilling your prescription, we noticed that you are due to have a IN PERSON visit with your Provider.  We will refill your prescription but that appointment must be made before any additional refills can be approved.     Taking care of your health is important to us and we look forward to seeing you in the near future.  Please call us at [phone] or [phone] (or use [employer]) to schedule.  Please disregard this notice if you have already made an appointment.        Sincerely,          [facility] Team

## 2023-07-26 NOTE — TELEPHONE ENCOUNTER
Prescription approved per [facility] Refill Protocol.  Terri Lainez, RN  [facility] Triage Nurse

## 2023-07-28 DIAGNOSIS — F32.0 MAJOR DEPRESSIVE DISORDER, SINGLE EPISODE, MILD (H): ICD-10-CM

## 2023-07-28 DIAGNOSIS — Z13.6 CARDIOVASCULAR SCREENING; LDL GOAL LESS THAN 100: ICD-10-CM

## 2023-07-28 DIAGNOSIS — E11.9 TYPE 2 DIABETES MELLITUS WITHOUT COMPLICATION, WITHOUT LONG-TERM CURRENT USE OF INSULIN (H): ICD-10-CM

## 2023-07-28 DIAGNOSIS — I10 ESSENTIAL HYPERTENSION, BENIGN: ICD-10-CM

## 2023-07-28 DIAGNOSIS — K21.9 GASTROESOPHAGEAL REFLUX DISEASE WITHOUT ESOPHAGITIS: ICD-10-CM

## 2023-07-28 NOTE — TELEPHONE ENCOUNTER
Medication Question or Refill    Contacts         Type Contact Phone/Fax    07/28/2023 07:19 AM CDT Phone (Incoming) Diane Gaitan (Self) [phone] (H)            What medication are you calling about (include dose and sig)?: Seven of them: Metformin, Wellbutrin, Losartan, hydrochlorothiazide, citalopram, omeprazole, simvastatin     Preferred Pharmacy:       [facility] [place] - [address]  [address]  [place] [address]  Phone: [phone] Fax: [phone]      Controlled Substance Agreement on file:   CSA -- Patient Level:    CSA: None found at the patient level.       Who prescribed the medication?: Asha    Do you need a refill? Yes    When did you use the medication last? This AM - she has enough for a week. It takes 10 days usually to get them.     Patient offered an appointment? Yes:  states she has been asking for an apt and waiting for a call back.     Do you have any questions or concerns?  Yes: concerns about blood test       Could we send this information to you in [facility] or would you prefer to receive a phone call?:   Patient would prefer a phone call   Okay to leave a detailed message?: Yes at Home number on file [phone] (home)

## 2023-07-31 RX ORDER — SIMVASTATIN 20 MG
TABLET ORAL
Qty: 90 TABLET | Refills: 3 | Status: SHIPPED | OUTPATIENT
Start: 2023-07-31 | End: 2024-08-21

## 2023-07-31 RX ORDER — CITALOPRAM HYDROBROMIDE 20 MG/1
20 TABLET ORAL DAILY
Qty: 90 TABLET | Refills: 0 | Status: SHIPPED | OUTPATIENT
Start: 2023-07-31 | End: 2023-09-18

## 2023-07-31 RX ORDER — AMLODIPINE BESYLATE 5 MG/1
5 TABLET ORAL DAILY
Qty: 90 TABLET | Refills: 3 | Status: SHIPPED | OUTPATIENT
Start: 2023-07-31 | End: 2024-08-01

## 2023-07-31 RX ORDER — BUPROPION HYDROCHLORIDE 150 MG/1
150 TABLET ORAL EVERY MORNING
Qty: 90 TABLET | Refills: 3 | Status: SHIPPED | OUTPATIENT
Start: 2023-07-31 | End: 2024-08-01

## 2023-07-31 RX ORDER — LOSARTAN POTASSIUM 100 MG/1
100 TABLET ORAL DAILY
Qty: 90 TABLET | Refills: 3 | OUTPATIENT
Start: 2023-07-31

## 2023-07-31 RX ORDER — HYDROCHLOROTHIAZIDE 12.5 MG/1
25 TABLET ORAL DAILY
Qty: 180 TABLET | Refills: 3 | OUTPATIENT
Start: 2023-07-31

## 2023-08-27 ENCOUNTER — HEALTH MAINTENANCE LETTER (OUTPATIENT)
Age: 73
End: 2023-08-27

## 2023-09-18 ENCOUNTER — OFFICE VISIT (OUTPATIENT)
Dept: INTERNAL MEDICINE | Facility: CLINIC | Age: 73
End: 2023-09-18
Payer: MEDICARE

## 2023-09-18 VITALS
HEART RATE: 96 BPM | WEIGHT: 193 LBS | TEMPERATURE: 96.9 F | BODY MASS INDEX: 28.58 KG/M2 | DIASTOLIC BLOOD PRESSURE: 78 MMHG | OXYGEN SATURATION: 100 % | SYSTOLIC BLOOD PRESSURE: 136 MMHG | RESPIRATION RATE: 17 BRPM | HEIGHT: 69 IN

## 2023-09-18 DIAGNOSIS — E11.42 DIABETIC POLYNEUROPATHY ASSOCIATED WITH TYPE 2 DIABETES MELLITUS (H): Primary | ICD-10-CM

## 2023-09-18 DIAGNOSIS — D64.9 ANEMIA, UNSPECIFIED TYPE: ICD-10-CM

## 2023-09-18 DIAGNOSIS — G89.29 CHRONIC BILATERAL LOW BACK PAIN WITHOUT SCIATICA: ICD-10-CM

## 2023-09-18 DIAGNOSIS — F32.0 MAJOR DEPRESSIVE DISORDER, SINGLE EPISODE, MILD (H): ICD-10-CM

## 2023-09-18 DIAGNOSIS — I10 ESSENTIAL HYPERTENSION, BENIGN: ICD-10-CM

## 2023-09-18 DIAGNOSIS — G25.81 RESTLESS LEG SYNDROME: ICD-10-CM

## 2023-09-18 DIAGNOSIS — M54.50 CHRONIC BILATERAL LOW BACK PAIN WITHOUT SCIATICA: ICD-10-CM

## 2023-09-18 LAB
ANION GAP SERPL CALCULATED.3IONS-SCNC: 12 MMOL/L (ref 7–15)
BUN SERPL-MCNC: 8.4 MG/DL (ref 8–23)
CALCIUM SERPL-MCNC: 9.5 MG/DL (ref 8.8–10.2)
CHLORIDE SERPL-SCNC: 93 MMOL/L (ref 98–107)
CHOLEST SERPL-MCNC: 147 MG/DL
CREAT SERPL-MCNC: 0.58 MG/DL (ref 0.51–0.95)
DEPRECATED HCO3 PLAS-SCNC: 25 MMOL/L (ref 22–29)
EGFRCR SERPLBLD CKD-EPI 2021: >90 ML/MIN/1.73M2
ERYTHROCYTE [DISTWIDTH] IN BLOOD BY AUTOMATED COUNT: 12.3 % (ref 10–15)
FERRITIN SERPL-MCNC: 67 NG/ML (ref 11–328)
GLUCOSE SERPL-MCNC: 104 MG/DL (ref 70–99)
HBA1C MFR BLD: 4.9 % (ref 0–5.6)
HCT VFR BLD AUTO: 37.2 % (ref 35–47)
HDLC SERPL-MCNC: 77 MG/DL
HGB BLD-MCNC: 13.1 G/DL (ref 11.7–15.7)
IRON BINDING CAPACITY (ROCHE): 332 UG/DL (ref 240–430)
IRON SATN MFR SERPL: 11 % (ref 15–46)
IRON SERPL-MCNC: 35 UG/DL (ref 37–145)
LDLC SERPL CALC-MCNC: 49 MG/DL
MCH RBC QN AUTO: 28.3 PG (ref 26.5–33)
MCHC RBC AUTO-ENTMCNC: 35.2 G/DL (ref 31.5–36.5)
MCV RBC AUTO: 80 FL (ref 78–100)
NONHDLC SERPL-MCNC: 70 MG/DL
PLATELET # BLD AUTO: 207 10E3/UL (ref 150–450)
POTASSIUM SERPL-SCNC: 3.7 MMOL/L (ref 3.4–5.3)
RBC # BLD AUTO: 4.63 10E6/UL (ref 3.8–5.2)
SODIUM SERPL-SCNC: 130 MMOL/L (ref 136–145)
TRIGL SERPL-MCNC: 106 MG/DL
WBC # BLD AUTO: 7.1 10E3/UL (ref 4–11)

## 2023-09-18 PROCEDURE — 36415 COLL VENOUS BLD VENIPUNCTURE: CPT | Performed by: INTERNAL MEDICINE

## 2023-09-18 PROCEDURE — 80048 BASIC METABOLIC PNL TOTAL CA: CPT | Performed by: INTERNAL MEDICINE

## 2023-09-18 PROCEDURE — 83550 IRON BINDING TEST: CPT | Performed by: INTERNAL MEDICINE

## 2023-09-18 PROCEDURE — 80061 LIPID PANEL: CPT | Performed by: INTERNAL MEDICINE

## 2023-09-18 PROCEDURE — 99214 OFFICE O/P EST MOD 30 MIN: CPT | Performed by: INTERNAL MEDICINE

## 2023-09-18 PROCEDURE — 83540 ASSAY OF IRON: CPT | Performed by: INTERNAL MEDICINE

## 2023-09-18 PROCEDURE — 85027 COMPLETE CBC AUTOMATED: CPT | Performed by: INTERNAL MEDICINE

## 2023-09-18 PROCEDURE — 83036 HEMOGLOBIN GLYCOSYLATED A1C: CPT | Performed by: INTERNAL MEDICINE

## 2023-09-18 PROCEDURE — 82728 ASSAY OF FERRITIN: CPT | Performed by: INTERNAL MEDICINE

## 2023-09-18 RX ORDER — LOSARTAN POTASSIUM 100 MG/1
100 TABLET ORAL DAILY
Qty: 90 TABLET | Refills: 3 | Status: SHIPPED | OUTPATIENT
Start: 2023-09-18 | End: 2024-08-21

## 2023-09-18 RX ORDER — GABAPENTIN 300 MG/1
300 CAPSULE ORAL 3 TIMES DAILY
Qty: 270 CAPSULE | Refills: 3 | Status: SHIPPED | OUTPATIENT
Start: 2023-09-18 | End: 2024-08-21

## 2023-09-18 RX ORDER — CITALOPRAM HYDROBROMIDE 20 MG/1
20 TABLET ORAL DAILY
Qty: 90 TABLET | Refills: 0 | Status: SHIPPED | OUTPATIENT
Start: 2023-09-18 | End: 2023-11-30

## 2023-09-18 RX ORDER — HYDROCHLOROTHIAZIDE 12.5 MG/1
25 TABLET ORAL DAILY
Qty: 180 TABLET | Refills: 3 | Status: SHIPPED | OUTPATIENT
Start: 2023-09-18 | End: 2024-08-21

## 2023-09-18 ASSESSMENT — PATIENT HEALTH QUESTIONNAIRE - PHQ9
10. IF YOU CHECKED OFF ANY PROBLEMS, HOW DIFFICULT HAVE THESE PROBLEMS MADE IT FOR YOU TO DO YOUR WORK, TAKE CARE OF THINGS AT HOME, OR GET ALONG WITH OTHER PEOPLE: NOT DIFFICULT AT ALL
SUM OF ALL RESPONSES TO PHQ QUESTIONS 1-9: 2
SUM OF ALL RESPONSES TO PHQ QUESTIONS 1-9: 2

## 2023-09-18 NOTE — PROGRESS NOTES
"  Assessment & Plan     Diabetic polyneuropathy associated with type 2 diabetes mellitus (H)  Continuing with supportive care.  Suggest reinitiating gabapentin.  PDMP reviewed  - HEMOGLOBIN A1C; Future  - Lipid panel reflex to direct LDL Non-fasting; Future  - gabapentin (NEURONTIN) 300 MG capsule; Take 1 capsule (300 mg) by mouth 3 times daily    Essential hypertension, benign  Stable on therapy.  Medication failed.  Recheck renal function  - BASIC METABOLIC PANEL; Future  - hydrochlorothiazide (HYDRODIURIL) 12.5 MG tablet; Take 2 tablets (25 mg) by mouth daily  - losartan (COZAAR) 100 MG tablet; Take 1 tablet (100 mg) by mouth daily    Chronic bilateral low back pain without sciatica  Continue with supportive care.  Offered physical therapy but declined.  - gabapentin (NEURONTIN) 300 MG capsule; Take 1 capsule (300 mg) by mouth 3 times daily    Restless leg syndrome  Stable on current medical management.  Noted history of polyneuropathy  - gabapentin (NEURONTIN) 300 MG capsule; Take 1 capsule (300 mg) by mouth 3 times daily    Major depressive disorder, single episode, mild (H)  Stable per patient and continue with current medical management.  - citalopram (CELEXA) 20 MG tablet; Take 1 tablet (20 mg) by mouth daily    Anemia, unspecified type  Stable and rechecking hemoglobin and iron studies now off iron supplementation  - CBC with platelets; Future  - Ferritin; Future  - Iron & Iron Binding Capacity; Future     BMI:   Estimated body mass index is 28.92 kg/m  as calculated from the following:    Height as of this encounter: 1.74 m (5' 8.5\").    Weight as of this encounter: 87.5 kg (193 lb).       Work on weight loss  Regular exercise    Jac Barry MD  [facility]    Leny Contreras is a 73 year old, presenting for the following health issues:  Recheck Medication and Anemia    She had COVID in 2020 and felt this hastened her decline.    She did see PT previously in [facility]. "  She was told she didn't need to come back. She has been doing home PT exercises with them.    She gets winded easily.  This worsened after COVID too.  The did have brain fog previously as well, perhaps persistent to some extent.    Legs wake her up out of sleep and legs are painful as well at times.   She previously was on gabapentin now wondering if needs again.    History of Present Illness       Reason for visit:  Blood check    She eats 2-3 servings of fruits and vegetables daily.She consumes 0 sweetened beverage(s) daily.She exercises with enough effort to increase her heart rate 10 to 19 minutes per day.  She exercises with enough effort to increase her heart rate 3 or less days per week.   She is taking medications regularly.     Follow up anemia, patient no longer taking iron supplement     Diabetes Follow-up    How often are you checking your blood sugar? A few times a month  What time of day are you checking your blood sugars (select all that apply)?  Not applicable  Have you had any blood sugars above 200?  Not applicable  Have you had any blood sugars below 70?  Not applicable  What symptoms do you notice when your blood sugar is low?  None  What concerns do you have today about your diabetes? None   Do you have any of these symptoms? (Select all that apply)  Numbness in feet      Hyperlipidemia Follow-Up    Are you regularly taking any medication or supplement to lower your cholesterol?   Yes- Simvastatin   Are you having muscle aches or other side effects that you think could be caused by your cholesterol lowering medication?  No    Hypertension Follow-up    Do you check your blood pressure regularly outside of the clinic? Yes   Are you following a low salt diet? Yes  Are your blood pressures ever more than 140 on the top number (systolic) OR more   than 90 on the bottom number (diastolic), for example 140/90? No    BP Readings from Last 2 Encounters:   09/18/23 136/78   10/07/22 131/72     Hemoglobin  "A1C (%)   Date Value   12/08/2022 5.0   06/21/2022 5.5   02/11/2021 5.3   06/19/2020 5.7 (H)     LDL Cholesterol Calculated (mg/dL)   Date Value   06/21/2022 33   02/11/2021 58   10/03/2019 59       Other concerns:    Bilateral shoulder pain, unable to lift right arm  Weakness in legs, multiple falls from legs giving out while standing up. Patient requesting handicap parking certificate. Using cane and walker   No longer taking gabapentin, patient is wondering if this should be restarted - discussed.    Review of Systems:    CONSTITUTIONAL: NEGATIVE for fever, chills, change in weight  EYES: NEGATIVE for vision changes or irritation  ENT/MOUTH: NEGATIVE for ear, mouth and throat problems  RESP: NEGATIVE for significant cough or SOB  CV: NEGATIVE for chest pain, palpitations or peripheral edema  GI: NEGATIVE for nausea, abdominal pain, heartburn, or change in bowel habits  : NEGATIVE for frequency, dysuria, or hematuria  NEURO: NEGATIVE for dizziness   HEME: NEGATIVE for bleeding problems  PSYCHIATRIC: NEGATIVE for changes in mood or affect      Objective    /78   Pulse 96   Temp 96.9  F (36.1  C) (Temporal)   Resp 17   Ht 1.74 m (5' 8.5\")   Wt 87.5 kg (193 lb)   SpO2 100%   BMI 28.92 kg/m    Body mass index is 28.92 kg/m .  Physical Exam   GENERAL: alert and no distress  EYES: Eyes grossly normal to inspection, PERRL and conjunctivae and sclerae normal  HENT: ear canals and TM's normal, nose and mouth without ulcers or lesions  NECK: no adenopathy, no asymmetry, masses, or scars and thyroid normal to palpation  RESP: lungs clear to auscultation - no rales, rhonchi or wheezes  CV: regular rate and rhythm, normal S1 S2, no S3 or S4, no click or rub  MS: no gross musculoskeletal defects noted from baseline  NEURO: No changes from baseline neuropathic changes  PSYCH: mentation appears normal, affect normal/bright                      "

## 2023-10-09 ENCOUNTER — IMMUNIZATION (OUTPATIENT)
Dept: INTERNAL MEDICINE | Facility: CLINIC | Age: 73
End: 2023-10-09
Payer: MEDICARE

## 2023-10-09 DIAGNOSIS — Z23 NEED FOR PROPHYLACTIC VACCINATION AND INOCULATION AGAINST INFLUENZA: Primary | ICD-10-CM

## 2023-10-09 PROCEDURE — 90662 IIV NO PRSV INCREASED AG IM: CPT

## 2023-10-09 PROCEDURE — G0008 ADMIN INFLUENZA VIRUS VAC: HCPCS

## 2023-10-09 PROCEDURE — 99207 PR NO CHARGE NURSE ONLY: CPT

## 2023-11-29 NOTE — PROGRESS NOTES
SUBJECTIVE:   Diane is a 73 year old, presenting for the following:    Are you in the first 12 months of your Medicare coverage?  No    History of Present Illness       Reason for visit:  Checkup andblooddraw    She eats 2-3 servings of fruits and vegetables daily.She consumes 0 sweetened beverage(s) daily.She exercises with enough effort to increase her heart rate 10 to 19 minutes per day.  She exercises with enough effort to increase her heart rate 3 or less days per week.   She is not taking prescribed medications regularly due to None.  Healthy Habits:     In general, how would you rate your overall health?  Fair    Frequency of exercise:  6-7 days/week    Duration of exercise:  15-30 minutes    Taking medications regularly:  Yes    Barriers to taking medications:  None    Medication side effects:  None    Ability to successfully perform activities of daily living:  Laundry requires assistance    Home Safety:  No safety concerns identified    Hearing Impairment:  No hearing concerns    In the past 6 months, have you been bothered by leaking of urine?  No    In general, how would you rate your overall mental or emotional health?  Excellent    Additional concerns today:  No      Have you ever done Advance Care Planning? (For example, a Health Directive, POLST, or a discussion with a medical provider or your loved ones about your wishes): Yes, advance care planning is on file.       Fall risk:  low    Cognitive Screening   1) Repeat 3 items (Leader, Season, Table)    2) Clock draw: NORMAL  3) 3 item recall: Recalls 3 objects  Results: 3 items recalled: COGNITIVE IMPAIRMENT LESS LIKELY    Mini-CogTM Copyright S Nina. Licensed by the author for use in Health system; reprinted with permission (pola@.[email or web address]). All rights reserved.      Reviewed and updated as needed this visit by clinical staff   Tobacco  Allergies  Meds              Reviewed and updated as needed this visit by Provider                  Social History     Tobacco Use    Smoking status: Former     Packs/day: 1.00     Years: 12.00     Additional pack years: 0.00     Total pack years: 12.00     Types: Cigarettes     Quit date: 10/18/1980     Years since quittin.1    Smokeless tobacco: Never   Substance Use Topics    Alcohol use: Yes     Comment: 12 Beers per week           2022     9:39 AM   Alcohol Use   Prescreen: >3 drinks/day or >7 drinks/week? No     Do you have a current opioid prescription? No  Do you use any other controlled substances or medications that are not prescribed by a provider? None      Current Outpatient Medications   Medication    amLODIPine (NORVASC) 5 MG tablet    anastrozole (ARIMIDEX) 1 MG tablet    aspirin 81 MG tablet    buPROPion (WELLBUTRIN XL) 150 MG 24 hr tablet    calcium carbonate 600 mg-vitamin D 400 units (CALTRATE) 600-400 MG-UNIT per tablet    citalopram (CELEXA) 20 MG tablet    gabapentin (NEURONTIN) 300 MG capsule    hydrochlorothiazide (HYDRODIURIL) 12.5 MG tablet    LORazepam (ATIVAN) 0.5 MG tablet    losartan (COZAAR) 100 MG tablet    metFORMIN (GLUCOPHAGE) 500 MG tablet    omeprazole (PRILOSEC) 20 MG DR capsule    simvastatin (ZOCOR) 20 MG tablet     Current Facility-Administered Medications   Medication    lidocaine 1 % injection 4 mL    LORazepam (ATIVAN) tablet 0.5 mg    methylPREDNISolone (DEPO-MEDROL) injection 80 mg       Current providers sharing in care for this patient include:   Patient Care Team:  Jac Barry MD as PCP - General  Jac Barry MD as Assigned PCP    The following health maintenance items are reviewed in Epic and correct as of today:  Health Maintenance   Topic Date Due    RSV VACCINE (Pregnancy & 60+) (1 - 1-dose 60+ series) Never done    DIABETIC FOOT EXAM  2019    COVID-19 Vaccine (2 - Vadim risk series) 2021    MEDICARE ANNUAL WELLNESS VISIT  2023    MICROALBUMIN  2023    ANNUAL REVIEW OF HM ORDERS  2023    EYE EXAM  2023     A1C  03/18/2024    PHQ-9  03/18/2024    BMP  09/18/2024    LIPID  09/18/2024    FALL RISK ASSESSMENT  09/18/2024    MAMMO SCREENING  01/18/2025    DTAP/TDAP/TD IMMUNIZATION (2 - Td or Tdap) 01/19/2025    ADVANCE CARE PLANNING  06/30/2027    COLORECTAL CANCER SCREENING  09/28/2028    DEXA  01/18/2038    HEPATITIS C SCREENING  Completed    DEPRESSION ACTION PLAN  Completed    INFLUENZA VACCINE  Completed    Pneumococcal Vaccine: 65+ Years  Completed    ZOSTER IMMUNIZATION  Completed    IPV IMMUNIZATION  Aged Out    HPV IMMUNIZATION  Aged Out    MENINGITIS IMMUNIZATION  Aged Out    RSV MONOCLONAL ANTIBODY  Aged Out    LUNG CANCER SCREENING  Discontinued       Immunization History   Administered Date(s) Administered    COVID-19 Vaccine (imgfave) 07/21/2021    Influenza (High Dose) 3 valent vaccine 09/17/2015, 09/15/2016, 11/07/2017, 10/18/2018, 10/03/2019    Influenza (IIV3) PF 12/05/1997, 11/03/2003, 12/22/2004, 10/18/2005, 11/27/2006, 10/24/2007, 11/25/2008, 09/23/2009, 09/28/2010, 10/01/2011, 10/11/2012, 10/01/2014    Influenza Vaccine 65+ (Fluzone HD) 09/30/2020, 11/14/2022, 10/09/2023    Influenza Vaccine >6 months,quad, PF 10/17/2013    Pneumo Conj 13-V (2010&after) 09/17/2015    Pneumococcal 23 valent 06/25/2013, 10/18/2018    TD,PF 7+ (Tenivac) 01/25/2005    TDAP Vaccine (Adacel) 01/19/2015    Zoster recombinant adjuvanted (SHINGRIX) 06/19/2020, 09/30/2020             Review of Systems  CONSTITUTIONAL: NEGATIVE for fever, chills, change in weight  EYES: NEGATIVE for vision changes or irritation  ENT/MOUTH: NEGATIVE for ear, mouth and throat problems  RESP: NEGATIVE for significant cough or SOB  CV: NEGATIVE for chest pain, palpitations or peripheral edema  GI: NEGATIVE for nausea, abdominal pain, heartburn, or change in bowel habits  : NEGATIVE for frequency, dysuria, or hematuria  MUSCULOSKELETAL: NEGATIVE for significant arthralgias or myalgia  NEURO: NEGATIVE for weakness, dizziness or  "paresthesias  ENDOCRINE: NEGATIVE for temperature intolerance, skin/hair changes  HEME: NEGATIVE for bleeding problems  PSYCHIATRIC: NEGATIVE for changes in mood or affect    OBJECTIVE:   /85   Pulse 65   Temp 98.4  F (36.9  C) (Temporal)   Resp 16   Ht 1.74 m (5' 8.5\")   Wt 87.8 kg (193 lb 8 oz)   SpO2 96%   BMI 28.99 kg/m   Estimated body mass index is 28.92 kg/m  as calculated from the following:    Height as of 9/18/23: 1.74 m (5' 8.5\").    Weight as of 9/18/23: 87.5 kg (193 lb).    Physical Exam:    GENERAL: alert and no distress  EYES: Eyes grossly normal to inspection, PERRL and conjunctivae and sclerae normal  HENT: ear canals and TM's normal, nose and mouth without ulcers or lesions  NECK: no adenopathy, no asymmetry, masses, or scars and thyroid normal to palpation  RESP: lungs clear to auscultation - no rales, rhonchi or wheezes  CV: regular rate and rhythm, normal S1 S2, no S3 or S4, no murmur, click or rub  ABDOMEN: soft, nontender, no hepatosplenomegaly, no masses and bowel sounds normal  MS: no gross musculoskeletal defects noted, no edema  NEURO: No focal changes  PSYCH: mentation appears normal, affect normal/bright    ASSESSMENT / PLAN:   (Z00.00) Encounter for subsequent annual wellness visit in Medicare patient  (primary encounter diagnosis)  Comment: Has routine vaccination updates but patient is not interested.  Plan: CBC with platelets, Comprehensive metabolic         panel (BMP + Alb, Alk Phos, ALT, AST, Total.         Bili, TP)            (E11.9) Type 2 diabetes mellitus without complication, without long-term current use of insulin (H)  Comment: Labs ordered as fasting per routine.  A1c listed  Plan: Comprehensive metabolic panel (BMP + Alb, Alk         Phos, ALT, AST, Total. Bili, TP)          Lab Results   Component Value Date    A1C 4.9 09/18/2023    A1C 5.0 12/08/2022    A1C 5.5 06/21/2022    A1C 5.7 07/28/2021    A1C 5.3 02/11/2021    A1C 5.7 06/19/2020    A1C 5.7 " 10/03/2019    A1C 5.7 05/02/2019    A1C 6.1 09/13/2018         (I21.4) NSTEMI (non-ST elevated myocardial infarction) (H)  Comment: Stable without active complaints and continue with risk reduction therapy  Plan:     (C50.412,  Z17.0) Malignant neoplasm of upper-outer quadrant of left breast in female, estrogen receptor positive (H)  Comment:  Following up with oncology as previously directed  Plan:     (E66.01) Morbid obesity (H)  Comment: Encouraged ongoing weight loss and weight reduction  Plan:     (I48.91) Atrial fibrillation, unspecified type (H)  Comment: Noted as baseline history and clinically stable  Plan:     (F32.0) Major depressive disorder, single episode, mild (H24)  Comment: Stable per patient and continuing with current medical management  Plan: citalopram (CELEXA) 20 MG tablet           (Z12.11) Colon cancer screening  Comment: Unable to locate most recent colonoscopy per patient report.  I see 1 in 2018 patient states has had 1 since during hospitalization none of which have been able to locate.  Plan: Fecal colorectal cancer screen (FIT)            (Z12.31) Visit for screening mammogram  Comment: Mammography done through outside source  Plan:     (E11.42) Diabetic polyneuropathy associated with type 2 diabetes mellitus (H)  Comment: Continue with supportive care  Plan: Albumin Random Urine Quantitative with Creat         Ratio            (K21.9) Gastroesophageal reflux disease without esophagitis  Comment: Refilled per patient request  Plan: omeprazole (PRILOSEC) 20 MG DR capsule            Patient has been advised of split billing requirements and indicates understanding: Yes      COUNSELING:  Reviewed preventive health counseling, as reflected in patient instructions       Regular exercise       Healthy diet/nutrition        She reports that she quit smoking about 43 years ago. She has a 12.00 pack-year smoking history. She has never used smokeless tobacco.      Appropriate preventive services  were discussed with this patient, including applicable screening as appropriate for fall prevention, nutrition, physical activity, Tobacco-use cessation, weight loss and cognition.  Checklist reviewing preventive services available has been given to the patient.    Reviewed patients plan of care and provided an AVS. The Basic Care Plan (routine screening as documented in Health Maintenance) for Diane meets the Care Plan requirement. This Care Plan has been established and reviewed with the Patient.        Jac Barry MD  [facility]    Identified Health Risks:

## 2023-11-30 ENCOUNTER — OFFICE VISIT (OUTPATIENT)
Dept: INTERNAL MEDICINE | Facility: CLINIC | Age: 73
End: 2023-11-30
Payer: MEDICARE

## 2023-11-30 VITALS
DIASTOLIC BLOOD PRESSURE: 85 MMHG | OXYGEN SATURATION: 96 % | HEART RATE: 65 BPM | BODY MASS INDEX: 28.66 KG/M2 | TEMPERATURE: 98.4 F | RESPIRATION RATE: 16 BRPM | WEIGHT: 193.5 LBS | HEIGHT: 69 IN | SYSTOLIC BLOOD PRESSURE: 132 MMHG

## 2023-11-30 DIAGNOSIS — Z12.11 COLON CANCER SCREENING: ICD-10-CM

## 2023-11-30 DIAGNOSIS — I48.91 ATRIAL FIBRILLATION, UNSPECIFIED TYPE (H): ICD-10-CM

## 2023-11-30 DIAGNOSIS — Z17.0 MALIGNANT NEOPLASM OF UPPER-OUTER QUADRANT OF LEFT BREAST IN FEMALE, ESTROGEN RECEPTOR POSITIVE (H): ICD-10-CM

## 2023-11-30 DIAGNOSIS — I21.4 NSTEMI (NON-ST ELEVATED MYOCARDIAL INFARCTION) (H): ICD-10-CM

## 2023-11-30 DIAGNOSIS — F32.0 MAJOR DEPRESSIVE DISORDER, SINGLE EPISODE, MILD (H): ICD-10-CM

## 2023-11-30 DIAGNOSIS — Z12.31 VISIT FOR SCREENING MAMMOGRAM: ICD-10-CM

## 2023-11-30 DIAGNOSIS — Z00.00 ENCOUNTER FOR SUBSEQUENT ANNUAL WELLNESS VISIT IN MEDICARE PATIENT: Primary | ICD-10-CM

## 2023-11-30 DIAGNOSIS — E11.9 TYPE 2 DIABETES MELLITUS WITHOUT COMPLICATION, WITHOUT LONG-TERM CURRENT USE OF INSULIN (H): ICD-10-CM

## 2023-11-30 DIAGNOSIS — E66.01 MORBID OBESITY (H): ICD-10-CM

## 2023-11-30 DIAGNOSIS — K21.9 GASTROESOPHAGEAL REFLUX DISEASE WITHOUT ESOPHAGITIS: ICD-10-CM

## 2023-11-30 DIAGNOSIS — C50.412 MALIGNANT NEOPLASM OF UPPER-OUTER QUADRANT OF LEFT BREAST IN FEMALE, ESTROGEN RECEPTOR POSITIVE (H): ICD-10-CM

## 2023-11-30 DIAGNOSIS — E11.42 DIABETIC POLYNEUROPATHY ASSOCIATED WITH TYPE 2 DIABETES MELLITUS (H): ICD-10-CM

## 2023-11-30 LAB
ALBUMIN SERPL BCG-MCNC: 4.4 G/DL (ref 3.5–5.2)
ALP SERPL-CCNC: 109 U/L (ref 40–150)
ALT SERPL W P-5'-P-CCNC: 22 U/L (ref 0–50)
ANION GAP SERPL CALCULATED.3IONS-SCNC: 10 MMOL/L (ref 7–15)
AST SERPL W P-5'-P-CCNC: 23 U/L (ref 0–45)
BILIRUB SERPL-MCNC: 0.9 MG/DL
BUN SERPL-MCNC: 10.8 MG/DL (ref 8–23)
CALCIUM SERPL-MCNC: 10.2 MG/DL (ref 8.8–10.2)
CHLORIDE SERPL-SCNC: 99 MMOL/L (ref 98–107)
CREAT SERPL-MCNC: 0.67 MG/DL (ref 0.51–0.95)
CREAT UR-MCNC: 60.2 MG/DL
DEPRECATED HCO3 PLAS-SCNC: 25 MMOL/L (ref 22–29)
EGFRCR SERPLBLD CKD-EPI 2021: >90 ML/MIN/1.73M2
ERYTHROCYTE [DISTWIDTH] IN BLOOD BY AUTOMATED COUNT: 12 % (ref 10–15)
GLUCOSE SERPL-MCNC: 116 MG/DL (ref 70–99)
HCT VFR BLD AUTO: 40.8 % (ref 35–47)
HGB BLD-MCNC: 13.9 G/DL (ref 11.7–15.7)
MCH RBC QN AUTO: 28.5 PG (ref 26.5–33)
MCHC RBC AUTO-ENTMCNC: 34.1 G/DL (ref 31.5–36.5)
MCV RBC AUTO: 84 FL (ref 78–100)
MICROALBUMIN UR-MCNC: <12 MG/L
MICROALBUMIN/CREAT UR: NORMAL MG/G{CREAT}
PLATELET # BLD AUTO: 196 10E3/UL (ref 150–450)
POTASSIUM SERPL-SCNC: 4.8 MMOL/L (ref 3.4–5.3)
PROT SERPL-MCNC: 7.4 G/DL (ref 6.4–8.3)
RBC # BLD AUTO: 4.88 10E6/UL (ref 3.8–5.2)
SODIUM SERPL-SCNC: 134 MMOL/L (ref 135–145)
WBC # BLD AUTO: 6.3 10E3/UL (ref 4–11)

## 2023-11-30 PROCEDURE — 82043 UR ALBUMIN QUANTITATIVE: CPT | Performed by: INTERNAL MEDICINE

## 2023-11-30 PROCEDURE — G0439 PPPS, SUBSEQ VISIT: HCPCS | Performed by: INTERNAL MEDICINE

## 2023-11-30 PROCEDURE — 36415 COLL VENOUS BLD VENIPUNCTURE: CPT | Performed by: INTERNAL MEDICINE

## 2023-11-30 PROCEDURE — 99214 OFFICE O/P EST MOD 30 MIN: CPT | Mod: 25 | Performed by: INTERNAL MEDICINE

## 2023-11-30 PROCEDURE — 82570 ASSAY OF URINE CREATININE: CPT | Performed by: INTERNAL MEDICINE

## 2023-11-30 PROCEDURE — 80053 COMPREHEN METABOLIC PANEL: CPT | Performed by: INTERNAL MEDICINE

## 2023-11-30 PROCEDURE — 85027 COMPLETE CBC AUTOMATED: CPT | Performed by: INTERNAL MEDICINE

## 2023-11-30 RX ORDER — CITALOPRAM HYDROBROMIDE 20 MG/1
20 TABLET ORAL DAILY
Qty: 90 TABLET | Refills: 3 | Status: SHIPPED | OUTPATIENT
Start: 2023-11-30 | End: 2024-08-01

## 2023-11-30 ASSESSMENT — ACTIVITIES OF DAILY LIVING (ADL): CURRENT_FUNCTION: LAUNDRY REQUIRES ASSISTANCE

## 2023-11-30 NOTE — PATIENT INSTRUCTIONS
Patient Education   Personalized Prevention Plan  You are due for the preventive services outlined below.  Your care team is available to assist you in scheduling these services.  If you have already completed any of these items, please share that information with your care team to update in your medical record.  Health Maintenance Due   Topic Date Due     RSV VACCINE (Pregnancy & 60+) (1 - 1-dose 60+ series) Never done     Diabetic Foot Exam  12/13/2019     COVID-19 Vaccine (2 - Vadim risk series) 08/18/2021     Annual Wellness Visit  06/21/2023     Kidney Microalbumin Urine Test  06/21/2023     ANNUAL REVIEW OF HM ORDERS  06/21/2023     Eye Exam  11/22/2023

## 2023-11-30 NOTE — COMMUNITY RESOURCES LIST (ENGLISH)
11/30/2023   [facility] - Outpatient Clinics  N/A  For additional resource needs, please contact your health insurance member services or your primary care team.  Phone: [phone]   Email: N/A   Address: [facility] [place] [address]   Hours: N/A        Financial Stability       Utility payment assistance  1  [place] [facility] - Energy and Utilities Distance: 11.12 miles      In-Person, Phone/Virtual   [address] [place] [address]  Language: English  Hours: Mon - Fri 8:30 AM - 4:30 PM  Fees: Free   Phone: (936) 114-5253 Website: [place]://[place]/Idea2/energy/consumer-assistance/energy-assistance-program/     2  [place] [employer] [facility] - [place]'s Telephone Assistance Plan (TAP) and [facility] Lifeline and Affordable Connectivity Program (ACP) Distance: 17.24 miles      Phone/Virtual   [address] [place] [address]  Language: English  Fees: Free   Phone: (722) 183-5763 Email: [email or web address]. Website: https://[place].gov/puc/consumers/telephone/          Important Numbers & Websites       [facility]   [address] 211[address].org  Poison Control   (544) 267-8433 [place]  Suicide and Crisis Lifeline   [address] [place], [place] [address]line.org  Childhelp [place] Child Abuse Hotline   660.444.5184 Childhelphotline.org  National Sexual Assault Hotline   (759) 505-8204 (HOPE) Rainn.org  National Runaway Safeline   (784) 544-6152 (RUNAWAY) 1800runaway.org  Pregnancy & Postpartum Support [place]   Call/text 991-444-8846 Ppsupportmn.org  Substance Abuse National Helpline ([facility]   811-793-HELP (4111) Findtreatment.gov  Emergency Services   911

## 2023-11-30 NOTE — COMMUNITY RESOURCES LIST (ENGLISH)
11/30/2023   [facility] [employer]  N/A  For questions about this resource list or additional care needs, please contact your primary care clinic or care manager.  Phone: [phone]   Email: N/A   Address: [address] [place] [address]   Hours: N/A        Financial Stability       Utility payment assistance  1  [facility] Distance: 1.62 miles      In-Person, Phone/Virtual   [address] [place] [address]  Language: English, Portuguese  Hours: Mon - Fri 9:00 AM - 4:30 PM  Fees: Free   Phone: (949) 157-5443 Ext.113 Email: [email or web address] Website: [email or web address]://[email or web address]     2  [facility] - Utility payment assistance Distance: 6.03 miles      In-Person, Phone/Virtual   [address] [place] [address]  Language: English  Hours: Mon - Thu 9:00 AM - 3:00 PM  Fees: Free   Phone: (727) 424-1882 Email: [email or web address]@[email or web address] Website: http://[email or web address]/care-ministries/          Important Numbers & Websites       Emergency Services   911  [facility] Services   311  Poison Control   (993) 952-9148  Suicide Prevention Lifeline   (700) 650-1300 (TALK)  Child Abuse Hotline   (241) 742-8694 (4-A-Child)  Sexual Assault Hotline   (765) 561-6731 (HOPE)  National Runaway Safeline   (937) 982-6725 (RUNAWAY)  All-Options Talkline   (777) 304-5342  Substance Abuse Referral   (790) 465-5528 (HELP)

## 2023-11-30 NOTE — LETTER
December 4, 2023      Diane ESPARZA Kandy  [address] [address]        Dear ,    Your sodium function tests are slightly abnormal but stable and should be rechecked here in the clinic in 3 months with a follow-up visit with me.  I will look forward to seeing you at that time and please call to make an appointment.     Your blood sugar function tests are slightly abnormal and elevated and should be rechecked here in the clinic in 3 months with a follow-up visit with me, fasting.  I will look forward to seeing you at that time and please call to make an appointment.  In the meantime, please work on your diet limiting your carbohydrates and getting some good, regular exercise.     I am pleased to inform you that your routine blood work including your hemoglobin, potassium, calcium, kidney and liver function tests are all normal.    Resulted Orders   CBC with platelets   Result Value Ref Range    WBC Count 6.3 4.0 - 11.0 10e3/uL    RBC Count 4.88 3.80 - 5.20 10e6/uL    Hemoglobin 13.9 11.7 - 15.7 g/dL    Hematocrit 40.8 35.0 - 47.0 %    MCV 84 78 - 100 fL    MCH 28.5 26.5 - 33.0 pg    MCHC 34.1 31.5 - 36.5 g/dL    RDW 12.0 10.0 - 15.0 %    Platelet Count 196 150 - 450 10e3/uL   Comprehensive metabolic panel (BMP + Alb, Alk Phos, ALT, AST, Total. Bili, TP)   Result Value Ref Range    Sodium 134 (L) 135 - 145 mmol/L      Comment:      Reference intervals for this test were updated on 09/26/2023 to more accurately reflect our healthy population. There may be differences in the flagging of prior results with similar values performed with this method. Interpretation of those prior results can be made in the context of the updated reference intervals.     Potassium 4.8 3.4 - 5.3 mmol/L    Carbon Dioxide (CO2) 25 22 - 29 mmol/L    Anion Gap 10 7 - 15 mmol/L    Urea Nitrogen 10.8 8.0 - 23.0 mg/dL    Creatinine 0.67 0.51 - 0.95 mg/dL    GFR Estimate >90 >60 mL/min/1.73m2    Calcium 10.2 8.8 - 10.2  mg/dL    Chloride 99 98 - 107 mmol/L    Glucose 116 (H) 70 - 99 mg/dL    Alkaline Phosphatase 109 40 - 150 U/L      Comment:      Reference intervals for this test were updated on 11/14/2023 to more accurately reflect our healthy population. There may be differences in the flagging of prior results with similar values performed with this method. Interpretation of those prior results can be made in the context of the updated reference intervals.    AST 23 0 - 45 U/L      Comment:      Reference intervals for this test were updated on 6/12/2023 to more accurately reflect our healthy population. There may be differences in the flagging of prior results with similar values performed with this method. Interpretation of those prior results can be made in the context of the updated reference intervals.    ALT 22 0 - 50 U/L      Comment:      Reference intervals for this test were updated on 6/12/2023 to more accurately reflect our healthy population. There may be differences in the flagging of prior results with similar values performed with this method. Interpretation of those prior results can be made in the context of the updated reference intervals.      Protein Total 7.4 6.4 - 8.3 g/dL    Albumin 4.4 3.5 - 5.2 g/dL    Bilirubin Total 0.9 <=1.2 mg/dL   Albumin Random Urine Quantitative with Creat Ratio   Result Value Ref Range    Creatinine Urine mg/dL 60.2 mg/dL      Comment:      The reference ranges have not been established in urine creatinine. The results should be integrated into the clinical context for interpretation.    Albumin Urine mg/L <12.0 mg/L      Comment:      The reference ranges have not been established in urine albumin. The results should be integrated into the clinical context for interpretation.    Albumin Urine mg/g Cr        Comment:      Unable to calculate, urine albumin and/or urine creatinine is outside detectable limits.  Microalbuminuria is defined as an albumin:creatinine ratio of 17 to  299 for males and 25 to 299 for females. A ratio of albumin:creatinine of 300 or higher is indicative of overt proteinuria.  Due to biologic variability, positive results should be confirmed by a second, first-morning random or 24-hour timed urine specimen. If there is discrepancy, a third specimen is recommended. When 2 out of 3 results are in the microalbuminuria range, this is evidence for incipient nephropathy and warrants increased efforts at glucose control, blood pressure control, and institution of therapy with an angiotensin-converting-enzyme (ACE) inhibitor (if the patient can tolerate it).         If you have any questions or concerns, please call the clinic at the number listed above.       Sincerely,      Jac Barry MD

## 2023-11-30 NOTE — COMMUNITY RESOURCES LIST (ENGLISH)
11/30/2023   [facility] - Outpatient Clinics  N/A  For additional resource needs, please contact your health insurance member services or your primary care team.  Phone: [phone]   Email: N/A   Address: [facility] [place] [address]   Hours: N/A        Financial Stability       Utility payment assistance  1  [place] [facility] - Energy and Utilities Distance: 11.12 miles      In-Person, Phone/Virtual   [address] [place] [address]  Language: English  Hours: Mon - Fri 8:30 AM - 4:30 PM  Fees: Free   Phone: (887) 292-1115 Website: https://[place].gov/Aisle50/energy/consumer-assistance/energy-assistance-program/     2  [place] Public [employer] [facility] - [place]'s Telephone Assistance Plan (TAP) and [facility] Lifeline and Affordable Connectivity Program (ACP) Distance: 17.24 miles      Phone/Virtual   [address] [place] [address]  Language: English  Fees: Free   Phone: (315) 597-4448 Email: [email or web address]. Website: https://[place].gov/puc/consumers/telephone/          Important Numbers & Websites       [facility]   211 211unitedway.org  Poison Control   (873) 776-9927 [place]  Suicide and Crisis Lifeline   [address] [place], [place] [address]line.org  Childhelp [place] Child Abuse Hotline   693.999.2734 Childhelphotline.org  National Sexual Assault Hotline   (936) 157-2490 (HOPE) Rainn.org  National Runaway Safeline   (813) 486-2393 (RUNAWAY) 1800runaway.org  Pregnancy & Postpartum Support [place]   Call/text 170-729-5048 Ppsupportmn.org  Substance Abuse National Helpline ([facility]   010-751-HELP (3503) Findtreatment.gov  Emergency Services   911

## 2023-12-14 PROCEDURE — 82274 ASSAY TEST FOR BLOOD FECAL: CPT | Performed by: INTERNAL MEDICINE

## 2023-12-16 LAB — HEMOCCULT STL QL IA: NEGATIVE

## 2024-03-05 NOTE — PROGRESS NOTES
Assessment & Plan     Type 2 diabetes mellitus without complication, without long-term current use of insulin (H)  Continue with current medical management, recheck basic metabolic panel and A1c level  - HEMOGLOBIN A1C; Future  - Basic metabolic panel  (Ca, Cl, CO2, Creat, Gluc, K, Na, BUN); Future    Morbid obesity (H)  Encouraged ongoing weight loss and weight reduction    NSTEMI (non-ST elevated myocardial infarction) (H)  Noted prior history per patient.  Patient states that she was hospitalized for such at [facility] 2022 but we have been unable to locate this discharge summary.  Was an admission at an assessment in September 2018 noted.    Major depressive disorder, single episode, mild (H24)  Stable per patient and continue with medical management.    Essential hypertension, benign  Stable at present time and recheck renal function  - Basic metabolic panel  (Ca, Cl, CO2, Creat, Gluc, K, Na, BUN); Future  - Basic metabolic panel  (Ca, Cl, CO2, Creat, Gluc, K, Na, BUN)    Visit for screening mammogram  Ordered as routine screening.  - MA SCREENING DIGITAL BILAT - Future  (s+30); Future    Gastroesophageal reflux disease without esophagitis  New with current medical management as ordered.    Shoulder pain, unspecified chronicity, unspecified laterality  Referral placed for upcoming outpatient assessment.  - Orthopedic  Referral; Future    See Patient Instructions    Leny Contreras is a 73 year old, presenting for the following health issues:  Recheck Medication and Shoulder Pain    Follow up sodium and glucose from 11/30/23 labs    History of Present Illness       Diabetes:   She presents for follow up of diabetes.    She is not checking blood glucose.         She has no concerns regarding her diabetes at this time.  She is having numbness in feet.  The patient has had a diabetic eye exam in the last 12 months. Eye exam performed on last year in june. Location of last eye exam same  time.        Hyperlipidemia:  She presents for follow up of hyperlipidemia.   She is taking medication to lower cholesterol. She is not having myalgia or other side effects to statin medications.    Hypertension: She presents for follow up of hypertension.  She does check blood pressure  regularly outside of the clinic. Outpatient blood pressures have not been over 140/90. She follows a low salt diet.     She eats 2-3 servings of fruits and vegetables daily.She consumes 0 sweetened beverage(s) daily.She exercises with enough effort to increase her heart rate 20 to 29 minutes per day.  She exercises with enough effort to increase her heart rate 4 days per week.   She is taking medications regularly.     Other concerns:  Bilateral shoulder pain,given gabapentin with no relief. Injection in left shoulder provided 1-2 months of improvement    Patient is interested in seeing her prior orthopedist in the past.  She reports decreased and altered range of motion of her shoulder.      Review of Systems  CONSTITUTIONAL: NEGATIVE for fever, chills, change in weight  EYES: NEGATIVE for vision changes or irritation  ENT/MOUTH: NEGATIVE for ear, mouth and throat problems  RESP: NEGATIVE for significant cough or SOB  CV: NEGATIVE for chest pain, palpitations or peripheral edema  GI: NEGATIVE for nausea, abdominal pain, heartburn, or change in bowel habits  : NEGATIVE for frequency, dysuria, or hematuria  NEURO: NEGATIVE for weakness, dizziness or paresthesias  HEME: NEGATIVE for bleeding problems      Current Outpatient Medications   Medication    amLODIPine (NORVASC) 5 MG tablet    anastrozole (ARIMIDEX) 1 MG tablet    aspirin 81 MG tablet    buPROPion (WELLBUTRIN XL) 150 MG 24 hr tablet    calcium carbonate 600 mg-vitamin D 400 units (CALTRATE) 600-400 MG-UNIT per tablet    citalopram (CELEXA) 20 MG tablet    gabapentin (NEURONTIN) 300 MG capsule    hydrochlorothiazide (HYDRODIURIL) 12.5 MG tablet    LORazepam (ATIVAN) 0.5 MG  tablet    losartan (COZAAR) 100 MG tablet    metFORMIN (GLUCOPHAGE) 500 MG tablet    omeprazole (PRILOSEC) 20 MG DR capsule    simvastatin (ZOCOR) 20 MG tablet     Current Facility-Administered Medications   Medication    lidocaine 1 % injection 4 mL    LORazepam (ATIVAN) tablet 0.5 mg    methylPREDNISolone (DEPO-MEDROL) injection 80 mg           Objective    /82   Pulse 98   Temp 98  F (36.7  C) (Temporal)   Resp 10   Wt 86.6 kg (190 lb 14.4 oz)   SpO2 98%   BMI 28.60 kg/m    Body mass index is 28.6 kg/m .  Physical Exam   GENERAL: alert and no distress  EYES: Eyes grossly normal to inspection, PERRL and conjunctivae and sclerae normal  HENT: ear canals and TM's normal, nose and mouth without ulcers or lesions  RESP: lungs clear to auscultation - no rales, rhonchi or wheezes  CV: regular rate and rhythm, normal S1 S2, no S3 or S4, no murmur, click or rub, no peripheral edema  ABDOMEN: soft, nontender, no hepatosplenomegaly, no masses and bowel sounds normal  MS: no gross musculoskeletal defects noted with decreased ROM shoulder, left.  NEURO: No focal changes  PSYCH: mentation appears normal, affect normal/bright          Signed Electronically by: Jac Barry MD

## 2024-03-06 ENCOUNTER — OFFICE VISIT (OUTPATIENT)
Dept: INTERNAL MEDICINE | Facility: CLINIC | Age: 74
End: 2024-03-06
Payer: MEDICARE

## 2024-03-06 VITALS
WEIGHT: 190.9 LBS | RESPIRATION RATE: 10 BRPM | TEMPERATURE: 98 F | SYSTOLIC BLOOD PRESSURE: 116 MMHG | DIASTOLIC BLOOD PRESSURE: 82 MMHG | OXYGEN SATURATION: 98 % | HEART RATE: 98 BPM | BODY MASS INDEX: 28.6 KG/M2

## 2024-03-06 DIAGNOSIS — M25.519 SHOULDER PAIN, UNSPECIFIED CHRONICITY, UNSPECIFIED LATERALITY: ICD-10-CM

## 2024-03-06 DIAGNOSIS — I10 ESSENTIAL HYPERTENSION, BENIGN: ICD-10-CM

## 2024-03-06 DIAGNOSIS — K21.9 GASTROESOPHAGEAL REFLUX DISEASE WITHOUT ESOPHAGITIS: ICD-10-CM

## 2024-03-06 DIAGNOSIS — E66.01 MORBID OBESITY (H): ICD-10-CM

## 2024-03-06 DIAGNOSIS — F32.0 MAJOR DEPRESSIVE DISORDER, SINGLE EPISODE, MILD (H): ICD-10-CM

## 2024-03-06 DIAGNOSIS — E11.9 TYPE 2 DIABETES MELLITUS WITHOUT COMPLICATION, WITHOUT LONG-TERM CURRENT USE OF INSULIN (H): Primary | ICD-10-CM

## 2024-03-06 DIAGNOSIS — Z12.31 VISIT FOR SCREENING MAMMOGRAM: ICD-10-CM

## 2024-03-06 DIAGNOSIS — I21.4 NSTEMI (NON-ST ELEVATED MYOCARDIAL INFARCTION) (H): ICD-10-CM

## 2024-03-06 LAB — HBA1C MFR BLD: 5 % (ref 0–5.6)

## 2024-03-06 PROCEDURE — 83036 HEMOGLOBIN GLYCOSYLATED A1C: CPT | Performed by: INTERNAL MEDICINE

## 2024-03-06 PROCEDURE — 99214 OFFICE O/P EST MOD 30 MIN: CPT | Performed by: INTERNAL MEDICINE

## 2024-03-06 PROCEDURE — 36415 COLL VENOUS BLD VENIPUNCTURE: CPT | Performed by: INTERNAL MEDICINE

## 2024-03-06 PROCEDURE — 80048 BASIC METABOLIC PNL TOTAL CA: CPT | Performed by: INTERNAL MEDICINE

## 2024-03-06 NOTE — LETTER
March 12, 2024      Diane Gaitan  [address] [facility] [address]        Dear MsVernaKandy,    We are writing to inform you of your test results.     Your basic panel has returned within normal limits.     Your Hemoglobin A1C and blood sugar tests look good and I would continue with your medication without change.  These tests should be repeated in 6 months.        If you have any questions or concerns, please call the clinic at the number listed above.       Sincerely,      Jac Barry MD

## 2024-03-07 LAB
ANION GAP SERPL CALCULATED.3IONS-SCNC: 7 MMOL/L (ref 7–15)
BUN SERPL-MCNC: 11.5 MG/DL (ref 8–23)
CALCIUM SERPL-MCNC: 9.5 MG/DL (ref 8.8–10.2)
CHLORIDE SERPL-SCNC: 100 MMOL/L (ref 98–107)
CREAT SERPL-MCNC: 0.64 MG/DL (ref 0.51–0.95)
DEPRECATED HCO3 PLAS-SCNC: 28 MMOL/L (ref 22–29)
EGFRCR SERPLBLD CKD-EPI 2021: >90 ML/MIN/1.73M2
GLUCOSE SERPL-MCNC: 111 MG/DL (ref 70–99)
POTASSIUM SERPL-SCNC: 4.4 MMOL/L (ref 3.4–5.3)
SODIUM SERPL-SCNC: 135 MMOL/L (ref 135–145)

## 2024-03-11 ENCOUNTER — TELEPHONE (OUTPATIENT)
Dept: ORTHOPEDICS | Facility: CLINIC | Age: 74
End: 2024-03-11
Payer: MEDICARE

## 2024-03-11 DIAGNOSIS — F41.9 ANXIETY DUE TO INVASIVE PROCEDURE: Primary | ICD-10-CM

## 2024-03-11 NOTE — TELEPHONE ENCOUNTER
Patient was last seen in clinic with Dr Carpenter on 8/26/21 and received a left glenohumeral joint injection. Per the patient call, patient needed to take an Ativan prior to the injection and Dr Carpenter prescribed this prior to the procedure.     Please advise if patient would need another prescription and if so, would she need a  to and from the appointment. The injection is scheduled for 3/27/24    SHERRY Corbin, LAT, ATC  Certified Athletic Trainer

## 2024-03-11 NOTE — TELEPHONE ENCOUNTER
[facility] Call Center    Phone Message    May a detailed message be left on voicemail: yes please do if she doesn't answer    Reason for Call: Other: Patient was seen in 2021 and had a cortisone injection in her left shoulder, both shoulders are in rough shape, she is scheduled for 03/27 to be seen at [facility].  She took an adavan last time she had an injection, she wants to know if she needs to take an adivan or if she'll need a . Please call her to discuss this. Thanks.       Action Taken Bu sports  Travel Screening: Not Applicable

## 2024-03-12 ENCOUNTER — TELEPHONE (OUTPATIENT)
Dept: INTERNAL MEDICINE | Facility: CLINIC | Age: 74
End: 2024-03-12
Payer: MEDICARE

## 2024-03-12 DIAGNOSIS — M54.2 CERVICALGIA: Primary | ICD-10-CM

## 2024-03-12 RX ORDER — LORAZEPAM 0.5 MG/1
TABLET ORAL
Qty: 2 TABLET | Refills: 0 | Status: SHIPPED | OUTPATIENT
Start: 2024-03-12 | End: 2024-08-21

## 2024-03-12 NOTE — TELEPHONE ENCOUNTER
Order/Referral Request    Who is requesting: Patient    Orders being requested: referral to [facility] chiropractic  clinic     Reason service is needed/diagnosis: Pain in neck    When are orders needed by: ASAP    Has this been discussed with Provider: No    Does patient have a preference on a Group/Provider/Facility? [facility]    Does patient have an appointment scheduled?: No    Where to send orders: N/A    Could we send this information to you in [facility] or would you prefer to receive a phone call?:   Patient would prefer a phone call   Okay to leave a detailed message?: Yes at Home number on file [phone] (home)

## 2024-03-13 NOTE — TELEPHONE ENCOUNTER
Patient states she has ongoing neck pain. Requesting referral to [facility] chiropractic [address], [place] [address]

## 2024-03-13 NOTE — TELEPHONE ENCOUNTER
Need more information as to why patient wants to see chiropractic care.  There is no chiropractic care at [facility].

## 2024-03-24 ENCOUNTER — HEALTH MAINTENANCE LETTER (OUTPATIENT)
Age: 74
End: 2024-03-24

## 2024-04-04 ENCOUNTER — OFFICE VISIT (OUTPATIENT)
Dept: ORTHOPEDICS | Facility: CLINIC | Age: 74
End: 2024-04-04
Attending: INTERNAL MEDICINE
Payer: MEDICARE

## 2024-04-04 ENCOUNTER — ANCILLARY PROCEDURE (OUTPATIENT)
Dept: GENERAL RADIOLOGY | Facility: CLINIC | Age: 74
End: 2024-04-04
Attending: FAMILY MEDICINE
Payer: MEDICARE

## 2024-04-04 VITALS — HEIGHT: 69 IN | WEIGHT: 190 LBS | BODY MASS INDEX: 28.14 KG/M2

## 2024-04-04 DIAGNOSIS — M25.511 CHRONIC PAIN OF BOTH SHOULDERS: Primary | ICD-10-CM

## 2024-04-04 DIAGNOSIS — M25.512 CHRONIC PAIN OF BOTH SHOULDERS: Primary | ICD-10-CM

## 2024-04-04 DIAGNOSIS — M25.511 CHRONIC RIGHT SHOULDER PAIN: ICD-10-CM

## 2024-04-04 DIAGNOSIS — G89.29 CHRONIC PAIN OF BOTH SHOULDERS: Primary | ICD-10-CM

## 2024-04-04 DIAGNOSIS — G89.29 CHRONIC RIGHT SHOULDER PAIN: ICD-10-CM

## 2024-04-04 PROCEDURE — 99214 OFFICE O/P EST MOD 30 MIN: CPT | Performed by: FAMILY MEDICINE

## 2024-04-04 PROCEDURE — 73030 X-RAY EXAM OF SHOULDER: CPT | Mod: TC | Performed by: RADIOLOGY

## 2024-04-04 RX ORDER — METHYLPREDNISOLONE 4 MG
TABLET, DOSE PACK ORAL
Qty: 21 TABLET | Refills: 0 | Status: SHIPPED | OUTPATIENT
Start: 2024-04-04 | End: 2024-08-21

## 2024-04-04 NOTE — PROGRESS NOTES
CHIEF COMPLAINT:  Pain of the Right Shoulder and Follow Up and Pain of the Left Shoulder       HISTORY OF PRESENT ILLNESS  Ms. Gaitan is a pleasant 73 year old year old female who presents to clinic today with chronic bilateral shoulder, left>>>right and known severe osteoarthritis of left shoulder.  Diane explains that worsening chronic bilateral shoulder pain, left>>>right over the past 12+ months following COVID bout.  Patient has severe bilateral shoulder limited AROM that is now radiating to her c-spine over the past one week.    Onset: gradual, slow, worsening  Location: bilateral shoulder, deep anterior glenohumeral joint, left>>>right  Quality:  aching, dull, exhausting, nagging, and unbearable  Duration: 4 years on the left, 6 months on right  Severity: 8/10 at worst  Timing:worse since following COVID infection in 2022  Modifying factors:  resting and non-use makes it better, shoulder flexion/abduction, IR behind back, & lying on either shoulder makes pain worse  Associated signs & symptoms: pain, limited AROM  Previous similar pain: Yes - previously been treated by Sports Med - Dr Carpenter in 2021.  Left glenohumeral joint steroid injection provided ~ 2 - 3 months of relief.  Treatments to date:OTC pain medications, stretching, chiropractic care, steroid injections    Additional history: as documented    Review of Systems:  A 10-point review of systems was obtained and is negative except for as noted in the HPI.       MEDICAL HISTORY  Patient Active Problem List   Diagnosis    Essential hypertension, benign    CARDIOVASCULAR SCREENING; LDL GOAL LESS THAN 100    S/P total knee replacement    Osteoarthrosis, unspecified whether generalized or localized, involving lower leg    Acute posthemorrhagic anemia    Major depressive disorder, single episode, mild (H24)    Gastroesophageal reflux disease without esophagitis    Type 2 diabetes mellitus without complication, without long-term current use of insulin (H)     NSTEMI (non-ST elevated myocardial infarction) (H)    Unstable angina (H)    Diabetic polyneuropathy associated with type 2 diabetes mellitus (H)    Iron deficiency anemia, unspecified iron deficiency anemia type    Malignant neoplasm of upper-outer quadrant of left breast in female, estrogen receptor positive (H)    Morbid obesity (H)    Atrial fibrillation, unspecified type (H)       Current Outpatient Medications   Medication Sig Dispense Refill    amLODIPine (NORVASC) 5 MG tablet Take 1 tablet (5 mg) by mouth daily 90 tablet 3    anastrozole (ARIMIDEX) 1 MG tablet Take 1 tablet (1 mg) by mouth daily 90 tablet 3    aspirin 81 MG tablet Take 1 tablet by mouth daily. 90 tablet 3    buPROPion (WELLBUTRIN XL) 150 MG 24 hr tablet Take 1 tablet (150 mg) by mouth every morning 90 tablet 3    calcium carbonate 600 mg-vitamin D 400 units (CALTRATE) 600-400 MG-UNIT per tablet Take 1 tablet by mouth 2 times daily       citalopram (CELEXA) 20 MG tablet Take 1 tablet (20 mg) by mouth daily 90 tablet 3    gabapentin (NEURONTIN) 300 MG capsule Take 1 capsule (300 mg) by mouth 3 times daily (Patient taking differently: Take 300 mg by mouth 3 times daily 2 in AM & 1 IN PM) 270 capsule 3    hydrochlorothiazide (HYDRODIURIL) 12.5 MG tablet Take 2 tablets (25 mg) by mouth daily 180 tablet 3    LORazepam (ATIVAN) 0.5 MG tablet Please take one tab 30-60 minutes prior to procedure. 2 tablet 0    LORazepam (ATIVAN) 0.5 MG tablet Take 1 tablet (0.5 mg) by mouth every 6 hours as needed for anxiety 2 tablet 0    losartan (COZAAR) 100 MG tablet Take 1 tablet (100 mg) by mouth daily 90 tablet 3    metFORMIN (GLUCOPHAGE) 500 MG tablet Take 1 tablet (500 mg) by mouth 2 times daily (with meals) 180 tablet 3    omeprazole (PRILOSEC) 20 MG DR capsule Take 1 capsule (20 mg) by mouth daily 90 capsule 3    simvastatin (ZOCOR) 20 MG tablet TAKE ONE TABLET BY MOUTH EVERY DAY AT BEDTIME 90 tablet 3       Allergies   Allergen Reactions    Lisinopril  "Cough     COUGH       Family History   Problem Relation Age of Onset    Breast Cancer Sister     Hypertension Daughter     Breast Cancer Daughter 43    Skin Cancer Mother     Coronary Artery Disease Father     Colon Cancer Sister     Breast Cancer Maternal Aunt         Many Maternal Aunts have had breast Cancer       Additional medical/Social/Surgical histories reviewed in [facility] and updated as appropriate.       PHYSICAL EXAM  Ht 1.74 m (5' 8.5\")   Wt 86.2 kg (190 lb)   BMI 28.47 kg/m      General  - normal appearance, in no obvious distress  Musculoskeletal - bilateral shoulder  - inspection: normal bone and joint alignment, no obvious deformity, no scapular winging, no AC step-off,   - palpation: Tenderness anterior joint line, normal clavicle, non-tender AC  - ROM:  Shoulder hiking with abduction, painful and limited forward elevation, abduction and internal rotation bilateral. ER limited to 30 degrees.  - strength: 5/5  strength, 4/5 shoulder strength in abduction due to pain.  5/5 ER and IR  - special tests:  (-) Speed's  (+) Neer  (+) Hawkin's  (+) Michelle's  (-) [place]'s  (-) apprehension  (-) subscap lift-off  Neuro  - no sensory or motor deficit, grossly normal coordination, normal muscle tone  Skin  - no ecchymosis, erythema, warmth, or induration, no obvious rash  Psych  - interactive, appropriate, normal mood and affect       IMAGING :     XR SHOULDER LEFT  Narrative & Impression   XR SHOULDER LEFT G/E 3 VIEWS 8/18/2021 1:46 PM      HISTORY: Left shoulder pain                                                                      IMPRESSION: Glenohumeral degenerative arthrosis. Large ossification in  the axillary joint line which could represent a large articular body  versus large humeral head osteophytic spur.     ROMULO JAIME MD      X-ray right shoulder independently evaluated and completed today, 4/4/2024.  X-ray findings of severe glenohumeral osteoarthrosis of right shoulder.  ASSESSMENT & " PLAN  Ms. Gaitan is a 73 year old year old female with past ministry of known left glenohumeral osteoarthritis, type 2 diabetes mellitus on warfarin, last A1c under 7 who presents to clinic today with chronic left and worsening right shoulder pain and loss of range of motion.    Diagnosis: Glenohumeral osteoarthritis of bilateral shoulders    Discussed likely difficulty with overhead range of motion secondary to large osteophyte of the left shoulder and newly found severe osteoarthritic changes of the right shoulder as well.  Additional cuff arthropathy suspected.    She had modest benefit from previous glenohumeral injection and we did offer this again.  I also discussed additional options which may include reverse total shoulder/total shoulder arthroplasty consultation. Lastly we discussed medrol pack given acute nature of pain.    Medrol pack prescribed today and to complete x 6 days.  If persisting, will return in 2 weeks for bilateral US guided corticosteroid injection bilateral shoulders.  She would consider arthroplasty starting with left shoulder in the future.     It was a pleasure seeing Diane today.    Martin Carpenter DO, CAM  Primary Care Sports Medicine

## 2024-04-04 NOTE — LETTER
4/4/2024         RE: Diane Gaitan  [address] [address]        Dear Colleague,    Thank you for referring your patient, Diane Gaitan, to the [facility] [place]. Please see a copy of my visit note below.    CHIEF COMPLAINT:  Pain of the Right Shoulder and Follow Up and Pain of the Left Shoulder       HISTORY OF PRESENT ILLNESS  Ms. Gaitan is a pleasant 73 year old year old female who presents to clinic today with chronic bilateral shoulder, left>>>right and known severe osteoarthritis of left shoulder.  Diane explains that worsening chronic bilateral shoulder pain, left>>>right over the past 12+ months following COVID bout.  Patient has severe bilateral shoulder limited AROM that is now radiating to her c-spine over the past one week.    Onset: gradual, slow, worsening  Location: bilateral shoulder, deep anterior glenohumeral joint, left>>>right  Quality:  aching, dull, exhausting, nagging, and unbearable  Duration: 4 years on the left, 6 months on right  Severity: 8/10 at worst  Timing:worse since following COVID infection in 2022  Modifying factors:  resting and non-use makes it better, shoulder flexion/abduction, IR behind back, & lying on either shoulder makes pain worse  Associated signs & symptoms: pain, limited AROM  Previous similar pain: Yes - previously been treated by Sports Med - Dr Carpenter in 2021.  Left glenohumeral joint steroid injection provided ~ 2 - 3 months of relief.  Treatments to date:OTC pain medications, stretching, chiropractic care, steroid injections    Additional history: as documented    Review of Systems:  A 10-point review of systems was obtained and is negative except for as noted in the HPI.       MEDICAL HISTORY  Patient Active Problem List   Diagnosis     Essential hypertension, benign     CARDIOVASCULAR SCREENING; LDL GOAL LESS THAN 100     S/P total knee replacement     Osteoarthrosis, unspecified whether generalized  or localized, involving lower leg     Acute posthemorrhagic anemia     Major depressive disorder, single episode, mild (H24)     Gastroesophageal reflux disease without esophagitis     Type 2 diabetes mellitus without complication, without long-term current use of insulin (H)     NSTEMI (non-ST elevated myocardial infarction) (H)     Unstable angina (H)     Diabetic polyneuropathy associated with type 2 diabetes mellitus (H)     Iron deficiency anemia, unspecified iron deficiency anemia type     Malignant neoplasm of upper-outer quadrant of left breast in female, estrogen receptor positive (H)     Morbid obesity (H)     Atrial fibrillation, unspecified type (H)       Current Outpatient Medications   Medication Sig Dispense Refill     amLODIPine (NORVASC) 5 MG tablet Take 1 tablet (5 mg) by mouth daily 90 tablet 3     anastrozole (ARIMIDEX) 1 MG tablet Take 1 tablet (1 mg) by mouth daily 90 tablet 3     aspirin 81 MG tablet Take 1 tablet by mouth daily. 90 tablet 3     buPROPion (WELLBUTRIN XL) 150 MG 24 hr tablet Take 1 tablet (150 mg) by mouth every morning 90 tablet 3     calcium carbonate 600 mg-vitamin D 400 units (CALTRATE) 600-400 MG-UNIT per tablet Take 1 tablet by mouth 2 times daily        citalopram (CELEXA) 20 MG tablet Take 1 tablet (20 mg) by mouth daily 90 tablet 3     gabapentin (NEURONTIN) 300 MG capsule Take 1 capsule (300 mg) by mouth 3 times daily (Patient taking differently: Take 300 mg by mouth 3 times daily 2 in AM & 1 IN PM) 270 capsule 3     hydrochlorothiazide (HYDRODIURIL) 12.5 MG tablet Take 2 tablets (25 mg) by mouth daily 180 tablet 3     LORazepam (ATIVAN) 0.5 MG tablet Please take one tab 30-60 minutes prior to procedure. 2 tablet 0     LORazepam (ATIVAN) 0.5 MG tablet Take 1 tablet (0.5 mg) by mouth every 6 hours as needed for anxiety 2 tablet 0     losartan (COZAAR) 100 MG tablet Take 1 tablet (100 mg) by mouth daily 90 tablet 3     metFORMIN (GLUCOPHAGE) 500 MG tablet Take 1 tablet  "(500 mg) by mouth 2 times daily (with meals) 180 tablet 3     omeprazole (PRILOSEC) 20 MG DR capsule Take 1 capsule (20 mg) by mouth daily 90 capsule 3     simvastatin (ZOCOR) 20 MG tablet TAKE ONE TABLET BY MOUTH EVERY DAY AT BEDTIME 90 tablet 3       Allergies   Allergen Reactions     Lisinopril Cough     COUGH       Family History   Problem Relation Age of Onset     Breast Cancer Sister      Hypertension Daughter      Breast Cancer Daughter 43     Skin Cancer Mother      Coronary Artery Disease Father      Colon Cancer Sister      Breast Cancer Maternal Aunt         Many Maternal Aunts have had breast Cancer       Additional medical/Social/Surgical histories reviewed in [facility] and updated as appropriate.       PHYSICAL EXAM  Ht 1.74 m (5' 8.5\")   Wt 86.2 kg (190 lb)   BMI 28.47 kg/m      General  - normal appearance, in no obvious distress  Musculoskeletal - bilateral shoulder  - inspection: normal bone and joint alignment, no obvious deformity, no scapular winging, no AC step-off,   - palpation: Tenderness anterior joint line, normal clavicle, non-tender AC  - ROM:  Shoulder hiking with abduction, painful and limited forward elevation, abduction and internal rotation bilateral. ER limited to 30 degrees.  - strength: 5/5  strength, 4/5 shoulder strength in abduction due to pain.  5/5 ER and IR  - special tests:  (-) Speed's  (+) Neer  (+) Hawkin's  (+) Michelle's  (-) [place]'s  (-) apprehension  (-) subscap lift-off  Neuro  - no sensory or motor deficit, grossly normal coordination, normal muscle tone  Skin  - no ecchymosis, erythema, warmth, or induration, no obvious rash  Psych  - interactive, appropriate, normal mood and affect       IMAGING :     XR SHOULDER LEFT  Narrative & Impression   XR SHOULDER LEFT G/E 3 VIEWS 8/18/2021 1:46 PM      HISTORY: Left shoulder pain                                                                      IMPRESSION: Glenohumeral degenerative arthrosis. Large ossification " in  the axillary joint line which could represent a large articular body  versus large humeral head osteophytic spur.     ROMULO JAIME MD      X-ray right shoulder independently evaluated and completed today, 4/4/2024.  X-ray findings of severe glenohumeral osteoarthrosis of right shoulder.  ASSESSMENT & PLAN  Ms. Gaitan is a 73 year old year old female with past ministry of known left glenohumeral osteoarthritis, type 2 diabetes mellitus on warfarin, last A1c under 7 who presents to clinic today with chronic left and worsening right shoulder pain and loss of range of motion.    Diagnosis: Glenohumeral osteoarthritis of bilateral shoulders    Discussed likely difficulty with overhead range of motion secondary to large osteophyte of the left shoulder and newly found severe osteoarthritic changes of the right shoulder as well.  Additional cuff arthropathy suspected.    She had modest benefit from previous glenohumeral injection and we did offer this again.  I also discussed additional options which may include reverse total shoulder/total shoulder arthroplasty consultation. Lastly we discussed medrol pack given acute nature of pain.    Medrol pack prescribed today and to complete x 6 days.  If persisting, will return in 2 weeks for bilateral US guided corticosteroid injection bilateral shoulders.  She would consider arthroplasty starting with left shoulder in the future.     It was a pleasure seeing Diane today.    Martin Carpenter DO, [facility]  Primary Care Sports Medicine      Again, thank you for allowing me to participate in the care of your patient.        Sincerely,        Martin Carpenter DO

## 2024-04-17 NOTE — PROGRESS NOTES
"ESTABLISHED PATIENT FOLLOW-UP:  Follow Up of the Right Shoulder and Follow Up of the Left Shoulder     HISTORY OF PRESENT ILLNESS  Ms. Gaitan is a pleasant 74 year old year old female who presents to clinic today for follow-up of bilateral shoulder pain.    Date of injury: gradual onset, worsening  Date last seen: 4/4/24  Following Therapeutic Plan: was prescribed Medrol lenard at last visit that did not provide any relief in symptoms, chiropractic care for neck  Pain: worsening, 8-9/10 in both shoulders and neck  Function: worsening limitations in neck ROM  Interval History: Patient reports worsening symptoms since her last visit. She is interested in discussing surgical intervention at this time.      Additional medical/Social/Surgical histories reviewed in [facility] and updated as appropriate.    REVIEW OF SYSTEMS (4/24/2024)  CONSTITUTIONAL: Denies fever and weight loss  GASTROINTESTINAL: Denies abdominal pain, nausea, vomiting  MUSCULOSKELETAL: See HPI  SKIN: Denies any recent rash or lesion  NEUROLOGICAL: Denies numbness or focal weakness     PHYSICAL EXAM  /78   Ht 1.74 m (5' 8.5\")   Wt 86.2 kg (190 lb)   BMI 28.47 kg/m      General  - normal appearance, in no obvious distress  Musculoskeletal - bilateral shoulder  - inspection: normal bone and joint alignment, no obvious deformity, no scapular winging, no AC step-off,   - palpation: Tenderness anterior joint line, normal clavicle, non-tender AC  - ROM:  Shoulder hiking with abduction, painful and limited forward elevation, abduction and internal rotation bilateral. ER limited to 30 degrees.  - strength: 5/5  strength, 4/5 shoulder strength in abduction due to pain.  5/5 ER and IR  - special tests:  (-) Speed's  (+) Neer  (+) Hawkin's  (+) Michelle's  (-) [place]'s  (-) apprehension  (-) subscap lift-off  Neuro  - no sensory or motor deficit, grossly normal coordination, normal muscle tone  Skin  - no ecchymosis, erythema, warmth, or induration, no obvious " rash  Psych  - interactive, appropriate, normal mood and affect     IMAGING :     XR SHOULDER LEFT G/E 3 VIEWS 8/18/2021 1:46 PM      HISTORY: Left shoulder pain                                                              IMPRESSION: Glenohumeral degenerative arthrosis. Large ossification in  the axillary joint line which could represent a large articular body  versus large humeral head osteophytic spur.     ROMULO JAIME MD     EXAM: XR SHOULDER RIGHT G/E 3 VIEWS  LOCATION: [facility]  DATE: 4/4/2024     INDICATION:  Chronic right shoulder pain, Chronic right shoulder pain  COMPARISON: None.                                                           IMPRESSION: No fracture or dislocation. Very advanced degenerative changes at the glenohumeral joint. Osteopenia.     ASSESSMENT & PLAN  Ms. Gaitan is a 74 year old year old female who presents to clinic today with right shoulder pain secondary to end stage osteoarthritis of bilateral shoulders.    Pain and debility secondary to known advanced osteoarthritis of bilateral shoulders.  Last corticosteroid injection of left shoulder provided little benefit on 8/26/2021.    Diagnosis:  Glenohumeral osteoarthritis of left shoulder  Glenohumeral osteoarthritis of right shoulder    After much thought, Diane is interested in pursuing arthroplasty for her left more symptomatic shoulder.  She had little to no relief with prior ultrasound-guided injection.  In regard to her right shoulder, she would pursue this arthroplasty secondarily and we agreed to proceed with ultrasound-guided injection of corticosteroid at the right shoulder only.    I will place an orthopedic surgical referral for my shoulder as by the surgeons they can discuss options with her.  In regard to her past medical history, she has not optimize A1c of 4.7, other chronic medical conditions are stable and I do not see any reason from medical standpoint that would hinder surgery.    We  discussed backing down on ibuprofen as able, as she is taking more than recommended.  She has tried lidocaine patches, she has tried Tylenol with little relief.  Lastly I would like her to try to transition her gabapentin to nightly use rather than morning.  We can also consider a muscle relaxant to be sparingly.    Glenohumeral Injection - Ultrasound Guided  The patient was informed of the risks and the benefits of the procedure and a written consent was signed.  The patient s right shoulder was prepped with chlorhexidine in sterile fashion.   Local anesthesia was performed using a 27-gauge 1.5-inch needle to administer 3 mL of 1% lidocaine without epi.  40 mg of methylprednisolone suspension was drawn up into a 5 mL syringe with 3 mL of 1% lidocaine w/o Epi.  Injection was performed using sterile technique.  Under ultrasound guidance a 3.5-inch 22-gauge needle was used to enter the right glenohumeral joint.  Posterior approach was used with the patient in lateral recumbent position, arm in neutral position at the side.  Needle placement was visualized and documented with ultrasound.  Ultrasound visualization was necessary due to the small joint space entered.  Injection performed long axis to the probe.  Injection solution visualized within the joint space.  Images were permanently stored for the patient's record.  There were no complications. The patient tolerated the procedure well. There was negligible bleeding.   Therapy scheduled to follow for mobilization.  The patient was instructed to call or go to the emergency room with any unusual pain, swelling, redness, or if otherwise concerned.  Martin Carpenter DO [facility]  Primary Care Sports Medicine  [facility]    Large Joint Injection/Arthocentesis: R glenohumeral joint    Date/Time: 4/24/2024 1:44 PM    Performed by: Martin Carpenter DO  Authorized by: Martin Carpenter DO    Indications:  Pain  Needle Size:  22 G  Guidance: ultrasound     Location:  Shoulder      Site:  R glenohumeral joint  Medications:  4 mL lidocaine 1 %; 3 mL lidocaine 1 %; 40 mg methylPREDNISolone 40 MG/ML  Medications comment:  The 3mL Lidocaine was used as local anesthetic.  Outcome:  Tolerated well, no immediate complications  Procedure discussed: discussed risks, benefits, and alternatives    Consent Given by:  Patient  Timeout: timeout called immediately prior to procedure    Prep: patient was prepped and draped in usual sterile fashion     Ultrasound was used to ensure safe and accurate needle placement and injection. Ultrasound images of the procedure were permanently stored.      It was a pleasure seeing Diane.    Martin Carpenter DO, [facility]  Primary Care Sports Medicine

## 2024-04-24 ENCOUNTER — OFFICE VISIT (OUTPATIENT)
Dept: ORTHOPEDICS | Facility: CLINIC | Age: 74
End: 2024-04-24
Payer: MEDICARE

## 2024-04-24 VITALS
DIASTOLIC BLOOD PRESSURE: 78 MMHG | HEIGHT: 69 IN | SYSTOLIC BLOOD PRESSURE: 125 MMHG | BODY MASS INDEX: 28.14 KG/M2 | WEIGHT: 190 LBS

## 2024-04-24 DIAGNOSIS — M19.011 PRIMARY OSTEOARTHRITIS, RIGHT SHOULDER: Primary | ICD-10-CM

## 2024-04-24 DIAGNOSIS — M19.012 OSTEOARTHRITIS OF GLENOHUMERAL JOINTS, BILATERAL: ICD-10-CM

## 2024-04-24 DIAGNOSIS — M19.011 OSTEOARTHRITIS OF GLENOHUMERAL JOINTS, BILATERAL: ICD-10-CM

## 2024-04-24 PROCEDURE — 20611 DRAIN/INJ JOINT/BURSA W/US: CPT | Mod: RT | Performed by: FAMILY MEDICINE

## 2024-04-24 PROCEDURE — 99213 OFFICE O/P EST LOW 20 MIN: CPT | Mod: 25 | Performed by: FAMILY MEDICINE

## 2024-04-24 RX ORDER — METHYLPREDNISOLONE ACETATE 40 MG/ML
40 INJECTION, SUSPENSION INTRA-ARTICULAR; INTRALESIONAL; INTRAMUSCULAR; SOFT TISSUE
Status: SHIPPED | OUTPATIENT
Start: 2024-04-24

## 2024-04-24 RX ORDER — LIDOCAINE HYDROCHLORIDE 10 MG/ML
3 INJECTION, SOLUTION INFILTRATION; PERINEURAL
Status: SHIPPED | OUTPATIENT
Start: 2024-04-24

## 2024-04-24 RX ORDER — LIDOCAINE HYDROCHLORIDE 10 MG/ML
4 INJECTION, SOLUTION INFILTRATION; PERINEURAL
Status: SHIPPED | OUTPATIENT
Start: 2024-04-24

## 2024-04-24 RX ADMIN — LIDOCAINE HYDROCHLORIDE 4 ML: 10 INJECTION, SOLUTION INFILTRATION; PERINEURAL at 13:44

## 2024-04-24 RX ADMIN — LIDOCAINE HYDROCHLORIDE 3 ML: 10 INJECTION, SOLUTION INFILTRATION; PERINEURAL at 13:44

## 2024-04-24 RX ADMIN — METHYLPREDNISOLONE ACETATE 40 MG: 40 INJECTION, SUSPENSION INTRA-ARTICULAR; INTRALESIONAL; INTRAMUSCULAR; SOFT TISSUE at 13:44

## 2024-04-24 NOTE — LETTER
"    4/24/2024         RE: Diane Gaitan  [address] [place] [address]        Dear Colleague,    Thank you for referring your patient, Diane Gaitan, to the [facility] [place]. Please see a copy of my visit note below.    ESTABLISHED PATIENT FOLLOW-UP:  Follow Up of the Right Shoulder and Follow Up of the Left Shoulder       HISTORY OF PRESENT ILLNESS  Ms. Gaitan is a pleasant 74 year old year old female who presents to clinic today for follow-up of bilateral shoulder pain.    Date of injury: gradual onset, worsening  Date last seen: 4/4/24  Following Therapeutic Plan: was prescribed Medrol lenard at last visit that did not provide any relief in symptoms, chiropractic care for neck  Pain: worsening, 8-9/10 in both shoulders and neck  Function: worsening limitations in neck ROM  Interval History: Patient reports worsening symptoms since her last visit. She is interested in discussing surgical intervention at this time.      Additional medical/Social/Surgical histories reviewed in [facility] and updated as appropriate.    REVIEW OF SYSTEMS (4/24/2024)  CONSTITUTIONAL: Denies fever and weight loss  GASTROINTESTINAL: Denies abdominal pain, nausea, vomiting  MUSCULOSKELETAL: See HPI  SKIN: Denies any recent rash or lesion  NEUROLOGICAL: Denies numbness or focal weakness     PHYSICAL EXAM  /78   Ht 1.74 m (5' 8.5\")   Wt 86.2 kg (190 lb)   BMI 28.47 kg/m      General  - normal appearance, in no obvious distress  Musculoskeletal - bilateral shoulder  - inspection: normal bone and joint alignment, no obvious deformity, no scapular winging, no AC step-off,   - palpation: Tenderness anterior joint line, normal clavicle, non-tender AC  - ROM:  Shoulder hiking with abduction, painful and limited forward elevation, abduction and internal rotation bilateral. ER limited to 30 degrees.  - strength: 5/5  strength, 4/5 shoulder strength in abduction due to pain.  5/5 ER and IR  - " special tests:  (-) Speed's  (+) Neer  (+) Hawkin's  (+) Michelle's  (-) [place]'s  (-) apprehension  (-) subscap lift-off  Neuro  - no sensory or motor deficit, grossly normal coordination, normal muscle tone  Skin  - no ecchymosis, erythema, warmth, or induration, no obvious rash  Psych  - interactive, appropriate, normal mood and affect     IMAGING :     XR SHOULDER LEFT G/E 3 VIEWS 8/18/2021 1:46 PM      HISTORY: Left shoulder pain                                                                      IMPRESSION: Glenohumeral degenerative arthrosis. Large ossification in  the axillary joint line which could represent a large articular body  versus large humeral head osteophytic spur.     ROMULO JAIME MD     EXAM: XR SHOULDER RIGHT G/E 3 VIEWS  LOCATION: [facility]  DATE: 4/4/2024     INDICATION:  Chronic right shoulder pain, Chronic right shoulder pain  COMPARISON: None.                                                           IMPRESSION: No fracture or dislocation. Very advanced degenerative changes at the glenohumeral joint. Osteopenia.     ASSESSMENT & PLAN  Ms. Gaitan is a 74 year old year old female who presents to clinic today with right shoulder pain secondary to end stage osteoarthritis of bilateral shoulders.    Pain and debility secondary to known advanced osteoarthritis of bilateral shoulders.  Last corticosteroid injection of left shoulder provided little benefit on 8/26/2021.    Diagnosis:  Glenohumeral osteoarthritis of left shoulder  Glenohumeral osteoarthritis of right shoulder    After much thought, Diane is interested in pursuing arthroplasty for her left more symptomatic shoulder.  She had little to no relief with prior ultrasound-guided injection.  In regard to her right shoulder, she would pursue this arthroplasty secondarily and we agreed to proceed with ultrasound-guided injection of corticosteroid at the right shoulder only.    I will place an orthopedic surgical  referral for my shoulder as by the surgeons they can discuss options with her.  In regard to her past medical history, she has not optimize A1c of 4.7, other chronic medical conditions are stable and I do not see any reason from medical standpoint that would hinder surgery.    Glenohumeral Injection - Ultrasound Guided  The patient was informed of the risks and the benefits of the procedure and a written consent was signed.  The patient s right shoulder was prepped with chlorhexidine in sterile fashion.   Local anesthesia was performed using a 27-gauge 1.5-inch needle to administer 3 mL of 1% lidocaine without epi.  40 mg of methylprednisolone suspension was drawn up into a 5 mL syringe with 3 mL of 1% lidocaine w/o Epi.  Injection was performed using sterile technique.  Under ultrasound guidance a 3.5-inch 22-gauge needle was used to enter the right glenohumeral joint.  Posterior approach was used with the patient in lateral recumbent position, arm in neutral position at the side.  Needle placement was visualized and documented with ultrasound.  Ultrasound visualization was necessary due to the small joint space entered.  Injection performed long axis to the probe.  Injection solution visualized within the joint space.  Images were permanently stored for the patient's record.  There were no complications. The patient tolerated the procedure well. There was negligible bleeding.   Therapy scheduled to follow for mobilization.  The patient was instructed to call or go to the emergency room with any unusual pain, swelling, redness, or if otherwise concerned.  Martin Carpenter DO [facility]  Primary Care Sports Medicine  [facility]    Large Joint Injection/Arthocentesis: R glenohumeral joint    Date/Time: 4/24/2024 1:44 PM    Performed by: Martin Carpenter DO  Authorized by: Martin Carpenter DO    Indications:  Pain  Needle Size:  22 G  Guidance: ultrasound    Location:  Shoulder      Site:  R  glenohumeral joint  Medications:  4 mL lidocaine 1 %; 3 mL lidocaine 1 %; 40 mg methylPREDNISolone 40 MG/ML  Medications comment:  The 3mL Lidocaine was used as local anesthetic.  Outcome:  Tolerated well, no immediate complications  Procedure discussed: discussed risks, benefits, and alternatives    Consent Given by:  Patient  Timeout: timeout called immediately prior to procedure    Prep: patient was prepped and draped in usual sterile fashion     Ultrasound was used to ensure safe and accurate needle placement and injection. Ultrasound images of the procedure were permanently stored.      It was a pleasure seeing Julio Carpenter DO, [facility]  Primary Care Sports Medicine         Again, thank you for allowing me to participate in the care of your patient.        Sincerely,        Martin Carpenter DO

## 2024-04-24 NOTE — PATIENT INSTRUCTIONS
Thank you for choosing [facility]    [facility] LOCATIONS  [place] [address], [address]th [place], [address] [place] [address]   [phone]       APPOINTMENTS: [phone]    CARE QUESTIONS: [phone],    BILLING QUESTIONS: [phone]    FAX NUMBER: [phone]        Follow up: with Dr. Carpenter as needed      1. Osteoarthritis of glenohumeral joints, bilateral      A referral to meet with an orthopedic surgeon to discuss surgical options has been placed. You will receive a call from the scheduling department to schedule. Shoulder surgeons in [place]: Dr. Suarez and Dr. Morales.       Steroid Injection Information:    A corticosteroid injection was administered at your visit today.  The area of injection may be sore, slightly swollen for 1-2 days afterward.  Immediately after injection, you may have an area of numbness, which is caused by the local anesthetic, lidocaine (similar to novacaine) in the shot.  This medicine will wear off in about 4 hours.  In addition to the lidocaine, a steroid medication was injected, called triamcinolone acetate.  This medication is intended to provide long-acting antiinflammatory / pain relief.  It may take 2-5 days for this medication to provide noticeable relief.      After an injection, Dr. Carpenter recommends:    Protecting the area wearing a bandage or gauze pad for at least 24 hours.    Using ice; ice bag or frozen vegetables over the injection site every one to two hours when able (for 15 minutes at a time).      Avoid any strenuous activity even if the knee is already feeling better immediately afterward. Light stretching is encouraged but refrain from home exercise program and increasing activity level for about 48 hours.    Avoid soaking in a hot tub, bath, or pool for 48 hours after injection. Showering is fine!    Watch for signs of infection,  including increasing pain, redness and swelling that last more than 48 hours after injection.

## 2024-04-25 NOTE — PROGRESS NOTES
CC: Left > Right Shoulder Pain    HPI: Patient is a 74 year old, right-hand-dominant female who presents here today with chronic left greater than right shoulder pain.  Pain focused on the left shoulder, this has been progressing for over 10 years.  She has global left shoulder pain.  This is most focal at the deep anterior posterior joint line of the glenohumeral joint.  She has significant deficits in range of motion in all planes due to mechanical block.  She has trialed physical therapy, oral anti-inflammatory medication, and intra-articular corticosteroid injection (8/2021) in the past.  These fail to provide symptomatic pain relief.  She has difficulty with activities of daily living due to her significant decrease in range of motion.      Of note, she follows my colleague Dr. Carpenter for her right shoulder.  She has similar symptoms, but less severe than the left.  She recently underwent a intra-articular glenohumeral injection in the right shoulder in April 2024.    She is type II diabetic.  Last hemoglobin A1c 5.3.  BMI 27.  She has a history of breast cancer treated with radiation.  She is currently on anastrozole, but is finishing that treatment this month.  She is retired.  She does not smoke.  She enjoys working around the house, doing senior yoga, and exercise at home for activities.       Patient Active Problem List   Diagnosis    Essential hypertension, benign    CARDIOVASCULAR SCREENING; LDL GOAL LESS THAN 100    S/P total knee replacement    Osteoarthrosis, unspecified whether generalized or localized, involving lower leg    Acute posthemorrhagic anemia    Major depressive disorder, single episode, mild (H24)    Gastroesophageal reflux disease without esophagitis    Type 2 diabetes mellitus without complication, without long-term current use of insulin (H)    NSTEMI (non-ST elevated myocardial infarction) (H)    Unstable angina (H)    Diabetic polyneuropathy associated with type 2 diabetes mellitus  (H)    Iron deficiency anemia, unspecified iron deficiency anemia type    Malignant neoplasm of upper-outer quadrant of left breast in female, estrogen receptor positive (H)    Morbid obesity (H)    Atrial fibrillation, unspecified type (H)          Past Medical History:   Diagnosis Date    Arthritis     BENIGN HYPERTENSION 8/28/2002    Cancer (H)     Basal cell carcinoma    CARDIOVASCULAR SCREENING; LDL GOAL LESS THAN 100 10/31/2010    Depressive disorder 1997    Esophageal reflux     Mild major depression (H24) 9/14/2010    Other malaise and fatigue 8/28/2002    Type 2 diabetes, HbA1c goal < 7% (H) 10/31/2010          Past Surgical History:   Procedure Laterality Date    ARTHROPLASTY KNEE  10/18/2012    Procedure: ARTHROPLASTY KNEE;  RIGHT TOTAL KNEE ARTHROPLASTY (SMITH & NEPHEW)^ ;  Surgeon: Howard Charles MD;  Location:  OR    BIOPSY NODE SENTINEL Left 12/19/2019    Procedure: LEFT SENTINEL LYMPH NODE BIOPSY;  Surgeon: Ruddy Malik MD;  Location:  OR    BREAST BIOPSY, RT/LT  1988    breast biopsy    COLONOSCOPY N/A 7/14/2016    Procedure: COLONOSCOPY;  Surgeon: Curt Patel MD;  Location:  GI    ESOPHAGOSCOPY, GASTROSCOPY, DUODENOSCOPY (EGD), COMBINED N/A 9/13/2018    Procedure: COMBINED ESOPHAGOSCOPY, GASTROSCOPY, DUODENOSCOPY (EGD);  gastroscopy;  Surgeon: Mac White MD;  Location:  GI    HYSTERECTOMY, PAP NO LONGER INDICATED  1986    abdominal hysterectomy    LUMPECTOMY BREAST WITH SEED LOCALIZATION Left 12/19/2019    Procedure: SEED LOCALIZED LEFT BREAST LUMPECTOMY;  Surgeon: Ruddy Malik MD;  Location:  OR    ROTATOR CUFF REPAIR RT/LT Right     TUBAL LIGATION      at age 29    [facility] NONSPECIFIC PROCEDURE  10/30/96    skin tags removed    [facility] NONSPECIFIC PROCEDURE      colonic polyps          Current Outpatient Medications   Medication Sig Dispense Refill    amLODIPine (NORVASC) 5 MG tablet Take 1 tablet (5 mg) by mouth daily 90 tablet 3     anastrozole (ARIMIDEX) 1 MG tablet Take 1 tablet (1 mg) by mouth daily 90 tablet 3    aspirin 81 MG tablet Take 1 tablet by mouth daily. 90 tablet 3    buPROPion (WELLBUTRIN XL) 150 MG 24 hr tablet Take 1 tablet (150 mg) by mouth every morning 90 tablet 3    calcium carbonate 600 mg-vitamin D 400 units (CALTRATE) 600-400 MG-UNIT per tablet Take 1 tablet by mouth 2 times daily       citalopram (CELEXA) 20 MG tablet Take 1 tablet (20 mg) by mouth daily 90 tablet 3    gabapentin (NEURONTIN) 300 MG capsule Take 1 capsule (300 mg) by mouth 3 times daily (Patient taking differently: Take 300 mg by mouth 3 times daily 2 in AM & 1 IN PM) 270 capsule 3    hydrochlorothiazide (HYDRODIURIL) 12.5 MG tablet Take 2 tablets (25 mg) by mouth daily 180 tablet 3    LORazepam (ATIVAN) 0.5 MG tablet Please take one tab 30-60 minutes prior to procedure. 2 tablet 0    LORazepam (ATIVAN) 0.5 MG tablet Take 1 tablet (0.5 mg) by mouth every 6 hours as needed for anxiety 2 tablet 0    losartan (COZAAR) 100 MG tablet Take 1 tablet (100 mg) by mouth daily 90 tablet 3    metFORMIN (GLUCOPHAGE) 500 MG tablet Take 1 tablet (500 mg) by mouth 2 times daily (with meals) 180 tablet 3    methylPREDNISolone (MEDROL DOSEPAK) 4 MG tablet therapy pack Follow Package Directions 21 tablet 0    omeprazole (PRILOSEC) 20 MG DR capsule Take 1 capsule (20 mg) by mouth daily 90 capsule 3    simvastatin (ZOCOR) 20 MG tablet TAKE ONE TABLET BY MOUTH EVERY DAY AT BEDTIME 90 tablet 3          Allergies   Allergen Reactions    Lisinopril Cough     COUGH          Family History   Problem Relation Age of Onset    Breast Cancer Sister     Hypertension Daughter     Breast Cancer Daughter 43    Skin Cancer Mother     Coronary Artery Disease Father     Colon Cancer Sister     Breast Cancer Maternal Aunt         Many Maternal Aunts have had breast Cancer          Social History     Tobacco Use    Smoking status: Former     Current packs/day: 0.00     Average packs/day: 1  pack/day for 12.0 years (12.0 ttl pk-yrs)     Types: Cigarettes     Start date: 10/18/1968     Quit date: 10/18/1980     Years since quittin.5    Smokeless tobacco: Never   Substance Use Topics    Alcohol use: Yes     Comment: 12 Beers per week            Objective:  Physical Exam:  LUE: No gross deformity of the shoulder.  No open wounds or lacerations.  No surgical incisions.  No erythema or ecchymosis.  No pain to palpation over the clavicle, AC joint, acromion, or scapular spine.  Pain to palpation over the posterior joint line, lateral aspect of the shoulder, or long head of the biceps in the bicipital groove.  Passive range of motion 60 degrees forward flexion, 50 degrees abduction, 30 degrees external rotation, HP internal rotation.  Active range of motion is equivalent to passive range of motion.  4/5 strength in flexion, abduction, internal and external rotation limited by pain.  Fires deltoid.  Sensation intact to axillary, radial, median, and ulnar nerve distribution.    Imaging:  X-ray of the left shoulder from today including Grashey view was reviewed.  Axillary unable to be performed due to limited range of motion.  This shows severe glenohumeral arthritis.  There is significant bone loss of the humeral head.  There is bony wear from the glenoid, approximately back to the base of the coracoid.  There is a large inferior osteophyte.    Assessment and Plan: Patient is a 74-year-old female who presents here today with end-stage left shoulder glenohumeral arthritis with humeral head and glenoid bone loss.  She is neurovascularly intact.  I the opportunity to review her images with her today.  We discussed her diagnosis of glenohumeral arthritis with bone loss.  We discussed operative and nonoperative options.  We discussed nonoperative management in the form of activity modification, oral anti-inflammatory medication, topical medication, and the role of a trial of a repeat intra-articular glenohumeral  injection.  I discussed with her that I have low expectation that any of the nonoperative management would provide her significant pain relief or restore her range of motion.  We discussed operative options.  I discussed a reverse total shoulder arthroplasty of her shoulder fusion.  In regards to the reverse total shoulder arthroplasty.  I highlighted the operative technique and postoperative protocol.  I discussed with her that this would provide her pain relief from her arthritis.  I suspect it will improve her range of motion, but would be unlikely to restore her full range of motion.  Active range of motion will be less predictable, as this is a function of her muscular strength.  However, she does have active deltoid function.  I discussed the risks of operative intervention including but not limited to bleeding, infection, failure to cure pain, stiffness, periprosthetic fracture, failure to restore active and passive range of motion, damage surrounding nerves and blood vessels, loss of limb and loss of life.  I discussed with her the lifelong lifting restriction of 10 pounds.  I also discussed with her that I would need a CT scan of her left shoulder to evaluate the geometry of her glenoid to see if she is a candidate for this procedure, or if she would need a custom glenoid implant.  I also discussed shoulder fusion with her.  This would be a surgery for pain relief, at the sacrifice of shoulder range of motion.  She is interested in moving forward with a CT scan of the left shoulder with the [employer] signature 1 protocol to evaluate for a reverse total shoulder arthroplasty.  I will order this for her at our [place] location.  I will send a clinic appointment with her after the CT scan is been completed to go over options.    Floyd Suarez MD    [facility]   Department of Orthopedic Surgery    Disclaimer: This note consists of symbols derived from keyboarding,  dictation and/or voice recognition software. As a result, there may be errors in the script that have gone undetected. Please consider this when interpreting information found in this chart.

## 2024-04-29 ENCOUNTER — OFFICE VISIT (OUTPATIENT)
Dept: ORTHOPEDICS | Facility: CLINIC | Age: 74
End: 2024-04-29
Attending: FAMILY MEDICINE
Payer: MEDICARE

## 2024-04-29 ENCOUNTER — ANCILLARY PROCEDURE (OUTPATIENT)
Dept: GENERAL RADIOLOGY | Facility: CLINIC | Age: 74
End: 2024-04-29
Attending: STUDENT IN AN ORGANIZED HEALTH CARE EDUCATION/TRAINING PROGRAM
Payer: MEDICARE

## 2024-04-29 VITALS — SYSTOLIC BLOOD PRESSURE: 137 MMHG | DIASTOLIC BLOOD PRESSURE: 84 MMHG

## 2024-04-29 DIAGNOSIS — M25.512 LEFT SHOULDER PAIN: ICD-10-CM

## 2024-04-29 DIAGNOSIS — M19.011 OSTEOARTHRITIS OF GLENOHUMERAL JOINTS, BILATERAL: Primary | ICD-10-CM

## 2024-04-29 DIAGNOSIS — M19.012 OSTEOARTHRITIS OF GLENOHUMERAL JOINTS, BILATERAL: Primary | ICD-10-CM

## 2024-04-29 PROCEDURE — 99204 OFFICE O/P NEW MOD 45 MIN: CPT | Performed by: STUDENT IN AN ORGANIZED HEALTH CARE EDUCATION/TRAINING PROGRAM

## 2024-04-29 PROCEDURE — 73030 X-RAY EXAM OF SHOULDER: CPT | Mod: TC | Performed by: RADIOLOGY

## 2024-04-29 RX ORDER — DIAZEPAM 5 MG
5 TABLET ORAL
Qty: 2 TABLET | Refills: 0 | Status: SHIPPED | OUTPATIENT
Start: 2024-04-29 | End: 2024-08-21

## 2024-04-29 NOTE — PATIENT INSTRUCTIONS
[facility]   [address], [address]  [place] [address] [address] [place] [address]   Appointments: [phone] Appointments: [phone]   Fax: [phone] Fax: [phone]       1. Left shoulder pain    2. Osteoarthritis of glenohumeral joints, bilateral        IMAGING:  An order for a CT scan was placed. Please call [phone] to schedule.    Follow up with Dr. Suarez after completion of CT scan. Please call [phone] to schedule a follow up appointment for 2 or more days following the date of your CT scan.    Call my office with any questions or concerns, [phone].       SURGERY:  Schedule left reverse total shoulder arthroplasty surgery.   The Surgery Scheduler will contact you to assist with scheduling surgery.   You can contact her directly at [phone].     Prior to your joint replacement surgery we advise scheduling and appointment with your dentist to obtain clearance for surgery and to complete any recommended dental work prior.     A pre-operative Physical with your primary care physician is required within 30 days of your scheduled proceedure  Physical Therapy will be scheduled to start within 1 week after surgery.    For more information regarding total joint surgery please visit our website:   [email or web address]    CPM AND POLAR THERAPY:  To obtain CPM (continuous passive motion) machine, or Polar Therapy unit, please contact my office to obtain orders.   These items will be dispensed by [employer].  Their direct number is [phone].     FORMS:   If you are needing any forms completed relating to your upcoming procedure, please send them to our office with a completed Release of Information.   Forms will be completed AFTER your procedure. A letter can be sent to your employer prior to surgery to inform them of your anticipated time off.     Please notify our staff if you would like a letter to do so.   Forms can be faxed directly to our clinic at [phone].     DO NOT BRING FORMS ON THE DATE OF SURGERY.     MEDICATION REFILL:   Please allow 3 business days for completion of medication refills for any surgery related prescription.   Medication refills submitted on Friday, may not be addressed until the following Monday.  You may request a refill via [employer], or by calling our Nurse Triage at [phone].

## 2024-04-29 NOTE — LETTER
4/29/2024         RE: Diane Gaitan  [address] [address]        Dear Colleague,    Thank you for referring your patient, Diane Gaitan, to the [facility] [place]. Please see a copy of my visit note below.    CC: Left > Right Shoulder Pain    HPI: Patient is a 74 year old, right-hand-dominant female who presents here today with chronic left greater than right shoulder pain.  Pain focused on the left shoulder, this has been progressing for over 10 years.  She has global left shoulder pain.  This is most focal at the deep anterior posterior joint line of the glenohumeral joint.  She has significant deficits in range of motion in all planes due to mechanical block.  She has trialed physical therapy, oral anti-inflammatory medication, and intra-articular corticosteroid injection (8/2021) in the past.  These fail to provide symptomatic pain relief.  She has difficulty with activities of daily living due to her significant decrease in range of motion.      Of note, she follows my colleague Dr. Carpenter for her right shoulder.  She has similar symptoms, but less severe than the left.  She recently underwent a intra-articular glenohumeral injection in the right shoulder in April 2024.    She is type II diabetic.  Last hemoglobin A1c 5.3.  BMI 27.  She has a history of breast cancer treated with radiation.  She is currently on anastrozole, but is finishing that treatment this month.  She is retired.  She does not smoke.  She enjoys working around the house, doing senior yoga, and exercise at home for activities.       Patient Active Problem List   Diagnosis     Essential hypertension, benign     CARDIOVASCULAR SCREENING; LDL GOAL LESS THAN 100     S/P total knee replacement     Osteoarthrosis, unspecified whether generalized or localized, involving lower leg     Acute posthemorrhagic anemia     Major depressive disorder, single episode, mild (H24)     Gastroesophageal  reflux disease without esophagitis     Type 2 diabetes mellitus without complication, without long-term current use of insulin (H)     NSTEMI (non-ST elevated myocardial infarction) (H)     Unstable angina (H)     Diabetic polyneuropathy associated with type 2 diabetes mellitus (H)     Iron deficiency anemia, unspecified iron deficiency anemia type     Malignant neoplasm of upper-outer quadrant of left breast in female, estrogen receptor positive (H)     Morbid obesity (H)     Atrial fibrillation, unspecified type (H)          Past Medical History:   Diagnosis Date     Arthritis      BENIGN HYPERTENSION 8/28/2002     Cancer (H)     Basal cell carcinoma     CARDIOVASCULAR SCREENING; LDL GOAL LESS THAN 100 10/31/2010     Depressive disorder 1997     Esophageal reflux      Mild major depression (H24) 9/14/2010     Other malaise and fatigue 8/28/2002     Type 2 diabetes, HbA1c goal < 7% (H) 10/31/2010          Past Surgical History:   Procedure Laterality Date     ARTHROPLASTY KNEE  10/18/2012    Procedure: ARTHROPLASTY KNEE;  RIGHT TOTAL KNEE ARTHROPLASTY (SMITH & NEPHEW)^ ;  Surgeon: Howard Charles MD;  Location:  OR     BIOPSY NODE SENTINEL Left 12/19/2019    Procedure: LEFT SENTINEL LYMPH NODE BIOPSY;  Surgeon: Ruddy Malik MD;  Location:  OR     BREAST BIOPSY, RT/LT  1988    breast biopsy     COLONOSCOPY N/A 7/14/2016    Procedure: COLONOSCOPY;  Surgeon: Curt Patel MD;  Location:  GI     ESOPHAGOSCOPY, GASTROSCOPY, DUODENOSCOPY (EGD), COMBINED N/A 9/13/2018    Procedure: COMBINED ESOPHAGOSCOPY, GASTROSCOPY, DUODENOSCOPY (EGD);  gastroscopy;  Surgeon: Mac White MD;  Location:  GI     HYSTERECTOMY, PAP NO LONGER INDICATED  1986    abdominal hysterectomy     LUMPECTOMY BREAST WITH SEED LOCALIZATION Left 12/19/2019    Procedure: SEED LOCALIZED LEFT BREAST LUMPECTOMY;  Surgeon: Ruddy Malik MD;  Location:  OR     ROTATOR CUFF REPAIR RT/LT Right      TUBAL  LIGATION      at age 29     [facility] NONSPECIFIC PROCEDURE  10/30/96    skin tags removed     [facility] NONSPECIFIC PROCEDURE      colonic polyps          Current Outpatient Medications   Medication Sig Dispense Refill     amLODIPine (NORVASC) 5 MG tablet Take 1 tablet (5 mg) by mouth daily 90 tablet 3     anastrozole (ARIMIDEX) 1 MG tablet Take 1 tablet (1 mg) by mouth daily 90 tablet 3     aspirin 81 MG tablet Take 1 tablet by mouth daily. 90 tablet 3     buPROPion (WELLBUTRIN XL) 150 MG 24 hr tablet Take 1 tablet (150 mg) by mouth every morning 90 tablet 3     calcium carbonate 600 mg-vitamin D 400 units (CALTRATE) 600-400 MG-UNIT per tablet Take 1 tablet by mouth 2 times daily        citalopram (CELEXA) 20 MG tablet Take 1 tablet (20 mg) by mouth daily 90 tablet 3     gabapentin (NEURONTIN) 300 MG capsule Take 1 capsule (300 mg) by mouth 3 times daily (Patient taking differently: Take 300 mg by mouth 3 times daily 2 in AM & 1 IN PM) 270 capsule 3     hydrochlorothiazide (HYDRODIURIL) 12.5 MG tablet Take 2 tablets (25 mg) by mouth daily 180 tablet 3     LORazepam (ATIVAN) 0.5 MG tablet Please take one tab 30-60 minutes prior to procedure. 2 tablet 0     LORazepam (ATIVAN) 0.5 MG tablet Take 1 tablet (0.5 mg) by mouth every 6 hours as needed for anxiety 2 tablet 0     losartan (COZAAR) 100 MG tablet Take 1 tablet (100 mg) by mouth daily 90 tablet 3     metFORMIN (GLUCOPHAGE) 500 MG tablet Take 1 tablet (500 mg) by mouth 2 times daily (with meals) 180 tablet 3     methylPREDNISolone (MEDROL DOSEPAK) 4 MG tablet therapy pack Follow Package Directions 21 tablet 0     omeprazole (PRILOSEC) 20 MG DR capsule Take 1 capsule (20 mg) by mouth daily 90 capsule 3     simvastatin (ZOCOR) 20 MG tablet TAKE ONE TABLET BY MOUTH EVERY DAY AT BEDTIME 90 tablet 3          Allergies   Allergen Reactions     Lisinopril Cough     COUGH          Family History   Problem Relation Age of Onset     Breast Cancer Sister      Hypertension Daughter       Breast Cancer Daughter 43     Skin Cancer Mother      Coronary Artery Disease Father      Colon Cancer Sister      Breast Cancer Maternal Aunt         Many Maternal Aunts have had breast Cancer          Social History     Tobacco Use     Smoking status: Former     Current packs/day: 0.00     Average packs/day: 1 pack/day for 12.0 years (12.0 ttl pk-yrs)     Types: Cigarettes     Start date: 10/18/1968     Quit date: 10/18/1980     Years since quittin.5     Smokeless tobacco: Never   Substance Use Topics     Alcohol use: Yes     Comment: 12 Beers per week            Objective:  Physical Exam:  LUE: No gross deformity of the shoulder.  No open wounds or lacerations.  No surgical incisions.  No erythema or ecchymosis.  No pain to palpation over the clavicle, AC joint, acromion, or scapular spine.  Pain to palpation over the posterior joint line, lateral aspect of the shoulder, or long head of the biceps in the bicipital groove.  Passive range of motion 60 degrees forward flexion, 50 degrees abduction, 30 degrees external rotation, HP internal rotation.  Active range of motion is equivalent to passive range of motion.  4/5 strength in flexion, abduction, internal and external rotation limited by pain.  Fires deltoid.  Sensation intact to axillary, radial, median, and ulnar nerve distribution.    Imaging:  X-ray of the left shoulder from today including Grashey view was reviewed.  Axillary unable to be performed due to limited range of motion.  This shows severe glenohumeral arthritis.  There is significant bone loss of the humeral head.  There is bony wear from the glenoid, approximately back to the base of the coracoid.  There is a large inferior osteophyte.    Assessment and Plan: Patient is a 74-year-old female who presents here today with end-stage left shoulder glenohumeral arthritis with humeral head and glenoid bone loss.  She is neurovascularly intact.  I the opportunity to review her images with her  today.  We discussed her diagnosis of glenohumeral arthritis with bone loss.  We discussed operative and nonoperative options.  We discussed nonoperative management in the form of activity modification, oral anti-inflammatory medication, topical medication, and the role of a trial of a repeat intra-articular glenohumeral injection.  I discussed with her that I have low expectation that any of the nonoperative management would provide her significant pain relief or restore her range of motion.  We discussed operative options.  I discussed a reverse total shoulder arthroplasty of her shoulder fusion.  In regards to the reverse total shoulder arthroplasty.  I highlighted the operative technique and postoperative protocol.  I discussed with her that this would provide her pain relief from her arthritis.  I suspect it will improve her range of motion, but would be unlikely to restore her full range of motion.  Active range of motion will be less predictable, as this is a function of her muscular strength.  However, she does have active deltoid function.  I discussed the risks of operative intervention including but not limited to bleeding, infection, failure to cure pain, stiffness, periprosthetic fracture, failure to restore active and passive range of motion, damage surrounding nerves and blood vessels, loss of limb and loss of life.  I discussed with her the lifelong lifting restriction of 10 pounds.  I also discussed with her that I would need a CT scan of her left shoulder to evaluate the geometry of her glenoid to see if she is a candidate for this procedure, or if she would need a custom glenoid implant.  I also discussed shoulder fusion with her.  This would be a surgery for pain relief, at the sacrifice of shoulder range of motion.  She is interested in moving forward with a CT scan of the left shoulder with the [employer] signature 1 protocol to evaluate for a reverse total shoulder arthroplasty.  I will  order this for her at our [place] location.  I will send a clinic appointment with her after the CT scan is been completed to go over options.    Floyd Suarez MD    [facility]   Department of Orthopedic Surgery    Disclaimer: This note consists of symbols derived from keyboarding, dictation and/or voice recognition software. As a result, there may be errors in the script that have gone undetected. Please consider this when interpreting information found in this chart.         Again, thank you for allowing me to participate in the care of your patient.        Sincerely,        Floyd Suarez MD

## 2024-05-08 ENCOUNTER — TELEPHONE (OUTPATIENT)
Dept: ORTHOPEDICS | Facility: CLINIC | Age: 74
End: 2024-05-08
Payer: MEDICARE

## 2024-05-08 NOTE — TELEPHONE ENCOUNTER
Phoned patient per Dr. Suarez request regarding her upcoming left shoulder CT appt to be done under Biomet/Michelle signature one protocol to evaluate for reverse total shoulder.    Writer informed patient that unfortunately where she is scheduled, they do not have the capabilities to complete exam.  Per Dr. Suarez she should reschedule her appt at [facility].  Patient is agreeable to rescheduling her exam at [facility].    She has her shoulder CT exam rescheduled at [facility] on 5/14 and will plan to follow up with Dr. Suarez on 5/17.    Patient thankful for call and explanation.  She has no further questions.    Flor Hoyt RN on 5/8/2024 at 2:16 PM

## 2024-05-14 ENCOUNTER — HOSPITAL ENCOUNTER (OUTPATIENT)
Dept: CT IMAGING | Facility: CLINIC | Age: 74
Discharge: HOME | End: 2024-05-14
Attending: STUDENT IN AN ORGANIZED HEALTH CARE EDUCATION/TRAINING PROGRAM | Admitting: STUDENT IN AN ORGANIZED HEALTH CARE EDUCATION/TRAINING PROGRAM
Payer: MEDICARE

## 2024-05-14 DIAGNOSIS — M19.011 OSTEOARTHRITIS OF GLENOHUMERAL JOINTS, BILATERAL: ICD-10-CM

## 2024-05-14 DIAGNOSIS — M19.012 OSTEOARTHRITIS OF GLENOHUMERAL JOINTS, BILATERAL: ICD-10-CM

## 2024-05-14 PROCEDURE — 73200 CT UPPER EXTREMITY W/O DYE: CPT | Mod: LT,MF

## 2024-05-16 NOTE — PROGRESS NOTES
CC: Left shoulder arthritis    HPI: Patient is a 74-year-old female known to my clinic with end-stage left shoulder arthritis.  For full history and physical please see my note from April 29.  In brief review, she has end-stage left shoulder arthritis with significant glenoid wear.  She has trialed nonoperative management the form of physical therapy, oral anti-inflammatory medication, activity modification, and intra-articular injections in the past.  Her significant pain and decreased range of motion are affecting her ability to do her day-to-day activities like washing her hair and her face.  We discussed reverse total shoulder arthroplasty.  I discussed with her that due to the significant glenoid bone loss, we would need to get a CT scan for operative planning as she may need a custom glenoid.  This was performed on Wednesday.     Objective:   PE:  LUE: No surgical incisions, open wounds or lacerations noted.  Pain to palpation with anterior posterior joint line.  Passive range of motion today 80 degrees flexion and 60 degrees abduction.  Active range of motion 70 degrees of flexion and 15 degrees external rotation limited by pain and mechanical block.  Fires deltoid.  5/5 wrist flexion extension.  2+ radial pulse.  Sensation tact axillary, radial, median, and ulnar nerve distribution.    Imaging:   CT scan left shoulder per the [employer] protocol was reviewed.  This shows flattening of the humeral head.  Large inferior humeral osteophyte.  Significant glenoid bone loss in the posterior aspect of the glenoid with erosion medial to the base of the coracoid.    A/P:  Patient is a 74-year-old female seen here today in follow-up for end-stage left shoulder glenohumeral arthritis.  I reviewed her CT scan with her today.  Again discussed operative and nonoperative options.  Discussed nonoperative management in the form of oral anti-inflammatory medication, and intra-articular corticosteroid injection.  She feels  she has exhausted nonoperative management.  We discussed operative management in the form of reverse total shoulder arthroplasty.  I reviewed the operative technique with her.  I reviewed the postoperative protocol.  I discussed with her that shoulder arthroplasty can reliably relieve the pain from shoulder arthritis.  I would expect her to see improvements in range of motion, but would not expect full range of motion.  Most patients notice some slight decrease in flexion and internal rotation.  Certainly given her preoperative stiffness, I would not expect her to regain full 180 degrees of flexion.  We discussed the risks and benefits of operative intervention including but not limited to bleeding, infection, failure to cure pain, stiffness, dislocation, periprosthetic fracture, loss of function, damage surrounding nerves and blood vessels, loss of limb and loss of life.  I discussed that her 10 pound lifelong lifting restriction I the opportunity answer questions.  She is very interested in moving forward with a reverse total shoulder arthroplasty.  I counseled her that I am going to perform preoperative planning based on her CT scan.  Given the amount of posterior glenoid wear, she may require a custom glenoid component.  This may affect the timing of her surgery.  Once I am able to finalize a surgical plan, I will call her to discuss timing of surgery    Floyd Suarez MD    [facility]   Department of Orthopedic Surgery      Disclaimer: This note consists of symbols derived from keyboarding, dictation and/or voice recognition software. As a result, there may be errors in the script that have gone undetected. Please consider this when interpreting information found in this chart.

## 2024-05-17 ENCOUNTER — OFFICE VISIT (OUTPATIENT)
Dept: ORTHOPEDICS | Facility: CLINIC | Age: 74
End: 2024-05-17
Payer: MEDICARE

## 2024-05-17 VITALS
SYSTOLIC BLOOD PRESSURE: 120 MMHG | WEIGHT: 190 LBS | HEIGHT: 69 IN | DIASTOLIC BLOOD PRESSURE: 80 MMHG | BODY MASS INDEX: 28.14 KG/M2

## 2024-05-17 DIAGNOSIS — M19.012 LOCALIZED PRIMARY OSTEOARTHRITIS OF LEFT SHOULDER REGION: Primary | ICD-10-CM

## 2024-05-17 PROCEDURE — 99214 OFFICE O/P EST MOD 30 MIN: CPT | Performed by: STUDENT IN AN ORGANIZED HEALTH CARE EDUCATION/TRAINING PROGRAM

## 2024-05-17 NOTE — LETTER
5/17/2024         RE: Diane Gaitan  [address] [facility] [address]        Dear Colleague,    Thank you for referring your patient, Diane Gaitan, to the [facility] [place]. Please see a copy of my visit note below.    CC: Left shoulder arthritis    HPI: Patient is a 74-year-old female known to my clinic with end-stage left shoulder arthritis.  For full history and physical please see my note from April 29.  In brief review, she has end-stage left shoulder arthritis with significant glenoid wear.  She has trialed nonoperative management the form of physical therapy, oral anti-inflammatory medication, activity modification, and intra-articular injections in the past.  Her significant pain and decreased range of motion are affecting her ability to do her day-to-day activities like washing her hair and her face.  We discussed reverse total shoulder arthroplasty.  I discussed with her that due to the significant glenoid bone loss, we would need to get a CT scan for operative planning as she may need a custom glenoid.  This was performed on Wednesday.     Objective:   PE:  LUE: No surgical incisions, open wounds or lacerations noted.  Pain to palpation with anterior posterior joint line.  Passive range of motion today 80 degrees flexion and 60 degrees abduction.  Active range of motion 70 degrees of flexion and 15 degrees external rotation limited by pain and mechanical block.  Fires deltoid.  5/5 wrist flexion extension.  2+ radial pulse.  Sensation tact axillary, radial, median, and ulnar nerve distribution.    Imaging:   CT scan left shoulder per the [employer] protocol was reviewed.  This shows flattening of the humeral head.  Large inferior humeral osteophyte.  Significant glenoid bone loss in the posterior aspect of the glenoid with erosion medial to the base of the coracoid.    A/P:  Patient is a 74-year-old female seen here today in follow-up for end-stage left  shoulder glenohumeral arthritis.  I reviewed her CT scan with her today.  Again discussed operative and nonoperative options.  Discussed nonoperative management in the form of oral anti-inflammatory medication, and intra-articular corticosteroid injection.  She feels she has exhausted nonoperative management.  We discussed operative management in the form of reverse total shoulder arthroplasty.  I reviewed the operative technique with her.  I reviewed the postoperative protocol.  I discussed with her that shoulder arthroplasty can reliably relieve the pain from shoulder arthritis.  I would expect her to see improvements in range of motion, but would not expect full range of motion.  Most patients notice some slight decrease in flexion and internal rotation.  Certainly given her preoperative stiffness, I would not expect her to regain full 180 degrees of flexion.  We discussed the risks and benefits of operative intervention including but not limited to bleeding, infection, failure to cure pain, stiffness, dislocation, periprosthetic fracture, loss of function, damage surrounding nerves and blood vessels, loss of limb and loss of life.  I discussed that her 10 pound lifelong lifting restriction I the opportunity answer questions.  She is very interested in moving forward with a reverse total shoulder arthroplasty.  I counseled her that I am going to perform preoperative planning based on her CT scan.  Given the amount of posterior glenoid wear, she may require a custom glenoid component.  This may affect the timing of her surgery.  Once I am able to finalize a surgical plan, I will call her to discuss timing of surgery    Floyd Suarez MD    [facility]   Department of Orthopedic Surgery      Disclaimer: This note consists of symbols derived from keyboarding, dictation and/or voice recognition software. As a result, there may be errors in the script that have gone undetected. Please  consider this when interpreting information found in this chart.         Again, thank you for allowing me to participate in the care of your patient.        Sincerely,        Floyd Suarez MD

## 2024-06-05 ENCOUNTER — TELEPHONE (OUTPATIENT)
Dept: ORTHOPEDICS | Facility: CLINIC | Age: 74
End: 2024-06-05
Payer: MEDICARE

## 2024-06-05 DIAGNOSIS — M19.012 OSTEOARTHRITIS OF LEFT GLENOHUMERAL JOINT: Primary | ICD-10-CM

## 2024-06-05 NOTE — TELEPHONE ENCOUNTER
CC: CT scan follow-up    Patient is a 74-year female known to our practice with end-stage left shoulder glenohumeral osteoarthritis with significant posterior glenoid bone loss and medialization.  She is undergone CT scan left shoulder for preoperative templating.  I had the opportunity to template her glenoid side using the [employer] signature 1.  I reviewed preoperative templating with one of my shoulder partners.  We will be unable to adequately with a large augmented glenoid with appropriate bony contact.  We will have to order a custom glenoid component for her left shoulder.  Discussed this with her today.  Discussed with her that it can take 2 to 3 months to get the components in.  This procedure will likely be done at the  [facility] with one of my shoulder colleagues.  She understands this.  She does wish to move forward with a left reverse total shoulder arthroplasty with a custom glenoid.  I will start the process of ordering her a custom glenoid component and find a date for surgery for her.  I definitely answered questions.      10 minutes were spent on the phone with the patient.    Floyd Suarez MD    [facility]   Department of Orthopedic Surgery

## 2024-06-29 DIAGNOSIS — M19.012 LOCALIZED OSTEOARTHRITIS OF LEFT SHOULDER: Primary | ICD-10-CM

## 2024-07-01 ENCOUNTER — TELEPHONE (OUTPATIENT)
Dept: ORTHOPEDICS | Facility: CLINIC | Age: 74
End: 2024-07-01
Payer: MEDICARE

## 2024-07-01 NOTE — TELEPHONE ENCOUNTER
Patient has been scheduled for surgery. Details are below.    Date of Surgery: 09/13/24    Approximate Arrival Time: SURGERY CENTER WILL CALL 3/4 DAYS PRIOR TO CONFIRM A TIME   Surgeon:  DR. ADIEL SOLIS     Procedure: ARTHROPLASTY, SHOULDER, TOTAL, REVERSE - Left   Location: [facility].  [address] [place] [id number]  Surgery Consult: NA  PreOp Physical: 08/21/24  PostOp: 09/30 & 10/21/24  Packet Mailed/MyChart Sent: YES  Added to [place]: YES    Spoke to: GRACIE

## 2024-07-16 ENCOUNTER — PATIENT OUTREACH (OUTPATIENT)
Dept: CARE COORDINATION | Facility: CLINIC | Age: 74
End: 2024-07-16
Payer: MEDICARE

## 2024-08-01 DIAGNOSIS — E11.9 TYPE 2 DIABETES MELLITUS WITHOUT COMPLICATION, WITHOUT LONG-TERM CURRENT USE OF INSULIN (H): ICD-10-CM

## 2024-08-01 DIAGNOSIS — C50.412 MALIGNANT NEOPLASM OF UPPER-OUTER QUADRANT OF LEFT BREAST IN FEMALE, ESTROGEN RECEPTOR POSITIVE (H): ICD-10-CM

## 2024-08-01 DIAGNOSIS — I10 ESSENTIAL HYPERTENSION, BENIGN: ICD-10-CM

## 2024-08-01 DIAGNOSIS — Z17.0 MALIGNANT NEOPLASM OF UPPER-OUTER QUADRANT OF LEFT BREAST IN FEMALE, ESTROGEN RECEPTOR POSITIVE (H): ICD-10-CM

## 2024-08-01 DIAGNOSIS — F32.0 MAJOR DEPRESSIVE DISORDER, SINGLE EPISODE, MILD (H): ICD-10-CM

## 2024-08-01 RX ORDER — ANASTROZOLE 1 MG/1
1 TABLET ORAL DAILY
Qty: 90 TABLET | Refills: 3 | Status: SHIPPED | OUTPATIENT
Start: 2024-08-01

## 2024-08-01 RX ORDER — BUPROPION HYDROCHLORIDE 150 MG/1
150 TABLET ORAL EVERY MORNING
Qty: 90 TABLET | Refills: 3 | Status: SHIPPED | OUTPATIENT
Start: 2024-08-01

## 2024-08-01 RX ORDER — AMLODIPINE BESYLATE 5 MG/1
5 TABLET ORAL DAILY
Qty: 90 TABLET | Refills: 1 | Status: SHIPPED | OUTPATIENT
Start: 2024-08-01

## 2024-08-01 RX ORDER — CITALOPRAM HYDROBROMIDE 20 MG/1
20 TABLET ORAL DAILY
Qty: 90 TABLET | Refills: 3 | Status: SHIPPED | OUTPATIENT
Start: 2024-08-01

## 2024-08-09 ENCOUNTER — DOCUMENTATION ONLY (OUTPATIENT)
Dept: OTHER | Facility: CLINIC | Age: 74
End: 2024-08-09
Payer: MEDICARE

## 2024-08-14 DIAGNOSIS — E11.9 TYPE 2 DIABETES MELLITUS WITHOUT COMPLICATION, WITHOUT LONG-TERM CURRENT USE OF INSULIN (H): ICD-10-CM

## 2024-08-14 NOTE — TELEPHONE ENCOUNTER
Pt is requesting partial refill for Metformin. She is out of medication and it would take 2 weeks to receive mail order refill.   Rx for 28 tablets was approved to local ' pharmacy per patients request.

## 2024-08-21 ENCOUNTER — OFFICE VISIT (OUTPATIENT)
Dept: INTERNAL MEDICINE | Facility: CLINIC | Age: 74
End: 2024-08-21
Payer: MEDICARE

## 2024-08-21 VITALS
HEART RATE: 100 BPM | TEMPERATURE: 99 F | WEIGHT: 188.8 LBS | HEIGHT: 69 IN | OXYGEN SATURATION: 99 % | DIASTOLIC BLOOD PRESSURE: 70 MMHG | SYSTOLIC BLOOD PRESSURE: 126 MMHG | RESPIRATION RATE: 19 BRPM | BODY MASS INDEX: 27.96 KG/M2

## 2024-08-21 DIAGNOSIS — C50.412 MALIGNANT NEOPLASM OF UPPER-OUTER QUADRANT OF LEFT BREAST IN FEMALE, ESTROGEN RECEPTOR POSITIVE (H): ICD-10-CM

## 2024-08-21 DIAGNOSIS — K21.9 GASTROESOPHAGEAL REFLUX DISEASE WITHOUT ESOPHAGITIS: ICD-10-CM

## 2024-08-21 DIAGNOSIS — Z13.6 CARDIOVASCULAR SCREENING; LDL GOAL LESS THAN 100: ICD-10-CM

## 2024-08-21 DIAGNOSIS — G89.29 CHRONIC BILATERAL LOW BACK PAIN WITHOUT SCIATICA: ICD-10-CM

## 2024-08-21 DIAGNOSIS — M25.519 CHRONIC SHOULDER PAIN, UNSPECIFIED LATERALITY: ICD-10-CM

## 2024-08-21 DIAGNOSIS — G89.29 CHRONIC SHOULDER PAIN, UNSPECIFIED LATERALITY: ICD-10-CM

## 2024-08-21 DIAGNOSIS — E11.9 TYPE 2 DIABETES MELLITUS WITHOUT COMPLICATION, WITHOUT LONG-TERM CURRENT USE OF INSULIN (H): ICD-10-CM

## 2024-08-21 DIAGNOSIS — G25.81 RESTLESS LEG SYNDROME: ICD-10-CM

## 2024-08-21 DIAGNOSIS — I48.91 ATRIAL FIBRILLATION, UNSPECIFIED TYPE (H): ICD-10-CM

## 2024-08-21 DIAGNOSIS — E66.01 MORBID OBESITY (H): ICD-10-CM

## 2024-08-21 DIAGNOSIS — Z17.0 MALIGNANT NEOPLASM OF UPPER-OUTER QUADRANT OF LEFT BREAST IN FEMALE, ESTROGEN RECEPTOR POSITIVE (H): ICD-10-CM

## 2024-08-21 DIAGNOSIS — I10 ESSENTIAL HYPERTENSION, BENIGN: ICD-10-CM

## 2024-08-21 DIAGNOSIS — M54.50 CHRONIC BILATERAL LOW BACK PAIN WITHOUT SCIATICA: ICD-10-CM

## 2024-08-21 DIAGNOSIS — Z01.818 PRE-OPERATIVE GENERAL PHYSICAL EXAMINATION: Primary | ICD-10-CM

## 2024-08-21 DIAGNOSIS — E11.42 DIABETIC POLYNEUROPATHY ASSOCIATED WITH TYPE 2 DIABETES MELLITUS (H): ICD-10-CM

## 2024-08-21 LAB
ANION GAP SERPL CALCULATED.3IONS-SCNC: 10 MMOL/L (ref 7–15)
BUN SERPL-MCNC: 13.4 MG/DL (ref 8–23)
CALCIUM SERPL-MCNC: 9.6 MG/DL (ref 8.8–10.4)
CHLORIDE SERPL-SCNC: 97 MMOL/L (ref 98–107)
CREAT SERPL-MCNC: 0.87 MG/DL (ref 0.51–0.95)
EGFRCR SERPLBLD CKD-EPI 2021: 69 ML/MIN/1.73M2
ERYTHROCYTE [DISTWIDTH] IN BLOOD BY AUTOMATED COUNT: 14.5 % (ref 10–15)
GLUCOSE SERPL-MCNC: 93 MG/DL (ref 70–99)
HBA1C MFR BLD: 5 % (ref 0–5.6)
HCO3 SERPL-SCNC: 25 MMOL/L (ref 22–29)
HCT VFR BLD AUTO: 33.1 % (ref 35–47)
HGB BLD-MCNC: 10.6 G/DL (ref 11.7–15.7)
MCH RBC QN AUTO: 23.2 PG (ref 26.5–33)
MCHC RBC AUTO-ENTMCNC: 32 G/DL (ref 31.5–36.5)
MCV RBC AUTO: 72 FL (ref 78–100)
PLATELET # BLD AUTO: 238 10E3/UL (ref 150–450)
POTASSIUM SERPL-SCNC: 4.1 MMOL/L (ref 3.4–5.3)
RBC # BLD AUTO: 4.57 10E6/UL (ref 3.8–5.2)
SODIUM SERPL-SCNC: 132 MMOL/L (ref 135–145)
WBC # BLD AUTO: 5.9 10E3/UL (ref 4–11)

## 2024-08-21 PROCEDURE — 80048 BASIC METABOLIC PNL TOTAL CA: CPT | Performed by: INTERNAL MEDICINE

## 2024-08-21 PROCEDURE — 83036 HEMOGLOBIN GLYCOSYLATED A1C: CPT | Performed by: INTERNAL MEDICINE

## 2024-08-21 PROCEDURE — 93000 ELECTROCARDIOGRAM COMPLETE: CPT | Performed by: INTERNAL MEDICINE

## 2024-08-21 PROCEDURE — 85027 COMPLETE CBC AUTOMATED: CPT | Performed by: INTERNAL MEDICINE

## 2024-08-21 PROCEDURE — G2211 COMPLEX E/M VISIT ADD ON: HCPCS | Performed by: INTERNAL MEDICINE

## 2024-08-21 PROCEDURE — 36415 COLL VENOUS BLD VENIPUNCTURE: CPT | Performed by: INTERNAL MEDICINE

## 2024-08-21 PROCEDURE — 99214 OFFICE O/P EST MOD 30 MIN: CPT | Performed by: INTERNAL MEDICINE

## 2024-08-21 RX ORDER — SIMVASTATIN 20 MG
TABLET ORAL
Qty: 90 TABLET | Refills: 3 | Status: SHIPPED | OUTPATIENT
Start: 2024-08-21

## 2024-08-21 RX ORDER — GABAPENTIN 300 MG/1
300 CAPSULE ORAL 3 TIMES DAILY
Qty: 270 CAPSULE | Refills: 3 | Status: SHIPPED | OUTPATIENT
Start: 2024-08-21

## 2024-08-21 RX ORDER — LOSARTAN POTASSIUM 100 MG/1
100 TABLET ORAL DAILY
Qty: 90 TABLET | Refills: 3 | Status: SHIPPED | OUTPATIENT
Start: 2024-08-21

## 2024-08-21 RX ORDER — HYDROCHLOROTHIAZIDE 12.5 MG/1
25 TABLET ORAL DAILY
Qty: 180 TABLET | Refills: 3 | Status: SHIPPED | OUTPATIENT
Start: 2024-08-21

## 2024-08-21 ASSESSMENT — PATIENT HEALTH QUESTIONNAIRE - PHQ9
10. IF YOU CHECKED OFF ANY PROBLEMS, HOW DIFFICULT HAVE THESE PROBLEMS MADE IT FOR YOU TO DO YOUR WORK, TAKE CARE OF THINGS AT HOME, OR GET ALONG WITH OTHER PEOPLE: NOT DIFFICULT AT ALL
SUM OF ALL RESPONSES TO PHQ QUESTIONS 1-9: 1
SUM OF ALL RESPONSES TO PHQ QUESTIONS 1-9: 1

## 2024-08-21 NOTE — PROGRESS NOTES
Preoperative Evaluation  [address] [address]  Phone: [phone]  Primary Provider: Jac Barry MD  Pre-op Performing Provider: Jac Barry MD  Aug 21, 2024               8/21/2024   Surgical Information   What procedure is being done? Left shoulder total arthoplasty    Facility or Hospital where procedure/surgery will be performed: [facility]   Who is doing the procedure / surgery? Dr. Suarez   Date of surgery / procedure: 9/13/24    Time of surgery / procedure: 7:30am   Where do you plan to recover after surgery? Other - Unknown       Fax number for surgical facility: Note does not need to be faxed, will be available electronically in Epic.    Assessment & Plan     The proposed surgical procedure is considered mild risk.    Pre-operative general physical examination  Proceed as scheduled.  Patient to discuss with her orthopedics dosing and timing of her Arimidex.  - CBC with platelets; Future  - EKG 12-lead complete w/read - Clinics  - Basic metabolic panel  (Ca, Cl, CO2, Creat, Gluc, K, Na, BUN); Future    Chronic shoulder pain, unspecified laterality  Follow-up routine postoperative course with patient.  She is wondering about whether or not she will need acute rehab    Type 2 diabetes mellitus without complication, without long-term current use of insulin (H)  Continue with current meds as ordered advised to hold a.m. medications  - Hemoglobin A1c; Future  - simvastatin (ZOCOR) 20 MG tablet; TAKE ONE TABLET BY MOUTH EVERY DAY AT BEDTIME  - metFORMIN (GLUCOPHAGE) 500 MG tablet; Take 1 tablet (500 mg) by mouth 2 times daily (with meals).    Essential hypertension, benign  Therapy continue with medical  - hydroCHLOROthiazide 12.5 MG tablet; Take 2 tablets (25 mg) by mouth daily.  - losartan (COZAAR) 100 MG tablet; Take 1 tablet (100 mg) by mouth daily.    Atrial fibrillation, unspecified type (H)  Noted as prior history listed.   Clinically stable      CARDIOVASCULAR SCREENING; LDL GOAL LESS THAN 100  Continue with statin therapy as directed  - simvastatin (ZOCOR) 20 MG tablet; TAKE ONE TABLET BY MOUTH EVERY DAY AT BEDTIME    Chronic bilateral low back pain without sciatica  Using gabapentin for control of pain discomfort  - gabapentin (NEURONTIN) 300 MG capsule; Take 1 capsule (300 mg) by mouth 3 times daily.    Restless leg syndrome  Continue with supportive care and symptom resolution  - gabapentin (NEURONTIN) 300 MG capsule; Take 1 capsule (300 mg) by mouth 3 times daily.    Gastroesophageal reflux disease without esophagitis  Continue with PPI therapy as directed  - omeprazole (PRILOSEC) 20 MG DR capsule; Take 1 capsule (20 mg) by mouth daily.    Morbid obesity (H)  Encouraged ongoing weight loss and weight reduction.    Malignant neoplasm of upper-outer quadrant of left breast in female, estrogen receptor positive (H)  Noted as prior history.    The longitudinal plan of care for the diagnosis(es)/condition(s) as documented were addressed during this visit. Due to the added complexity in care, I will continue to support Diane in the subsequent management and with ongoing continuity of care.     - No identified additional risk factors other than previously addressed    Antiplatelet or Anticoagulation Medication Instructions   - aspirin: Discontinue aspirin 7-10 days prior to procedure to reduce bleeding risk. It should be resumed postoperatively.   Discuss Arimidex with Orthopedist.    Additional Medication Instructions  Advised to hold all traditional herbal and over-the-counter supplements.    Recommendation  Approval given to proceed with proposed procedure, without further diagnostic evaluation.    Leny Contreras is a 74 year old, presenting for the following:  Pre-Op Exam and Medication Question (Discuss holding anastrozole prior to surgery )      HPI related to upcoming procedure: Arthroplasty shoulder, left         8/21/2024    Pre-Op Questionnaire   Have you ever had a heart attack or stroke? (!) YES    Have you ever had surgery on your heart or blood vessels, such as a stent placement, a coronary artery bypass, or surgery on an artery in your head, neck, heart, or legs? No   Do you have chest pain with activity? No   Do you have a history of heart failure? No   Do you currently have a cold, bronchitis or symptoms of other infection? No   Do you have a cough, shortness of breath, or wheezing? No   Do you or anyone in your family have previous history of blood clots? No   Do you or does anyone in your family have a serious bleeding problem such as prolonged bleeding following surgeries or cuts? No   Have you ever had problems with anemia or been told to take iron pills? (!) YES    Have you had any abnormal blood loss such as black, tarry or bloody stools, or abnormal vaginal bleeding? No   Have you ever had a blood transfusion? (!) YES   Have you ever had a transfusion reaction? No   Are you willing to have a blood transfusion if it is medically needed before, during, or after your surgery? Yes   Have you or any of your relatives ever had problems with anesthesia? No   Do you have sleep apnea, excessive snoring or daytime drowsiness? No   Do you have any artifical heart valves or other implanted medical devices like a pacemaker, defibrillator, or continuous glucose monitor? No   Do you have artificial joints? (!) YES   Are you allergic to latex? No      Health Care Directive  Patient does not have a Health Care Directive or Living Will: Discussed advance care planning with patient; however, patient declined at this time.      Status of Chronic Conditions:  See problem list for active medical problems.  Problems all longstanding and stable, except as noted/documented.  See ROS for pertinent symptoms related to these conditions.    Patient Active Problem List    Diagnosis Date Noted    Atrial fibrillation, unspecified type (H) 07/28/2021      Priority: Medium    Morbid obesity (H) 06/23/2020     Priority: Medium    Malignant neoplasm of upper-outer quadrant of left breast in female, estrogen receptor positive (H) 11/27/2019     Priority: Medium    Diabetic polyneuropathy associated with type 2 diabetes mellitus (H) 05/02/2019     Priority: Medium    Iron deficiency anemia, unspecified iron deficiency anemia type 05/02/2019     Priority: Medium    NSTEMI (non-ST elevated myocardial infarction) (H) 09/13/2018     Priority: Medium    Unstable angina (H) 09/13/2018     Priority: Medium    Type 2 diabetes mellitus without complication, without long-term current use of insulin (H) 06/05/2017     Priority: Medium    Major depressive disorder, single episode, mild (H24) 04/28/2016     Priority: Medium    Gastroesophageal reflux disease without esophagitis 04/28/2016     Priority: Medium    Osteoarthrosis, unspecified whether generalized or localized, involving lower leg 10/22/2012     Priority: Medium     Problem list name updated by automated process. Provider to review  Replacing diagnoses that were inactivated after the 10/1/2021 regulatory import.      Acute posthemorrhagic anemia 10/22/2012     Priority: Medium    S/P total knee replacement 10/18/2012     Priority: Medium    CARDIOVASCULAR SCREENING; LDL GOAL LESS THAN 100 10/31/2010     Priority: Medium    Essential hypertension, benign 08/28/2002     Priority: Medium      Past Medical History:   Diagnosis Date    Arthritis     BENIGN HYPERTENSION 8/28/2002    Cancer (H)     Basal cell carcinoma    CARDIOVASCULAR SCREENING; LDL GOAL LESS THAN 100 10/31/2010    Depressive disorder 1997    Esophageal reflux     Mild major depression (H24) 9/14/2010    Other malaise and fatigue 8/28/2002    Type 2 diabetes, HbA1c goal < 7% (H) 10/31/2010     Past Surgical History:   Procedure Laterality Date    ARTHROPLASTY KNEE  10/18/2012    Procedure: ARTHROPLASTY KNEE;  RIGHT TOTAL KNEE ARTHROPLASTY (SM[employer] NEPHEW)^  ;  Surgeon: Howard Charles MD;  Location:  OR    BIOPSY NODE SENTINEL Left 12/19/2019    Procedure: LEFT SENTINEL LYMPH NODE BIOPSY;  Surgeon: Ruddy Malik MD;  Location:  OR    BREAST BIOPSY, RT/LT  1988    breast biopsy    COLONOSCOPY N/A 7/14/2016    Procedure: COLONOSCOPY;  Surgeon: Curt Patel MD;  Location:  GI    ESOPHAGOSCOPY, GASTROSCOPY, DUODENOSCOPY (EGD), COMBINED N/A 9/13/2018    Procedure: COMBINED ESOPHAGOSCOPY, GASTROSCOPY, DUODENOSCOPY (EGD);  gastroscopy;  Surgeon: Mac White MD;  Location:  GI    HYSTERECTOMY, PAP NO LONGER INDICATED  1986    abdominal hysterectomy    LUMPECTOMY BREAST WITH SEED LOCALIZATION Left 12/19/2019    Procedure: SEED LOCALIZED LEFT BREAST LUMPECTOMY;  Surgeon: Ruddy Malik MD;  Location:  OR    ROTATOR CUFF REPAIR RT/LT Right     TUBAL LIGATION      at age 29    [facility] NONSPECIFIC PROCEDURE  10/30/96    skin tags removed    [facility] NONSPECIFIC PROCEDURE      colonic polyps     Current Outpatient Medications   Medication Sig Dispense Refill    amLODIPine (NORVASC) 5 MG tablet Take 1 tablet (5 mg) by mouth daily 90 tablet 1    anastrozole (ARIMIDEX) 1 MG tablet Take 1 tablet (1 mg) by mouth daily 90 tablet 3    aspirin 81 MG tablet Take 1 tablet by mouth daily. 90 tablet 3    buPROPion (WELLBUTRIN XL) 150 MG 24 hr tablet Take 1 tablet (150 mg) by mouth every morning 90 tablet 3    calcium carbonate 600 mg-vitamin D 400 units (CALTRATE) 600-400 MG-UNIT per tablet Take 1 tablet by mouth 2 times daily       citalopram (CELEXA) 20 MG tablet Take 1 tablet (20 mg) by mouth daily 90 tablet 3    gabapentin (NEURONTIN) 300 MG capsule Take 1 capsule (300 mg) by mouth 3 times daily. 270 capsule 3    hydroCHLOROthiazide 12.5 MG tablet Take 2 tablets (25 mg) by mouth daily. 180 tablet 3    losartan (COZAAR) 100 MG tablet Take 1 tablet (100 mg) by mouth daily. 90 tablet 3    metFORMIN (GLUCOPHAGE) 500 MG tablet Take 1 tablet (500  "mg) by mouth 2 times daily (with meals). 180 tablet 1    omeprazole (PRILOSEC) 20 MG DR capsule Take 1 capsule (20 mg) by mouth daily. 90 capsule 3    simvastatin (ZOCOR) 20 MG tablet TAKE ONE TABLET BY MOUTH EVERY DAY AT BEDTIME 90 tablet 3       Allergies   Allergen Reactions    Lisinopril Cough     COUGH        Social History     Tobacco Use    Smoking status: Former     Current packs/day: 0.00     Average packs/day: 1 pack/day for 12.0 years (12.0 ttl pk-yrs)     Types: Cigarettes     Start date: 10/18/1968     Quit date: 10/18/1980     Years since quittin.8    Smokeless tobacco: Never   Substance Use Topics    Alcohol use: Yes     Comment: 12 Beers per week       History   Drug Use No         Answers submitted by the patient for this visit:  Patient Health Questionnaire (Submitted on 2024)  If you checked off any problems, how difficult have these problems made it for you to do your work, take care of things at home, or get along with other people?: Not difficult at all  PHQ9 TOTAL SCORE: 1      Review of Systems  CONSTITUTIONAL: NEGATIVE for fever, chills, change in weight  EYES: NEGATIVE for vision changes or irritation  ENT/MOUTH: NEGATIVE for ear, mouth and throat problems  RESP: NEGATIVE for significant cough or SOB  CV: NEGATIVE for chest pain, palpitations or peripheral edema  GI: NEGATIVE for nausea, abdominal pain, heartburn, or change in bowel habits  : NEGATIVE for frequency, dysuria, or hematuria  NEURO: NEGATIVE for weakness, dizziness or paresthesias  ENDOCRINE: NEGATIVE for temperature intolerance, skin/hair changes  HEME: NEGATIVE for bleeding problems  PSYCHIATRIC: NEGATIVE for changes in mood or affect    Objective    /70   Pulse 100   Temp 99  F (37.2  C) (Temporal)   Resp 19   Ht 1.74 m (5' 8.5\")   Wt 85.6 kg (188 lb 12.8 oz)   SpO2 99%   BMI 28.29 kg/m     Estimated body mass index is 28.29 kg/m  as calculated from the following:    Height as of this encounter: 1.74 " "sarthak (5' 8.5\").    Weight as of this encounter: 85.6 kg (188 lb 12.8 oz).  Physical Exam  GENERAL: alert and no distress  EYES: Eyes grossly normal to inspection, PERRL and conjunctivae and sclerae normal  HENT: ear canals and TM's normal, nose and mouth without ulcers or lesions  RESP: lungs clear to auscultation - no rales, rhonchi or wheezes  CV: regular rate and rhythm, normal S1 S2, no S3 or S4, no click or rub, no peripheral edema  ABDOMEN: soft, nontender, no hepatosplenomegaly, no masses and bowel sounds normal  NEURO: No focal changes  PSYCH: mentation appears normal, affect normal/bright    Recent Labs   Lab Test 03/06/24  0842 11/30/23  0737 09/18/23  1447   HGB  --  13.9 13.1   PLT  --  196 207    134* 130*   POTASSIUM 4.4 4.8 3.7   CR 0.64 0.67 0.58   A1C 5.0  --  4.9        Diagnostics  Labs pending at this time.  Results will be reviewed when available.   EKG demonstrates a normal sinus rhythm with a heart rate of 96 bpm.  There is mild voltage criteria for LVH which was noted on preceding EKGs.  No other acute changes are noted per baseline.    Revised Cardiac Risk Index (RCRI)  The patient has the following serious cardiovascular risks for perioperative complications:   - Diabetes Mellitus  = 1 point     RCRI Interpretation: 1 point: Class II (low risk - 0.9% complication rate)         Signed Electronically by: Jac Barry MD  A copy of this evaluation report is provided to the requesting physician, Dr. Suarez.         "

## 2024-08-27 ENCOUNTER — TELEPHONE (OUTPATIENT)
Dept: ORTHOPEDICS | Facility: CLINIC | Age: 74
End: 2024-08-27
Payer: MEDICARE

## 2024-08-27 DIAGNOSIS — M19.012 OSTEOARTHRITIS OF LEFT SHOULDER: Primary | ICD-10-CM

## 2024-08-27 DIAGNOSIS — Z96.612 S/P REVERSE TOTAL SHOULDER ARTHROPLASTY, LEFT: ICD-10-CM

## 2024-08-27 NOTE — TELEPHONE ENCOUNTER
Other: Patient calling back to speak with Flor regarding surgery instructions     Could we send this information to you in [facility] or would you prefer to receive a phone call?:   Patient would prefer a phone call   Okay to leave a detailed message?: Yes at Home number on file [phone] (home)

## 2024-08-27 NOTE — TELEPHONE ENCOUNTER
Attempted to phone patient to review surgery instructions for upcoming reverse shoulder replacement with Dr. Suarez DOS 9/13/24.  No answer during call, VM left with callback instructions.    Pre-op shoulder CT completed on 5/14 via [employer] protocol.    Ultrasling orthotics order placed.    PT referral placed, patient to begin PT after first post operative visit (scheduled 9/30).    Flor Hoyt RN on 8/27/2024 at 12:24 PM

## 2024-08-28 NOTE — TELEPHONE ENCOUNTER
Teaching Flowsheet    Relevant Diagnosis: left shoulder arthritis   Teaching Topic: left reverse total shoulder    Surgery on 9/13/24  H&P done on 8/21/24  Follow up on 9/30/24  PT is ordered.   Patient has two daughters, Viktoriya and Kandi, helping her out for a few days postoperatively.  Patient understands the expected length of stay and the expectation of outpatient physical therapy. Patient understands the need for an at home  after surgery and need for transportation home.  Discussed dental/non-sterile procedure prophylaxis. Discussed the [facility] Companion service.     Person(s) involved in teaching:   Patient     Motivation Level:  Asks Questions: Yes  Eager to Learn: Yes  Cooperative: Yes  Receptive (willing/able to accept information): Yes  Any cultural factors/Mormon beliefs that may influence understanding or compliance? No  Comments: none       Patient demonstrates understanding of the following:  Reason for the appointment, diagnosis and treatment plan: Yes  Knowledge of proper use of medications and conditions for which they are ordered (with special attention to potential side effects or drug interactions): Yes  Which situations necessitate calling provider and whom to contact: Yes       Teaching Concerns Addressed:   Comments: None       Proper use and care of Sling (medical equip, care aids, etc.): Yes  Nutritional needs and diet plan: Yes  Pain management techniques: Yes  Wound Care: Yes  How and/when to access community resources: Yes     Instructional Materials Used/Given: Preoperative teaching packet, surgical soap x2, dental card, total joint booklet, & welcome letter       Time spent with patient: 30 minutes.

## 2024-08-29 ENCOUNTER — TELEPHONE (OUTPATIENT)
Dept: ORTHOPEDICS | Facility: CLINIC | Age: 74
End: 2024-08-29
Payer: MEDICARE

## 2024-08-29 NOTE — TELEPHONE ENCOUNTER
Dr. Suarez did not require patient to hold anastrazole or Gabapentin prior to surgery.  Patient called and notified.  She has no further questions.  She will expect a phone call a few days prior to surgery with her report time.

## 2024-09-12 ENCOUNTER — ANESTHESIA EVENT (OUTPATIENT)
Dept: SURGERY | Facility: CLINIC | Age: 74
End: 2024-09-12
Payer: MEDICARE

## 2024-09-13 ENCOUNTER — APPOINTMENT (OUTPATIENT)
Dept: GENERAL RADIOLOGY | Facility: CLINIC | Age: 74
End: 2024-09-13
Attending: STUDENT IN AN ORGANIZED HEALTH CARE EDUCATION/TRAINING PROGRAM
Payer: MEDICARE

## 2024-09-13 ENCOUNTER — ANCILLARY PROCEDURE (OUTPATIENT)
Dept: ULTRASOUND IMAGING | Facility: CLINIC | Age: 74
End: 2024-09-13
Payer: MEDICARE

## 2024-09-13 ENCOUNTER — ANESTHESIA (OUTPATIENT)
Dept: SURGERY | Facility: CLINIC | Age: 74
End: 2024-09-13
Payer: MEDICARE

## 2024-09-13 ENCOUNTER — HOSPITAL ENCOUNTER (OUTPATIENT)
Facility: CLINIC | Age: 74
Discharge: HOME | End: 2024-09-14
Attending: STUDENT IN AN ORGANIZED HEALTH CARE EDUCATION/TRAINING PROGRAM | Admitting: STUDENT IN AN ORGANIZED HEALTH CARE EDUCATION/TRAINING PROGRAM
Payer: MEDICARE

## 2024-09-13 DIAGNOSIS — M19.012 LOCALIZED OSTEOARTHRITIS OF LEFT SHOULDER: Primary | ICD-10-CM

## 2024-09-13 DIAGNOSIS — M17.10 UNILATERAL PRIMARY OSTEOARTHRITIS, UNSPECIFIED KNEE: ICD-10-CM

## 2024-09-13 PROBLEM — I48.91 ATRIAL FIBRILLATION, UNSPECIFIED TYPE (H): Status: RESOLVED | Noted: 2021-07-28 | Resolved: 2024-09-13

## 2024-09-13 PROBLEM — I20.0 UNSTABLE ANGINA (H): Status: RESOLVED | Noted: 2018-09-13 | Resolved: 2024-09-13

## 2024-09-13 PROBLEM — I21.4 NSTEMI (NON-ST ELEVATED MYOCARDIAL INFARCTION) (H): Status: RESOLVED | Noted: 2018-09-13 | Resolved: 2024-09-13

## 2024-09-13 LAB
GLUCOSE BLDC GLUCOMTR-MCNC: 122 MG/DL (ref 70–99)
GLUCOSE BLDC GLUCOMTR-MCNC: 144 MG/DL (ref 70–99)
HGB BLD-MCNC: 9.8 G/DL (ref 11.7–15.7)

## 2024-09-13 PROCEDURE — 250N000011 HC RX IP 250 OP 636: Performed by: STUDENT IN AN ORGANIZED HEALTH CARE EDUCATION/TRAINING PROGRAM

## 2024-09-13 PROCEDURE — 271N000001 HC OR GENERAL SUPPLY NON-STERILE: Performed by: STUDENT IN AN ORGANIZED HEALTH CARE EDUCATION/TRAINING PROGRAM

## 2024-09-13 PROCEDURE — 23472 RECONSTRUCT SHOULDER JOINT: CPT | Performed by: NURSE ANESTHETIST, CERTIFIED REGISTERED

## 2024-09-13 PROCEDURE — 250N000009 HC RX 250: Performed by: NURSE ANESTHETIST, CERTIFIED REGISTERED

## 2024-09-13 PROCEDURE — 360N000077 HC SURGERY LEVEL 4, PER MIN: Performed by: STUDENT IN AN ORGANIZED HEALTH CARE EDUCATION/TRAINING PROGRAM

## 2024-09-13 PROCEDURE — 272N000001 HC OR GENERAL SUPPLY STERILE: Performed by: STUDENT IN AN ORGANIZED HEALTH CARE EDUCATION/TRAINING PROGRAM

## 2024-09-13 PROCEDURE — 258N000001 HC RX 258: Performed by: STUDENT IN AN ORGANIZED HEALTH CARE EDUCATION/TRAINING PROGRAM

## 2024-09-13 PROCEDURE — 999N000065 XR SHOULDER LEFT PORT G/E 2 VIEWS: Mod: LT

## 2024-09-13 PROCEDURE — 82962 GLUCOSE BLOOD TEST: CPT

## 2024-09-13 PROCEDURE — 85018 HEMOGLOBIN: CPT | Performed by: STUDENT IN AN ORGANIZED HEALTH CARE EDUCATION/TRAINING PROGRAM

## 2024-09-13 PROCEDURE — 250N000011 HC RX IP 250 OP 636: Performed by: NURSE ANESTHETIST, CERTIFIED REGISTERED

## 2024-09-13 PROCEDURE — 23472 RECONSTRUCT SHOULDER JOINT: CPT | Performed by: ANESTHESIOLOGY

## 2024-09-13 PROCEDURE — 710N000010 HC RECOVERY PHASE 1, LEVEL 2, PER MIN: Performed by: STUDENT IN AN ORGANIZED HEALTH CARE EDUCATION/TRAINING PROGRAM

## 2024-09-13 PROCEDURE — 999N000141 HC STATISTIC PRE-PROCEDURE NURSING ASSESSMENT: Performed by: STUDENT IN AN ORGANIZED HEALTH CARE EDUCATION/TRAINING PROGRAM

## 2024-09-13 PROCEDURE — C9290 INJ, BUPIVACAINE LIPOSOME: HCPCS | Performed by: STUDENT IN AN ORGANIZED HEALTH CARE EDUCATION/TRAINING PROGRAM

## 2024-09-13 PROCEDURE — 250N000013 HC RX MED GY IP 250 OP 250 PS 637: Performed by: PHYSICIAN ASSISTANT

## 2024-09-13 PROCEDURE — 250N000009 HC RX 250: Performed by: STUDENT IN AN ORGANIZED HEALTH CARE EDUCATION/TRAINING PROGRAM

## 2024-09-13 PROCEDURE — 99100 ANES PT EXTEME AGE<1 YR&>70: CPT | Performed by: ANESTHESIOLOGY

## 2024-09-13 PROCEDURE — 250N000013 HC RX MED GY IP 250 OP 250 PS 637: Performed by: STUDENT IN AN ORGANIZED HEALTH CARE EDUCATION/TRAINING PROGRAM

## 2024-09-13 PROCEDURE — 370N000017 HC ANESTHESIA TECHNICAL FEE, PER MIN: Performed by: STUDENT IN AN ORGANIZED HEALTH CARE EDUCATION/TRAINING PROGRAM

## 2024-09-13 PROCEDURE — 258N000003 HC RX IP 258 OP 636: Performed by: NURSE ANESTHETIST, CERTIFIED REGISTERED

## 2024-09-13 PROCEDURE — 99100 ANES PT EXTEME AGE<1 YR&>70: CPT | Performed by: NURSE ANESTHETIST, CERTIFIED REGISTERED

## 2024-09-13 PROCEDURE — C1776 JOINT DEVICE (IMPLANTABLE): HCPCS | Performed by: STUDENT IN AN ORGANIZED HEALTH CARE EDUCATION/TRAINING PROGRAM

## 2024-09-13 PROCEDURE — 99417 PROLNG OP E/M EACH 15 MIN: CPT | Performed by: PHYSICIAN ASSISTANT

## 2024-09-13 PROCEDURE — C1713 ANCHOR/SCREW BN/BN,TIS/BN: HCPCS | Performed by: STUDENT IN AN ORGANIZED HEALTH CARE EDUCATION/TRAINING PROGRAM

## 2024-09-13 PROCEDURE — 258N000003 HC RX IP 258 OP 636: Performed by: STUDENT IN AN ORGANIZED HEALTH CARE EDUCATION/TRAINING PROGRAM

## 2024-09-13 PROCEDURE — 250N000025 HC SEVOFLURANE, PER MIN: Performed by: STUDENT IN AN ORGANIZED HEALTH CARE EDUCATION/TRAINING PROGRAM

## 2024-09-13 PROCEDURE — 250N000012 HC RX MED GY IP 250 OP 636 PS 637: Performed by: ANESTHESIOLOGY

## 2024-09-13 PROCEDURE — 250N000011 HC RX IP 250 OP 636: Performed by: ANESTHESIOLOGY

## 2024-09-13 PROCEDURE — 99215 OFFICE O/P EST HI 40 MIN: CPT | Performed by: PHYSICIAN ASSISTANT

## 2024-09-13 PROCEDURE — 36415 COLL VENOUS BLD VENIPUNCTURE: CPT | Performed by: STUDENT IN AN ORGANIZED HEALTH CARE EDUCATION/TRAINING PROGRAM

## 2024-09-13 DEVICE — IMPLANTABLE DEVICE
Type: IMPLANTABLE DEVICE | Site: SHOULDER | Status: FUNCTIONAL
Brand: COMPREHENSIVE® VRS GLENOID

## 2024-09-13 DEVICE — IMPLANTABLE DEVICE
Type: IMPLANTABLE DEVICE | Site: SHOULDER | Status: FUNCTIONAL
Brand: COMPREHENSIVE® REVERSE SHOULDER

## 2024-09-13 DEVICE — IMPLANTABLE DEVICE
Type: IMPLANTABLE DEVICE | Site: SHOULDER | Status: FUNCTIONAL
Brand: COMPREHENSIVE REVERSE SHOULDER

## 2024-09-13 DEVICE — IMPLANTABLE DEVICE
Type: IMPLANTABLE DEVICE | Site: SHOULDER | Status: FUNCTIONAL
Brand: COMPREHENSIVE® SHOULDER SYSTEM

## 2024-09-13 DEVICE — IMPLANTABLE DEVICE
Type: IMPLANTABLE DEVICE | Site: SHOULDER | Status: FUNCTIONAL
Brand: COMPREHENSIVE®

## 2024-09-13 DEVICE — IMPLANTABLE DEVICE
Type: IMPLANTABLE DEVICE | Site: SHOULDER | Status: FUNCTIONAL
Brand: COMPREHENSIVE® VRS

## 2024-09-13 DEVICE — BONE CEMENT SIMPLEX W/TOBRAMYCIN 6197-9-001: Type: IMPLANTABLE DEVICE | Site: SHOULDER | Status: FUNCTIONAL

## 2024-09-13 DEVICE — IMPLANTABLE DEVICE
Type: IMPLANTABLE DEVICE | Site: SHOULDER | Status: FUNCTIONAL
Brand: COMPREHENSIVE® PROLONG®

## 2024-09-13 RX ORDER — SODIUM CHLORIDE, SODIUM LACTATE, POTASSIUM CHLORIDE, CALCIUM CHLORIDE 600; 310; 30; 20 MG/100ML; MG/100ML; MG/100ML; MG/100ML
INJECTION, SOLUTION INTRAVENOUS CONTINUOUS
Status: DISCONTINUED | OUTPATIENT
Start: 2024-09-13 | End: 2024-09-14 | Stop reason: HOSPADM

## 2024-09-13 RX ORDER — ONDANSETRON 2 MG/ML
4 INJECTION INTRAMUSCULAR; INTRAVENOUS EVERY 6 HOURS PRN
Status: DISCONTINUED | OUTPATIENT
Start: 2024-09-13 | End: 2024-09-14 | Stop reason: HOSPADM

## 2024-09-13 RX ORDER — LIDOCAINE HYDROCHLORIDE 20 MG/ML
INJECTION, SOLUTION INFILTRATION; PERINEURAL PRN
Status: DISCONTINUED | OUTPATIENT
Start: 2024-09-13 | End: 2024-09-13

## 2024-09-13 RX ORDER — DEXAMETHASONE SODIUM PHOSPHATE 4 MG/ML
INJECTION, SOLUTION INTRA-ARTICULAR; INTRALESIONAL; INTRAMUSCULAR; INTRAVENOUS; SOFT TISSUE PRN
Status: DISCONTINUED | OUTPATIENT
Start: 2024-09-13 | End: 2024-09-13

## 2024-09-13 RX ORDER — ANASTROZOLE 1 MG/1
1 TABLET ORAL DAILY
Status: DISCONTINUED | OUTPATIENT
Start: 2024-09-14 | End: 2024-09-14 | Stop reason: HOSPADM

## 2024-09-13 RX ORDER — FLUMAZENIL 0.1 MG/ML
0.2 INJECTION, SOLUTION INTRAVENOUS
Status: DISCONTINUED | OUTPATIENT
Start: 2024-09-13 | End: 2024-09-13 | Stop reason: HOSPADM

## 2024-09-13 RX ORDER — LIDOCAINE 40 MG/G
CREAM TOPICAL
Status: DISCONTINUED | OUTPATIENT
Start: 2024-09-13 | End: 2024-09-14 | Stop reason: HOSPADM

## 2024-09-13 RX ORDER — CEFAZOLIN SODIUM/WATER 2 G/20 ML
2 SYRINGE (ML) INTRAVENOUS SEE ADMIN INSTRUCTIONS
Status: DISCONTINUED | OUTPATIENT
Start: 2024-09-13 | End: 2024-09-13 | Stop reason: HOSPADM

## 2024-09-13 RX ORDER — AMLODIPINE BESYLATE 5 MG/1
5 TABLET ORAL DAILY
Status: DISCONTINUED | OUTPATIENT
Start: 2024-09-13 | End: 2024-09-14 | Stop reason: HOSPADM

## 2024-09-13 RX ORDER — ONDANSETRON 4 MG/1
4 TABLET, ORALLY DISINTEGRATING ORAL EVERY 30 MIN PRN
Status: DISCONTINUED | OUTPATIENT
Start: 2024-09-13 | End: 2024-09-13

## 2024-09-13 RX ORDER — ONDANSETRON 4 MG/1
4 TABLET, ORALLY DISINTEGRATING ORAL EVERY 6 HOURS PRN
Status: DISCONTINUED | OUTPATIENT
Start: 2024-09-13 | End: 2024-09-14 | Stop reason: HOSPADM

## 2024-09-13 RX ORDER — DIMENHYDRINATE 50 MG/ML
25 INJECTION, SOLUTION INTRAMUSCULAR; INTRAVENOUS
Status: DISCONTINUED | OUTPATIENT
Start: 2024-09-13 | End: 2024-09-13 | Stop reason: HOSPADM

## 2024-09-13 RX ORDER — BUPIVACAINE HYDROCHLORIDE 5 MG/ML
INJECTION, SOLUTION EPIDURAL; INTRACAUDAL
Status: COMPLETED | OUTPATIENT
Start: 2024-09-13 | End: 2024-09-13

## 2024-09-13 RX ORDER — OXYCODONE HYDROCHLORIDE 10 MG/1
10 TABLET ORAL EVERY 4 HOURS PRN
Status: DISCONTINUED | OUTPATIENT
Start: 2024-09-13 | End: 2024-09-14 | Stop reason: HOSPADM

## 2024-09-13 RX ORDER — CALCIUM CARBONATE/VITAMIN D3 600 MG-10
1 TABLET ORAL 2 TIMES DAILY WITH MEALS
Status: DISCONTINUED | OUTPATIENT
Start: 2024-09-13 | End: 2024-09-14 | Stop reason: HOSPADM

## 2024-09-13 RX ORDER — DIPHENHYDRAMINE HCL 12.5MG/5ML
12.5 LIQUID (ML) ORAL EVERY 6 HOURS PRN
Status: DISCONTINUED | OUTPATIENT
Start: 2024-09-13 | End: 2024-09-14 | Stop reason: HOSPADM

## 2024-09-13 RX ORDER — GABAPENTIN 300 MG/1
900 CAPSULE ORAL AT BEDTIME
Status: DISCONTINUED | OUTPATIENT
Start: 2024-09-13 | End: 2024-09-13

## 2024-09-13 RX ORDER — CITALOPRAM HYDROBROMIDE 20 MG/1
20 TABLET ORAL DAILY
Status: DISCONTINUED | OUTPATIENT
Start: 2024-09-14 | End: 2024-09-14 | Stop reason: HOSPADM

## 2024-09-13 RX ORDER — FENTANYL CITRATE 50 UG/ML
INJECTION, SOLUTION INTRAMUSCULAR; INTRAVENOUS PRN
Status: DISCONTINUED | OUTPATIENT
Start: 2024-09-13 | End: 2024-09-13

## 2024-09-13 RX ORDER — ONDANSETRON 4 MG/1
4 TABLET, ORALLY DISINTEGRATING ORAL EVERY 30 MIN PRN
Status: DISCONTINUED | OUTPATIENT
Start: 2024-09-13 | End: 2024-09-13 | Stop reason: HOSPADM

## 2024-09-13 RX ORDER — FENTANYL CITRATE 50 UG/ML
25 INJECTION, SOLUTION INTRAMUSCULAR; INTRAVENOUS EVERY 5 MIN PRN
Status: DISCONTINUED | OUTPATIENT
Start: 2024-09-13 | End: 2024-09-13 | Stop reason: HOSPADM

## 2024-09-13 RX ORDER — TRANEXAMIC ACID 650 MG/1
1950 TABLET ORAL ONCE
Status: COMPLETED | OUTPATIENT
Start: 2024-09-13 | End: 2024-09-13

## 2024-09-13 RX ORDER — LOSARTAN POTASSIUM 100 MG/1
100 TABLET ORAL DAILY
Status: DISCONTINUED | OUTPATIENT
Start: 2024-09-13 | End: 2024-09-14 | Stop reason: HOSPADM

## 2024-09-13 RX ORDER — NALOXONE HYDROCHLORIDE 0.4 MG/ML
0.2 INJECTION, SOLUTION INTRAMUSCULAR; INTRAVENOUS; SUBCUTANEOUS
Status: DISCONTINUED | OUTPATIENT
Start: 2024-09-13 | End: 2024-09-13 | Stop reason: HOSPADM

## 2024-09-13 RX ORDER — OXYCODONE HYDROCHLORIDE 10 MG/1
10 TABLET ORAL
Status: DISCONTINUED | OUTPATIENT
Start: 2024-09-13 | End: 2024-09-13

## 2024-09-13 RX ORDER — CEFAZOLIN SODIUM/WATER 2 G/20 ML
2 SYRINGE (ML) INTRAVENOUS EVERY 8 HOURS
Status: DISCONTINUED | OUTPATIENT
Start: 2024-09-13 | End: 2024-09-13

## 2024-09-13 RX ORDER — HYDROMORPHONE HCL IN WATER/PF 6 MG/30 ML
0.2 PATIENT CONTROLLED ANALGESIA SYRINGE INTRAVENOUS
Status: DISCONTINUED | OUTPATIENT
Start: 2024-09-13 | End: 2024-09-14 | Stop reason: HOSPADM

## 2024-09-13 RX ORDER — ACETAMINOPHEN 325 MG/1
650 TABLET ORAL EVERY 4 HOURS PRN
Status: DISCONTINUED | OUTPATIENT
Start: 2024-09-16 | End: 2024-09-14 | Stop reason: HOSPADM

## 2024-09-13 RX ORDER — IBUPROFEN 600 MG/1
600 TABLET, FILM COATED ORAL EVERY 6 HOURS PRN
Status: DISCONTINUED | OUTPATIENT
Start: 2024-09-13 | End: 2024-09-14 | Stop reason: HOSPADM

## 2024-09-13 RX ORDER — HYDROMORPHONE HYDROCHLORIDE 1 MG/ML
0.2 INJECTION, SOLUTION INTRAMUSCULAR; INTRAVENOUS; SUBCUTANEOUS EVERY 5 MIN PRN
Status: DISCONTINUED | OUTPATIENT
Start: 2024-09-13 | End: 2024-09-13 | Stop reason: HOSPADM

## 2024-09-13 RX ORDER — EPHEDRINE SULFATE 50 MG/ML
INJECTION, SOLUTION INTRAMUSCULAR; INTRAVENOUS; SUBCUTANEOUS PRN
Status: DISCONTINUED | OUTPATIENT
Start: 2024-09-13 | End: 2024-09-13

## 2024-09-13 RX ORDER — ACETAMINOPHEN 325 MG/1
650 TABLET ORAL EVERY 4 HOURS PRN
Qty: 100 TABLET | Refills: 0 | Status: SHIPPED | OUTPATIENT
Start: 2024-09-13 | End: 2024-09-14

## 2024-09-13 RX ORDER — CEFAZOLIN SODIUM 2 G/100ML
2 INJECTION, SOLUTION INTRAVENOUS EVERY 8 HOURS
Status: COMPLETED | OUTPATIENT
Start: 2024-09-13 | End: 2024-09-14

## 2024-09-13 RX ORDER — BUPROPION HYDROCHLORIDE 150 MG/1
150 TABLET ORAL EVERY MORNING
Status: DISCONTINUED | OUTPATIENT
Start: 2024-09-14 | End: 2024-09-14 | Stop reason: HOSPADM

## 2024-09-13 RX ORDER — IBUPROFEN 600 MG/1
600 TABLET, FILM COATED ORAL EVERY 6 HOURS PRN
Qty: 90 TABLET | Refills: 0 | Status: SHIPPED | OUTPATIENT
Start: 2024-09-13 | End: 2024-09-14

## 2024-09-13 RX ORDER — NALOXONE HYDROCHLORIDE 0.4 MG/ML
0.4 INJECTION, SOLUTION INTRAMUSCULAR; INTRAVENOUS; SUBCUTANEOUS
Status: DISCONTINUED | OUTPATIENT
Start: 2024-09-13 | End: 2024-09-13 | Stop reason: HOSPADM

## 2024-09-13 RX ORDER — OXYCODONE HYDROCHLORIDE 5 MG/1
5 TABLET ORAL
Status: DISCONTINUED | OUTPATIENT
Start: 2024-09-13 | End: 2024-09-13

## 2024-09-13 RX ORDER — NALOXONE HYDROCHLORIDE 0.4 MG/ML
0.4 INJECTION, SOLUTION INTRAMUSCULAR; INTRAVENOUS; SUBCUTANEOUS
Status: DISCONTINUED | OUTPATIENT
Start: 2024-09-13 | End: 2024-09-14 | Stop reason: HOSPADM

## 2024-09-13 RX ORDER — AMOXICILLIN 250 MG
1-2 CAPSULE ORAL 2 TIMES DAILY
Qty: 30 TABLET | Refills: 0 | Status: SHIPPED | OUTPATIENT
Start: 2024-09-13 | End: 2024-09-14

## 2024-09-13 RX ORDER — AMOXICILLIN 250 MG
1 CAPSULE ORAL 2 TIMES DAILY
Status: DISCONTINUED | OUTPATIENT
Start: 2024-09-13 | End: 2024-09-14 | Stop reason: HOSPADM

## 2024-09-13 RX ORDER — MAGNESIUM HYDROXIDE 1200 MG/15ML
LIQUID ORAL PRN
Status: DISCONTINUED | OUTPATIENT
Start: 2024-09-13 | End: 2024-09-13 | Stop reason: HOSPADM

## 2024-09-13 RX ORDER — DEXTROSE MONOHYDRATE 25 G/50ML
25-50 INJECTION, SOLUTION INTRAVENOUS
Status: DISCONTINUED | OUTPATIENT
Start: 2024-09-13 | End: 2024-09-14 | Stop reason: HOSPADM

## 2024-09-13 RX ORDER — SIMVASTATIN 20 MG
20 TABLET ORAL AT BEDTIME
Status: DISCONTINUED | OUTPATIENT
Start: 2024-09-13 | End: 2024-09-14 | Stop reason: HOSPADM

## 2024-09-13 RX ORDER — FENTANYL CITRATE 50 UG/ML
25-50 INJECTION, SOLUTION INTRAMUSCULAR; INTRAVENOUS
Status: DISCONTINUED | OUTPATIENT
Start: 2024-09-13 | End: 2024-09-13 | Stop reason: HOSPADM

## 2024-09-13 RX ORDER — ONDANSETRON 2 MG/ML
4 INJECTION INTRAMUSCULAR; INTRAVENOUS EVERY 30 MIN PRN
Status: DISCONTINUED | OUTPATIENT
Start: 2024-09-13 | End: 2024-09-13

## 2024-09-13 RX ORDER — NALOXONE HYDROCHLORIDE 0.4 MG/ML
0.1 INJECTION, SOLUTION INTRAMUSCULAR; INTRAVENOUS; SUBCUTANEOUS
Status: DISCONTINUED | OUTPATIENT
Start: 2024-09-13 | End: 2024-09-13 | Stop reason: HOSPADM

## 2024-09-13 RX ORDER — PROPOFOL 10 MG/ML
INJECTION, EMULSION INTRAVENOUS PRN
Status: DISCONTINUED | OUTPATIENT
Start: 2024-09-13 | End: 2024-09-13

## 2024-09-13 RX ORDER — ACETAMINOPHEN 325 MG/1
975 TABLET ORAL EVERY 8 HOURS
Status: DISCONTINUED | OUTPATIENT
Start: 2024-09-13 | End: 2024-09-14 | Stop reason: HOSPADM

## 2024-09-13 RX ORDER — SODIUM CHLORIDE, SODIUM LACTATE, POTASSIUM CHLORIDE, CALCIUM CHLORIDE 600; 310; 30; 20 MG/100ML; MG/100ML; MG/100ML; MG/100ML
INJECTION, SOLUTION INTRAVENOUS CONTINUOUS PRN
Status: DISCONTINUED | OUTPATIENT
Start: 2024-09-13 | End: 2024-09-13

## 2024-09-13 RX ORDER — GABAPENTIN 300 MG/1
300 CAPSULE ORAL AT BEDTIME
Status: DISCONTINUED | OUTPATIENT
Start: 2024-09-13 | End: 2024-09-13

## 2024-09-13 RX ORDER — HALOPERIDOL 5 MG/ML
1 INJECTION INTRAMUSCULAR
Status: DISCONTINUED | OUTPATIENT
Start: 2024-09-13 | End: 2024-09-13 | Stop reason: HOSPADM

## 2024-09-13 RX ORDER — NALOXONE HYDROCHLORIDE 0.4 MG/ML
0.2 INJECTION, SOLUTION INTRAMUSCULAR; INTRAVENOUS; SUBCUTANEOUS
Status: DISCONTINUED | OUTPATIENT
Start: 2024-09-13 | End: 2024-09-14 | Stop reason: HOSPADM

## 2024-09-13 RX ORDER — VANCOMYCIN HYDROCHLORIDE 1 G/20ML
INJECTION, POWDER, LYOPHILIZED, FOR SOLUTION INTRAVENOUS PRN
Status: DISCONTINUED | OUTPATIENT
Start: 2024-09-13 | End: 2024-09-13 | Stop reason: HOSPADM

## 2024-09-13 RX ORDER — HYDROMORPHONE HYDROCHLORIDE 1 MG/ML
0.4 INJECTION, SOLUTION INTRAMUSCULAR; INTRAVENOUS; SUBCUTANEOUS EVERY 5 MIN PRN
Status: DISCONTINUED | OUTPATIENT
Start: 2024-09-13 | End: 2024-09-13 | Stop reason: HOSPADM

## 2024-09-13 RX ORDER — OXYCODONE HYDROCHLORIDE 5 MG/1
5-10 TABLET ORAL EVERY 4 HOURS PRN
Qty: 30 TABLET | Refills: 0 | Status: SHIPPED | OUTPATIENT
Start: 2024-09-13 | End: 2024-09-14

## 2024-09-13 RX ORDER — NICOTINE POLACRILEX 4 MG
15-30 LOZENGE BUCCAL
Status: DISCONTINUED | OUTPATIENT
Start: 2024-09-13 | End: 2024-09-14 | Stop reason: HOSPADM

## 2024-09-13 RX ORDER — NALOXONE HYDROCHLORIDE 0.4 MG/ML
0.1 INJECTION, SOLUTION INTRAMUSCULAR; INTRAVENOUS; SUBCUTANEOUS
Status: DISCONTINUED | OUTPATIENT
Start: 2024-09-13 | End: 2024-09-13

## 2024-09-13 RX ORDER — ONDANSETRON 2 MG/ML
4 INJECTION INTRAMUSCULAR; INTRAVENOUS EVERY 30 MIN PRN
Status: DISCONTINUED | OUTPATIENT
Start: 2024-09-13 | End: 2024-09-13 | Stop reason: HOSPADM

## 2024-09-13 RX ORDER — SODIUM CHLORIDE, SODIUM LACTATE, POTASSIUM CHLORIDE, CALCIUM CHLORIDE 600; 310; 30; 20 MG/100ML; MG/100ML; MG/100ML; MG/100ML
INJECTION, SOLUTION INTRAVENOUS CONTINUOUS
Status: DISCONTINUED | OUTPATIENT
Start: 2024-09-13 | End: 2024-09-13 | Stop reason: HOSPADM

## 2024-09-13 RX ORDER — HYDROCHLOROTHIAZIDE 25 MG/1
25 TABLET ORAL DAILY
Status: DISCONTINUED | OUTPATIENT
Start: 2024-09-13 | End: 2024-09-14 | Stop reason: HOSPADM

## 2024-09-13 RX ORDER — APREPITANT 40 MG/1
40 CAPSULE ORAL ONCE
Status: COMPLETED | OUTPATIENT
Start: 2024-09-13 | End: 2024-09-13

## 2024-09-13 RX ORDER — HYDROMORPHONE HCL IN WATER/PF 6 MG/30 ML
0.4 PATIENT CONTROLLED ANALGESIA SYRINGE INTRAVENOUS
Status: DISCONTINUED | OUTPATIENT
Start: 2024-09-13 | End: 2024-09-14 | Stop reason: HOSPADM

## 2024-09-13 RX ORDER — CEFAZOLIN SODIUM/WATER 2 G/20 ML
2 SYRINGE (ML) INTRAVENOUS
Status: COMPLETED | OUTPATIENT
Start: 2024-09-13 | End: 2024-09-13

## 2024-09-13 RX ORDER — FENTANYL CITRATE 50 UG/ML
50 INJECTION, SOLUTION INTRAMUSCULAR; INTRAVENOUS EVERY 5 MIN PRN
Status: DISCONTINUED | OUTPATIENT
Start: 2024-09-13 | End: 2024-09-13 | Stop reason: HOSPADM

## 2024-09-13 RX ORDER — GABAPENTIN 300 MG/1
300 CAPSULE ORAL AT BEDTIME
Status: DISCONTINUED | OUTPATIENT
Start: 2024-09-13 | End: 2024-09-14 | Stop reason: HOSPADM

## 2024-09-13 RX ORDER — LABETALOL HYDROCHLORIDE 5 MG/ML
10 INJECTION, SOLUTION INTRAVENOUS
Status: DISCONTINUED | OUTPATIENT
Start: 2024-09-13 | End: 2024-09-13 | Stop reason: HOSPADM

## 2024-09-13 RX ORDER — PROCHLORPERAZINE MALEATE 5 MG
5 TABLET ORAL EVERY 6 HOURS PRN
Status: DISCONTINUED | OUTPATIENT
Start: 2024-09-13 | End: 2024-09-14 | Stop reason: HOSPADM

## 2024-09-13 RX ORDER — ONDANSETRON 2 MG/ML
INJECTION INTRAMUSCULAR; INTRAVENOUS PRN
Status: DISCONTINUED | OUTPATIENT
Start: 2024-09-13 | End: 2024-09-13

## 2024-09-13 RX ORDER — OXYCODONE HYDROCHLORIDE 5 MG/1
5 TABLET ORAL EVERY 4 HOURS PRN
Status: DISCONTINUED | OUTPATIENT
Start: 2024-09-13 | End: 2024-09-14 | Stop reason: HOSPADM

## 2024-09-13 RX ORDER — BISACODYL 10 MG
10 SUPPOSITORY, RECTAL RECTAL DAILY PRN
Status: DISCONTINUED | OUTPATIENT
Start: 2024-09-16 | End: 2024-09-14 | Stop reason: HOSPADM

## 2024-09-13 RX ORDER — POLYETHYLENE GLYCOL 3350 17 G/17G
17 POWDER, FOR SOLUTION ORAL DAILY
Status: DISCONTINUED | OUTPATIENT
Start: 2024-09-14 | End: 2024-09-14 | Stop reason: HOSPADM

## 2024-09-13 RX ADMIN — FENTANYL CITRATE 50 MCG: 50 INJECTION INTRAMUSCULAR; INTRAVENOUS at 09:22

## 2024-09-13 RX ADMIN — EPHEDRINE SULFATE 5 MG: 5 INJECTION INTRAVENOUS at 13:03

## 2024-09-13 RX ADMIN — Medication 2 G: at 09:55

## 2024-09-13 RX ADMIN — EPHEDRINE SULFATE 5 MG: 5 INJECTION INTRAVENOUS at 11:39

## 2024-09-13 RX ADMIN — CEFAZOLIN SODIUM 2 G: 2 INJECTION, SOLUTION INTRAVENOUS at 18:18

## 2024-09-13 RX ADMIN — SODIUM CHLORIDE, POTASSIUM CHLORIDE, SODIUM LACTATE AND CALCIUM CHLORIDE: 600; 310; 30; 20 INJECTION, SOLUTION INTRAVENOUS at 09:41

## 2024-09-13 RX ADMIN — ACETAMINOPHEN 975 MG: 325 TABLET ORAL at 22:43

## 2024-09-13 RX ADMIN — SODIUM CHLORIDE, POTASSIUM CHLORIDE, SODIUM LACTATE AND CALCIUM CHLORIDE: 600; 310; 30; 20 INJECTION, SOLUTION INTRAVENOUS at 15:55

## 2024-09-13 RX ADMIN — PROPOFOL 50 MG: 10 INJECTION, EMULSION INTRAVENOUS at 10:08

## 2024-09-13 RX ADMIN — EPHEDRINE SULFATE 5 MG: 5 INJECTION INTRAVENOUS at 12:15

## 2024-09-13 RX ADMIN — Medication 40 MG: at 12:04

## 2024-09-13 RX ADMIN — CALCIUM CARBONATE 600 MG (1,500 MG)-VITAMIN D3 400 UNIT TABLET 1 TABLET: at 18:19

## 2024-09-13 RX ADMIN — PROPOFOL 50 MG: 10 INJECTION, EMULSION INTRAVENOUS at 10:06

## 2024-09-13 RX ADMIN — PHENYLEPHRINE HYDROCHLORIDE 0.3 MCG/KG/MIN: 10 INJECTION INTRAVENOUS at 10:14

## 2024-09-13 RX ADMIN — ONDANSETRON 4 MG: 2 INJECTION INTRAMUSCULAR; INTRAVENOUS at 12:37

## 2024-09-13 RX ADMIN — PHENYLEPHRINE HYDROCHLORIDE 100 MCG: 10 INJECTION INTRAVENOUS at 10:03

## 2024-09-13 RX ADMIN — PROPOFOL 50 MG: 10 INJECTION, EMULSION INTRAVENOUS at 13:06

## 2024-09-13 RX ADMIN — DEXAMETHASONE SODIUM PHOSPHATE 4 MG: 4 INJECTION, SOLUTION INTRAMUSCULAR; INTRAVENOUS at 10:00

## 2024-09-13 RX ADMIN — FENTANYL CITRATE 25 MCG: 50 INJECTION INTRAMUSCULAR; INTRAVENOUS at 09:53

## 2024-09-13 RX ADMIN — ACETAMINOPHEN 975 MG: 325 TABLET ORAL at 15:55

## 2024-09-13 RX ADMIN — PHENYLEPHRINE HYDROCHLORIDE 100 MCG: 10 INJECTION INTRAVENOUS at 13:03

## 2024-09-13 RX ADMIN — PHENYLEPHRINE HYDROCHLORIDE 0.5 MCG/KG/MIN: 10 INJECTION INTRAVENOUS at 11:32

## 2024-09-13 RX ADMIN — PHENYLEPHRINE HYDROCHLORIDE 200 MCG: 10 INJECTION INTRAVENOUS at 10:12

## 2024-09-13 RX ADMIN — PROPOFOL 20 MG: 10 INJECTION, EMULSION INTRAVENOUS at 12:58

## 2024-09-13 RX ADMIN — MIDAZOLAM 1 MG: 1 INJECTION INTRAMUSCULAR; INTRAVENOUS at 09:41

## 2024-09-13 RX ADMIN — MIDAZOLAM 0.5 MG: 1 INJECTION INTRAMUSCULAR; INTRAVENOUS at 09:22

## 2024-09-13 RX ADMIN — PROPOFOL 50 MG: 10 INJECTION, EMULSION INTRAVENOUS at 09:57

## 2024-09-13 RX ADMIN — FENTANYL CITRATE 75 MCG: 50 INJECTION INTRAMUSCULAR; INTRAVENOUS at 10:05

## 2024-09-13 RX ADMIN — SENNOSIDES AND DOCUSATE SODIUM 1 TABLET: 50; 8.6 TABLET ORAL at 19:59

## 2024-09-13 RX ADMIN — SUGAMMADEX 200 MG: 100 INJECTION, SOLUTION INTRAVENOUS at 13:11

## 2024-09-13 RX ADMIN — BUPIVACAINE HYDROCHLORIDE 10 ML: 5 INJECTION, SOLUTION EPIDURAL; INTRACAUDAL at 09:20

## 2024-09-13 RX ADMIN — PHENYLEPHRINE HYDROCHLORIDE 10 MCG: 10 INJECTION INTRAVENOUS at 10:03

## 2024-09-13 RX ADMIN — PHENYLEPHRINE HYDROCHLORIDE 100 MCG: 10 INJECTION INTRAVENOUS at 10:09

## 2024-09-13 RX ADMIN — PROPOFOL 50 MG: 10 INJECTION, EMULSION INTRAVENOUS at 09:55

## 2024-09-13 RX ADMIN — EPHEDRINE SULFATE 5 MG: 5 INJECTION INTRAVENOUS at 11:57

## 2024-09-13 RX ADMIN — LIDOCAINE HYDROCHLORIDE 80 MG: 20 INJECTION, SOLUTION INFILTRATION; PERINEURAL at 09:51

## 2024-09-13 RX ADMIN — APREPITANT 40 MG: 40 CAPSULE ORAL at 08:42

## 2024-09-13 RX ADMIN — SODIUM CHLORIDE, POTASSIUM CHLORIDE, SODIUM LACTATE AND CALCIUM CHLORIDE: 600; 310; 30; 20 INJECTION, SOLUTION INTRAVENOUS at 11:24

## 2024-09-13 RX ADMIN — PHENYLEPHRINE HYDROCHLORIDE 200 MCG: 10 INJECTION INTRAVENOUS at 10:07

## 2024-09-13 RX ADMIN — BUPIVACAINE 10 ML: 13.3 INJECTION, SUSPENSION, LIPOSOMAL INFILTRATION at 09:20

## 2024-09-13 RX ADMIN — PROPOFOL 100 MG: 10 INJECTION, EMULSION INTRAVENOUS at 09:52

## 2024-09-13 RX ADMIN — EPHEDRINE SULFATE 5 MG: 5 INJECTION INTRAVENOUS at 12:54

## 2024-09-13 RX ADMIN — TRANEXAMIC ACID 1950 MG: 650 TABLET ORAL at 08:42

## 2024-09-13 RX ADMIN — GABAPENTIN 300 MG: 300 CAPSULE ORAL at 22:42

## 2024-09-13 RX ADMIN — PHENYLEPHRINE HYDROCHLORIDE 100 MCG: 10 INJECTION INTRAVENOUS at 09:57

## 2024-09-13 RX ADMIN — SIMVASTATIN 20 MG: 20 TABLET, FILM COATED ORAL at 22:42

## 2024-09-13 ASSESSMENT — ACTIVITIES OF DAILY LIVING (ADL)
ADLS_ACUITY_SCORE: 36
ADLS_ACUITY_SCORE: 43
ADLS_ACUITY_SCORE: 49
ADLS_ACUITY_SCORE: 49
ADLS_ACUITY_SCORE: 41
ADLS_ACUITY_SCORE: 49
ADLS_ACUITY_SCORE: 43
ADLS_ACUITY_SCORE: 41
ADLS_ACUITY_SCORE: 43
ADLS_ACUITY_SCORE: 38
ADLS_ACUITY_SCORE: 41
ADLS_ACUITY_SCORE: 43
ADLS_ACUITY_SCORE: 41
ADLS_ACUITY_SCORE: 43

## 2024-09-13 ASSESSMENT — ENCOUNTER SYMPTOMS: DYSRHYTHMIAS: 1

## 2024-09-13 NOTE — ANESTHESIA PROCEDURE NOTES
Airway       Patient location during procedure: OR       Procedure Start/Stop Times: 9/13/2024 9:56 AM  Staff -        CRNA: Wanda Samayoa APRN CRNA       Performed By: CRNA  Consent for Airway        Urgency: elective  Indications and Patient Condition       Indications for airway management: karli-procedural       Induction type:intravenous       Mask difficulty assessment: 1 - vent by mask    Final Airway Details       Final airway type: endotracheal airway       Successful airway: Oral and ETT - single  Endotracheal Airway Details        ETT size (mm): 7.0       Cuffed: yes       Successful intubation technique: direct laryngoscopy       DL Blade Type: MAC 3       Grade View of Cords: 1       Adjucts: stylet       Measured from: gums/teeth       Secured at (cm): 20       Bite block used: None    Post intubation assessment        Placement verified by: capnometry, equal breath sounds and chest rise        Number of attempts at approach: 1       Secured with: tape       Ease of procedure: easy       Dentition: Intact and Unchanged (edentulous)    Medication(s) Administered   Medication Administration Time: 9/13/2024 9:56 AM

## 2024-09-13 NOTE — ANESTHESIA CARE TRANSFER NOTE
Patient: Diane Gaitan    Procedure: Procedure(s):  ARTHROPLASTY, SHOULDER, TOTAL, REVERSE       Diagnosis: Localized osteoarthritis of left shoulder [M19.012]  Diagnosis Additional Information: No value filed.    Anesthesia Type:   General     Note:    Oropharynx: oropharynx clear of all foreign objects and spontaneously breathing  Level of Consciousness: awake  Oxygen Supplementation: face mask  Level of Supplemental Oxygen (L/min / FiO2): 8  Independent Airway: airway patency satisfactory and stable  Dentition: dentition unchanged  Vital Signs Stable: post-procedure vital signs reviewed and stable  Report to RN Given: handoff report given  Patient transferred to: PACU    Handoff Report: Identifed the Patient, Identified the Reponsible Provider, Reviewed the pertinent medical history, Discussed the surgical course, Reviewed Intra-OP anesthesia mangement and issues during anesthesia, Set expectations for post-procedure period and Allowed opportunity for questions and acknowledgement of understanding    Vitals:  Vitals Value Taken Time   /87 09/13/24 1330   Temp 36.9  C (98.4  F) 09/13/24 1323   Pulse 107 09/13/24 1332   Resp 15 09/13/24 1332   SpO2 96 % 09/13/24 1332   Vitals shown include unfiled device data.    Electronically Signed By: NITZA Schulte CRNA  September 13, 2024  1:32 PM

## 2024-09-13 NOTE — DISCHARGE INSTRUCTIONS
To contact a doctor, call  [phone] Dr Floyd Suarez or:     [phone] and ask for the Resident On Call for:          Orthopedics (answered 24 hours a day)   Emergency Departments:  [facility] [place] [facility] Department: [phone]

## 2024-09-13 NOTE — ANESTHESIA PREPROCEDURE EVALUATION
Anesthesia Pre-Procedure Evaluation    Patient: Diane Gaitan   MRN: [id number] : 1950        Procedure : Procedure(s):  ARTHROPLASTY, SHOULDER, TOTAL, REVERSE          Past Medical History:   Diagnosis Date    Arthritis     BENIGN HYPERTENSION 2002    Cancer (H)     Basal cell carcinoma    CARDIOVASCULAR SCREENING; LDL GOAL LESS THAN 100 10/31/2010    Depressive disorder     Esophageal reflux     Mild major depression (H24) 2010    Other malaise and fatigue 2002    Type 2 diabetes, HbA1c goal < 7% (H) 10/31/2010      Past Surgical History:   Procedure Laterality Date    ARTHROPLASTY KNEE  10/18/2012    Procedure: ARTHROPLASTY KNEE;  RIGHT TOTAL KNEE ARTHROPLASTY (SMITH & NEPHEW)^ ;  Surgeon: Howard Charles MD;  Location:  OR    BIOPSY NODE SENTINEL Left 2019    Procedure: LEFT SENTINEL LYMPH NODE BIOPSY;  Surgeon: Ruddy Malik MD;  Location:  OR    BREAST BIOPSY, RT/LT  1988    breast biopsy    COLONOSCOPY N/A 2016    Procedure: COLONOSCOPY;  Surgeon: Curt Patel MD;  Location:  GI    ESOPHAGOSCOPY, GASTROSCOPY, DUODENOSCOPY (EGD), COMBINED N/A 2018    Procedure: COMBINED ESOPHAGOSCOPY, GASTROSCOPY, DUODENOSCOPY (EGD);  gastroscopy;  Surgeon: Mac White MD;  Location:  GI    HYSTERECTOMY, PAP NO LONGER INDICATED      abdominal hysterectomy    LUMPECTOMY BREAST WITH SEED LOCALIZATION Left 2019    Procedure: SEED LOCALIZED LEFT BREAST LUMPECTOMY;  Surgeon: Ruddy Malik MD;  Location:  OR    ROTATOR CUFF REPAIR RT/LT Right     TUBAL LIGATION      at age 29    [facility] NONSPECIFIC PROCEDURE  10/30/96    skin tags removed    [facility] NONSPECIFIC PROCEDURE      colonic polyps      Allergies   Allergen Reactions    Lisinopril Cough     COUGH      Social History     Tobacco Use    Smoking status: Former     Current packs/day: 0.00     Average packs/day: 1 pack/day for 12.0 years (12.0 ttl pk-yrs)     Types:  Cigarettes     Start date: 10/18/1968     Quit date: 10/18/1980     Years since quittin.9    Smokeless tobacco: Never   Substance Use Topics    Alcohol use: Yes     Comment: 12 Beers per week      Wt Readings from Last 1 Encounters:   24 83.9 kg (184 lb 15.5 oz)        Anesthesia Evaluation            ROS/MED HX  ENT/Pulmonary:       Neurologic:       Cardiovascular:     (+)  hypertension- -  CAD (NSTEMI) angina-  - -                        dysrhythmias, a-fib,        Previous cardiac testing     METS/Exercise Tolerance:     Hematologic:       Musculoskeletal:   (+)  arthritis,             GI/Hepatic:     (+) GERD,                   Renal/Genitourinary:       Endo:     (+)  type II DM,       Diabetic complications: neuropathy.      Obesity,       Psychiatric/Substance Use:     (+) psychiatric history depression       Infectious Disease:       Malignancy:   (+) Malignancy, History of Breast.    Other:            CBC RESULTS:   Recent Labs   Lab Test 24  1419 23  0737 23  1447 22  1323   WBC 5.9 6.3 7.1 5.4   HGB 10.6* 13.9 13.1 12.0   HCT 33.1* 40.8 37.2 35.7    196 207 249     Last Comprehensive Metabolic Panel:  Recent Labs   Lab Test 24  1419 24  0842 23  0737 23  1447   * 135 134* 130*   POTASSIUM 4.1 4.4 4.8 3.7   CHLORIDE 97* 100 99 93*   CO2 25 28 25 25   ANIONGAP 10 7 10 12   BUN 13.4 11.5 10.8 8.4   CR 0.87 0.64 0.67 0.58   GFRESTIMATED 69 >90 >90 >90   GLC 93 111* 116* 104*   JORDON 9.6 9.5 10.2 9.5        Physical Exam    Airway        Mallampati: II   TM distance: > 3 FB   Neck ROM: full   Mouth opening: > 3 cm    Respiratory Devices and Support         Dental  no notable dental history         Cardiovascular   cardiovascular exam normal       Rhythm and rate: regular and normal     Pulmonary   pulmonary exam normal        breath sounds clear to auscultation           OUTSIDE LABS:  CBC:   Lab Results   Component Value Date    WBC 5.9  08/21/2024    WBC 6.3 11/30/2023    HGB 10.6 (L) 08/21/2024    HGB 13.9 11/30/2023    HCT 33.1 (L) 08/21/2024    HCT 40.8 11/30/2023     08/21/2024     11/30/2023     BMP:   Lab Results   Component Value Date     (L) 08/21/2024     03/06/2024    POTASSIUM 4.1 08/21/2024    POTASSIUM 4.4 03/06/2024    CHLORIDE 97 (L) 08/21/2024    CHLORIDE 100 03/06/2024    CO2 25 08/21/2024    CO2 28 03/06/2024    BUN 13.4 08/21/2024    BUN 11.5 03/06/2024    CR 0.87 08/21/2024    CR 0.64 03/06/2024    GLC 93 08/21/2024     (H) 03/06/2024     COAGS:   Lab Results   Component Value Date    INR 0.97 10/18/2012     POC:   Lab Results   Component Value Date     (H) 12/19/2019     HEPATIC:   Lab Results   Component Value Date    ALBUMIN 4.4 11/30/2023    PROTTOTAL 7.4 11/30/2023    ALT 22 11/30/2023    AST 23 11/30/2023    ALKPHOS 109 11/30/2023    BILITOTAL 0.9 11/30/2023     OTHER:   Lab Results   Component Value Date    A1C 5.0 08/21/2024    JORDON 9.6 08/21/2024    LIPASE 115 09/13/2018    TSH 0.78 06/21/2022    T4 0.93 08/28/2002    SED 36 (H) 06/21/2022       Anesthesia Plan    ASA Status:  3       Anesthesia Type: General.     - Airway: ETT   Induction: Intravenous, Propofol.   Maintenance: Balanced.        Consents    Anesthesia Plan(s) and associated risks, benefits, and realistic alternatives discussed. Questions answered and patient/representative(s) expressed understanding.     - Discussed:     - Discussed with:  Patient      - Extended Intubation/Ventilatory Support Discussed: No.      - Patient is DNR/DNI Status: No          Postoperative Care    Pain management: IV analgesics, Oral pain medications, Peripheral nerve block (Single Shot), Multi-modal analgesia.   PONV prophylaxis: Ondansetron (or other 5HT-3), Aprepitant, Background Propofol Infusion     Comments:               Mehreen Richter MD    I have reviewed the pertinent notes and labs in the chart from the past 30 days and  "(re)examined the patient.  Any updates or changes from those notes are reflected in this note.     # Hyponatremia: Lowest Na = 132 mmol/L in last 30 days, will monitor as appropriate         # Drug Induced Platelet Defect: home medication list includes an antiplatelet medication  # Overweight: Estimated body mass index is 28.97 kg/m  as calculated from the following:    Height as of this encounter: 1.702 m (5' 7\").    Weight as of this encounter: 83.9 kg (184 lb 15.5 oz).      "

## 2024-09-13 NOTE — ANESTHESIA POSTPROCEDURE EVALUATION
"Patient: Diane Gaitan    Procedure: Procedure(s):  ARTHROPLASTY, SHOULDER, TOTAL, REVERSE       Anesthesia Type:  General    Note:  Disposition: Inpatient   Postop Pain Control:    PONV:    Neuro/Psych:    Airway/Respiratory:    CV/Hemodynamics:    Other NRE:    DID A NON-ROUTINE EVENT OCCUR?          Patient Vitals for the past 24 hrs:   BP Temp Temp src Pulse Resp SpO2 Height Weight   09/13/24 1534 -- 36.9  C (98.5  F) -- -- 16 -- -- --   09/13/24 1500 112/64 -- -- 95 15 90 % -- --   09/13/24 1456 -- -- -- 97 23 92 % -- --   09/13/24 1453 132/75 -- -- -- -- -- -- --   09/13/24 1415 118/60 36.9  C (98.4  F) Axillary 96 14 95 % -- --   09/13/24 1400 119/61 -- -- 97 15 95 % -- --   09/13/24 1352 113/68 -- -- 100 14 94 % -- --   09/13/24 1345 -- -- -- 102 17 95 % -- --   09/13/24 1330 114/87 -- -- 115 18 97 % -- --   09/13/24 1323 110/64 36.9  C (98.4  F) Axillary 109 13 98 % -- --   09/13/24 0935 123/84 -- -- 100 -- 98 % -- --   09/13/24 0930 122/69 -- -- 101 -- 97 % -- --   09/13/24 0925 133/82 -- -- 99 16 99 % -- --   09/13/24 0812 (!) 129/95 36.7  C (98.1  F) Oral 103 20 96 % 1.702 m (5' 7\") 83.9 kg (184 lb 15.5 oz)       Last vitals:  Vitals Value Taken Time   /61 09/13/24 1515   Temp 36.9  C (98.4  F) 09/13/24 1415   Pulse 99 09/13/24 1517   Resp 15 09/13/24 1517   SpO2 95 % 09/13/24 1519   Vitals shown include unfiled device data.    Electronically Signed By: Mehreen Richter MD  September 13, 2024  4:17 PM  "

## 2024-09-13 NOTE — ANESTHESIA PROCEDURE NOTES
Brachial plexus Procedure Note    Pre-Procedure   Staff -        Anesthesiologist:  Dorian Cardoza MD       Resident/Fellow: Edmund Kay MD       Performed By: resident       Location: pre-op       Procedure Start/Stop Times: 9/13/2024 9:20 AM and 9/13/2024 9:30 AM       Pre-Anesthestic Checklist: patient identified, IV checked, site marked, risks and benefits discussed, informed consent, monitors and equipment checked, pre-op evaluation, at physician/surgeon's request and post-op pain management  Timeout:       Correct Patient: Yes        Correct Procedure: Yes        Correct Site: Yes        Correct Position: Yes        Correct Laterality: Yes        Site Marked: Yes  Procedure Documentation  Procedure: Brachial plexus       Diagnosis: POST OPERATIVE PAIN       Laterality: left       Patient Position: sitting       Patient Prep/Sterile Barriers: sterile gloves, mask       Skin prep: Chloraprep (interscalene approach).       Needle Type: short bevel       Needle Gauge: 21.        Needle Length (millimeters): 110        Ultrasound guided       1. Ultrasound was used to identify targeted nerve, plexus, vascular marker, or fascial plane and place a needle adjacent to it in real-time.       2. Ultrasound was used to visualize the spread of anesthetic in close proximity to the above referenced structure.       3. A permanent image is entered into the patient's record.    Assessment/Narrative         The placement was negative for: blood aspirated, painful injection and site bleeding       Paresthesias: No.       Bolus given via needle..        Secured via.        Insertion/Infusion Method: Single Shot       Complications: none       Injection made incrementally with aspirations every 5 mL.    Medication(s) Administered   Bupivacaine 0.5% PF (Infiltration) - Infiltration   10 mL - 9/13/2024 9:20:00 AM  Bupivacaine liposome (Exparel) 1.3% LA inj susp (Infiltration) - Infiltration   10 mL - 9/13/2024 9:20:00  "AM  Medication Administration Time: 9/13/2024 9:20 AM      FOR [facility] ONLY:   Pain Team Contact information: please page the Pain Team Via [employer]. Search \"Pain\". During daytime hours, please page the attending first. At night please page the resident first.      "

## 2024-09-13 NOTE — CONSULTS
[facility]  Consult Note - Hospitalist Service, GOLD TEAM   Date of Admission:  9/13/2024  Consult Requested by: Dr. Suarez  Reason for Consult: Medical Comanagement     Assessment & Plan   Diane Gaitan is a 74 year old female with a history of breast cancer, atrial fibrillation, HTN, DM2, HLD, chronic bilateral low back pain, RLS, GERD, and depression admitted s/p reverse left total shoulder arthroplasty on 9/13/24.    #S/p Reverse Left Total Shoulder Arthroplasty on 9/13/24: Primary surgeon is Dr. Suarez.  mL. Pre-op Hgb 9.8. Received brachial plexus block. Pain controlled. Last BM 9/12.  - Management per Orthopedics.   *Trend Hgb in AM.   *Pain: Scheduled Tylenol, Oxycodone 5-10 mg q 4h PRN, IV Dilaudid PRN    *Scheduled and PRN bowel regimen    #Hx of Atrial Fibrillation: Occurred during 2018 admission for atypical chest pain. AC deferred in setting of anemia. Did not follow up with Cardiology. No apparent issues since, in RRR currently.  - Monitor. Obtain EKG if HR >120 or symptomatic.     #HTN: BP soft post-operatively, last 95/52. On Norvasc 5 mg daily, Hydrochlorothiazide 25 mg daily, and Cozaar 100 mg daily PTA.  - IVF at 100 mL/hr. Low threshold for IVF bolus.  - Hold PTA Norvasc, Hydrochlorothiazide, Losartan. Resume as BP/Cr allow.    #DM2: A1c 5.0 on 8/21/24. On Metformin 500 mg BID PTA. Does not monitor BG at home.  - Hold PTA Metformin.   - Medium sliding scale insulin AC and HS  - Follow up with PCP to discuss ongoing need for Metformin given normal A1c.    #Hx of Left Breast Cancer: ER/[place] +, HER-2 negative. S/p lumpectomy with lymph node biopsy in 12/2019 and XRT in 2020.  - Continue PTA Arimidex 1 mg daily  - Overdue for Oncology follow up and annual mammogram.     #Chronic Low Back Pain, Restless Leg Syndrome: Prescribed Gabapentin 300 mg TID, but takes 900 mg q HS instead.   - Resume Gabapentin 300 mg q HS to reduce risk of oversedation  "w/pain medications. Can titrate to 900 mg q HS vs Gabapentin 300 mg TID as needed.     #HLD  - Continue PTA Simvastatin  - Hold PTA Aspirin 81 mg daily. Resume once cleared by Orthopedics.    #GERD  - Continue PTA Prilosec    #Depression  - Continue PTA Celexa and Wellbutrin XL    Clinically Significant Risk Factors Present on Admission   # Drug Induced Platelet Defect: home medication list includes an antiplatelet medication   # Hypertension: Noted on problem list    # Anemia: based on hgb <11  # Overweight: Estimated body mass index is 28.97 kg/m  as calculated from the following:    Height as of this encounter: 1.702 m (5' 7\").    Weight as of this encounter: 83.9 kg (184 lb 15.5 oz).                    PRIYANKA Leslie  Hospitalist Service, GOLD TEAM   Securely message with [employer] (more info)  Text page via [facility] Paging/Directory   See signed in provider for up to date coverage information  ______________________________________________________________________    Chief Complaint   Left Shoulder Osteoarthritis s/p Left Total Shoulder Arthoplasty     History is obtained from the patient and electronic health record    History of Present Illness   Diane Gaitan is a 74 year old female with a history of breast cancer, atrial fibrillation, HTN, DM2, HLD, chronic bilateral low back pain, RLS, GERD, and depression who underwent reverse left total shoulder arthroplasty on 9/13/24 by Quita Suarez and Benjy.  mL.     She received brachial plexus block and reports adequate pain relief from this. She is tolerating oral intake. She has voided a small amount post-operatively. Last BM 9/12. Denies chest pain, SOB, nausea, vomiting, or abdominal pain.    Of note, she has a history of atypical chest pain for which she was hospitalized in 2018. Extensive evaluation was negative including EKG, troponin, Echocardiogram, NM stress test, CT PE, and CXR.    Past Medical History    Past Medical History:   Diagnosis Date    " Arthritis     Atrial fibrillation, unspecified type (H) 07/28/2021    EKG 9/2024 NSR      Basal cell carcinoma     BENIGN HYPERTENSION 08/28/2002    Breast cancer (H)     CARDIOVASCULAR SCREENING; LDL GOAL LESS THAN 100 10/31/2010    Esophageal reflux     Mild major depression (H24) 09/14/2010    NSTEMI (non-ST elevated myocardial infarction) (H)     9/13/2018 trop neg x3 Stress test neg EF 70%    Type 2 diabetes, HbA1c goal < 7% (H) 10/31/2010    Unstable angina (H) 09/13/2018    Troponins neg x3 Stress test neg EF 70%         Past Surgical History   Past Surgical History:   Procedure Laterality Date    ARTHROPLASTY KNEE  10/18/2012    Procedure: ARTHROPLASTY KNEE;  RIGHT TOTAL KNEE ARTHROPLASTY (SMITH & NEPHEW)^ ;  Surgeon: Howard Charles MD;  Location:  OR    BIOPSY NODE SENTINEL Left 12/19/2019    Procedure: LEFT SENTINEL LYMPH NODE BIOPSY;  Surgeon: Ruddy Malik MD;  Location:  OR    BREAST BIOPSY, RT/LT  1988    breast biopsy    COLONOSCOPY N/A 7/14/2016    Procedure: COLONOSCOPY;  Surgeon: Curt Patel MD;  Location:  GI    ESOPHAGOSCOPY, GASTROSCOPY, DUODENOSCOPY (EGD), COMBINED N/A 9/13/2018    Procedure: COMBINED ESOPHAGOSCOPY, GASTROSCOPY, DUODENOSCOPY (EGD);  gastroscopy;  Surgeon: Mac White MD;  Location:  GI    HYSTERECTOMY, PAP NO LONGER INDICATED  1986    abdominal hysterectomy    LUMPECTOMY BREAST WITH SEED LOCALIZATION Left 12/19/2019    Procedure: SEED LOCALIZED LEFT BREAST LUMPECTOMY;  Surgeon: Ruddy Malik MD;  Location:  OR    ROTATOR CUFF REPAIR RT/LT Right     TUBAL LIGATION      at age 29    [facility] NONSPECIFIC PROCEDURE  10/30/96    skin tags removed    [facility] NONSPECIFIC PROCEDURE      colonic polyps       Medications   Medications Prior to Admission   Medication Sig Dispense Refill Last Dose    amLODIPine (NORVASC) 5 MG tablet Take 1 tablet (5 mg) by mouth daily 90 tablet 1 9/12/2024    anastrozole (ARIMIDEX) 1 MG tablet Take 1  tablet (1 mg) by mouth daily 90 tablet 3 9/12/2024    aspirin 81 MG tablet Take 1 tablet by mouth daily. 90 tablet 3 9/12/2024    buPROPion (WELLBUTRIN XL) 150 MG 24 hr tablet Take 1 tablet (150 mg) by mouth every morning 90 tablet 3 9/12/2024    calcium carbonate 600 mg-vitamin D 400 units (CALTRATE) 600-400 MG-UNIT per tablet Take 1 tablet by mouth 2 times daily    Past Week    citalopram (CELEXA) 20 MG tablet Take 1 tablet (20 mg) by mouth daily 90 tablet 3 9/12/2024    gabapentin (NEURONTIN) 300 MG capsule Take 1 capsule (300 mg) by mouth 3 times daily. (Patient taking differently: Take 900 mg by mouth at bedtime.) 270 capsule 3 9/12/2024    hydroCHLOROthiazide 12.5 MG tablet Take 2 tablets (25 mg) by mouth daily. 180 tablet 3 Past Week    losartan (COZAAR) 100 MG tablet Take 1 tablet (100 mg) by mouth daily. 90 tablet 3 9/12/2024    metFORMIN (GLUCOPHAGE) 500 MG tablet Take 1 tablet (500 mg) by mouth 2 times daily (with meals). 180 tablet 1 Past Week    omeprazole (PRILOSEC) 20 MG DR capsule Take 1 capsule (20 mg) by mouth daily. 90 capsule 3 9/12/2024    simvastatin (ZOCOR) 20 MG tablet TAKE ONE TABLET BY MOUTH EVERY DAY AT BEDTIME 90 tablet 3 9/12/2024        Physical Exam   Vital Signs: Temp: 98.4  F (36.9  C) Temp src: Axillary BP: 112/64 Pulse: 95   Resp: 15 SpO2: 90 % O2 Device: None (Room air)   Weight: 184 lbs 15.46 oz    General: Sitting up in bed, eating dinner. Family at bedside. NAD.  HEENT: Anicteric sclera. MMM.  CV: RRR  Respiratory: Normal effort on RA. Lungs CTA anterolaterally.   GI: Abdomen is soft, non-tender, and non-distended. Bowel sounds present.  Extremities: LUE in sling. No BLE edema.   Skin: No visible rashes or jaundice.    75 MINUTES SPENT BY ME on the date of service doing chart review, history, exam, documentation & further activities per the note.      Data   Imaging results reviewed over the past 24 hrs:   Recent Results (from the past 24 hour(s))   XR Shoulder Left Port G/E 2  Views    Narrative    EXAM: XR SHOULDER LEFT PORT G/E 2 VIEWS  LOCATION: [facility]  DATE: 9/13/2024    INDICATION: Status post surgery  COMPARISON: 5/14/2024.      Impression    IMPRESSION: Completed reverse total shoulder arthroplasty. Normal joint alignment. No evidence of periprosthetic fracture or other immediate complication. Postoperative air in the soft tissues.     Most Recent CBC:  Lab Test 09/13/24  0902 08/21/24  1419   WBC  --  5.9   HGB 9.8* 10.6*   MCV  --  72*   PLT  --  238     Most Recent BMP:  Lab Test 08/21/24  1419   *   POTASSIUM 4.1   CHLORIDE 97*   CO2 25   BUN 13.4   CR 0.87   ANIONGAP 10   JORDON 9.6   GLC 93

## 2024-09-13 NOTE — OR NURSING
PACU to Inpatient Nursing Handoff    Patient Diane Gaitan is a 74 year old female who speaks English.   Procedure Procedure(s):  ARTHROPLASTY, SHOULDER, TOTAL, REVERSE   Surgeon(s) Primary: Floyd Suarez MD  Assisting: Maxine Burleson MD  Resident - Assisting: Sandhya Peck MD; Kathy Mcgregor MD     Allergies   Allergen Reactions    Lisinopril Cough     COUGH       Isolation  [unfilled] none    Past Medical History   has a past medical history of Arthritis, Atrial fibrillation, unspecified type (H) (07/28/2021), BENIGN HYPERTENSION (08/28/2002), Cancer (H), CARDIOVASCULAR SCREENING; LDL GOAL LESS THAN 100 (10/31/2010), Depressive disorder (1997), Esophageal reflux, Mild major depression (H24) (09/14/2010), NSTEMI (non-ST elevated myocardial infarction) (H), Other malaise and fatigue (08/28/2002), Type 2 diabetes, HbA1c goal < 7% (H) (10/31/2010), and Unstable angina (H) (09/13/2018).    Anesthesia General with Block   Dermatome Level     Preop Meds Emend - time given: 842am; TXA 1950mg 842am   Nerve block Brachial plexus .  Location:left. Med:Exparel (liposomal bupivacaine). Time given: 0920   Intraop Meds dexamethasone (Decadron)  fentanyl (Sublimaze): 100 mcg total  ondansetron (Zofran): last given at 1230  Decadron 4mg at 1000, versed 1.5mg, ephedrine, phenylephrine   Local Meds Yes   Antibiotics cefazolin (Ancef) - last given at 1000     Pain Patient Currently in Pain: denies   PACU meds  Denies pain, block working     PCA / epidural No   Capnography  Not monitored     Telemetry ECG Rhythm: Sinus rhythm   Inpatient Telemetry Monitor Ordered? No        Labs Glucose Lab Results   Component Value Date     09/13/2024     06/21/2022     06/28/2021       Hgb Lab Results   Component Value Date    HGB 9.8 09/13/2024    HGB 12.9 12/16/2019       INR Lab Results   Component Value Date    INR 0.97 10/18/2012      PACU Imaging Completed     Wound/Incision Incision/Surgical Site  09/13/24 Left Shoulder (Active)   Incision Assessment UTV 09/13/24 1352   Closure Adhesive strip(s) 09/13/24 1327   Dressing Intervention Clean, dry, intact 09/13/24 1352   Number of days: 0      CMS        Equipment Not applicable   Other LDA       IV Access Peripheral IV 09/13/24 Right Lower forearm (Active)   Site Assessment WDL 09/13/24 1352   Line Status Infusing 09/13/24 1352   Dressing Transparent 09/13/24 1352   Dressing Status clean;dry;intact 09/13/24 1323   Dressing Intervention New dressing  09/13/24 0900   Line Intervention Flushed 09/13/24 0900   Phlebitis Scale 0-->no symptoms 09/13/24 1352   Infiltration? no 09/13/24 1352   Number of days: 0      Blood Products Not applicable  mL   Intake/Output Date 09/13/24 0700 - 09/14/24 0659   Shift 8218-9048 1412-1183 9532-8183 24 Hour Total   INTAKE   P.O. 150   150   I.V. 1400   1400   Shift Total(mL/kg) 1550(18.47)   1550(18.47)   OUTPUT   Blood 100   100   Shift Total(mL/kg) 100(1.19)   100(1.19)   Weight (kg) 83.9 83.9 83.9 83.9      Drains / Rajan     Time of void PreOp Time of Void Prior to Procedure: 0830 (09/13/24 0849)    PostOp  1453, 100ml      Diapered? No   Bladder Scan  453    mL (09/13/24 1336)  Drinking water     Vitals    B/P: 113/68  T: 98.4  F (36.9  C)    Temp src: Axillary  P:  Pulse: 100 (09/13/24 1352)          R: 14  O2:  SpO2: 94 %    O2 Device: None (Room air) (09/13/24 1352)    Oxygen Delivery: 4 LPM (09/13/24 1323)         Family/support present daughter   Patient belongings  Cane and clothing   Patient transported on bed   DC meds/scripts (obs/outpt) Not applicable   Inpatient Pain Meds Released? Yes       Special needs/considerations Pt should try to void at 1630   Tasks needing completion None       Hiral Swain, RN  enid

## 2024-09-14 ENCOUNTER — APPOINTMENT (OUTPATIENT)
Dept: OCCUPATIONAL THERAPY | Facility: CLINIC | Age: 74
End: 2024-09-14
Attending: STUDENT IN AN ORGANIZED HEALTH CARE EDUCATION/TRAINING PROGRAM
Payer: MEDICARE

## 2024-09-14 VITALS
HEART RATE: 96 BPM | OXYGEN SATURATION: 97 % | SYSTOLIC BLOOD PRESSURE: 105 MMHG | DIASTOLIC BLOOD PRESSURE: 50 MMHG | WEIGHT: 184.97 LBS | HEIGHT: 67 IN | RESPIRATION RATE: 18 BRPM | BODY MASS INDEX: 29.03 KG/M2 | TEMPERATURE: 98.6 F

## 2024-09-14 LAB
ANION GAP SERPL CALCULATED.3IONS-SCNC: 9 MMOL/L (ref 7–15)
BUN SERPL-MCNC: 12.8 MG/DL (ref 8–23)
CALCIUM SERPL-MCNC: 8.7 MG/DL (ref 8.8–10.4)
CHLORIDE SERPL-SCNC: 97 MMOL/L (ref 98–107)
CREAT SERPL-MCNC: 0.59 MG/DL (ref 0.51–0.95)
EGFRCR SERPLBLD CKD-EPI 2021: >90 ML/MIN/1.73M2
GLUCOSE BLDC GLUCOMTR-MCNC: 114 MG/DL (ref 70–99)
GLUCOSE BLDC GLUCOMTR-MCNC: 114 MG/DL (ref 70–99)
GLUCOSE SERPL-MCNC: 116 MG/DL (ref 70–99)
HCO3 SERPL-SCNC: 24 MMOL/L (ref 22–29)
HGB BLD-MCNC: 7.9 G/DL (ref 11.7–15.7)
POTASSIUM SERPL-SCNC: 3.7 MMOL/L (ref 3.4–5.3)
SODIUM SERPL-SCNC: 130 MMOL/L (ref 135–145)

## 2024-09-14 PROCEDURE — 99214 OFFICE O/P EST MOD 30 MIN: CPT | Performed by: INTERNAL MEDICINE

## 2024-09-14 PROCEDURE — 250N000011 HC RX IP 250 OP 636: Mod: JZ | Performed by: STUDENT IN AN ORGANIZED HEALTH CARE EDUCATION/TRAINING PROGRAM

## 2024-09-14 PROCEDURE — 97530 THERAPEUTIC ACTIVITIES: CPT | Mod: GO

## 2024-09-14 PROCEDURE — 82962 GLUCOSE BLOOD TEST: CPT

## 2024-09-14 PROCEDURE — 97110 THERAPEUTIC EXERCISES: CPT | Mod: GO

## 2024-09-14 PROCEDURE — 97535 SELF CARE MNGMENT TRAINING: CPT | Mod: GO

## 2024-09-14 PROCEDURE — 36415 COLL VENOUS BLD VENIPUNCTURE: CPT | Performed by: STUDENT IN AN ORGANIZED HEALTH CARE EDUCATION/TRAINING PROGRAM

## 2024-09-14 PROCEDURE — 97165 OT EVAL LOW COMPLEX 30 MIN: CPT | Mod: GO

## 2024-09-14 PROCEDURE — 85018 HEMOGLOBIN: CPT | Performed by: STUDENT IN AN ORGANIZED HEALTH CARE EDUCATION/TRAINING PROGRAM

## 2024-09-14 PROCEDURE — 80048 BASIC METABOLIC PNL TOTAL CA: CPT | Performed by: STUDENT IN AN ORGANIZED HEALTH CARE EDUCATION/TRAINING PROGRAM

## 2024-09-14 PROCEDURE — 250N000013 HC RX MED GY IP 250 OP 250 PS 637: Performed by: STUDENT IN AN ORGANIZED HEALTH CARE EDUCATION/TRAINING PROGRAM

## 2024-09-14 PROCEDURE — 250N000013 HC RX MED GY IP 250 OP 250 PS 637: Performed by: PHYSICIAN ASSISTANT

## 2024-09-14 RX ORDER — AMOXICILLIN 250 MG
1-2 CAPSULE ORAL 2 TIMES DAILY
Qty: 30 TABLET | Refills: 0 | Status: SHIPPED | OUTPATIENT
Start: 2024-09-14

## 2024-09-14 RX ORDER — ACETAMINOPHEN 325 MG/1
650 TABLET ORAL EVERY 4 HOURS PRN
Qty: 100 TABLET | Refills: 0 | Status: SHIPPED | OUTPATIENT
Start: 2024-09-14

## 2024-09-14 RX ORDER — OXYCODONE HYDROCHLORIDE 5 MG/1
5-10 TABLET ORAL EVERY 4 HOURS PRN
Qty: 20 TABLET | Refills: 0 | Status: SHIPPED | OUTPATIENT
Start: 2024-09-14

## 2024-09-14 RX ORDER — IBUPROFEN 600 MG/1
600 TABLET, FILM COATED ORAL EVERY 6 HOURS PRN
Qty: 90 TABLET | Refills: 0 | Status: SHIPPED | OUTPATIENT
Start: 2024-09-14

## 2024-09-14 RX ADMIN — SENNOSIDES AND DOCUSATE SODIUM 1 TABLET: 50; 8.6 TABLET ORAL at 08:51

## 2024-09-14 RX ADMIN — BUPROPION HYDROCHLORIDE 150 MG: 150 TABLET, EXTENDED RELEASE ORAL at 08:51

## 2024-09-14 RX ADMIN — CEFAZOLIN SODIUM 2 G: 2 INJECTION, SOLUTION INTRAVENOUS at 02:09

## 2024-09-14 RX ADMIN — ANASTROZOLE 1 MG: 1 TABLET, COATED ORAL at 10:39

## 2024-09-14 RX ADMIN — OXYCODONE HYDROCHLORIDE 5 MG: 5 TABLET ORAL at 02:08

## 2024-09-14 RX ADMIN — POLYETHYLENE GLYCOL 3350 17 G: 17 POWDER, FOR SOLUTION ORAL at 08:51

## 2024-09-14 RX ADMIN — OMEPRAZOLE 20 MG: 20 CAPSULE, DELAYED RELEASE ORAL at 06:55

## 2024-09-14 RX ADMIN — OXYCODONE HYDROCHLORIDE 5 MG: 5 TABLET ORAL at 10:46

## 2024-09-14 RX ADMIN — CALCIUM CARBONATE 600 MG (1,500 MG)-VITAMIN D3 400 UNIT TABLET 1 TABLET: at 08:51

## 2024-09-14 RX ADMIN — ACETAMINOPHEN 975 MG: 325 TABLET ORAL at 06:54

## 2024-09-14 RX ADMIN — CITALOPRAM HYDROBROMIDE 20 MG: 20 TABLET ORAL at 08:51

## 2024-09-14 ASSESSMENT — ACTIVITIES OF DAILY LIVING (ADL)
ADLS_ACUITY_SCORE: 49

## 2024-09-14 NOTE — OP NOTE
Date of Surgery: September 14, 2024    Location: [facility]    Surgeon: Floyd Suarez MD     Co-Surgeon: Maxine Burleson MD     A co-surgeon was required for soft tissue tensioning, and cementation of the stem, and additional expertise and assistance given the complex nature of the case due to significant glenoid bone loss, and the need for a custom glenoid implant, the need for cement fixation of the humeral stem, and complex soft tissue tensioning.    Assistants:   1. Kathy Mcgregor, PGY-5  2. Sandhya Peck, PGY-3/    A skilled surgical assistant was required to assist with patient positioning, draping, and retraction during open portions of the case.    Pre-operative Diagnosis:   1) End-stage left glenohumeral osteoarthritis with significant glenoid bony deformity and bone loss    Post-operative Diagnosis:   1) End-stage left glenohumeral osteoarthritis with significant glenoid bony deformity and bone loss    Procedure:   1) Left reverse total shoulder arthroplasty with custom glenoid baseplate and cemented fixation of the humeral stem - 35208-73    Requesting a 22 modifier as there was significant bone loss and bony deformity from the glenoid requiring a custom glenoid implant.  Additionally, the patient is bone quality required cement fixation of the humeral stem.  The significant joint line medialization made soft tissue tension quite complex and required additional expertise.  All these factors together required the assistance of an additional co-surgeon, additional OR time, expertise, and technique.     Implants:  [employer] VRS custom glenoid, comprehensive mini stem size 8 mm, 6 5 central screw nonlocking, 475 peripheral screws x 3 locking, 36 glenosphere, standard offset humeral tray and standard polyethylene bearing.  Bone cement.    Anesthesia: General With regional anesthetic block    Estimated Blood Loss: 100 ml       Complications: None       Specimen:  None    Tourniquette Time: 0 min    Condition: Stable to PACU    Procedure Details   Indications for Procedure:  Patient is a 74-year-old female known to my practice with end-stage left shoulder glenohumeral arthritis with severe glenoid bone loss and deformity.  For full history and physical please see my clinic note.  In brief review, she has had significant pain and limitations of motion affecting her ability to perform her activities of daily living for several years.  X-ray and CT scan of her left shoulder demonstrated end-stage shoulder arthritis with severe posterior glenoid bone loss and medialization of the joint line.  Based on the CT scan preoperative templating, it was determined that a custom glenoid component would be required.  I discussed with her operative and nonoperative options.  We discussed nonoperative management the form of oral anti-inflammatory medication, activity modification, the role of intra-articular corticosteroid injection, and physical therapy.  We discussed operative intervention in the form of a left reverse total shoulder arthroplasty with custom glenoid implant.  I described the operative technique.  I outlined the postoperative protocol, specifically 4 to 6 weeks in a sling.  We discussed the risks and benefits of operative intervention including but not limited to bleeding, infection, failure to cure pain, limitations in range of motion, loss of function, damage surrounding nerves and blood vessels, loss of limb and loss of life.  I discussed the lifelong restrictions with a reverse total shoulder arthroplasty, namely 10 pound lifting restriction for life.  I did opportunity answer questions.  She was to move forward with a left reverse total shoulder arthroplasty with custom glenoid implant.    Procedure Details:  On the day of surgery the patient was met in the preoperative holding area.  The correct limb was confirmed and marked with a permanent skin marker.  Informed  consent was again reviewed confirming the correct patient, site, procedure, and surgeon.  I again had the opportunity discussed the risks and benefits of operative intervention as well as nonoperative alternatives.  I again had the opportunity to answer questions.  She wished to move forward with operative intervention.  A regional anesthetic block performed by my anesthesia colleagues.    The patient was brought back to the operative suite, transferred from the hospital bed to the operative table without incident and secured using a belt.  General anesthesia was induced by my anesthesia colleagues.  The patient was placed in the beachchair position.  The left shoulder was sterilely prepped and draped in the normal fashion.  A final timeout was performed with all in the room in agreement with the correct patient, site, procedure, and surgeon's, as well as preoperative antibiotics and PO TXA having been given.    We utilized the deltopectoral approach to the shoulder.  I made a 12 cm longitudinal incision over the anterior aspect of the shoulder just lateral to the coracoid.  Sharp dissection was carried down to the fascial layer.  Large skin flaps were raised.  Meticulous hemostasis was achieved throughout.  Identified the interval and the cephalic vein.  The cephalic vein was cauterized.  The interval was explored.  I cleared the subdeltoid space.  Retractors were placed.  The superior 1 cm of the pectoralis major insertion was released.  I identified the conjoined tendon and dissected lateral to this placing retractors.  The long head of the biceps was identified in the bicipital groove.  The bicipital groove was opened.  The long head of the biceps was tenodesed to the superior border of the pack.  The remaining biceps was tenotomized and we further open the rotator interval to define the plane between the subscapularis and the supraspinatus.  At this time the anterior circumflex humeral vessels were identified  and ligated with electrocautery.    The shoulder was flexed and externally rotated.  I performed a subscapularis peel, releasing it from the lesser tuberosity.  I tagged the subscapularis with 0 Ethibond.  This allowed us to dislocate the humeral head.  Retractors were placed.  The axillary nerve was identified by tug test.  Knowing where the axillary nerve was, we are able to safely release all capsule down to the latissimus dorsi insertion and it a very small amount of latissimus dorsi.  I then remove the large inferior osteophyte with a rongeur.    At this time the shoulder was adducted and externally rotated.  Retractors were placed.  I open the proximal humerus with the opening reamer.  I began sequentially reaming up to a size 10 mm reamer.  This was left in place.  The cutting guide was placed set at 30 degrees of retroversion.  It was pinned in place for a cut to come out just proximal to the infraspinatus insertion.  The broach was removed.  The proximal humerus cut was made and I ended up taking an additional 2 mm given the significant soft tissue tightness and medialization of the joint line.  Any loose pieces of bone removed.  The large humeral head osteophyte inferiorly posteriorly was further removed.  We then began sequentially broaching.  We broached up to a size 10 mm broach from the micro set.  This was left in place.    A large curved air retractor was then placed posterior to the glenoid to expose the glenoid.  I released the subscapularis from the anterior capsule of the shoulder.  A Batman retractor was placed over the anterior lip of the glenoid.  A Claude retractor was placed superiorly.  We removed any remaining long head biceps tendon and labrum from the superior anterior inferior aspect of the glenoid.  The axillary nerve was again located by palpation to ensure its safety during soft tissue removal from the inferior aspect of the glenoid.  There was noted to be some calcified labrum for  the anterior aspect of the glenoid and this was removed for the manufactures instructions for our custom glenoid implant.  The glenoid was thoroughly irrigated.    At this time we had good exposure of the glenoid.  We took the 3D printed model in our custom implant places on the glenoid, ensuring that it fit appropriately.  We opened our custom VRS glenoid implant.  This was loaded onto the  and we placed it on the glenoid.  It achieved excellent bony contact and fit appropriately per the 's instructions.  We placed a drill bit through the anterior superior screw hole and then a guidepin through the central hole to fixate the implant onto the glenoid.  This achieved good fixation.  We removed the  handle.  The central guidepin was removed.  The central screw hole was measured.  We selected a 30 mm 6 5 nonlocking screw.  This was placed on hand achieved excellent fixation, depressing the implant down to the bony surface of the glenoid.  The additional drill bit was removed.  We drilled and placed a three 4.75 locking peripheral screws, measuring 25, 25, 30 mm.  These all achieved good fixation.  Good fixation of the custom glenoid to the bony glenoid was achieved.  We placed a trial 36 mm glenosphere with maximum offset.  This fit appropriately.  The final implant glenosphere was opened and set to maximum offset.  It was placed with a maximum offset and inferiorly and malleted into place with 5 into the mallet.  This achieved good fixation.  The glenoid was thoroughly irrigated with Irrisept and normal saline solution.    The humerus was then delivered out of the incision anterior to the glenoid.  At this time we set out to tension our implant and trial in place final humeral, and humeral tray, and poly bearing components.  For this portion of the procedure please see my colleague, DR Burleson's, note.  I appreciate her assistance and expertise in this complex case.    Having completed  humeral stem cementation, final implant placement, and joint reduction, the shoulder was thoroughly irrigated with Irrisept and normal saline.  The axillary nerve was again identified and confirmed to be in continuity by tug test.  500 mg of vancomycin powder was placed into the incision.      The deltopectoral interval was closed with 0 Ethibond.  The skin was closed in layered fashion with 2-0 Monocryl and 3-0 Monocryl in a running fashion.  An axle dressing was placed.  The drapes were taken down.  2+ radial pulse was palpable.  An UltraSling was placed.  The patient was successfully awoken from general anesthesia.  She transferred from the operative table to hospital bed without incident.  She returned to PACU in stable condition.    All sponge, needle, and instrument counts were correct at the end of the case.    I was present for all critical aspects of the case.    Post-Operative Plan:  The patient will be admitted to the orthopedic surgery service overnight for pain control.  She is nonweightbearing the left upper extremity.  She is to remain in the UltraSling except for personal hygiene and clothing.  She may come out of the sling for elbow and wrist range of motion's.  No shoulder joint mobilization for 6 weeks.  She will follow-up with me in the outpatient clinic in 2 weeks for suture tail removal and incision check.    Floyd Suarez MD    [facility]   Department of Orthopedic Surgery      Disclaimer: This note consists of symbols derived from keyboarding, dictation and/or voice recognition software. As a result, there may be errors in the script that have gone undetected. Please consider this when interpreting information found in this chart.

## 2024-09-14 NOTE — PLAN OF CARE
VS: VSS, pt denied CP or SOB.   O2: Room air sat. > 90%.   Output: Up to commode voided x one.    Last BM: 09/11/24 passing gas.    Activity: Up to BSC with assist of one.    Skin: Intact except surgical incision.   Pain: Denied pain at this time.    Neuro: CMS and neuro intact to baseline, some numbness in LUE form the block.    Dressing: CDI.    Diet: Regular tolerating okay.    LDA: PIV infusing.    Equipment: IV pole, Commode and personal belongings.    Plan: TBD.    Additional Info: Pt arrived on 5 ortho room 548 about 03:30 pm via bed, pt oriented to room and call light makes need known, call light in reach will continue to monitor.

## 2024-09-14 NOTE — PROGRESS NOTES
[facility]    Medicine Progress Note - Hospitalist Service, GOLD TEAM 16    Date of Admission:  9/13/2024    Assessment & Plan   74 year old female with a history of breast cancer, atrial fibrillation, HTN, DM2, HLD, chronic bilateral low back pain, RLS, GERD, and depression admitted s/p reverse left total shoulder arthroplasty on 9/13/24.     #S/p Reverse Left Total Shoulder Arthroplasty on 9/13/24: Primary surgeon is Dr. Suarez.  mL. Pre-op Hgb 9.8. Received brachial plexus block. Pain controlled. Last BM 9/12.  - Management per Orthopedics.              *HGB 7.9, continue monitoring               *Pain: Scheduled Tylenol, Oxycodone 5-10 mg q 4h PRN, IV Dilaudid PRN               *Scheduled and PRN bowel regimen     #Hx of Atrial Fibrillation: Occurred during 2018 admission for atypical chest pain. AC deferred in setting of anemia. Did not follow up with Cardiology. No apparent issues since, in RRR currently.  - Monitor. Obtain EKG if HR >120 or symptomatic.      #HTN: BP soft post-operatively, last 95/52. On Norvasc 5 mg daily, Hydrochlorothiazide 25 mg daily, and Cozaar 100 mg daily PTA.  - IVF at 100 mL/hr. Low threshold for IVF bolus.  - Hold PTA Norvasc, Hydrochlorothiazide, Losartan. Resume as BP/Cr allow.     #DM2: A1c 5.0 on 8/21/24. On Metformin 500 mg BID PTA. Does not monitor BG at home.  - Hold PTA Metformin.   - Medium sliding scale insulin AC and HS  - Follow up with PCP to discuss ongoing need for Metformin given normal A1c.     #Hx of Left Breast Cancer: ER/AL +, HER-2 negative. S/p lumpectomy with lymph node biopsy in 12/2019 and XRT in 2020.  - Continue PTA Arimidex 1 mg daily  - Overdue for Oncology follow up and annual mammogram.      #Chronic Low Back Pain, Restless Leg Syndrome: Prescribed Gabapentin 300 mg TID, but takes 900 mg q HS instead.   - Resume Gabapentin 300 mg q HS to reduce risk of oversedation w/pain medications. Can titrate to  "900 mg q HS vs Gabapentin 300 mg TID as needed.      #HLD  - Continue PTA Simvastatin  - ASA per Ortho      #GERD  - Continue PTA Prilosec     #Depression  - Continue PTA Celexa and Wellbutrin XL          Diet: Advance Diet as Tolerated: Regular Diet Adult  Discharge Instruction - Regular Diet Adult    DVT Prophylaxis: Defer to primary service  Rajan Catheter: Not present  Lines: None     Cardiac Monitoring: None  Code Status: Full Code      Clinically Significant Risk Factors Present on Admission                # Drug Induced Platelet Defect: home medication list includes an antiplatelet medication   # Hypertension: Noted on problem list    # Anemia: based on hgb <11       # Overweight: Estimated body mass index is 28.97 kg/m  as calculated from the following:    Height as of this encounter: 1.702 m (5' 7\").    Weight as of this encounter: 83.9 kg (184 lb 15.5 oz).                    Disposition Plan              João Pereyra MD  Hospitalist Service, GOLD TEAM 16  [facility]  Securely message with Vocera (more info)  Text page via [facility] Paging/Directory   See signed in provider for up to date coverage information  ______________________________________________________________________    Interval History     No acute events overnight.   Patient was pleasant.   Patient denies chest pain, palpitations, shortness of breath, nausea, vomit, fever or chills.     Physical Exam   Vital Signs: Temp: 98.6  F (37  C) Temp src: Oral BP: 105/50 Pulse: 96   Resp: 18 SpO2: 97 % O2 Device: Nasal cannula Oxygen Delivery: 4 LPM  Weight: 184 lbs 15.46 oz    General: Alert, room air, no acute distress.   HEENT: Anicteric sclera. MMM.  CV: RRR  Respiratory: Normal effort on RA. Lungs CTA anterolaterally.   GI: Abdomen is soft, non-tender, and non-distended. Bowel sounds present.  Extremities: LUE in sling. No BLE edema.   Skin: No visible rashes or jaundice.    Medical Decision " Making       45 MINUTES SPENT BY ME on the date of service doing chart review, history, exam, documentation & further activities per the note.      Data     I have personally reviewed the following data over the past 24 hrs:    N/A  \   7.9 (L)   / N/A     130 (L) 97 (L) 12.8 /  114 (H)   3.7 24 0.59 \       Imaging results reviewed over the past 24 hrs:   Recent Results (from the past 24 hour(s))   XR Shoulder Left Port G/E 2 Views    Narrative    EXAM: XR SHOULDER LEFT PORT G/E 2 VIEWS  LOCATION: [facility]  DATE: 9/13/2024    INDICATION: Status post surgery  COMPARISON: 5/14/2024.      Impression    IMPRESSION: Completed reverse total shoulder arthroplasty. Normal joint alignment. No evidence of periprosthetic fracture or other immediate complication. Postoperative air in the soft tissues.

## 2024-09-14 NOTE — PROGRESS NOTES
Orthopaedic Surgery Progress Note 09/14/2024    Subjective  No acute events overnight.  Pain adequately controlled - rates 3/10. Denies new numbness, tingling, or weakness.  Tolerating diet without nausea or vomiting.  Voiding spontaneously.  + flatus, no BM.   Denies fevers, chills, chest pain, SOB. Ambulating, working with PT    Objective  Temp: 97.8  F (36.6  C) Temp src: Oral BP: 114/57 Pulse: 98   Resp: 18 SpO2: 97 % O2 Device: None (Room air) Oxygen Delivery: 4 LPM    Exam:  Gen: No acute distress, resting comfortably in bed.  Resp: Non-labored breathing  Cardio: Regular rate via peripheral pulse  MSK:  LUE:  - Dressings c/d/I aside from small amount of strike through mid portion of Aquacel  - Fires EPL, FPL, Intrinsics, wrist extensors, flexors  - SILT medial/radial/ulnar/axillary nerves - axillary nerve sensation symmetric to contralateral side  - Radial pulse 2+, hand wwp    Recent Labs   Lab 09/13/24  0902   HGB 9.8*     Assessment: Diane Gaitan is a 74 year old female s/p left reverse total shoulder on 9/13 with Dr. Suarez and Dr Burleson. Doing well.    Plan today:   - ongoing work on pain control, mobilizing up and out of bed  - anticipate discharge to home today     Plan:  Ortho Primary  Activity: Up with assist.  Weight bearing status: NWB LUE x6 weeks.  Antibiotics: Ancef x24 hours perioperatively.  Diet: Begin with clear fluids and progress diet as tolerated.  DVT prophylaxis:  Mechanical  Bracing/Splinting: Ultrasling to be kept on at all times  Wound Care: Dressing to remain in place until follow up  Pain management: transition from IV to orals as tolerated.   X-rays: Completed in PACU   Physical and Occupational Therapy: ADL's.  Labs: Trend Hgb on POD #1.  Consults: PT, OT, Medicine, appreciate assistance in caring for this patient.  Follow-up: Clinic with Dr. Suarez's team in 2 weeks  Disposition: Pending progress with therapies, pain control on orals, and medical stability, anticipate  discharge to home on POD #1.    Sandhya Peck MD  Orthopaedic Surgery PGY-3    Future Appointments   Date Time Provider Department [place]   9/14/2024  8:15 AM Zoraida Coates OT [facility] [place]   9/30/2024  2:00 PM Adan, Christopher T., PA-C [facility] [place]   10/21/2024  2:00 PM Mehran Arellano PA-C [facility] [place]

## 2024-09-14 NOTE — PLAN OF CARE
"Goal Outcome Evaluation:      Plan of Care Reviewed With: patient    Overall Patient Progress: improving       VS: /57   Pulse 98   Temp 97.8  F (36.6  C) (Oral)   Resp 18   Ht 1.702 m (5' 7\")   Wt 83.9 kg (184 lb 15.5 oz)   SpO2 97%   BMI 28.97 kg/m       O2: On room air.    Output: Voiding well in BSC.   Last BM: 09/11/24. Passing gas   Activity: Up to BSC with assist of 1   Skin: L shoulder surgical incision.   Skin check done this shift with charge nurse. No skin issues noted.    Pain: Minimal pain. Pain managed with scheduled Tylenol and x1 5mg oxycodone.     CMS: Numbness to LUE from block.    Dressing: Small dried drainage on dressing. Otherwise CDI.    Diet: Regular. Denies numbness and tingling.    LDA: R PIV, SL   Equipment: IV pole, BSC, call light and personal belongings.    Plan: TBD   Additional Info:          "

## 2024-09-14 NOTE — PROGRESS NOTES
[facility]  OUTPATIENT OCCUPATIONAL THERAPY  EVALUATION  PLAN OF TREATMENT FOR OUTPATIENT REHABILITATION  (COMPLETE FOR INITIAL CLAIMS ONLY)  Patient's Last Name, First Name, M.I.  YOB: 1950  Diane Gaitan                          Provider's Name  [facility] Medical Record No.  [id number]                             Onset Date:  09/13/24   Start of Care Date:  09/14/24   Type:     ___PT   _X_OT   ___SLP Medical Diagnosis:  s/p L reverse TSA                    OT Diagnosis:  decreased I with ADL Visits from SOC:  1     See note for plan of treatment, functional goals and certification details    I CERTIFY THE NEED FOR THESE SERVICES FURNISHED UNDER        THIS PLAN OF TREATMENT AND WHILE UNDER MY CARE     (Physician co-signature of this document indicates review and certification of the therapy plan).                            09/14/24 0962   Appointment Info   Signing Clinician's Name / Credentials (OT) Zoraida Coates MS, OTR/L, CLT   Rehab Comments (OT) NWB L UE   Quick Adds   Quick Adds Certification   Living Environment   People in Home grandchild(lakesha)   Current Living Arrangements house   Home Accessibility stairs to enter home   Number of Stairs, Main Entrance 1   Stair Railings, Main Entrance railings on both sides of stairs   Transportation Anticipated family or friend will provide   Living Environment Comments Pt lives with her grandson who goes to college but is available in the evenings and overnight. Dtr Viktoriya also planning to stay with pt initially and can check on pt daily. Pt reports 1 JADA with bilateral posts she can use. Pt has walk-in tub with a seat. Pt has tall toilet.   Self-Care   Usual Activity Tolerance good   Current Activity Tolerance moderate   Equipment Currently Used at Home cane, straight;walker, rolling;shower chair   Fall history within last six months yes   Activity/Exercise/Self-Care Comment Pt  "normally ind with ADL, uses SEC in the community and 4WW if walking on uneven surfaces. Normally no AD around the house. Pt has been practicing one-handed ADL as L UE has been very painful. Pt describes a fall at home where she slid out of recliner.   Instrumental Activities of Daily Living (IADL)   IADL Comments Pt dtr and grandson to assist prn   General Information   Onset of Illness/Injury or Date of Surgery 09/13/24   Referring Physician Floyd Suarez MD   Patient/Family Therapy Goal Statement (OT) go home   Additional Occupational Profile Info/Pertinent History of Current Problem Per chart: \"Diane Gaitan is a 74 year old female s/p left reverse total shoulder on 9/13 with Dr. Suarez and Dr Burleson\"   Existing Precautions/Restrictions shoulder   Left Upper Extremity (Weight-bearing Status) non weight-bearing (NWB)   General Observations and Info Activity: ambulate. No shoulder ROM, elbow/wrist/hand ok. No Codmans.   Cognitive Status Examination   Orientation Status orientation to person, place and time   Pain Assessment   Patient Currently in Pain Yes, see Vital Sign flowsheet   Range of Motion Comprehensive   Comment, General Range of Motion R UE WFL; LUE immobilized   Strength Comprehensive (MMT)   Comment, General Manual Muscle Testing (MMT) Assessment mild deconditioning   Coordination   Functional Limitations Fine motor ADL performance impaired;Impaired ability to perform bilateral tasks;Object transport impaired;Reach to targets impaired   Bed Mobility   Comment (Bed Mobility) SBA   Transfers   Transfer Comments SBA   Balance   Balance Comments SBA SEC   Activities of Daily Living   BADL Assessment/Intervention upper body dressing;lower body dressing;toileting   Upper Body Dressing Assessment/Training   [place] Level (Upper Body Dressing) maximum assist (25% patient effort)   Lower Body Dressing Assessment/Training   [place] Level (Lower Body Dressing) minimum assist (75% patient effort) "   Toileting   [place] Level (Toileting) minimum assist (75% patient effort)   Clinical Impression   Criteria for Skilled Therapeutic Interventions Met (OT) Yes, treatment indicated   OT Diagnosis decreased I with ADL   OT Problem List-Impairments impacting ADL activity tolerance impaired;pain;post-surgical precautions   Assessment of Occupational Performance 1-3 Performance Deficits   Identified Performance Deficits dressing, bathing, toileting, IADL   Planned Therapy Interventions (OT) ADL retraining;transfer training;ROM   Clinical Decision Making Complexity (OT) problem focused assessment/low complexity   Risk & Benefits of therapy have been explained evaluation/treatment results reviewed;care plan/treatment goals reviewed;risks/benefits reviewed;current/potential barriers reviewed;participants voiced agreement with care plan;participants included;patient   Clinical Impression Comments Pt presents with impaired ADL 2/2 post op precautions and sling. Pt to benefit from skilled OT for CS and AE to maximize safety and I with ADL.   OT Total Evaluation Time   OT Eval, Low Complexity Minutes (36833) 9   Therapy Certification   Medical Diagnosis s/p L reverse TSA   Start of Care Date 09/14/24   Certification date from 09/14/24   Certification date to 09/14/24   OT Goals   Therapy Frequency (OT) One time eval and treatment   OT Goals Upper Body Dressing;Lower Body Dressing;Toilet Transfer/Toileting;OT Goal 1   OT: Upper Body Dressing Minimal assist;within precautions;including orthotic;Goal Met   OT: Lower Body Dressing within precautions;Minimal assist;Goal Met   OT: Toilet Transfer/Toileting within precautions;Minimal assist;Goal Met   OT: Goal 1 Pt will demonstrate ind with elbow/wrist/hand HEP following instruction. MET   Self-Care/Home Management   Self-Care/Home Mgmt/ADL, Compensatory, Meal Prep Minutes (33406) 33   Symptoms Noted During/After Treatment (Meal Preparation/Planning Training) fatigue;increased  pain   Treatment Detail/Skilled Intervention Treatment initiated following evaluation. Pt educated in post op precautions, sling wear, and sling management. Pt educated in one-handed techniques for dressing UB and LB. Pt dtr present and participating in education. Pt educated in donning/doffing and positioning of sling, adjusted for pt and provided demonstration to dtr as well as written handout. Pt able to don underwear and pants with set-up, min A to manage up over L hip. Pt attempting to don socks but requesting assist, dtr reports she can assist at home. Pt donning shirt with mod A following instruction, dtr also planning to assist. Provided education in techniques for donning bra and bathing. Pt ind with toilet hygiene, needs A to pull off paper. Educated in having paper pre-torn or using wipes.   Therapeutic Procedures/Exercise   Therapeutic Procedure: strength, endurance, ROM, flexibillity minutes (27787) 10   Symptoms Noted During/After Treatment none   Treatment Detail/Skilled Intervention Pt educated in elbow/wrist/hand HEP per post op protocol. Pt unable to complete elbow flexion this date, able to demo with opposite hand. Educated in using R UE or having family assist with AAROM if needed. Pt able to demo back all other exercises. Instructed to complete x10 2x/day. Handout issued, pt reports no further questions.   Therapeutic Activities   Therapeutic Activity Minutes (51023) 12   Symptoms noted during/after treatment fatigue   Treatment Detail/Skilled Intervention Focus on functional mobility in room and donnelly to support ADL. Pt instructed in bed mobility while adhering to NWB, able to demo back. Pt up with SBA and SEC, initally wobbly but improves with time. Pt ambulates in room and bathroom with SBA SEC, transitioned to hallway. Pt ambulates ~75ft with SBAG and SEC. Pt denies any symptoms, back to chair at end of session. Encouraged pt to ambulate in room and donnelly with dtr or RN staff. Pt receptive.    OT Discharge Planning   OT Plan d/c   OT Discharge Recommendation (DC Rec) other (see comments)  (defer to ortho)   OT Rationale for DC Rec Pt moving well, excellent support in place for ADL from daughter and grandson. Recommend OP PT per post op protocol.   OT Brief overview of current status SBA with cane, min A ADL   Total Session Time   Timed Code Treatment Minutes 55   Total Session Time (sum of timed and untimed services) 64     Occupational Therapy Discharge Summary    Reason for therapy discharge:    All goals and outcomes met, no further needs identified.    Progress towards therapy goal(s). See goals on Care Plan in [facility] electronic health record for goal details.  Goals met    Therapy recommendation(s):    F/u with OP PT as directed by surgeon.

## 2024-09-14 NOTE — PLAN OF CARE
VS: VSS, pt denied CP or SOB   O2: Room air sat. > 90%.   Output: Voiding adequate amount without difficulty.    Last BM: 09/11/24 passing gas.    Activity: Up with SBA.    Skin: Intact except surgical incision.    Pain: Manageable with PRN medication.    Neuro: CMS and neuro intact to baseline.    Dressing: CDI.    Diet: Regular tolerating okay.    LDA: Removed none.    Equipment: IV pole, commode, PCD and personal belongings.    Plan: Discharge home today.    Additional Info:      DISCHARGE SUMMARY    Pt discharging to: Home.  Transportation: Family.  AVS given and discussed: Pt was given AVS and pt states understanding of content. Pt has no further questions.   Stoplight Tool given and discussed: Yes, no further questions.  Medications given: Yes, discussed. No further questions.   Belongings returned: Yes, ensured all belongings packed and sent with pt. No items in security.   Comments: pt understand discharge plan.

## 2024-09-16 NOTE — PROGRESS NOTES
"HISTORY OF PRESENT ILLNESS:    Diane Gaitan is a 74 year old female who is seen in follow up for Left reverse total shoulder arthroplasty 9/13/24 with Dr. Suarez.  Present symptoms: Says that it feels wonderful. She is taking ibuprofen and tylenol for pain. Is doing all of her PT exercises.  Using sling full time.  Denies Chest pain, Calve pain, Fever, Chills.    PHYSICAL EXAM:  /68   Ht 1.702 m (5' 7\")   Wt 83.5 kg (184 lb)   BMI 28.82 kg/m    Body mass index is 28.82 kg/m .   GENERAL APPEARANCE: healthy, alert, and no distress   PSYCH:  mentation appears normal and affect normal/bright    MSK:  Left: shoulder, presents in well fitted sling with abd pillow. ..  Incision clean and dry, suture tails present, healing.  Appropriate incisional erythema.   Ecchymosis resolving..  Edema min.  CMS: karli incisional numbness, otherwise grossly intact.  AROM elbow and hand intact..      IMAGING INTERPRETATION:  none today.       ASSESSMENT:  Diane Gaitan is a 74 year old female S/P left Rev TSA.    Healing incision.  Compliant.    PLAN:  - Surgery discussed, images reviewed if applicable, and all questions were answered at this time.  - suture tails removed with sterile technique, steri-strips applied in usual fashion, care instructions given and verbally acknowledged.  - Medications: as current.  - Physical therapy: continue with current physical therapy  - no active motion at shoulder.    Return to clinic 6, weeks, pos top with Dr. Suarez.    Mehran Arellano PA-C    Dept. Orthopedic Surgery  [facility]   9/16/2024   "

## 2024-09-30 ENCOUNTER — OFFICE VISIT (OUTPATIENT)
Dept: ORTHOPEDICS | Facility: CLINIC | Age: 74
End: 2024-09-30
Payer: MEDICARE

## 2024-09-30 VITALS
WEIGHT: 184 LBS | BODY MASS INDEX: 28.88 KG/M2 | HEIGHT: 67 IN | DIASTOLIC BLOOD PRESSURE: 68 MMHG | SYSTOLIC BLOOD PRESSURE: 129 MMHG

## 2024-09-30 DIAGNOSIS — Z47.89 ORTHOPEDIC AFTERCARE: Primary | ICD-10-CM

## 2024-09-30 PROCEDURE — 99024 POSTOP FOLLOW-UP VISIT: CPT | Performed by: PHYSICIAN ASSISTANT

## 2024-09-30 NOTE — PATIENT INSTRUCTIONS
Incision Care:  Showering is okay at this time, however no soaking, submerging or scrubbing of incision until all scabs have resolved.  Any applied Steri-strips will most likely fall off on their own, however they may be removed after 1 weeks with rubbing alcohol if they have not.    If there is no draining or bleeding, the tape-strips or clean garments are enough coverage unless you were instructed otherwise or you would like to cover for comfort.  If drainage or bleeding occurs, please cover the incision with clean dressings.  If drainage or bleeding is significant or does not stop within 48 hours please notify the office.    Gradually increase your activities as you can tolerated them, starting at a level well below what you would normally do.     Scar tissue may develop and be present at or deep to the incision site for several weeks to months which may feel like a lump. Gentle massage to the area when tolerated may help reduce this.   Also the top layers of skin may peel away over the next weeks.    Follow up as needed in clinic.

## 2024-09-30 NOTE — Clinical Note
"9/30/2024      Diane Gaitan  [address] [address]      Dear Colleague,    Thank you for referring your patient, Diane Gaitan, to the [facility] [place]. Please see a copy of my visit note below.    HISTORY OF PRESENT ILLNESS:    Diane Gaitan is a 74 year old female who is seen in follow up for Left reverse total shoulder arthroplasty 9/13/24 with Dr. Suarez.  Present symptoms: Says that it feels wonderful. She is taking ibuprofen and tylenol for pain. Is doing all of her PT exercises.   Denies Chest pain, Calve pain, Fever, Chills.    Current Treatment: ***    PHYSICAL EXAM:  There were no vitals taken for this visit.  There is no height or weight on file to calculate BMI.   GENERAL APPEARANCE: {HOME GENERAL APPEARANCE:50::\"healthy\",\"alert\",\"no distress\"}   PSYCH:  {[facility] EXAM CPE - PSYCH:283878::\"mentation appears normal\",\"affect normal/bright\"}    MSK:  {RIGHT:748747} .  Ambulates: ***.  Incision clean and dry, *** present, healing.  Appropriate incisional erythema.   {YES/NO -default:267735} Ecchymosis ***.  {YES/NO -default:546924} calve pain on palpation.  Edema ***  CMS: karli incisional numbness, otherwise grossly intact.  AROM ***.      IMAGING INTERPRETATION:  ***.  Images read and interpreted by me***     ASSESSMENT:  Diane Gaitan is a 74 year old female S/P ***.  ***    PLAN:  - Surgery discussed, images reviewed if applicable, and all questions were answered at this time.  - *** removed with sterile technique, steri-strips applied in usual fashion, care instructions given and verbally acknowledged.  - Medications: ***  - {REHAB :187642}  - ***    Return to clinic {RTC WHEN:623666}    Mehran Arellano PA-C    Dept. Orthopedic Surgery  [facility]   9/16/2024       Again, thank you for allowing me to participate in the care of your patient.        Sincerely,        Mehran Arellano PA-C  "

## 2024-10-09 NOTE — OP NOTE
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     NOTE ON COMPLEXITY: Modifier 22 is requested given the increased time necessary to safely and adequately expose the small and very medialized glenoid. Marked glenoid erosion was evident with the glenoid eroded beyond the coracoid base. Specific exposure with altered anatomy and use of a custom implant required at least 50% above standard time for such a procedure.    STAFF CO-SURGEON: Maxine Burleson MD; Sam Suarez MD  Co-surgeons was necessary given the severe glenoid erosion that required a custom implant. My presence was required given my experience with these custom implants.    ASSISTANT: Sandhya Peck MD; Kathy Mcgregor MD    ANESTHESIA: General endotracheal anesthesia.   ESTIMATED BLOOD LOSS: 200 mL.   COMPLICATIONS: None.       IMPLANTS:   Biomet Comprehensive Shoulder VRS (Vault Reconstruction System) baseplate with 6.5 central screw and 3 peripheral locking screws)  Biomet Comprehensive Shoulder 36mm standard glenosphere, C offset inferior  Biomet Comprehensive Shoulder  Biomet Comprehensive Shoulder    BRIEF PATIENT HISTORY: This patient has been seen in clinic by Dr. Suarez. He discussed the patient's symptoms, imaging and the nature of her severe arthrosis. We discussed that a custom implant would be needed to manage via reconstruction this degree of arthrosis. The patient's shoulder has been causing lifestyle limiting pain and she feels that nonoperative management has not adequately relieved these symptoms. Therefore, the patient wished to consider  surgical options. Dr. Suarez discussed reverse total shoulder arthroplasty including the risks and benefits. The patient had the opportunity for questions to be answered and she wished to proceed with surgery. Informed consent was completed.     DESCRIPTION OF PROCEDURE: The patient was identified in the preoperative area and the correct left shoulder was marked for surgery by Dr. Suarez. The patient was provided an interscalene block by our anesthesia colleagues. She was taken to the operating room where she was surrendered to general endotracheal anesthesia. She was moved to the operating table in the beachchair position with all bony prominences well padded. The head was placed in a head fung with the neck in neutral position. The left upper extremity was prepped and draped in the usual sterile fashion. A timeout was held in accordance with hospital policy, confirming correct patient, site, side, procedure and administration of IV antibiotics prior to incision.A deltopectoral incision and approach was completed.The cephalic vein was brought medially.The subacromial space was obliterated and the humeral head was riding up underneath the acromion. The biceps was tenotomized and tenodesed. I palpated the axillary nerve medially and laterally performing a tug test confirming location and continuity of the nerve. This was performed throughout the procedure including once prior to closure. I identified the anterior circumflex humeral vessels and ligated and divided these. The subscap was released via subperiosteal peel and we dislocated the humerus anteriorly. There was eburnated bone over the humeral head and fragmentation and collapse of the central and superior humeral head. The greater tuberosity was eroded away and was bare. The opening reamer entered the humeral canal and wereamed up to a size 10. We cut the humeral head in 30 degrees of retroversion. We prepared the proximal humerus for a size 10x83 reverse  stem though this was felt to not have rotational stability and stem cementation was felt likely necessary. We then turned attention to the glenoid. I performed anterior inferior capsular releases and removed the labrum circumferentially. Capsular releases were necessary inferiorly for exposure and then fit of the VRS flange. We prepared the glenoid for the baseplate which was secured and screws were placed. We then applied the glenosphere with C offset inferior and trialed a polyethylene liner. A standard tray and 36mm bearing provided forward elevation to at least 155 degrees with external rotation at the side to 50 without booking anteriorly. There was also internal rotation to the abdomen without impingement. We removed all trial humeral components. Due to decreased bone density in the metaphysis the stem required cementation for rotational stability. The final stem was cemented in the appropriate version at the appropriate height for stability. We impacted the final polyethylene liner with the same range of motion as above. I repaired the anterior capsule and remnants of subscapularis to the lesser tuberosity using bone tunnels. I then copiously irrigated the wound. I irrigated the wound then with 15 mL a sterile Betadine and 500 mL of saline. This was then irrigated out with saline. The wound was then closed in layered fashion with absorbable suture at skin. Steri-Strips and soft sterile dressing were repaired. The arm was placed into an abduction sling and the patient was extubated and transported to recovery in stable condition. There were no apparent intraoperative complications.     POSTOPERATIVE PLAN:   1. The patient will be admitted to the Orthopedic Service and will begin physical therapy to mobilize out of bed.There will be no shoulder range of motion for 6 weeks but the patient can begin immediate hand, wrist and elbow range of motion.   2. The patient will be seen in the clinic for wound check at 2  weeks.   3. AROM may begin at 6 weeks but no over shoulder height strengthening until 3 months.    NIGEL BAE MD

## 2024-10-21 ENCOUNTER — OFFICE VISIT (OUTPATIENT)
Dept: ORTHOPEDICS | Facility: CLINIC | Age: 74
End: 2024-10-21
Payer: MEDICARE

## 2024-10-21 ENCOUNTER — ANCILLARY PROCEDURE (OUTPATIENT)
Dept: GENERAL RADIOLOGY | Facility: CLINIC | Age: 74
End: 2024-10-21
Attending: STUDENT IN AN ORGANIZED HEALTH CARE EDUCATION/TRAINING PROGRAM
Payer: MEDICARE

## 2024-10-21 VITALS — BODY MASS INDEX: 28.88 KG/M2 | WEIGHT: 184 LBS | HEIGHT: 67 IN

## 2024-10-21 DIAGNOSIS — Z47.89 ORTHOPEDIC AFTERCARE: ICD-10-CM

## 2024-10-21 DIAGNOSIS — Z47.89 ORTHOPEDIC AFTERCARE: Primary | ICD-10-CM

## 2024-10-21 PROCEDURE — 73030 X-RAY EXAM OF SHOULDER: CPT | Mod: TC | Performed by: RADIOLOGY

## 2024-10-21 PROCEDURE — 73020 X-RAY EXAM OF SHOULDER: CPT | Mod: TC | Performed by: RADIOLOGY

## 2024-10-21 PROCEDURE — 99024 POSTOP FOLLOW-UP VISIT: CPT | Performed by: STUDENT IN AN ORGANIZED HEALTH CARE EDUCATION/TRAINING PROGRAM

## 2024-10-21 RX ORDER — AMOXICILLIN 500 MG/1
2000 CAPSULE ORAL ONCE
Qty: 4 CAPSULE | Refills: 0 | Status: SHIPPED | OUTPATIENT
Start: 2024-10-21 | End: 2024-10-21

## 2024-10-21 NOTE — PROGRESS NOTES
CC: 6 weeks status post left reverse total shoulder arthroplasty    HPI: Patient is a 74-year-old female seen here today with her daughter 6 weeks status post a left reverse total shoulder arthroplasty with custom glenoid component.  Overall she has done quite well in the for 6 weeks.  She has been using her sling except for showering and clothing.  She has been doing wrist and elbow range of motion exercises 3 times a day.  She states she has been doing very well with pain.  She is weaned off her narcotic pain medication after her stop date to.  She denies any numbness or tingling in her hand.  Overall she is happy with the progress of her shoulder thus far.    Objective:   PE:  LUE: Well-healed surgical incisions over the anterior aspect of the shoulder.  No significant edema.  No pain to palpation over the anterior, posterior, or lateral aspect of the shoulder.  Fires deltoid.  Passive range of motion is 60 degrees of forward elevation and 60 degrees abduction.  Active range of motion is 30 degrees of forward elevation and 30 days abduction.  Fires deltoid.  Distally neurovascular intact.  2+ radial pulse.    Imaging:   3 view x-ray of the left shoulder from today including axillary view was reviewed.  This shows stable alignment of the glenoid baseplate component without signs of loosening or hardware breakage.  Cemented humeral stem component in place without showing signs of loosening or change in alignment.    A/P:  Patient is a 74-year-old female seen here today in follow-up 6 weeks status post a left reverse total shoulder with custom glenoid augment.  Overall she has done well from a pain control standpoint.  She is neuro vas intact.  At this time point she is allowed to start weaning from her sling.  Start gentle active and passive range of motion.  I reiterated her 10 pound lifelong lifting restriction.  We will hold on formal physical therapy at this time.  She will follow-up with me in 6 weeks for  range of motion check    Floyd Suarez MD    [facility]   Department of Orthopedic Surgery      Disclaimer: This note consists of symbols derived from keyboarding, dictation and/or voice recognition software. As a result, there may be errors in the script that have gone undetected. Please consider this when interpreting information found in this chart.

## 2024-10-21 NOTE — LETTER
10/21/2024      Diane Gaitan  [address] [facility] [address]      Dear Colleague,    Thank you for referring your patient, Diane Gaitan, to the [facility] [place]. Please see a copy of my visit note below.    CC: 6 weeks status post left reverse total shoulder arthroplasty    HPI: Patient is a 74-year-old female seen here today with her daughter 6 weeks status post a left reverse total shoulder arthroplasty with custom glenoid component.  Overall she has done quite well in the for 6 weeks.  She has been using her sling except for showering and clothing.  She has been doing wrist and elbow range of motion exercises 3 times a day.  She states she has been doing very well with pain.  She is weaned off her narcotic pain medication after her stop date to.  She denies any numbness or tingling in her hand.  Overall she is happy with the progress of her shoulder thus far.    Objective:   PE:  LUE: Well-healed surgical incisions over the anterior aspect of the shoulder.  No significant edema.  No pain to palpation over the anterior, posterior, or lateral aspect of the shoulder.  Fires deltoid.  Passive range of motion is 60 degrees of forward elevation and 60 degrees abduction.  Active range of motion is 30 degrees of forward elevation and 30 days abduction.  Fires deltoid.  Distally neurovascular intact.  2+ radial pulse.    Imaging:   3 view x-ray of the left shoulder from today including axillary view was reviewed.  This shows stable alignment of the glenoid baseplate component without signs of loosening or hardware breakage.  Cemented humeral stem component in place without showing signs of loosening or change in alignment.    A/P:  Patient is a 74-year-old female seen here today in follow-up 6 weeks status post a left reverse total shoulder with custom glenoid augment.  Overall she has done well from a pain control standpoint.  She is neuro vas intact.  At this time point  she is allowed to start weaning from her sling.  Start gentle active and passive range of motion.  I reiterated her 10 pound lifelong lifting restriction.  We will hold on formal physical therapy at this time.  She will follow-up with me in 6 weeks for range of motion check    Floyd Suarez MD    [facility]   Department of Orthopedic Surgery      Disclaimer: This note consists of symbols derived from keyboarding, dictation and/or voice recognition software. As a result, there may be errors in the script that have gone undetected. Please consider this when interpreting information found in this chart.        Again, thank you for allowing me to participate in the care of your patient.        Sincerely,        Floyd Suarez MD

## 2024-10-31 ENCOUNTER — PATIENT OUTREACH (OUTPATIENT)
Dept: CARE COORDINATION | Facility: CLINIC | Age: 74
End: 2024-10-31
Payer: MEDICARE

## 2024-11-14 ENCOUNTER — PATIENT OUTREACH (OUTPATIENT)
Dept: CARE COORDINATION | Facility: CLINIC | Age: 74
End: 2024-11-14
Payer: MEDICARE

## 2024-12-09 ENCOUNTER — OFFICE VISIT (OUTPATIENT)
Dept: ORTHOPEDICS | Facility: CLINIC | Age: 74
End: 2024-12-09
Payer: MEDICARE

## 2024-12-09 VITALS — DIASTOLIC BLOOD PRESSURE: 64 MMHG | SYSTOLIC BLOOD PRESSURE: 107 MMHG

## 2024-12-09 DIAGNOSIS — Z47.89 ORTHOPEDIC AFTERCARE: Primary | ICD-10-CM

## 2024-12-09 PROCEDURE — 99024 POSTOP FOLLOW-UP VISIT: CPT | Performed by: STUDENT IN AN ORGANIZED HEALTH CARE EDUCATION/TRAINING PROGRAM

## 2024-12-09 NOTE — PROGRESS NOTES
Summary: Progress Note     CC: 3 months status post left reverse total shoulder arthroplasty     HPI: Patient is a 74-year-old female seen here today 3 months status post a left reverse total shoulder arthroplasty with custom glenoid component.  Overall she has done quite well in the for 3 months.  She states she is dramatically improved compared to her perioperative status.  She states that she has no pain at all.  She is continuing to work on her at home physical therapy exercises.  She does note mild discomfort from her bra strap over the incision.  She denies any numbness or tingling in her hand.  Overall she is happy with the progress of her shoulder thus far.     Objective:   PE:  LUE: Well-healed surgical incisions over the anterior aspect of the shoulder.  No significant edema.  No pain to palpation over the anterior, posterior, or lateral aspect of the shoulder.  Fires deltoid.  Passive range of motion is 90 degrees of forward elevation and 60 degrees abduction.  Active range of motion is 90 degrees of forward elevation and 60 days abduction.  Fires deltoid.  Distally neurovascular intact.  2+ radial pulse.     Imaging:   3 view x-ray of the left shoulder from 10/21/2024 including axillary view was reviewed.  This shows stable alignment of the glenoid baseplate component without signs of loosening or hardware breakage.  Cemented humeral stem component in place without showing signs of loosening or change in alignment.     A/P:  Patient is a 74-year-old female seen here today in follow-up 3 months status post a left reverse total shoulder with custom glenoid augment.  Overall she is doing very well and states that she is very happy with her current outcome.  She continues to improve her range of motion from 60 degrees of forward elevation, to now 90 degrees of forward elevation. she is neuro vas intact.  Discussed desensitizing her incision with the use of a toothbrush or sponge.  She will continue to  work on her at home physical therapy exercises.  She will follow-up in 3-4 months.          Ruddy Lea PA-C    Dept. Orthopedic Surgery  [facility]     I have reviewed and agree with the plan.  Overall patient has done well after surgery.  She has noted significant pain relief.  Her range of motion is improving.  At last appointment we had discussed physical therapy.  She had declined at that time.  We again revisited the idea of physical therapy to help with her range of motion.  She is amenable to it at this time.  Will start physical therapy once a week for 8 weeks for gentle active and passive range of motion and light strengthening.  She has a 10 pound lifelong lifting restriction.  She will follow-up with me in 3 to 4 months for x-ray and clinical evaluation    Floyd Suarez MD    [facility]   Department of Orthopedic Surgery    12/9/2024

## 2024-12-09 NOTE — LETTER
12/9/2024      Diane Gaitan  [address] [facility] [address]      Dear Colleague,    Thank you for referring your patient, Diane Gaitan, to the [facility] [place]. Please see a copy of my visit note below.       Summary: Progress Note     CC: 3 months status post left reverse total shoulder arthroplasty     HPI: Patient is a 74-year-old female seen here today 3 months status post a left reverse total shoulder arthroplasty with custom glenoid component.  Overall she has done quite well in the for 3 months.  She states she is dramatically improved compared to her perioperative status.  She states that she has no pain at all.  She is continuing to work on her at home physical therapy exercises.  She does note mild discomfort from her bra strap over the incision.  She denies any numbness or tingling in her hand.  Overall she is happy with the progress of her shoulder thus far.     Objective:   PE:  LUE: Well-healed surgical incisions over the anterior aspect of the shoulder.  No significant edema.  No pain to palpation over the anterior, posterior, or lateral aspect of the shoulder.  Fires deltoid.  Passive range of motion is 90 degrees of forward elevation and 60 degrees abduction.  Active range of motion is 90 degrees of forward elevation and 60 days abduction.  Fires deltoid.  Distally neurovascular intact.  2+ radial pulse.     Imaging:   3 view x-ray of the left shoulder from 10/21/2024 including axillary view was reviewed.  This shows stable alignment of the glenoid baseplate component without signs of loosening or hardware breakage.  Cemented humeral stem component in place without showing signs of loosening or change in alignment.     A/P:  Patient is a 74-year-old female seen here today in follow-up 3 months status post a left reverse total shoulder with custom glenoid augment.  Overall she is doing very well and states that she is very happy with her current  outcome.  She continues to improve her range of motion from 60 degrees of forward elevation, to now 90 degrees of forward elevation. she is neuro vas intact.  Discussed desensitizing her incision with the use of a toothbrush or sponge.  She will continue to work on her at home physical therapy exercises.  She will follow-up in 3-4 months.          Ruddy Lea PA-C    Dept. Orthopedic Surgery  [facility]     I have reviewed and agree with the plan.  Overall patient has done well after surgery.  She has noted significant pain relief.  Her range of motion is improving.  At last appointment we had discussed physical therapy.  She had declined at that time.  We again revisited the idea of physical therapy to help with her range of motion.  She is amenable to it at this time.  Will start physical therapy once a week for 8 weeks for gentle active and passive range of motion and light strengthening.  She has a 10 pound lifelong lifting restriction.  She will follow-up with me in 3 to 4 months for x-ray and clinical evaluation    Floyd Suarez MD    [facility]   Department of Orthopedic Surgery    12/9/2024         Again, thank you for allowing me to participate in the care of your patient.        Sincerely,        Floyd Suarez MD

## 2024-12-09 NOTE — PATIENT INSTRUCTIONS
[facility]   [address] [place] [address], [address]  [place] [address] [address] [place] [address]   Appointments: [phone] Appointments: [phone]   Fax: [phone] Fax: [phone]       1. Orthopedic aftercare        PHYSICAL THERAPY:  Physical Therapy orders have been placed with [facility].  You can call [phone] to schedule at your convenience.       Follow up with Dr. Suarez in 6 weeks     Call my office with any questions or concerns, [phone].

## 2025-01-26 ENCOUNTER — HEALTH MAINTENANCE LETTER (OUTPATIENT)
Age: 75
End: 2025-01-26

## 2025-02-23 ENCOUNTER — HEALTH MAINTENANCE LETTER (OUTPATIENT)
Age: 75
End: 2025-02-23

## 2025-04-13 ENCOUNTER — HEALTH MAINTENANCE LETTER (OUTPATIENT)
Age: 75
End: 2025-04-13

## 2025-05-29 ENCOUNTER — PATIENT OUTREACH (OUTPATIENT)
Dept: CARE COORDINATION | Facility: CLINIC | Age: 75
End: 2025-05-29
Payer: MEDICARE

## 2025-06-04 ENCOUNTER — VIRTUAL VISIT (OUTPATIENT)
Dept: INTERNAL MEDICINE | Facility: CLINIC | Age: 75
End: 2025-06-04
Payer: MEDICARE

## 2025-06-04 DIAGNOSIS — I10 ESSENTIAL HYPERTENSION, BENIGN: ICD-10-CM

## 2025-06-04 DIAGNOSIS — Z13.6 CARDIOVASCULAR SCREENING; LDL GOAL LESS THAN 100: ICD-10-CM

## 2025-06-04 DIAGNOSIS — E11.9 TYPE 2 DIABETES MELLITUS WITHOUT COMPLICATION, WITHOUT LONG-TERM CURRENT USE OF INSULIN (H): ICD-10-CM

## 2025-06-04 DIAGNOSIS — Z17.0 MALIGNANT NEOPLASM OF UPPER-OUTER QUADRANT OF LEFT BREAST IN FEMALE, ESTROGEN RECEPTOR POSITIVE (H): ICD-10-CM

## 2025-06-04 DIAGNOSIS — F32.0 MAJOR DEPRESSIVE DISORDER, SINGLE EPISODE, MILD: ICD-10-CM

## 2025-06-04 DIAGNOSIS — C50.412 MALIGNANT NEOPLASM OF UPPER-OUTER QUADRANT OF LEFT BREAST IN FEMALE, ESTROGEN RECEPTOR POSITIVE (H): ICD-10-CM

## 2025-06-04 DIAGNOSIS — K21.9 GASTROESOPHAGEAL REFLUX DISEASE WITHOUT ESOPHAGITIS: ICD-10-CM

## 2025-06-04 DIAGNOSIS — Z12.31 VISIT FOR SCREENING MAMMOGRAM: Primary | ICD-10-CM

## 2025-06-04 DIAGNOSIS — Z12.11 COLON CANCER SCREENING: ICD-10-CM

## 2025-06-04 DIAGNOSIS — M19.012 LOCALIZED OSTEOARTHRITIS OF LEFT SHOULDER: ICD-10-CM

## 2025-06-04 PROCEDURE — 98013 SYNCH AUDIO-ONLY EST LOW 20: CPT | Mod: GT | Performed by: INTERNAL MEDICINE

## 2025-06-04 RX ORDER — CITALOPRAM HYDROBROMIDE 20 MG/1
20 TABLET ORAL DAILY
Qty: 90 TABLET | Refills: 3 | Status: SHIPPED | OUTPATIENT
Start: 2025-06-04

## 2025-06-04 RX ORDER — HYDROCHLOROTHIAZIDE 12.5 MG/1
25 TABLET ORAL DAILY
Qty: 180 TABLET | Refills: 3 | Status: SHIPPED | OUTPATIENT
Start: 2025-06-04

## 2025-06-04 RX ORDER — BUPROPION HYDROCHLORIDE 150 MG/1
150 TABLET ORAL EVERY MORNING
Qty: 90 TABLET | Refills: 3 | Status: SHIPPED | OUTPATIENT
Start: 2025-06-04

## 2025-06-04 RX ORDER — SIMVASTATIN 20 MG
TABLET ORAL
Qty: 90 TABLET | Refills: 3 | Status: SHIPPED | OUTPATIENT
Start: 2025-06-04

## 2025-06-04 RX ORDER — OMEPRAZOLE 20 MG/1
20 CAPSULE, DELAYED RELEASE ORAL DAILY
Qty: 30 CAPSULE | Refills: 0 | Status: SHIPPED | OUTPATIENT
Start: 2025-06-04

## 2025-06-04 RX ORDER — LOSARTAN POTASSIUM 100 MG/1
100 TABLET ORAL DAILY
Qty: 90 TABLET | Refills: 3 | Status: SHIPPED | OUTPATIENT
Start: 2025-06-04

## 2025-06-04 RX ORDER — AMLODIPINE BESYLATE 5 MG/1
5 TABLET ORAL DAILY
Qty: 90 TABLET | Refills: 3 | Status: SHIPPED | OUTPATIENT
Start: 2025-06-04

## 2025-06-04 RX ORDER — OMEPRAZOLE 20 MG/1
20 CAPSULE, DELAYED RELEASE ORAL DAILY
Qty: 90 CAPSULE | Refills: 3 | Status: SHIPPED | OUTPATIENT
Start: 2025-06-04 | End: 2025-06-04

## 2025-06-04 SDOH — HEALTH STABILITY: PHYSICAL HEALTH: ON AVERAGE, HOW MANY DAYS PER WEEK DO YOU ENGAGE IN MODERATE TO STRENUOUS EXERCISE (LIKE A BRISK WALK)?: 7 DAYS

## 2025-06-04 SDOH — HEALTH STABILITY: PHYSICAL HEALTH: ON AVERAGE, HOW MANY MINUTES DO YOU ENGAGE IN EXERCISE AT THIS LEVEL?: 20 MIN

## 2025-06-04 ASSESSMENT — SOCIAL DETERMINANTS OF HEALTH (SDOH): HOW OFTEN DO YOU GET TOGETHER WITH FRIENDS OR RELATIVES?: MORE THAN THREE TIMES A WEEK

## 2025-06-04 NOTE — PROGRESS NOTES
Preventive Care Visit  [facility]  Jac Barry MD, Internal Medicine  Jun 4, 2025    Diane is a 75 year old who is being evaluated via a billable telephone visit.    How would you like to obtain your AVS? MyChart  If the video visit is dropped, the invitation should be resent by: Text to cell phone: [phone]  Will anyone else be joining your video visit? No      Assessment & Plan     Routine lab orders placed  Schedule fasting lab work.  - Comprehensive metabolic panel; Future  - CBC with platelets; Future  - Lipid Profile; Future    Visit for screening mammogram  Advised patient to update mammography.  - MA Screening Bilateral w/ Carlos; Future    Type 2 diabetes mellitus without complication, without long-term current use of insulin (H)  Refilled medication in future orders placed.  - simvastatin (ZOCOR) 20 MG tablet; TAKE ONE TABLET BY MOUTH EVERY DAY AT BEDTIME  - Hemoglobin A1c; Future  - metFORMIN (GLUCOPHAGE) 500 MG tablet; Take 1 tablet (500 mg) by mouth 2 times daily (with meals).    CARDIOVASCULAR SCREENING; LDL GOAL LESS THAN 100  Continue with statin therapy with goal reduction of LDL  - simvastatin (ZOCOR) 20 MG tablet; TAKE ONE TABLET BY MOUTH EVERY DAY AT BEDTIME    Essential hypertension, benign  Continue with current meds as refilled  - amLODIPine (NORVASC) 5 MG tablet; Take 1 tablet (5 mg) by mouth daily.  - hydroCHLOROthiazide 12.5 MG tablet; Take 2 tablets (25 mg) by mouth daily.  - losartan (COZAAR) 100 MG tablet; Take 1 tablet (100 mg) by mouth daily.    Gastroesophageal reflux disease without esophagitis  Patient informs me is currently taking 1 tablet daily  - omeprazole (PRILOSEC) 20 MG DR capsule; Take 1 capsule (20 mg) by mouth daily.    Major depressive disorder, single episode, mild  Stable per patient without active complaints  - buPROPion (WELLBUTRIN XL) 150 MG 24 hr tablet; Take 1 tablet (150 mg) by mouth every morning.  - citalopram (CELEXA) 20 MG tablet;  "Take 1 tablet (20 mg) by mouth daily.    Malignant neoplasm of upper-outer quadrant of left breast in female, estrogen receptor positive (H)  Noted as prior history following up with oncology as directed    Colon cancer screening  Patient elects to defer colonoscopy at this point.  Suggest FIT testing  - Fecal colorectal cancer screen FIT; Future    Localized osteoarthritis of left shoulder  Noted prior surgical procedure, slowly improving.        BMI  Estimated body mass index is 28.82 kg/m  as calculated from the following:    Height as of 10/21/24: 1.702 m (5' 7\").    Weight as of 10/21/24: 83.5 kg (184 lb).   Weight management plan: Discussed healthy diet and exercise guidelines    Counseling  Appropriate preventive services were addressed with this patient via screening, questionnaire, or discussion as appropriate for fall prevention, nutrition, physical activity, Tobacco-use cessation, social engagement, weight loss and cognition.  Checklist reviewing preventive services available has been given to the patient.  Reviewed patient's diet, addressing concerns and/or questions.   The patient was instructed to see the dentist every 6 months.   Updated plan of care.  Patient reported difficulty with activities of daily living were addressed today.    Leny Contreras is a 75 year old, presenting for the following:      Phone Start Time: 11:21 AM    Patient contacted the clinic to tell us that she is unable to do a virtual visit as her computer is not working properly.  She wants to switch this to a phone visit.    [facility]     Advance Care Planning  Discussed advance care planning with patient; informed AVS has link to [facility].    Other concerns:  Has been out of metformin for 1 week, requesting small script sent to local pharmacy.  Has been breast cancer free 5 years, was told could stop anastrozole           6/4/2025   General Health   How would you rate your overall physical health? Good   Feel stress " (tense, anxious, or unable to sleep) Not at all         6/4/2025   Nutrition   Diet: Regular (no restrictions)         6/4/2025   Exercise   Days per week of moderate/strenous exercise 7 days   Average minutes spent exercising at this level 20 min         6/4/2025   Social Factors   Frequency of gathering with friends or relatives More than three times a week   Worry food won't last until get money to buy more No   Food not last or not have enough money for food? No   Do you have housing? (Housing is defined as stable permanent housing and does not include staying outside in a car, in a tent, in an abandoned building, in an overnight shelter, or couch-surfing.) Yes   Are you worried about losing your housing? No   Lack of transportation? No   Unable to get utilities (heat,electricity)? No         6/4/2025   Fall Risk   Fallen 2 or more times in the past year? No    Trouble with walking or balance? Yes        Proxy-reported         6/4/2025   Activities of Daily Living- Home Safety   Needs help with the following daily activites Transportation   Safety concerns in the home None of the above         6/4/2025   Dental   Dentist two times every year? (!) NO         6/4/2025   Hearing Screening   Hearing concerns? None of the above         6/4/2025   Driving Risk Screening   Patient/family members have concerns about driving No         6/4/2025   General Alertness/Fatigue Screening   Have you been more tired than usual lately? No         6/4/2025   Urinary Incontinence Screening   Bothered by leaking urine in past 6 months No       Today's PHQ-9 Score:       6/4/2025    11:15 AM   PHQ-9 SCORE   PHQ-9 Total Score MyChart 1 (Minimal depression)   PHQ-9 Total Score 1        Proxy-reported         6/4/2025   Substance Use   Alcohol more than 3/day or more than 7/wk No   Do you have a current opioid prescription? No   How severe/bad is pain from 1 to 10? 0/10 (No Pain)   Do you use any other substances recreationally? No      Social History     Tobacco Use    Smoking status: Former     Current packs/day: 0.00     Average packs/day: 1 pack/day for 12.0 years (12.0 ttl pk-yrs)     Types: Cigarettes     Start date: 10/18/1968     Quit date: 10/18/1980     Years since quittin.6    Smokeless tobacco: Never   Vaping Use    Vaping status: Never Used   Substance Use Topics    Alcohol use: Yes     Comment: 12 Beers per week    Drug use: No           2023   LAST FHS-7 RESULTS   1st degree relative breast or ovarian cancer Yes   Any relative bilateral breast cancer No   Any male have breast cancer No   Any ONE woman have BOTH breast AND ovarian cancer No   Any woman with breast cancer before 50yrs Yes   2 or more relatives with breast AND/OR ovarian cancer Yes   2 or more relatives with breast AND/OR bowel cancer Yes       Mammogram Screening - After age 74- determine frequency with patient based on health status, life expectancy and patient goals    ASCVD Risk   The ASCVD Risk score (Angeline PEMBERTON, et al., 2019) failed to calculate for the following reasons:    Risk score cannot be calculated because patient has a medical history suggesting prior/existing ASCVD      Reviewed and updated as needed this visit by Provider                    Lab work is in process  Current providers sharing in care for this patient include:  Patient Care Team:  Jac Barry MD as PCP - General  Jac Barry MD as Assigned PCP  Floyd Suarez MD as Assigned Musculoskeletal Provider    The following health maintenance items are reviewed in Epic and correct as of today:  Health Maintenance   Topic Date Due    DIABETIC FOOT EXAM  2019    ANNUAL REVIEW OF HM ORDERS  2023    MAMMO SCREENING  2024    RSV VACCINE (1 - 1-dose 75+ series) Never done    EYE EXAM  06/10/2024    COVID-19 VACCINE (2 - - season) 2024    LIPID  2024    MEDICARE ANNUAL WELLNESS VISIT  2024    MICROALBUMIN  2024    DTAP/TDAP/TD  "VACCINE (2 - Td or Tdap) 01/19/2025    A1C  02/21/2025    INFLUENZA VACCINE (Season Ended) 09/01/2025    BMP  09/14/2025    PHQ-9  12/04/2025    FALL RISK ASSESSMENT  06/04/2026    ADVANCE CARE PLANNING  08/21/2029    DEXA  01/18/2038    HEPATITIS C SCREENING  Completed    DEPRESSION ACTION PLAN  Completed    PNEUMOCOCCAL VACCINE 50+ YEARS  Completed    ZOSTER VACCINE  Completed    HPV VACCINE  Aged Out    MENINGITIS VACCINE  Aged Out    COLORECTAL CANCER SCREENING  Discontinued    LUNG CANCER SCREENING  Discontinued         Review of Systems  CONSTITUTIONAL: NEGATIVE for fever, chills, change in weight  EYES: NEGATIVE for vision changes or irritation  ENT/MOUTH: NEGATIVE for ear, mouth and throat problems  RESP: NEGATIVE for significant cough or SOB  CV: NEGATIVE for chest pain, palpitations or peripheral edema  GI: NEGATIVE for nausea, abdominal pain, heartburn, or change in bowel habits  : NEGATIVE for frequency, dysuria, or hematuria  MUSCULOSKELETAL: NEGATIVE for significant arthralgias or myalgia  NEURO: NEGATIVE for weakness, dizziness or paresthesias  HEME: NEGATIVE for bleeding problems  PSYCHIATRIC: NEGATIVE for changes in mood or affect     Objective    Exam  There were no vitals taken for this visit.   Estimated body mass index is 28.82 kg/m  as calculated from the following:    Height as of 10/21/24: 1.702 m (5' 7\").    Weight as of 10/21/24: 83.5 kg (184 lb).    Physical Exam    Scheduled as a video visit therefore a complete exam could not be performed  GENERAL: alert and no distress  PSYCH: mentation appears normal, affect normal/bright      Signed Electronically by: Jac Barry MD    "

## 2025-06-04 NOTE — PATIENT INSTRUCTIONS
Patient Education   Preventive Care Advice   This is general advice given by our system to help you stay healthy. However, your care team may have specific advice just for you. Please talk to your care team about your preventive care needs.  Nutrition  Eat 5 or more servings of fruits and vegetables each day.  Try wheat bread, brown rice and whole grain pasta (instead of white bread, rice, and pasta).  Get enough calcium and vitamin D. Check the label on foods and aim for 100% of the RDA (recommended daily allowance).  Lifestyle  Exercise at least 150 minutes each week  (30 minutes a day, 5 days a week).  Do muscle strengthening activities 2 days a week. These help control your weight and prevent disease.  No smoking.  Wear sunscreen to prevent skin cancer.  Have a dental exam and cleaning every 6 months.  Yearly exams  See your health care team every year to talk about:  Any changes in your health.  Any medicines your care team has prescribed.  Preventive care, family planning, and ways to prevent chronic diseases.  Shots (vaccines)   HPV shots (up to age 26), if you've never had them before.  Hepatitis B shots (up to age 59), if you've never had them before.  COVID-19 shot: Get this shot when it's due.  Flu shot: Get a flu shot every year.  Tetanus shot: Get a tetanus shot every 10 years.  Pneumococcal, hepatitis A, and RSV shots: Ask your care team if you need these based on your risk.  Shingles shot (for age 50 and up)  General health tests  Diabetes screening:  Starting at age 35, Get screened for diabetes at least every 3 years.  If you are younger than age 35, ask your care team if you should be screened for diabetes.  Cholesterol test: At age 39, start having a cholesterol test every 5 years, or more often if advised.  Bone density scan (DEXA): At age 50, ask your care team if you should have this scan for osteoporosis (brittle bones).  Hepatitis C: Get tested at least once in your life.  STIs (sexually  transmitted infections)  Before age 24: Ask your care team if you should be screened for STIs.  After age 24: Get screened for STIs if you're at risk. You are at risk for STIs (including HIV) if:  You are sexually active with more than one person.  You don't use condoms every time.  You or a partner was diagnosed with a sexually transmitted infection.  If you are at risk for HIV, ask about PrEP medicine to prevent HIV.  Get tested for HIV at least once in your life, whether you are at risk for HIV or not.  Cancer screening tests  Cervical cancer screening: If you have a cervix, begin getting regular cervical cancer screening tests starting at age 21.  Breast cancer scan (mammogram): If you've ever had breasts, begin having regular mammograms starting at age 40. This is a scan to check for breast cancer.  Colon cancer screening: It is important to start screening for colon cancer at age 45.  Have a colonoscopy test every 10 years (or more often if you're at risk) Or, ask your provider about stool tests like a FIT test every year or Cologuard test every 3 years.  To learn more about your testing options, visit:   .  For help making a decision, visit:   https://bit.ly/jd62765.  Prostate cancer screening test: If you have a prostate, ask your care team if a prostate cancer screening test (PSA) at age 55 is right for you.  Lung cancer screening: If you are a current or former smoker ages 50 to 80, ask your care team if ongoing lung cancer screenings are right for you.  For informational purposes only. Not to replace the advice of your health care provider. Copyright   2023 [facility]. All rights reserved. Clinically reviewed by the [facility]. mnlakeplace.com 500247 - REV 01/24.  Learning About Activities of Daily Living  What are activities of daily living?     Activities of daily living (ADLs) are the basic self-care tasks you do every day. These include eating, bathing, dressing,  and moving around.  As you age, and if you have health problems, you may find that it's harder to do some of these tasks. If so, your doctor can suggest ideas that may help.  To measure what kind of help you may need, your doctor will ask how well you are able to do ADLs. Let your doctor know if there are any tasks that you are having trouble doing. This is an important first step to getting help. And when you have the help you need, you can stay as independent as possible.  How will a doctor assess your ADLs?  Asking about ADLs is part of a routine health checkup your doctor will likely do as you age. Your health check might be done in a doctor's office, in your home, or at a hospital. The goal is to find out if you are having any problems that could make it hard to care for yourself or that make it unsafe for you to be on your own.  To measure your ADLs, your doctor will ask how hard it is for you to do routine tasks. Your doctor may also want to know if you have changed the way you do a task because of a health problem. Your doctor may watch how you:  Walk back and forth.  Keep your balance while you stand or walk.  Move from sitting to standing or from a bed to a chair.  Button or unbutton a shirt or sweater.  Remove and put on your shoes.  It's common to feel a little worried or anxious if you find you can't do all the things you used to be able to do. Talking with your doctor about ADLs is a way to make sure you're as safe as possible and able to care for yourself as well as you can. You may want to bring a caregiver, friend, or family member to your checkup. They can help you talk to your doctor.  Follow-up care is a key part of your treatment and safety. Be sure to make and go to all appointments, and call your doctor if you are having problems. It's also a good idea to know your test results and keep a list of the medicines you take.  Current as of: October 24, 2024  Content Version: 14.4    2278-2896  [employer].   Care instructions adapted under license by your healthcare professional. If you have questions about a medical condition or this instruction, always ask your healthcare professional. [employer] disclaims any warranty or liability for your use of this information.    Preventing Falls: Care Instructions  Injuries and health problems such as trouble walking or poor eyesight can increase your risk of falling. So can some medicines. But there are things you can do to help prevent falls. You can exercise to get stronger. You can also arrange your home to make it safer.    Talk to your doctor about the medicines you take. Ask if any of them increase the risk of falls and whether they can be changed or stopped.   Try to exercise regularly. It can help improve your strength and balance. This can help lower your risk of falling.         Practice fall safety and prevention.   Wear low-heeled shoes that fit well and give your feet good support. Talk to your doctor if you have foot problems that make this hard.  Carry a cellphone or wear a medical alert device that you can use to call for help.  Use stepladders instead of chairs to reach high objects. Don't climb if you're at risk for falls. Ask for help, if needed.  Wear the correct eyeglasses, if you need them.        Make your home safer.   Remove rugs, cords, clutter, and furniture from walkways.  Keep your house well lit. Use night-lights in hallways and bathrooms.  Install and use sturdy handrails on stairways.  Wear nonskid footwear, even inside. Don't walk barefoot or in socks without shoes.        Be safe outside.   Use handrails, curb cuts, and ramps whenever possible.  Keep your hands free by using a shoulder bag or backpack.  Try to walk in well-lit areas. Watch out for uneven ground, changes in pavement, and debris.  Be careful in the winter. Walk on the grass or gravel when sidewalks are slippery. Use de-icer on steps and  "walkways. Add non-slip devices to shoes.    Put grab bars and nonskid mats in your shower or tub and near the toilet. Try to use a shower chair or bath bench when bathing.   Get into a tub or shower by putting in your weaker leg first. Get out with your strong side first. Have a phone or medical alert device in the bathroom with you.   Where can you learn more?  Go to https://www.[employer].net/patiented  Enter G117 in the search box to learn more about \"Preventing Falls: Care Instructions.\"  Current as of: July 31, 2024  Content Version: 14.4    4588-1781 [employer].   Care instructions adapted under license by your healthcare professional. If you have questions about a medical condition or this instruction, always ask your healthcare professional. [employer] disclaims any warranty or liability for your use of this information.       "

## 2025-06-05 ENCOUNTER — PATIENT OUTREACH (OUTPATIENT)
Dept: CARE COORDINATION | Facility: CLINIC | Age: 75
End: 2025-06-05
Payer: MEDICARE

## 2025-06-05 ENCOUNTER — ORDERS ONLY (AUTO-RELEASED) (OUTPATIENT)
Dept: INTERNAL MEDICINE | Facility: CLINIC | Age: 75
End: 2025-06-05

## 2025-06-05 DIAGNOSIS — Z12.11 COLON CANCER SCREENING: ICD-10-CM

## 2025-06-30 DIAGNOSIS — E11.9 TYPE 2 DIABETES MELLITUS WITHOUT COMPLICATION, WITHOUT LONG-TERM CURRENT USE OF INSULIN (H): ICD-10-CM

## 2025-07-03 DIAGNOSIS — K21.9 GASTROESOPHAGEAL REFLUX DISEASE WITHOUT ESOPHAGITIS: ICD-10-CM

## 2025-07-03 DIAGNOSIS — E11.9 TYPE 2 DIABETES MELLITUS WITHOUT COMPLICATION, WITHOUT LONG-TERM CURRENT USE OF INSULIN (H): ICD-10-CM

## 2025-07-03 RX ORDER — OMEPRAZOLE 20 MG/1
20 CAPSULE, DELAYED RELEASE ORAL DAILY
Qty: 90 CAPSULE | Refills: 0 | Status: SHIPPED | OUTPATIENT
Start: 2025-07-03

## 2025-07-25 DIAGNOSIS — E11.9 TYPE 2 DIABETES MELLITUS WITHOUT COMPLICATION, WITHOUT LONG-TERM CURRENT USE OF INSULIN (H): ICD-10-CM

## 2025-07-25 NOTE — LETTER
[facility]  [address] [address]  [phone] (nurse line)    July 29, 2025      Diane Gaitan  [address] [facility] [facility] [address]      Dear Diane Gaitan    In reviewing your recent refill request for metFORMIN (GLUCOPHAGE) 500 MG tablet , it was noted that you are due for the following laboratory test fasting.. Approval for a 90 day supply of the medication has been sent to your pharmacy. Additional refills will be approved after completion of the lab test.    Please contact our office at [phone] to schedule your appointment.      Sincerely,      [facility]

## (undated) DEVICE — SU VICRYL 3-0 SH 27" J316H

## (undated) DEVICE — SUCTION MANIFOLD NEPTUNE 2 SYS 4 PORT 0702-020-000

## (undated) DEVICE — GLOVE BIOGEL PI ORTHOPRO SZ 7.5 47675

## (undated) DEVICE — APPLICATOR COTTON TIP 6"X2 STERILE LF 6012

## (undated) DEVICE — NDL 25GA 1.5" 305127

## (undated) DEVICE — SOL DEXTROSE 5% 50ML BAG

## (undated) DEVICE — BIT DRILL BIOMET PERIPHERAL SCR 2.7MM SS 405889

## (undated) DEVICE — EYE PREP BETADINE 5% SOLUTION 30ML 0065-0411-30

## (undated) DEVICE — SYR BULB IRRIG DOVER 60 ML LATEX FREE 67000

## (undated) DEVICE — SOL WATER IRRIG 1000ML BOTTLE 2F7114

## (undated) DEVICE — Device

## (undated) DEVICE — BONE CLEANING TIP INTERPULSE  0210-010-000

## (undated) DEVICE — DRAPE POUCH INSTRUMENT 1018

## (undated) DEVICE — ESU PENCIL W/SMOKE EVAC NEPTUNE STRYKER 0703-046-000

## (undated) DEVICE — BIT DRILL BIOMET CENTRAL SCR 3.2MM SS 405883

## (undated) DEVICE — DECANTER VIAL 2006S

## (undated) DEVICE — SOL NACL 0.9% IRRIG 1000ML BOTTLE 2F7124

## (undated) DEVICE — POSITIONER ARMBOARD FOAM 1PAIR LF FP-ARMB1

## (undated) DEVICE — DRAPE IOBAN INCISE 23X17" 6650EZ

## (undated) DEVICE — LINEN TOWEL PACK X5 5464

## (undated) DEVICE — CLIP ETHICON LIGACLIP SM BLUE LT100

## (undated) DEVICE — PREP BRUSH SURG SCRUB CHLOROXYLENOL PCMX 3% 371163

## (undated) DEVICE — COVER CAMERA IN-LIGHT DISP LT-C02

## (undated) DEVICE — ESU ELEC NDL 1" E1552

## (undated) DEVICE — LINEN ORTHO PACK 5446

## (undated) DEVICE — ESU PENCIL W/SMOKE EVAC CVPLP2000

## (undated) DEVICE — GLOVE PROTEXIS BLUE W/NEU-THERA 7.5  2D73EB75

## (undated) DEVICE — GLOVE PROTEXIS W/NEU-THERA 7.5  2D73TE75

## (undated) DEVICE — DRSG STERI STRIP 1/2X4" R1547

## (undated) DEVICE — GLOVE BIOGEL PI PRO-FIT SZ 7.5 47975

## (undated) DEVICE — BLADE SAW SAGITTAL STRK 18X90X0.89MM  6118-089-090

## (undated) DEVICE — SOL HYDROGEN PEROXIDE 3% 4OZ BOTTLE F0010

## (undated) DEVICE — PREP CHLORAPREP 26ML TINTED ORANGE  260815

## (undated) DEVICE — SU MONOCRYL 2-0 SH 27" UND Y417H

## (undated) DEVICE — IMM KIT SHOULDER STABILIZATION 7210573

## (undated) DEVICE — IMM KIT SHOULDER TMAX MASK FACE 7210559

## (undated) DEVICE — SU ETHIBOND 0 CT-1 CR 8X18" CX21D

## (undated) DEVICE — DRSG GAUZE 4X4" 3033

## (undated) DEVICE — PACK MINOR SBA15MIFSE

## (undated) DEVICE — ESU GROUND PAD UNIVERSAL W/O CORD

## (undated) DEVICE — SU VICRYL 2-0 TIE 12X18" J905T

## (undated) DEVICE — DRSG AQUACEL AG HYDROFIBER  3.5X10" 422605

## (undated) DEVICE — SU SILK 2-0 TIE 12X30" A305H

## (undated) DEVICE — ESU GROUND PAD ADULT W/CORD E7507

## (undated) DEVICE — PACK SET-UP STD 9102

## (undated) DEVICE — PAD CHUX UNDERPAD 23X24" 7136

## (undated) DEVICE — ESU CLEANER TIP 31142717

## (undated) DEVICE — SYR 10ML FINGER CONTROL W/O NDL 309695

## (undated) DEVICE — DRAPE U-DRAPE 1015NSD NON-STERILE

## (undated) DEVICE — SUCTION CANISTER MEDIVAC LINER 3000ML W/LID 65651-530

## (undated) DEVICE — SU MONOCRYL 3-0 PS-1 27" Y936H

## (undated) DEVICE — BONE CEMENT MIXEVAC III HI VAC KIT  0206-015-000

## (undated) DEVICE — DRAPE BREAST/CHEST 29420

## (undated) DEVICE — RESTRAINT LIMB HOLDER ANKLE/WRIST FOAM W/QUICK RELEASE 2533

## (undated) DEVICE — SUCTION IRR SYSTEM W/O TIP INTERPULSE HANDPIECE 0210-100-000

## (undated) DEVICE — ESU ELEC BLADE 2.75" COATED/INSULATED E1455

## (undated) DEVICE — SPONGE LAP 18X18" X8435

## (undated) DEVICE — SU VICRYL 4-0 PS-2 18" UND J496H

## (undated) DEVICE — STRAP KNEE/BODY 31143004

## (undated) DEVICE — DRAPE U-POUCH 34X29" 1067

## (undated) DEVICE — CLEANSER JET LAVAGE IRRISEPT 0.05% CHG IRRISEPT45USA

## (undated) DEVICE — PREP CHLORAPREP 26ML TINTED HI-LITE ORANGE 930815

## (undated) DEVICE — SU ETHIBOND 2 V-37 4X30" MX69G

## (undated) DEVICE — GOWN IMPERVIOUS SPECIALTY XLG/XLONG 32474

## (undated) DEVICE — NDL 19GA 1.5"

## (undated) RX ORDER — FENTANYL CITRATE 50 UG/ML
INJECTION, SOLUTION INTRAMUSCULAR; INTRAVENOUS
Status: DISPENSED
Start: 2019-12-19

## (undated) RX ORDER — FENTANYL CITRATE 50 UG/ML
INJECTION, SOLUTION INTRAMUSCULAR; INTRAVENOUS
Status: DISPENSED
Start: 2018-09-13

## (undated) RX ORDER — HYDROMORPHONE HYDROCHLORIDE 1 MG/ML
INJECTION, SOLUTION INTRAMUSCULAR; INTRAVENOUS; SUBCUTANEOUS
Status: DISPENSED
Start: 2019-12-19

## (undated) RX ORDER — CEFAZOLIN SODIUM 2 G/100ML
INJECTION, SOLUTION INTRAVENOUS
Status: DISPENSED
Start: 2019-12-19

## (undated) RX ORDER — WATER 10 ML/10ML
INJECTION INTRAMUSCULAR; INTRAVENOUS; SUBCUTANEOUS
Status: DISPENSED
Start: 2019-12-19

## (undated) RX ORDER — FENTANYL CITRATE-0.9 % NACL/PF 10 MCG/ML
PLASTIC BAG, INJECTION (ML) INTRAVENOUS
Status: DISPENSED
Start: 2024-09-13

## (undated) RX ORDER — EPHEDRINE SULFATE 50 MG/ML
INJECTION, SOLUTION INTRAMUSCULAR; INTRAVENOUS; SUBCUTANEOUS
Status: DISPENSED
Start: 2024-09-13

## (undated) RX ORDER — VANCOMYCIN HYDROCHLORIDE 1 G/20ML
INJECTION, POWDER, LYOPHILIZED, FOR SOLUTION INTRAVENOUS
Status: DISPENSED
Start: 2024-09-13

## (undated) RX ORDER — FENTANYL CITRATE 50 UG/ML
INJECTION, SOLUTION INTRAMUSCULAR; INTRAVENOUS
Status: DISPENSED
Start: 2024-09-13

## (undated) RX ORDER — APREPITANT 40 MG/1
CAPSULE ORAL
Status: DISPENSED
Start: 2024-09-13

## (undated) RX ORDER — CEFAZOLIN SODIUM/WATER 2 G/20 ML
SYRINGE (ML) INTRAVENOUS
Status: DISPENSED
Start: 2024-09-13

## (undated) RX ORDER — PROPOFOL 10 MG/ML
INJECTION, EMULSION INTRAVENOUS
Status: DISPENSED
Start: 2019-12-19

## (undated) RX ORDER — FENTANYL CITRATE 0.05 MG/ML
INJECTION, SOLUTION INTRAMUSCULAR; INTRAVENOUS
Status: DISPENSED
Start: 2019-12-19

## (undated) RX ORDER — REGADENOSON 0.08 MG/ML
INJECTION, SOLUTION INTRAVENOUS
Status: DISPENSED
Start: 2018-09-14

## (undated) RX ORDER — HYDROCODONE BITARTRATE AND ACETAMINOPHEN 5; 325 MG/1; MG/1
TABLET ORAL
Status: DISPENSED
Start: 2019-12-19

## (undated) RX ORDER — BUPIVACAINE HYDROCHLORIDE AND EPINEPHRINE 5; 5 MG/ML; UG/ML
INJECTION, SOLUTION EPIDURAL; INTRACAUDAL; PERINEURAL
Status: DISPENSED
Start: 2019-12-19

## (undated) RX ORDER — BUPIVACAINE HYDROCHLORIDE 2.5 MG/ML
INJECTION, SOLUTION EPIDURAL; INFILTRATION; INTRACAUDAL
Status: DISPENSED
Start: 2024-09-13

## (undated) RX ORDER — HYDROMORPHONE HYDROCHLORIDE 1 MG/ML
INJECTION, SOLUTION INTRAMUSCULAR; INTRAVENOUS; SUBCUTANEOUS
Status: DISPENSED
Start: 2024-09-13

## (undated) RX ORDER — TRANEXAMIC ACID 650 MG/1
TABLET ORAL
Status: DISPENSED
Start: 2024-09-13